# Patient Record
Sex: MALE | Race: WHITE | NOT HISPANIC OR LATINO | Employment: OTHER | URBAN - METROPOLITAN AREA
[De-identification: names, ages, dates, MRNs, and addresses within clinical notes are randomized per-mention and may not be internally consistent; named-entity substitution may affect disease eponyms.]

---

## 2017-01-09 ENCOUNTER — NON-PROVIDER VISIT (OUTPATIENT)
Dept: CARDIOLOGY | Facility: PHYSICIAN GROUP | Age: 52
End: 2017-01-09
Attending: INTERNAL MEDICINE
Payer: OTHER GOVERNMENT

## 2017-01-09 DIAGNOSIS — E11.8 TYPE 2 DIABETES MELLITUS WITH COMPLICATION, WITH LONG-TERM CURRENT USE OF INSULIN (HCC): ICD-10-CM

## 2017-01-09 DIAGNOSIS — Z95.1 HX OF CABG: ICD-10-CM

## 2017-01-09 DIAGNOSIS — I44.7 LBBB (LEFT BUNDLE BRANCH BLOCK): ICD-10-CM

## 2017-01-09 DIAGNOSIS — I10 ESSENTIAL HYPERTENSION: ICD-10-CM

## 2017-01-09 DIAGNOSIS — I50.9 HEART FAILURE, NYHA CLASS 3 (HCC): ICD-10-CM

## 2017-01-09 DIAGNOSIS — G47.33 OSA ON CPAP: ICD-10-CM

## 2017-01-09 DIAGNOSIS — I50.20 ACC/AHA STAGE C SYSTOLIC HEART FAILURE (HCC): ICD-10-CM

## 2017-01-09 DIAGNOSIS — Z79.4 TYPE 2 DIABETES MELLITUS WITH COMPLICATION, WITH LONG-TERM CURRENT USE OF INSULIN (HCC): ICD-10-CM

## 2017-01-09 DIAGNOSIS — I25.5 ISCHEMIC CARDIOMYOPATHY: ICD-10-CM

## 2017-01-09 LAB — LV EJECT FRACT  99904: NORMAL

## 2017-01-18 ENCOUNTER — OFFICE VISIT (OUTPATIENT)
Dept: CARDIOLOGY | Facility: MEDICAL CENTER | Age: 52
End: 2017-01-18
Payer: OTHER GOVERNMENT

## 2017-01-18 VITALS
HEIGHT: 74 IN | WEIGHT: 300 LBS | SYSTOLIC BLOOD PRESSURE: 92 MMHG | DIASTOLIC BLOOD PRESSURE: 60 MMHG | HEART RATE: 76 BPM | OXYGEN SATURATION: 93 % | BODY MASS INDEX: 38.5 KG/M2

## 2017-01-18 DIAGNOSIS — G47.33 OSA ON CPAP: ICD-10-CM

## 2017-01-18 DIAGNOSIS — I50.9 HEART FAILURE, NYHA CLASS 3 (HCC): ICD-10-CM

## 2017-01-18 DIAGNOSIS — I25.5 ISCHEMIC CARDIOMYOPATHY: ICD-10-CM

## 2017-01-18 DIAGNOSIS — I50.20 ACC/AHA STAGE C SYSTOLIC HEART FAILURE (HCC): ICD-10-CM

## 2017-01-18 DIAGNOSIS — I44.7 LBBB (LEFT BUNDLE BRANCH BLOCK): ICD-10-CM

## 2017-01-18 DIAGNOSIS — Z95.1 S/P CABG X 3: ICD-10-CM

## 2017-01-18 DIAGNOSIS — R06.02 SHORTNESS OF BREATH: ICD-10-CM

## 2017-01-18 DIAGNOSIS — Z95.1 HX OF CABG: ICD-10-CM

## 2017-01-18 PROCEDURE — 99214 OFFICE O/P EST MOD 30 MIN: CPT | Mod: 25 | Performed by: INTERNAL MEDICINE

## 2017-01-18 PROCEDURE — 94620 PR PULMONARY STRESS TESTING,SIMPLE: CPT | Performed by: INTERNAL MEDICINE

## 2017-01-18 ASSESSMENT — ENCOUNTER SYMPTOMS
BRUISES/BLEEDS EASILY: 0
NAUSEA: 0
MYALGIAS: 0
EYE PAIN: 0
HALLUCINATIONS: 0
PALPITATIONS: 0
HEADACHES: 0
SPEECH CHANGE: 0
DEPRESSION: 0
FALLS: 0
SHORTNESS OF BREATH: 1
CHILLS: 0
PND: 0
SENSORY CHANGE: 0
ORTHOPNEA: 0
BLURRED VISION: 0
BLOOD IN STOOL: 0
EYE DISCHARGE: 0
CLAUDICATION: 0
DIZZINESS: 0
FEVER: 0
DOUBLE VISION: 0
LOSS OF CONSCIOUSNESS: 0
VOMITING: 0
WEIGHT LOSS: 0
ABDOMINAL PAIN: 0
COUGH: 0

## 2017-01-18 NOTE — PROGRESS NOTES
Subjective:   Juaquin Solorzano is a 51 y.o. male who presents today for cardiac care and evaluation of his prior history of CABG. In 9/2013, patient underwent triple bypass surgery. In 2014 he underwent open sternotomy again because of complications. Patient also has a history of sleep apnea for which he has been using CPAP. Patient is obese.     Patient also has diabetes for which he is then controlled with insulin injections.    Patient was able to complete 314 m during his 6 minute walk test. his O2 saturation at baseline was 93% and at the end of the test, the O2 saturation was 95%. He reported 8 level of dyspnea on Anson scale.    Because of his report of exertional dyspnea, I obtain a transthoracic echocardiogram which shows left ventricular systolic function of 30%. Patient underwent ischemic evaluation with coronary angiogram. Essentially, patient is living off of of his TURNER to [facility]. His LV gram shows left ventricular systolic function of 50-55%. Patient still reports having shortness of breath. He is able to walk for half a mile however at slow. He is tolerating Entresto therapy well.     I repeated his transthoracic echocardiogram which she had one week ago which shows evidence of 50% in terms of his left ventricular systolic function. Patient continues to report of having shortness of breath even when tying his shoes. However, his 6 minute walk test is much improved because he was not even able to walk at the last visit.    Past Medical History   Diagnosis Date   • Myocardial infarct (CMS-HCC) 2007   • Myocardial infarct (CMS-HCC) 2012   • Bronchitis 2014   • Sleep apnea      cpap   • Arthritis      osteo   • Fibromyalgia    • Diabetes (CMS-HCC) 2006     insulin   • Cataract      early stages   • Psychiatric problem      depression and anxiety     Past Surgical History   Procedure Laterality Date   • Other cardiac surgery  2014     cabg x 3   • Other  2015     sternal revision   • Recovery  7/22/2016      Procedure: CATH LAB-[facility] W/POSSIBLE RADIAL APPROACH-CARIE;  Surgeon: Recoveryonly Surgery;  Location: SURGERY PRE-POST PROC UNIT [place];  Service:      Family History   Problem Relation Age of Onset   • Heart Disease Mother    • Hyperlipidemia Father    • Heart Disease Father    • Hypertension Father    • Stroke Father      History   Smoking status   • Former Smoker -- 0.50 packs/day for 12 years   • Types: Cigarettes   • Quit date: 07/15/2012   Smokeless tobacco   • Not on file     Allergies   Allergen Reactions   • Iodine      Outpatient Encounter Prescriptions as of 1/18/2017   Medication Sig Dispense Refill   • Sacubitril-Valsartan (ENTRESTO)  MG Tab Take  mg by mouth 2 Times a Day. 60 Tab 11   • spironolactone (ALDACTONE) 25 MG Tab Take 1 Tab by mouth every day. 30 Tab 3   • amitriptyline (ELAVIL) 75 MG Tab Take 75 mg by mouth every evening.     • allopurinol (ZYLOPRIM) 100 MG Tab Take 100 mg by mouth 2 Times a Day.     • vitamin D, Ergocalciferol, (DRISDOL) 97147 UNITS Cap capsule Take  by mouth every 7 days.     • Pyridoxine HCl (VITAMIN B-6 PO) Take  by mouth.     • insulin lispro (HUMALOG) 100 UNIT/ML Solution Inject  as instructed 3 times a day before meals.     • insulin glargine (LANTUS) 100 UNIT/ML Solution Inject  as instructed every evening.     • esomeprazole magnesium (NEXIUM) 40 MG Pack Take 40 mg by mouth every morning before breakfast.     • aspirin 81 MG tablet Take 81 mg by mouth every day.     • escitalopram (LEXAPRO) 10 MG Tab Take 10 mg by mouth every day.     • atorvastatin (LIPITOR) 40 MG Tab Take 40 mg by mouth every evening.     • metformin (GLUCOPHAGE) 1000 MG tablet Take 1,000 mg by mouth 2 times a day, with meals.     • metoprolol SR (TOPROL XL) 100 MG TABLET SR 24 HR Take 100 mg by mouth 2 Times a Day. Two tablets twice a day     • Omega-3 Fatty Acids (FISH OIL) 1000 MG Cap capsule Take 1,000 mg by mouth 3 times a day, with meals.     • alprazolam (XANAX) 0.5 MG Tab Take  "0.5 mg by mouth at bedtime as needed for Sleep. NOT TAKING       No facility-administered encounter medications on file as of 1/18/2017.     Review of Systems   Constitutional: Negative for fever, chills, weight loss and malaise/fatigue.   HENT: Negative for ear discharge, ear pain, hearing loss and nosebleeds.    Eyes: Negative for blurred vision, double vision, pain and discharge.   Respiratory: Positive for shortness of breath. Negative for cough.    Cardiovascular: Negative for chest pain, palpitations, orthopnea, claudication, leg swelling and PND.   Gastrointestinal: Negative for nausea, vomiting, abdominal pain, blood in stool and melena.   Genitourinary: Negative for dysuria and hematuria.   Musculoskeletal: Negative for myalgias, joint pain and falls.   Skin: Negative for itching and rash.   Neurological: Negative for dizziness, sensory change, speech change, loss of consciousness and headaches.   Endo/Heme/Allergies: Negative for environmental allergies. Does not bruise/bleed easily.   Psychiatric/Behavioral: Negative for depression, suicidal ideas and hallucinations.        Objective:   BP 92/60 mmHg  Pulse 76  Ht 1.88 m (6' 2.02\")  Wt 136.079 kg (300 lb)  BMI 38.50 kg/m2  SpO2 93%    Physical Exam   Constitutional: He is oriented to person, place, and time. No distress.   HENT:   Head: Normocephalic and atraumatic.   Eyes: EOM are normal.   Neck: Normal range of motion. No JVD present.   Cardiovascular: Normal rate, regular rhythm, normal heart sounds and intact distal pulses.  Exam reveals no gallop and no friction rub.    No murmur heard.  Bilateral femoral pulses are 2+, bilateral dorsalis pedis pulses are 2+, bilateral posterior tibialis pulses are 2+.   Pulmonary/Chest: No respiratory distress.   Abdominal: Soft. Bowel sounds are normal. There is no tenderness. There is no rebound and no guarding.   The is no presence of abdominal bruits   Musculoskeletal: Normal range of motion. He exhibits no " edema or tenderness.   Neurological: He is alert and oriented to person, place, and time.   Skin: Skin is warm and dry.   Psychiatric: He has a normal mood and affect.   Nursing note and vitals reviewed.      Assessment:     1. ACC/AHA stage C systolic heart failure (CMS-HCC)  DE PULMONARY STRESS TESTING,SIMPLE    PULMONARY FUNCTION TESTS Test requested: Complete Pulmonary Function Test   2. Heart failure, NYHA class 3 (CMS-[facility])  DE PULMONARY STRESS TESTING,SIMPLE    PULMONARY FUNCTION TESTS Test requested: Complete Pulmonary Function Test   3. Hx of CABG  DE PULMONARY STRESS TESTING,SIMPLE    PULMONARY FUNCTION TESTS Test requested: Complete Pulmonary Function Test   4. Ischemic cardiomyopathy  DE PULMONARY STRESS TESTING,SIMPLE    PULMONARY FUNCTION TESTS Test requested: Complete Pulmonary Function Test   5. S/P CABG x 3  DE PULMONARY STRESS TESTING,SIMPLE    PULMONARY FUNCTION TESTS Test requested: Complete Pulmonary Function Test   6. LBBB (left bundle branch block)  DE PULMONARY STRESS TESTING,SIMPLE    PULMONARY FUNCTION TESTS Test requested: Complete Pulmonary Function Test   7. CLARE on CPAP  DE PULMONARY STRESS TESTING,SIMPLE    PULMONARY FUNCTION TESTS Test requested: Complete Pulmonary Function Test   8. Shortness of breath  DE PULMONARY STRESS TESTING,SIMPLE    PULMONARY FUNCTION TESTS Test requested: Complete Pulmonary Function Test       Medical Decision Making:  Today's Assessment / Status / Plan:     Heart failure: EF improved to 50%. Pt tolerating medications, but continues to complain of SOB in some instances such as tying his shoes. Pt may have underlying lung disease, will obtain a PFT to evaluate further. No medication adjustments to be made at this time.     At this time, I am not certain that his shortness of breath is entirely cardiac. I would advise him to lose some weight also.    Continue current medical therapy without change today. His blood pressure is borderline but he is not having  lightheadedness or passing out episodes.    Today, based on physical examination findings, patient is euvolemic. No JVD, lungs are clear to auscultation, no pitting edema in bilateral lower extremities, no ascites.    No indication for ICD at this time.    Pt to follow up with Evita VELA in 4 months. Sooner if needed.

## 2017-01-18 NOTE — MR AVS SNAPSHOT
"        Juaquin RAINNelson Jeanine   2017 9:45 AM   Office Visit   MRN: [id number]    Department:  [facility]   Dept Phone:  [phone]    Description:  Male : 1965   Provider:  Adrian Simms M.D.           Reason for Visit     Follow-Up           Allergies as of 2017     Allergen Noted Reactions    Iodine 2016         You were diagnosed with     ACC/AHA stage C systolic heart failure (CMS-[facility])   [0012440]       Heart failure, NYHA class 3 (CMS-[facility])   [794009]       Hx of CABG   [084742]       Ischemic cardiomyopathy   [806337]       S/P CABG x 3   [984495]       LBBB (left bundle branch block)   [251849]       CLARE on CPAP   [182414]       Shortness of breath   [786.05.ICD-9-CM]         Vital Signs     Blood Pressure Pulse Height Weight Body Mass Index Oxygen Saturation    92/60 mmHg 76 1.88 m (6' 2.02\") 136.079 kg (300 lb) 38.50 kg/m2 93%    Smoking Status                   Former Smoker           Basic Information     Date Of Birth Sex Race Ethnicity Preferred Language    1965 Male White Non- English      Health Maintenance        Date Due Completion Dates    IMM HEP B VACCINE (1 of 3 - Primary Series) 1965 ---    A1C SCREENING 1/3/1967 ---    DIABETES MONOFILAMENT / LE EXAM 1/3/1967 ---    RETINAL SCREENING 7/3/1984 ---    FASTING LIPID PROFILE 7/3/1984 ---    URINE ACR / MICROALBUMIN 7/3/1984 ---    IMM DTaP/Tdap/Td Vaccine (1 - Tdap) 7/3/1985 ---    COLONOSCOPY 7/3/2016 ---    IMM INFLUENZA (1) 2016 ---    SERUM CREATININE 2017, 2016, 2016            Current Immunizations     No immunizations on file.      Below and/or attached are the medications your provider expects you to take. Review all of your home medications and newly ordered medications with your provider and/or pharmacist. Follow medication instructions as directed by your provider and/or pharmacist. Please keep your medication list with you and share with your provider. Update the " information when medications are discontinued, doses are changed, or new medications (including over-the-counter products) are added; and carry medication information at all times in the event of emergency situations     Allergies:  IODINE - (reactions not documented)               Medications  Valid as of: January 18, 2017 - 10:26 AM    Generic Name Brand Name Tablet Size Instructions for use    Allopurinol (Tab) ZYLOPRIM 100 MG Take 100 mg by mouth 2 Times a Day.        ALPRAZolam (Tab) XANAX 0.5 MG Take 0.5 mg by mouth at bedtime as needed for Sleep. NOT TAKING        Amitriptyline HCl (Tab) ELAVIL 75 MG Take 75 mg by mouth every evening.        Aspirin (Tab) aspirin 81 MG Take 81 mg by mouth every day.        Atorvastatin Calcium (Tab) LIPITOR 40 MG Take 40 mg by mouth every evening.        Ergocalciferol (Cap) DRISDOL 51636 UNITS Take  by mouth every 7 days.        Escitalopram Oxalate (Tab) LEXAPRO 10 MG Take 10 mg by mouth every day.        Esomeprazole Magnesium (Pack) NEXIUM 40 MG Take 40 mg by mouth every morning before breakfast.        Insulin Glargine (Solution) LANTUS 100 UNIT/ML Inject  as instructed every evening.        Insulin Lispro (Solution) HUMALOG 100 UNIT/ML Inject  as instructed 3 times a day before meals.        MetFORMIN HCl (Tab) GLUCOPHAGE 1000 MG Take 1,000 mg by mouth 2 times a day, with meals.        Metoprolol Succinate (TABLET SR 24 HR) TOPROL  MG Take 100 mg by mouth 2 Times a Day. Two tablets twice a day        Omega-3 Fatty Acids (Cap) fish oil 1000 MG Take 1,000 mg by mouth 3 times a day, with meals.        Pyridoxine HCl   Take  by mouth.        Sacubitril-Valsartan (Tab) ENTRESTO  MG Take  mg by mouth 2 Times a Day.        Spironolactone (Tab) ALDACTONE 25 MG Take 1 Tab by mouth every day.        .                 Medicines prescribed today were sent to:     [facility] - [facility], [place] - [address] [place] [address]  [place] [address]    Phone: [phone] Fax: [phone]    Open 24 Hours?: No    [facility] [place] [facility] [place], [place] - [address]    [address] [place] [address]    Phone: [phone] Fax: [phone]    Open 24 Hours?: No      Medication refill instructions:       If your prescription bottle indicates you have medication refills left, it is not necessary to call your provider’s office. Please contact your pharmacy and they will refill your medication.    If your prescription bottle indicates you do not have any refills left, you may request refills at any time through one of the following ways: The online CardiOx system (except Urgent Care), by calling your provider’s office, or by asking your pharmacy to contact your provider’s office with a refill request. Medication refills are processed only during regular business hours and may not be available until the next business day. Your provider may request additional information or to have a follow-up visit with you prior to refilling your medication.   *Please Note: Medication refills are assigned a new Rx number when refilled electronically. Your pharmacy may indicate that no refills were authorized even though a new prescription for the same medication is available at the pharmacy. Please request the medicine by name with the pharmacy before contacting your provider for a refill.           CardiOx Access Code: Activation code not generated  Current CardiOx Status: Active

## 2017-01-19 NOTE — PROGRESS NOTES
Reevaluation of 6MWT/MLWHF    Never had original 6MWT/MLWHF    1/18/2017:  6MWT: 313.9 meters  MLWHF: 83    Taylor, HF RN  [phone]

## 2017-01-30 ENCOUNTER — HOSPITAL ENCOUNTER (OUTPATIENT)
Dept: OTHER | Facility: MEDICAL CENTER | Age: 52
End: 2017-01-30
Attending: INTERNAL MEDICINE
Payer: OTHER GOVERNMENT

## 2017-01-30 DIAGNOSIS — I50.9 HEART FAILURE, NYHA CLASS 3 (HCC): ICD-10-CM

## 2017-01-30 DIAGNOSIS — R06.02 SHORTNESS OF BREATH: ICD-10-CM

## 2017-01-30 DIAGNOSIS — I25.5 ISCHEMIC CARDIOMYOPATHY: ICD-10-CM

## 2017-01-30 DIAGNOSIS — Z95.1 HX OF CABG: ICD-10-CM

## 2017-01-30 DIAGNOSIS — I50.20 ACC/AHA STAGE C SYSTOLIC HEART FAILURE (HCC): ICD-10-CM

## 2017-01-30 DIAGNOSIS — Z95.1 S/P CABG X 3: ICD-10-CM

## 2017-01-30 DIAGNOSIS — I44.7 LBBB (LEFT BUNDLE BRANCH BLOCK): ICD-10-CM

## 2017-01-30 DIAGNOSIS — G47.33 OSA ON CPAP: ICD-10-CM

## 2017-01-30 PROCEDURE — 94729 DIFFUSING CAPACITY: CPT | Mod: 26 | Performed by: INTERNAL MEDICINE

## 2017-01-30 PROCEDURE — 94726 PLETHYSMOGRAPHY LUNG VOLUMES: CPT | Mod: 26 | Performed by: INTERNAL MEDICINE

## 2017-01-30 PROCEDURE — 94060 EVALUATION OF WHEEZING: CPT

## 2017-01-30 PROCEDURE — 94060 EVALUATION OF WHEEZING: CPT | Mod: 26 | Performed by: INTERNAL MEDICINE

## 2017-01-30 PROCEDURE — 94726 PLETHYSMOGRAPHY LUNG VOLUMES: CPT

## 2017-01-30 PROCEDURE — 94729 DIFFUSING CAPACITY: CPT

## 2017-01-30 ASSESSMENT — PULMONARY FUNCTION TESTS
FVC: 3.72
FEV1/FVC: 85
FEV1/FVC_PERCENT_PREDICTED: 106
FEV1_PERCENT_PREDICTED: 73
FEV1_PERCENT_CHANGE: -3
FEV1/FVC_PERCENT_PREDICTED: 111
FEV1: 3.08
FEV1/FVC_PERCENT_CHANGE: -300
FVC_PERCENT_PREDICTED: 67
FEV1: 3.16
FEV1_PREDICTED: 4.35
FEV1_PERCENT_PREDICTED: 71
FVC: 3.76
FVC_PERCENT_PREDICTED: 66
FEV1/FVC: 81.91
FEV1/FVC_PERCENT_PREDICTED: 77
FEV1_PERCENT_CHANGE: 1
FVC_PREDICTED: 5.62

## 2017-01-31 NOTE — PROCEDURES
INTERPRETATION:  Spirometry reveals FVC mildly reduced at 3.72 liters, 66% of   predicted.  FEV1 is also mildly reduced at 3.16 liters, 73% of predicted.    FEV1/FVC ratio is super normal at 85%.  There is no improvement post   bronchodilator.  There is no dynamic airway collapse.  Total lung capacity is   normal at 7.02 liters, 92% of predicted.  There is some hyperinflation evident   that is mild with RV elevated at 2.91 liters, 132% of predicted.  RV/TLC   ratio is elevated at 138%.  Diffusion capacity is normal.  Airway resistance   is normal.  Flow volume loop is noted and has marginal restrictive pattern.    Room air sats normal at 97%.  Effort was good.    ASSESSMENT:  Spirometry suggests mild restrictive lung disease and no evidence   of obstruction.  Plethysmography reveals mild hyperinflation and no evidence   of restrictive lung disease.  Gas transfer is normal as is airway resistance.    Clinical correlation is recommended.       ____________________________________     MD EVANS CUI / DEVEN    DD:  01/31/2017 11:16:45  DT:  01/31/2017 15:19:45    D#:  [id number]  Job#:  [id number]

## 2017-02-20 RX ORDER — ESOMEPRAZOLE MAGNESIUM 40 MG/1
CAPSULE, DELAYED RELEASE ORAL
Qty: 90 CAP | Refills: 2 | Status: SHIPPED | OUTPATIENT
Start: 2017-02-20

## 2017-05-18 ENCOUNTER — OFFICE VISIT (OUTPATIENT)
Dept: CARDIOLOGY | Facility: MEDICAL CENTER | Age: 52
End: 2017-05-18
Payer: OTHER GOVERNMENT

## 2017-05-18 VITALS
OXYGEN SATURATION: 95 % | WEIGHT: 292 LBS | SYSTOLIC BLOOD PRESSURE: 106 MMHG | BODY MASS INDEX: 37.47 KG/M2 | HEIGHT: 74 IN | HEART RATE: 68 BPM | DIASTOLIC BLOOD PRESSURE: 74 MMHG

## 2017-05-18 DIAGNOSIS — E11.8 TYPE 2 DIABETES MELLITUS WITH COMPLICATION, WITH LONG-TERM CURRENT USE OF INSULIN (HCC): ICD-10-CM

## 2017-05-18 DIAGNOSIS — I50.20 ACC/AHA STAGE C SYSTOLIC HEART FAILURE (HCC): ICD-10-CM

## 2017-05-18 DIAGNOSIS — Z95.1 S/P CABG X 3: ICD-10-CM

## 2017-05-18 DIAGNOSIS — I44.7 LBBB (LEFT BUNDLE BRANCH BLOCK): ICD-10-CM

## 2017-05-18 DIAGNOSIS — I25.10 CORONARY ARTERY DISEASE INVOLVING NATIVE CORONARY ARTERY OF NATIVE HEART WITHOUT ANGINA PECTORIS: ICD-10-CM

## 2017-05-18 DIAGNOSIS — I10 ESSENTIAL HYPERTENSION: ICD-10-CM

## 2017-05-18 DIAGNOSIS — R06.02 SOB (SHORTNESS OF BREATH): ICD-10-CM

## 2017-05-18 DIAGNOSIS — I25.5 ISCHEMIC CARDIOMYOPATHY: ICD-10-CM

## 2017-05-18 DIAGNOSIS — Z79.4 TYPE 2 DIABETES MELLITUS WITH COMPLICATION, WITH LONG-TERM CURRENT USE OF INSULIN (HCC): ICD-10-CM

## 2017-05-18 DIAGNOSIS — I50.9 HEART FAILURE, NYHA CLASS 3 (HCC): ICD-10-CM

## 2017-05-18 PROCEDURE — 99214 OFFICE O/P EST MOD 30 MIN: CPT | Performed by: NURSE PRACTITIONER

## 2017-05-18 PROCEDURE — 94620 PR PULMONARY STRESS TESTING,SIMPLE: CPT | Performed by: NURSE PRACTITIONER

## 2017-05-18 RX ORDER — BUSPIRONE HYDROCHLORIDE 10 MG/1
10 TABLET ORAL 2 TIMES DAILY
COMMUNITY
End: 2018-03-05

## 2017-05-18 RX ORDER — GABAPENTIN 300 MG/1
300 CAPSULE ORAL 3 TIMES DAILY
COMMUNITY
End: 2018-03-05

## 2017-05-18 ASSESSMENT — MINNESOTA LIVING WITH HEART FAILURE QUESTIONNAIRE (MLHF)
TIRED, FATIGUED OR LOW ON ENERGY: 5
EATING LESS FOODS YOU LIKE: 5
MAKING YOU STAY IN A HOSPITAL: 1
LOSS OF SELF CONTROL IN YOUR LIFE: 5
MAKING YOU WORRY: 5
DIFFICULTY WITH SEXUAL ACTIVITIES: 5
SWELLING IN ANKLES OR LEGS: 5
MAKING YOU SHORT OF BREATH: 5
GIVING YOU SIDE EFFECTS FROM TREATMENTS: 4
DIFFICULTY WORKING TO EARN A LIVING: 5
DIFFICULTY SLEEPING WELL AT NIGHT: 3
WALKING ABOUT OR CLIMBING STAIRS DIFFICULT: 5
FEELING LIKE A BURDEN TO FAMILY AND FRIENDS: 5
DIFFICULTY WITH RECREATIONAL PASTIMES, SPORTS, HOBBIES: 5
WORKING AROUND THE HOUSE OR YARD DIFFICULT: 5
COSTING YOU MONEY FOR MEDICAL CARE: 3
TOTAL_SCORE: 94
HAVING TO SIT OR LIE DOWN DURING THE DAY: 5
DIFFICULTY TO CONCENTRATE OR REMEMBERING THINGS: 5
DIFFICULTY SOCIALIZING WITH FAMILY OR FRIENDS: 5
DIFFICULTY GOING AWAY FROM HOME: 3
MAKING YOU FEEL DEPRESSED: 5

## 2017-05-18 ASSESSMENT — ENCOUNTER SYMPTOMS
FEVER: 1
ABDOMINAL PAIN: 0
SHORTNESS OF BREATH: 1
CLAUDICATION: 0
COUGH: 0
ORTHOPNEA: 0
MYALGIAS: 0
DIZZINESS: 1
PALPITATIONS: 0
PND: 0

## 2017-05-18 ASSESSMENT — NEW YORK HEART ASSOCIATION (NYHA) CLASSIFICATION: NYHA FUNCTIONAL CLASS: CLASS III

## 2017-05-18 ASSESSMENT — 6 MINUTE WALK TEST (6MWT): TOTAL DISTANCE WALKED (METERS): 186

## 2017-05-18 NOTE — PROGRESS NOTES
Subjective:   Juaquin Solorzano is a 51 y.o. male who presents today for follow-up on his heart failure.    Patient of Dr. Simms. Patient was last seen 1/18/17. Patient has heart failure with reduced EF of now 50% from 30%. Patient has a history of CABG ×3 in 2013 with complications, ischemic cardiomyopathy, left bundle branch block, obstructive sleep apnea which he uses CPAP regularly, insulin-dependent diabetic. No medication changes were made for his heart failure. Patient was sent for a PFT to evaluate lung function.   Since his last visit, patient has felt that his shortness of breath is slightly worse. Patient denies chest pain with exertion, palpitations, orthopnea, PND or edema.    Patient has not taken his home weights regularly, his last home was about 2 weeks ago at 295 pounds.    At 6 minute walk test re-evaluation, patient was able to complete 186 m during his 6 minute walk test. He O2 saturation at baseline was 95% and at the end of the test, the O2 saturation was 93%. He reported level 7 of dyspnea on Anson scale. MLWHF 94    Past Medical History   Diagnosis Date   • Myocardial infarct (CMS-HCC) 2007   • Myocardial infarct (CMS-[facility]) 2012   • Bronchitis 2014   • Sleep apnea      cpap   • Arthritis      osteo   • Fibromyalgia    • Diabetes (CMS-HCC) 2006     insulin   • Cataract      early stages   • Psychiatric problem      depression and anxiety     Past Surgical History   Procedure Laterality Date   • Other cardiac surgery  2014     cabg x 3   • Other  2015     sternal revision   • Recovery  7/22/2016     Procedure: CATH LAB-[facility] W/POSSIBLE RADIAL APPROACH-CARIE;  Surgeon: Recoveryonly Surgery;  Location: SURGERY PRE-POST [facility] UN[facility] [place];  Service:      Family History   Problem Relation Age of Onset   • Heart Disease Mother    • Diabetes Mother    • Hyperlipidemia Father    • Heart Disease Father    • Hypertension Father    • Stroke Father    • Lung Disease Sister      History   Smoking status   •  Former Smoker -- 0.50 packs/day for 12 years   • Types: Cigarettes   • Quit date: 07/15/2012   Smokeless tobacco   • Not on file     Allergies   Allergen Reactions   • Iodine      Outpatient Encounter Prescriptions as of 5/18/2017   Medication Sig Dispense Refill   • busPIRone (BUSPAR) 10 MG Tab Take 10 mg by mouth 2 times a day.     • gabapentin (NEURONTIN) 300 MG Cap Take 300 mg by mouth 3 times a day.     • Sacubitril-Valsartan (ENTRESTO)  MG Tab Take  mg by mouth 2 Times a Day. 60 Tab 11   • spironolactone (ALDACTONE) 25 MG Tab Take 1 Tab by mouth every day. 30 Tab 3   • amitriptyline (ELAVIL) 75 MG Tab Take 75 mg by mouth every evening.     • allopurinol (ZYLOPRIM) 100 MG Tab Take 100 mg by mouth 2 Times a Day.     • Pyridoxine HCl (VITAMIN B-6 PO) Take  by mouth.     • insulin lispro (HUMALOG) 100 UNIT/ML Solution Inject  as instructed 3 times a day before meals.     • insulin glargine (LANTUS) 100 UNIT/ML Solution Inject  as instructed every evening.     • esomeprazole magnesium (NEXIUM) 40 MG Pack Take 40 mg by mouth every morning before breakfast.     • aspirin 81 MG tablet Take 81 mg by mouth every day.     • escitalopram (LEXAPRO) 10 MG Tab Take 10 mg by mouth every day.     • atorvastatin (LIPITOR) 40 MG Tab Take 40 mg by mouth every evening.     • metformin (GLUCOPHAGE) 1000 MG tablet Take 1,000 mg by mouth 2 times a day, with meals.     • metoprolol SR (TOPROL XL) 100 MG TABLET SR 24 HR Take 100 mg by mouth 2 Times a Day. Two tablets twice a day     • Omega-3 Fatty Acids (FISH OIL) 1000 MG Cap capsule Take 1,000 mg by mouth 3 times a day, with meals.     • alprazolam (XANAX) 0.5 MG Tab Take 0.5 mg by mouth at bedtime as needed for Sleep. NOT TAKING     • vitamin D, Ergocalciferol, (DRISDOL) 09300 UNITS Cap capsule Take  by mouth every 7 days.       No facility-administered encounter medications on file as of 5/18/2017.     Review of Systems   Constitutional: Positive for fever and  "malaise/fatigue.   Respiratory: Positive for shortness of breath. Negative for cough.    Cardiovascular: Positive for chest pain (aching from prev CABG, no exertional CP). Negative for palpitations, orthopnea, claudication, leg swelling and PND.   Gastrointestinal: Negative for abdominal pain.   Musculoskeletal: Negative for myalgias.   Neurological: Positive for dizziness (with standing too quickly).   All other systems reviewed and are negative.       Objective:   /74 mmHg  Pulse 68  Ht 1.88 m (6' 2\")  Wt 132.45 kg (292 lb)  BMI 37.47 kg/m2  SpO2 95%    Physical Exam   Constitutional: He is oriented to person, place, and time. He appears well-developed and well-nourished.   Obese BMI 37.49   HENT:   Head: Normocephalic and atraumatic.   Eyes: EOM are normal.   Neck: Normal range of motion. Neck supple. No JVD present.   Cardiovascular: Normal rate, regular rhythm, normal heart sounds and intact distal pulses.    No murmur heard.  Pulmonary/Chest: Effort normal and breath sounds normal. No respiratory distress. He has no wheezes. He has no rales.   Abdominal: Soft. Bowel sounds are normal.   Musculoskeletal: Normal range of motion. He exhibits no edema.   Neurological: He is alert and oriented to person, place, and time.   Skin: Skin is warm and dry.   Psychiatric: He has a normal mood and affect.   Nursing note and vitals reviewed.    No results found for: CHOLSTRLTOT, LDL, HDL, TRIGLYCERIDE    Lab Results   Component Value Date/Time    SODIUM 139 11/30/2016 09:15 AM    POTASSIUM 5.1 11/30/2016 09:15 AM    CHLORIDE 105 11/30/2016 09:15 AM    CO2 28 11/30/2016 09:15 AM    GLUCOSE 182* 11/30/2016 09:15 AM    BUN 15 11/30/2016 09:15 AM    CREATININE 1.08 11/30/2016 09:15 AM     No results found for: ALKPHOSPHAT, ASTSGOT, ALTSGPT, TBILIRUBIN     PFT 1/30/17  ASSESSMENT:  Spirometry suggests mild restrictive lung disease and no evidence   of obstruction.  Plethysmography reveals mild hyperinflation and no " evidence    of restrictive lung disease.  Gas transfer is normal as is airway resistance.     Clinical correlation is recommended.  Transthoracic  Echo Report      Echocardiography Laboratory 7/11/16    CONCLUSIONS  Technically difficult study incomplete information is obtained  Left ventricle not well visualized.  Akinesis of the mid to distal septum, apex, distal half of the lateral   wall, distal half of the anterior wall and inferoapical region.    There is relative sparing of the basal LV segments.  Left ventricular ejection fraction is visually estimated to be  30% and   dilated.  Right ventricle not well visualized.  Right ventricle not well visualized.  Appears to be mildly dilated (2D).  Normal pericardium without effusion.  The aortic valve is not well visualized.  Structurally normal mitral valve without significant stenosis or   regurgitation.    Heart cath 7/22/16   POSTOPERATIVE DIAGNOSES:  1.  Occluded vein grafts.  2.  Patent LIMA to LAD.  3.  Diffuse distal RCA disease.  4.  A 100% occluded LAD stent.  5.  Moderate nonobstructive left circumflex disease.  6.  LVEF 50%.    Echo from 1/9/17 in MEDIA tab  Assessment:     1. ACC/AHA stage C systolic heart failure (CMS-[facility])  LIPID PROFILE    ECHOCARDIOGRAM COMP W/O CONT   2. Heart failure, NYHA class 3 (CMS-[facility])  LIPID PROFILE    ECHOCARDIOGRAM COMP W/O CONT   3. Ischemic cardiomyopathy  LIPID PROFILE    ECHOCARDIOGRAM COMP W/O CONT   4. Coronary artery disease involving native coronary artery of native heart without angina pectoris  LIPID PROFILE    ECHOCARDIOGRAM COMP W/O CONT   5. S/P CABG x 3  LIPID PROFILE    ECHOCARDIOGRAM COMP W/O CONT   6. Type 2 diabetes mellitus with complication, with long-term current use of insulin (CMS-[facility])  LIPID PROFILE    ECHOCARDIOGRAM COMP W/O CONT   7. Essential hypertension  LIPID PROFILE    ECHOCARDIOGRAM COMP W/O CONT   8. LBBB (left bundle branch block)  ECHOCARDIOGRAM COMP W/O CONT   9. SOB (shortness of breath)          Medical Decision Making:  Today's Assessment / Status / Plan:   1. HFrEF, Stage C ,Class 3, EF 50%: Based on physical examination findings, patient is euvolemic. No JVD, lungs are clear to auscultation, no pitting edema in bilateral lower extremities, no ascites.  -Continue Toprol- mg twice a day  -Continue Entresto  mg twice a day   -Continue spironolactone 25 mg daily  Reinforced s/sx of worsening heart failure with patient and weight monitoring. Pt verbalizes understanding. Pt to call office or RTC if present.   -Repeat echo (d/t some increase in SOB)    2. CAD:  -Continue aspirin 81 mg daily  -Continue atorvastatin 40 mg daily  -No recent lipid panel, will Repeat lipid panel    3. Diabetes:  -Continue insulin and metformin    4. hypertension:   -Per above      FU in clinic in 3-4 weeks after echo with Dr. Simms. Sooner if needed.    Patient verbalizes understanding and agrees with the plan of care.     Collaborating MD: Lux Renteria MD

## 2017-05-18 NOTE — MR AVS SNAPSHOT
"        Juaquin RAINNelson Solorzano   2017 12:40 PM   Office Visit   MRN: [id number]    Department:  [facility]   Dept Phone:  [phone]    Description:  Male : 1965   Provider:  LAINA Starr           Reason for Visit     Follow-Up           Allergies as of 2017     Allergen Noted Reactions    Iodine 2016         You were diagnosed with     ACC/AHA stage C systolic heart failure (CMS-[facility])   [8836955]       Heart failure, NYHA class 3 (CMS-[facility])   [664964]       Ischemic cardiomyopathy   [609839]       Coronary artery disease involving native coronary artery of native heart without angina pectoris   [2700168]       S/P CABG x 3   [513263]       Type 2 diabetes mellitus with complication, with long-term current use of insulin (CMS-HCC)   [6955705]       Essential hypertension   [9568765]       LBBB (left bundle branch block)   [994946]         Vital Signs     Blood Pressure Pulse Height Weight Body Mass Index Oxygen Saturation    106/74 mmHg 68 1.88 m (6' 2\") 132.45 kg (292 lb) 37.47 kg/m2 95%    Smoking Status                   Former Smoker           Basic Information     Date Of Birth Sex Race Ethnicity Preferred Language    1965 Male White Non- English      Problem List              ICD-10-CM Priority Class Noted - Resolved    ACC/AHA stage C systolic heart failure (CMS-[facility]) I50.20 High  2017 - Present    S/P CABG x 3 Z95.1 High  2017 - Present    Heart failure, NYHA class 3 (CMS-[facility]) I50.9 High  2017 - Present    Type 2 diabetes mellitus with complication, with long-term current use of insulin (CMS-HCC) E11.8, Z79.4   2017 - Present    Essential hypertension I10   2017 - Present      Health Maintenance        Date Due Completion Dates    IMM HEP B VACCINE (1 of 3 - Primary Series) 1965 ---    A1C SCREENING 1/3/1967 ---    DIABETES MONOFILAMENT / LE EXAM 1/3/1967 ---    RETINAL SCREENING 7/3/1984 ---    FASTING LIPID PROFILE 7/3/1984 ---   " URINE ACR / MICROALBUMIN 7/3/1984 ---    IMM DTaP/Tdap/Td Vaccine (1 - Tdap) 7/3/1985 ---    COLONOSCOPY 7/3/2016 ---    SERUM CREATININE 11/30/2017 11/30/2016, 9/7/2016, 7/21/2016            Current Immunizations     No immunizations on file.      Below and/or attached are the medications your provider expects you to take. Review all of your home medications and newly ordered medications with your provider and/or pharmacist. Follow medication instructions as directed by your provider and/or pharmacist. Please keep your medication list with you and share with your provider. Update the information when medications are discontinued, doses are changed, or new medications (including over-the-counter products) are added; and carry medication information at all times in the event of emergency situations     Allergies:  IODINE - (reactions not documented)               Medications  Valid as of: May 18, 2017 -  1:36 PM    Generic Name Brand Name Tablet Size Instructions for use    Allopurinol (Tab) ZYLOPRIM 100 MG Take 100 mg by mouth 2 Times a Day.        ALPRAZolam (Tab) XANAX 0.5 MG Take 0.5 mg by mouth at bedtime as needed for Sleep. NOT TAKING        Amitriptyline HCl (Tab) ELAVIL 75 MG Take 75 mg by mouth every evening.        Aspirin (Tab) aspirin 81 MG Take 81 mg by mouth every day.        Atorvastatin Calcium (Tab) LIPITOR 40 MG Take 40 mg by mouth every evening.        BusPIRone HCl (Tab) BUSPAR 10 MG Take 10 mg by mouth 2 times a day.        Ergocalciferol (Cap) DRISDOL 64912 UNITS Take  by mouth every 7 days.        Escitalopram Oxalate (Tab) LEXAPRO 10 MG Take 10 mg by mouth every day.        Esomeprazole Magnesium (Pack) NEXIUM 40 MG Take 40 mg by mouth every morning before breakfast.        Gabapentin (Cap) NEURONTIN 300 MG Take 300 mg by mouth 3 times a day.        Insulin Glargine (Solution) LANTUS 100 UNIT/ML Inject  as instructed every evening.        Insulin Lispro (Solution) HUMALOG 100 UNIT/ML Inject   as instructed 3 times a day before meals.        MetFORMIN HCl (Tab) GLUCOPHAGE 1000 MG Take 1,000 mg by mouth 2 times a day, with meals.        Metoprolol Succinate (TABLET SR 24 HR) TOPROL  MG Take 100 mg by mouth 2 Times a Day. Two tablets twice a day        Omega-3 Fatty Acids (Cap) fish oil 1000 MG Take 1,000 mg by mouth 3 times a day, with meals.        Pyridoxine HCl   Take  by mouth.        Sacubitril-Valsartan (Tab) ENTRESTO  MG Take  mg by mouth 2 Times a Day.        Spironolactone (Tab) ALDACTONE 25 MG Take 1 Tab by mouth every day.        .                 Medicines prescribed today were sent to:     [facility] - [address] [address]    Phone: [phone] Fax: [phone]    Open 24 Hours?: No    [facility] - [place], [place] - [address]    [address]  [facility] [address]    Phone: [phone] Fax: [phone]    Open 24 Hours?: No      Medication refill instructions:       If your prescription bottle indicates you have medication refills left, it is not necessary to call your provider’s office. Please contact your pharmacy and they will refill your medication.    If your prescription bottle indicates you do not have any refills left, you may request refills at any time through one of the following ways: The online [employer] system (except Urgent Care), by calling your provider’s office, or by asking your pharmacy to contact your provider’s office with a refill request. Medication refills are processed only during regular business hours and may not be available until the next business day. Your provider may request additional information or to have a follow-up visit with you prior to refilling your medication.   *Please Note: Medication refills are assigned a new Rx number when refilled electronically. Your pharmacy may indicate that no refills were authorized even though a new prescription  for the same medication is available at the pharmacy. Please request the medicine by name with the pharmacy before contacting your provider for a refill.        Your To Do List     Future Labs/Procedures Complete By Expires    ECHOCARDIOGRAM COMP W/O CONT  As directed 5/18/2018    LIPID PROFILE  As directed 5/18/2018         Exotelhart Access Code: Activation code not generated  Current Etherpad Status: Active

## 2017-05-18 NOTE — Clinical Note
[facility]   [address]  [place] [address]  Phone: [phone]  Fax: [phone]              Juaquin Solorzano  1965    Encounter Date: 5/18/2017    LAINA Starr          PROGRESS NOTE:  Subjective:   Juaquin Solorzano is a 51 y.o. male who presents today for follow-up on his heart failure.    Patient of Dr. Simms. Patient was last seen 1/18/17. Patient has heart failure with reduced EF of now 50% from 30%. Patient has a history of CABG ×3 in 2013 with complications, ischemic cardiomyopathy, left bundle branch block, obstructive sleep apnea which he uses CPAP regularly, insulin-dependent diabetic. No medication changes were made for his heart failure. Patient was sent for a PFT to evaluate lung function.   Since his last visit, patient has felt that his shortness of breath is slightly worse. Patient denies chest pain with exertion, palpitations, orthopnea, PND or edema.    Patient has not taken his home weights regularly, his last home was about 2 weeks ago at 295 pounds.    Past Medical History   Diagnosis Date   • Myocardial infarct (CMS-HCC) 2007   • Myocardial infarct (CMS-HCC) 2012   • Bronchitis 2014   • Sleep apnea      cpap   • Arthritis      osteo   • Fibromyalgia    • Diabetes (CMS-[facility]) 2006     insulin   • Cataract      early stages   • Psychiatric problem      depression and anxiety     Past Surgical History   Procedure Laterality Date   • Other cardiac surgery  2014     cabg x 3   • Other  2015     sternal revision   • Recovery  7/22/2016     Procedure: CATH LAB-[facility] W/POSSIBLE RADIAL APPROACH-CARIE;  Surgeon: Recoveryonly Surgery;  Location: SURGERY PRE-POST PROC UNIT [facility];  Service:      Family History   Problem Relation Age of Onset   • Heart Disease Mother    • Diabetes Mother    • Hyperlipidemia Father    • Heart Disease Father    • Hypertension Father    • Stroke Father    • Lung Disease Sister      History   Smoking  status   • Former Smoker -- 0.50 packs/day for 12 years   • Types: Cigarettes   • Quit date: 07/15/2012   Smokeless tobacco   • Not on file     Allergies   Allergen Reactions   • Iodine      Outpatient Encounter Prescriptions as of 5/18/2017   Medication Sig Dispense Refill   • busPIRone (BUSPAR) 10 MG Tab Take 10 mg by mouth 2 times a day.     • gabapentin (NEURONTIN) 300 MG Cap Take 300 mg by mouth 3 times a day.     • Sacubitril-Valsartan (ENTRESTO)  MG Tab Take  mg by mouth 2 Times a Day. 60 Tab 11   • spironolactone (ALDACTONE) 25 MG Tab Take 1 Tab by mouth every day. 30 Tab 3   • amitriptyline (ELAVIL) 75 MG Tab Take 75 mg by mouth every evening.     • allopurinol (ZYLOPRIM) 100 MG Tab Take 100 mg by mouth 2 Times a Day.     • Pyridoxine HCl (VITAMIN B-6 PO) Take  by mouth.     • insulin lispro (HUMALOG) 100 UNIT/ML Solution Inject  as instructed 3 times a day before meals.     • insulin glargine (LANTUS) 100 UNIT/ML Solution Inject  as instructed every evening.     • esomeprazole magnesium (NEXIUM) 40 MG Pack Take 40 mg by mouth every morning before breakfast.     • aspirin 81 MG tablet Take 81 mg by mouth every day.     • escitalopram (LEXAPRO) 10 MG Tab Take 10 mg by mouth every day.     • atorvastatin (LIPITOR) 40 MG Tab Take 40 mg by mouth every evening.     • metformin (GLUCOPHAGE) 1000 MG tablet Take 1,000 mg by mouth 2 times a day, with meals.     • metoprolol SR (TOPROL XL) 100 MG TABLET SR 24 HR Take 100 mg by mouth 2 Times a Day. Two tablets twice a day     • Omega-3 Fatty Acids (FISH OIL) 1000 MG Cap capsule Take 1,000 mg by mouth 3 times a day, with meals.     • alprazolam (XANAX) 0.5 MG Tab Take 0.5 mg by mouth at bedtime as needed for Sleep. NOT TAKING     • vitamin D, Ergocalciferol, (DRISDOL) 58688 UNITS Cap capsule Take  by mouth every 7 days.       No facility-administered encounter medications on file as of 5/18/2017.     Review of Systems   Constitutional: Positive for fever  "and malaise/fatigue.   Respiratory: Positive for shortness of breath. Negative for cough.    Cardiovascular: Positive for chest pain (aching from prev CABG, no exertional CP). Negative for palpitations, orthopnea, claudication, leg swelling and PND.   Gastrointestinal: Negative for abdominal pain.   Musculoskeletal: Negative for myalgias.   Neurological: Positive for dizziness (with standing too quickly).   All other systems reviewed and are negative.       Objective:   /74 mmHg  Pulse 68  Ht 1.88 m (6' 2\")  Wt 132.45 kg (292 lb)  BMI 37.47 kg/m2  SpO2 95%    Physical Exam   Constitutional: He is oriented to person, place, and time. He appears well-developed and well-nourished.   Obese BMI 37.49   HENT:   Head: Normocephalic and atraumatic.   Eyes: EOM are normal.   Neck: Normal range of motion. Neck supple. No JVD present.   Cardiovascular: Normal rate, regular rhythm, normal heart sounds and intact distal pulses.    No murmur heard.  Pulmonary/Chest: Effort normal and breath sounds normal. No respiratory distress. He has no wheezes. He has no rales.   Abdominal: Soft. Bowel sounds are normal.   Musculoskeletal: Normal range of motion. He exhibits no edema.   Neurological: He is alert and oriented to person, place, and time.   Skin: Skin is warm and dry.   Psychiatric: He has a normal mood and affect.   Nursing note and vitals reviewed.    No results found for: CHOLSTRLTOT, LDL, HDL, TRIGLYCERIDE    Lab Results   Component Value Date/Time    SODIUM 139 11/30/2016 09:15 AM    POTASSIUM 5.1 11/30/2016 09:15 AM    CHLORIDE 105 11/30/2016 09:15 AM    CO2 28 11/30/2016 09:15 AM    GLUCOSE 182* 11/30/2016 09:15 AM    BUN 15 11/30/2016 09:15 AM    CREATININE 1.08 11/30/2016 09:15 AM     No results found for: ALKPHOSPHAT, ASTSGOT, ALTSGPT, TBILIRUBIN     PFT 1/30/17  ASSESSMENT:  Spirometry suggests mild restrictive lung disease and no evidence   of obstruction.  Plethysmography reveals mild hyperinflation and " no evidence    of restrictive lung disease.  Gas transfer is normal as is airway resistance.     Clinical correlation is recommended.  Transthoracic  Echo Report      Echocardiography Laboratory 7/11/16    CONCLUSIONS  Technically difficult study incomplete information is obtained  Left ventricle not well visualized.  Akinesis of the mid to distal septum, apex, distal half of the lateral   wall, distal half of the anterior wall and inferoapical region.    There is relative sparing of the basal LV segments.  Left ventricular ejection fraction is visually estimated to be  30% and   dilated.  Right ventricle not well visualized.  Right ventricle not well visualized.  Appears to be mildly dilated (2D).  Normal pericardium without effusion.  The aortic valve is not well visualized.  Structurally normal mitral valve without significant stenosis or   regurgitation.    Heart cath 7/22/16   POSTOPERATIVE DIAGNOSES:  1.  Occluded vein grafts.  2.  Patent LIMA to LAD.  3.  Diffuse distal RCA disease.  4.  A 100% occluded LAD stent.  5.  Moderate nonobstructive left circumflex disease.  6.  LVEF 50%.    Echo from 1/9/17 in MEDIA tab  Assessment:     1. ACC/AHA stage C systolic heart failure (CMS-[facility])  LIPID PROFILE    ECHOCARDIOGRAM COMP W/O CONT   2. Heart failure, NYHA class 3 (CMS-[facility])  LIPID PROFILE    ECHOCARDIOGRAM COMP W/O CONT   3. Ischemic cardiomyopathy  LIPID PROFILE    ECHOCARDIOGRAM COMP W/O CONT   4. Coronary artery disease involving native coronary artery of native heart without angina pectoris  LIPID PROFILE    ECHOCARDIOGRAM COMP W/O CONT   5. S/P CABG x 3  LIPID PROFILE    ECHOCARDIOGRAM COMP W/O CONT   6. Type 2 diabetes mellitus with complication, with long-term current use of insulin (CMS-[facility])  LIPID PROFILE    ECHOCARDIOGRAM COMP W/O CONT   7. Essential hypertension  LIPID PROFILE    ECHOCARDIOGRAM COMP W/O CONT   8. LBBB (left bundle branch block)  ECHOCARDIOGRAM COMP W/O CONT       Medical Decision  Making:  Today's Assessment / Status / Plan:   1. HFrEF, Stage C ,Class 3, EF 50%: Based on physical examination findings, patient is euvolemic. No JVD, lungs are clear to auscultation, no pitting edema in bilateral lower extremities, no ascites.  -Continue Toprol- mg twice a day  -Continue Entresto  mg twice a day   -Continue spironolactone 25 mg daily  Reinforced s/sx of worsening heart failure with patient and weight monitoring. Pt verbalizes understanding. Pt to call office or RTC if present.   -Repeat echo (d/t some increase in SOB)    2. CAD:  -Continue aspirin 81 mg daily  -Continue atorvastatin 40 mg daily  -No recent lipid panel, will Repeat lipid panel    3. Diabetes:  -Continue insulin and metformin    4. hypertension:   -Per above      FU in clinic in 3-4 weeks after echo with Dr. Simms. Sooner if needed.    Patient verbalizes understanding and agrees with the plan of care.     Collaborating MD: MD Victoria Hussein M.D.  [address] [address]  VIA Facsimile: [phone]

## 2017-05-19 ENCOUNTER — NON-PROVIDER VISIT (OUTPATIENT)
Dept: MEDICAL GROUP | Facility: CLINIC | Age: 52
End: 2017-05-19
Payer: OTHER GOVERNMENT

## 2017-05-19 ENCOUNTER — HOSPITAL ENCOUNTER (OUTPATIENT)
Facility: MEDICAL CENTER | Age: 52
End: 2017-05-19
Attending: NURSE PRACTITIONER
Payer: OTHER GOVERNMENT

## 2017-05-19 DIAGNOSIS — Z79.4 TYPE 2 DIABETES MELLITUS WITH COMPLICATION, WITH LONG-TERM CURRENT USE OF INSULIN (HCC): ICD-10-CM

## 2017-05-19 DIAGNOSIS — I25.5 ISCHEMIC CARDIOMYOPATHY: ICD-10-CM

## 2017-05-19 DIAGNOSIS — E11.8 TYPE 2 DIABETES MELLITUS WITH COMPLICATION, WITH LONG-TERM CURRENT USE OF INSULIN (HCC): ICD-10-CM

## 2017-05-19 DIAGNOSIS — I50.9 HEART FAILURE, NYHA CLASS 3 (HCC): ICD-10-CM

## 2017-05-19 DIAGNOSIS — I25.10 CORONARY ARTERY DISEASE INVOLVING NATIVE CORONARY ARTERY OF NATIVE HEART WITHOUT ANGINA PECTORIS: ICD-10-CM

## 2017-05-19 DIAGNOSIS — I10 ESSENTIAL HYPERTENSION: ICD-10-CM

## 2017-05-19 DIAGNOSIS — I50.20 ACC/AHA STAGE C SYSTOLIC HEART FAILURE (HCC): ICD-10-CM

## 2017-05-19 DIAGNOSIS — Z95.1 S/P CABG X 3: ICD-10-CM

## 2017-05-19 LAB
AMBIGUOUS DTTM AMBI4: NORMAL
CHOLEST SERPL-MCNC: 76 MG/DL (ref 100–199)
HDLC SERPL-MCNC: 26 MG/DL
LDLC SERPL CALC-MCNC: 32 MG/DL
TRIGL SERPL-MCNC: 91 MG/DL (ref 0–149)

## 2017-05-19 PROCEDURE — 80061 LIPID PANEL: CPT

## 2017-05-19 PROCEDURE — 36415 COLL VENOUS BLD VENIPUNCTURE: CPT | Performed by: NURSE PRACTITIONER

## 2017-05-19 PROCEDURE — 99000 SPECIMEN HANDLING OFFICE-LAB: CPT | Performed by: NURSE PRACTITIONER

## 2017-05-22 NOTE — PROGRESS NOTES
"Reevaluation of 6MWT/MLWHF Questionnaire    Date: 2017  6MWT: 313.9 meters  MLWHF: 83    Date: 2017  6MWT: 186 meters   MLWHF: 94    OP Heart Failure  Vitals  Appointment Type: Heart Failure Established  Weight: (!) 132.45 kg (292 lb)  How Weight Obtained: Stand Up Scale  Height: 188 cm (6' 2\")  BMI (Calculated): 37.49  Blood Pressure: 106/74 mmHg  Pulse: 68    System Assessment  NYHA Functional Class Assessment: Class III  ACC/AHA HF Stage: C    Smoking Hx  Have you Ever Smoked: Yes  Have you Smoked in the Last 12 Mos: No  Confirm Quit Date: 07/15/12     Alcohol Hx  Do you Drink?: No     Illicit Drug Hx  Illicit Drug History: No    [place] Living with Heart Failure  Swelling in Ankles or Legs: 5  Having to Sit or Lie Down During the Day: 5  Walking About or Climbing Stairs Difficult: 5  Working Around the House or Yard Difficult: 5  Difficulty Going Away from Home: 3  Difficulty Sleeping Well at Night: 3  Difficulty Socializing with Family or Friends: 5  Difficulty Working to Earn a Livin  Difficulty with Recreational Pastimes, Sports, Hobbies: 5  Difficulty with Sexual Activities: 5  Eating Less Foods You Like: 5  Making you Short of Breath: 5  Tired, Fatigued or Low on Energy: 5  Making you Stay in a Hospital: 1  Costing you Money for Medical Care?: 3  Giving you Side Effects from Treatments: 4  Feeling like a [place] to Family and Friends: 5  Loss of Self Control in your Life: 5  Making You Worry: 5  Difficulty to Concentrate or Remembering Things: 5  Making you Feel Depressed: 5  MLHF Total Score : 94    6 Minute Walk Test  Baseline to end of test: 4:53  Total meters walked: 186  Comments:  (not able to complete d/t fatigue)    CHI Vazquez RN  [phone]  "

## 2017-06-19 ENCOUNTER — HOSPITAL ENCOUNTER (OUTPATIENT)
Dept: CARDIOLOGY | Facility: MEDICAL CENTER | Age: 52
End: 2017-06-19
Attending: NURSE PRACTITIONER
Payer: OTHER GOVERNMENT

## 2017-06-19 DIAGNOSIS — I10 ESSENTIAL HYPERTENSION: ICD-10-CM

## 2017-06-19 DIAGNOSIS — E11.8 TYPE 2 DIABETES MELLITUS WITH COMPLICATION, WITH LONG-TERM CURRENT USE OF INSULIN (HCC): ICD-10-CM

## 2017-06-19 DIAGNOSIS — I50.9 HEART FAILURE, NYHA CLASS 3 (HCC): ICD-10-CM

## 2017-06-19 DIAGNOSIS — Z79.4 TYPE 2 DIABETES MELLITUS WITH COMPLICATION, WITH LONG-TERM CURRENT USE OF INSULIN (HCC): ICD-10-CM

## 2017-06-19 DIAGNOSIS — I25.10 CORONARY ARTERY DISEASE INVOLVING NATIVE CORONARY ARTERY OF NATIVE HEART WITHOUT ANGINA PECTORIS: ICD-10-CM

## 2017-06-19 DIAGNOSIS — I44.7 LBBB (LEFT BUNDLE BRANCH BLOCK): ICD-10-CM

## 2017-06-19 DIAGNOSIS — Z95.1 S/P CABG X 3: ICD-10-CM

## 2017-06-19 DIAGNOSIS — I50.20 ACC/AHA STAGE C SYSTOLIC HEART FAILURE (HCC): ICD-10-CM

## 2017-06-19 DIAGNOSIS — I25.5 ISCHEMIC CARDIOMYOPATHY: ICD-10-CM

## 2017-06-19 LAB
LV EJECT FRACT  99904: 35
LV EJECT FRACT MOD 2C 99903: 42.2
LV EJECT FRACT MOD 4C 99902: 29.01
LV EJECT FRACT MOD BP 99901: 37.36

## 2017-06-19 PROCEDURE — 93306 TTE W/DOPPLER COMPLETE: CPT

## 2017-06-19 PROCEDURE — 93306 TTE W/DOPPLER COMPLETE: CPT | Mod: 26 | Performed by: INTERNAL MEDICINE

## 2017-06-20 ENCOUNTER — TELEPHONE (OUTPATIENT)
Dept: CARDIOLOGY | Facility: MEDICAL CENTER | Age: 52
End: 2017-06-20

## 2017-06-20 NOTE — TELEPHONE ENCOUNTER
Message  Received: Today       LAINA Starr R.N.                   Patient has appt with Dr. Simms on 6/27/17. Will discuss results with him then.       Pt called and made aware that Dr. Simms will discuss results with him on 6/27 @ 2134 and confirmed that appt date/time with him.

## 2017-06-27 ENCOUNTER — OFFICE VISIT (OUTPATIENT)
Dept: CARDIOLOGY | Facility: MEDICAL CENTER | Age: 52
End: 2017-06-27
Payer: OTHER GOVERNMENT

## 2017-06-27 VITALS
OXYGEN SATURATION: 96 % | BODY MASS INDEX: 37.86 KG/M2 | WEIGHT: 295 LBS | HEIGHT: 74 IN | HEART RATE: 69 BPM | DIASTOLIC BLOOD PRESSURE: 70 MMHG | SYSTOLIC BLOOD PRESSURE: 104 MMHG

## 2017-06-27 DIAGNOSIS — I10 ESSENTIAL HYPERTENSION: ICD-10-CM

## 2017-06-27 DIAGNOSIS — Z79.4 TYPE 2 DIABETES MELLITUS WITH COMPLICATION, WITH LONG-TERM CURRENT USE OF INSULIN (HCC): ICD-10-CM

## 2017-06-27 DIAGNOSIS — E11.8 TYPE 2 DIABETES MELLITUS WITH COMPLICATION, WITH LONG-TERM CURRENT USE OF INSULIN (HCC): ICD-10-CM

## 2017-06-27 DIAGNOSIS — G47.33 OSA ON CPAP: ICD-10-CM

## 2017-06-27 DIAGNOSIS — I25.5 ISCHEMIC CARDIOMYOPATHY: ICD-10-CM

## 2017-06-27 DIAGNOSIS — I44.7 LBBB (LEFT BUNDLE BRANCH BLOCK): ICD-10-CM

## 2017-06-27 DIAGNOSIS — I50.9 HEART FAILURE, NYHA CLASS 3 (HCC): ICD-10-CM

## 2017-06-27 DIAGNOSIS — Z95.1 HX OF CABG: ICD-10-CM

## 2017-06-27 DIAGNOSIS — I50.20 ACC/AHA STAGE C SYSTOLIC HEART FAILURE (HCC): ICD-10-CM

## 2017-06-27 PROCEDURE — 99214 OFFICE O/P EST MOD 30 MIN: CPT | Performed by: INTERNAL MEDICINE

## 2017-06-27 RX ORDER — ATORVASTATIN CALCIUM 40 MG/1
40 TABLET, FILM COATED ORAL DAILY
Qty: 90 TAB | Refills: 3 | Status: SHIPPED | OUTPATIENT
Start: 2017-06-27 | End: 2018-04-09 | Stop reason: SDUPTHER

## 2017-06-27 RX ORDER — METOPROLOL TARTRATE 100 MG/1
100 TABLET ORAL 2 TIMES DAILY
COMMUNITY
End: 2017-06-27 | Stop reason: SDUPTHER

## 2017-06-27 RX ORDER — METOPROLOL TARTRATE 100 MG/1
100 TABLET ORAL 2 TIMES DAILY
Qty: 180 TAB | Refills: 3 | Status: SHIPPED | OUTPATIENT
Start: 2017-06-27 | End: 2018-01-08

## 2017-06-27 RX ORDER — SPIRONOLACTONE 25 MG/1
25 TABLET ORAL DAILY
Qty: 90 TAB | Refills: 3 | Status: SHIPPED | OUTPATIENT
Start: 2017-06-27 | End: 2018-01-08 | Stop reason: SDUPTHER

## 2017-06-27 RX ORDER — DOXYCYCLINE HYCLATE 100 MG/1
CAPSULE ORAL
Refills: 0 | COMMUNITY
Start: 2017-05-01 | End: 2018-03-05

## 2017-06-27 RX ORDER — HYDROCODONE BITARTRATE AND ACETAMINOPHEN 5; 325 MG/1; MG/1
TABLET ORAL
Refills: 0 | COMMUNITY
Start: 2017-05-01 | End: 2018-03-05

## 2017-06-27 ASSESSMENT — ENCOUNTER SYMPTOMS
DIZZINESS: 0
CLAUDICATION: 0
HALLUCINATIONS: 0
FEVER: 0
ABDOMINAL PAIN: 0
BRUISES/BLEEDS EASILY: 0
FALLS: 0
PALPITATIONS: 0
SPEECH CHANGE: 0
BLOOD IN STOOL: 0
BLURRED VISION: 0
CHILLS: 0
LOSS OF CONSCIOUSNESS: 0
WEIGHT LOSS: 0
SENSORY CHANGE: 0
HEADACHES: 0
DEPRESSION: 0
VOMITING: 0
EYE PAIN: 0
NAUSEA: 0
DOUBLE VISION: 0
COUGH: 0
EYE DISCHARGE: 0
MYALGIAS: 0
PND: 0
ORTHOPNEA: 0
SHORTNESS OF BREATH: 1

## 2017-06-27 NOTE — Clinical Note
[facility]   [address] [facility] [address]  [place] [address]  Phone: [phone]  Fax: [phone]              Juaquin Solorzano  1965    Encounter Date: 6/27/2017    Adrian Simms M.D.          PROGRESS NOTE:  Subjective:   Juaquin Solorzano is a 51 y.o. male who presents today for cardiac care and evaluation of his prior history of CABG. In 9/2013, patient underwent triple bypass surgery. In 2014 he underwent open sternotomy again because of complications. Patient also has a history of sleep apnea for which he has been using CPAP. Patient is obese.     Patient also has diabetes for which he is then controlled with insulin injections.    Patient was able to complete 314 m during his 6 minute walk test. his O2 saturation at baseline was 93% and at the end of the test, the O2 saturation was 95%. He reported 8 level of dyspnea on Anson scale.    Because of his report of exertional dyspnea, I obtain a transthoracic echocardiogram which shows left ventricular systolic function of 30%. Patient underwent ischemic evaluation with coronary angiogram. Essentially, patient is living off of of his [place] to [facility]. His LV gram shows left ventricular systolic function of 50-55%. Patient still reports having shortness of breath. He is able to walk for half a mile however at slow. He is tolerating Entresto therapy well.     I repeated his transthoracic echocardiogram which she had one week ago which shows evidence of 50% in terms of his left ventricular systolic function. Patient continues to report of having shortness of breath even when tying his shoes. However, his 6 minute walk test is much improved because he was not even able to walk at the last visit.    Patient continues to be short of breath. He gets symptomatic with tying his shoe still. He did have a PFT which shows mild evidence of restrictive lung disease but no evidence of obstruction. He is tolerating his medications  well.    He is on HBG102 200 mg po bid, Lopressor 100 mg po bid, Spironolactone 25 mg po daily.    Blood pressure is well controlled.    Past Medical History   Diagnosis Date   • Myocardial infarct (CMS-HCC) 2007   • Myocardial infarct (CMS-HCC) 2012   • Bronchitis 2014   • Sleep apnea      cpap   • Arthritis      osteo   • Fibromyalgia    • Diabetes (CMS-HCC) 2006     insulin   • Cataract      early stages   • Psychiatric problem      depression and anxiety     Past Surgical History   Procedure Laterality Date   • Other cardiac surgery  2014     cabg x 3   • Other  2015     sternal revision   • Recovery  7/22/2016     Procedure: CATH LAB[facility] W/POSSIBLE RADIAL APPROACH-CARIE;  Surgeon: Recoveryonly Surgery;  Location: SURGERY PRE-POST PROC UNIT [facility];  Service:      Family History   Problem Relation Age of Onset   • Heart Disease Mother    • Diabetes Mother    • Hyperlipidemia Father    • Heart Disease Father    • Hypertension Father    • Stroke Father    • Lung Disease Sister      History   Smoking status   • Former Smoker -- 0.50 packs/day for 12 years   • Types: Cigarettes   • Quit date: 07/15/2012   Smokeless tobacco   • Not on file     Allergies   Allergen Reactions   • Iodine      Outpatient Encounter Prescriptions as of 6/27/2017   Medication Sig Dispense Refill   • spironolactone (ALDACTONE) 25 MG Tab Take 1 Tab by mouth every day. 90 Tab 3   • sacubitril-valsartan (ENTRESTO)  MG Tab tablet Take 1 Tab by mouth 2 Times a Day. 180 Tab 3   • metoprolol (LOPRESSOR) 100 MG Tab Take 1 Tab by mouth 2 times a day. 180 Tab 3   • atorvastatin (LIPITOR) 40 MG Tab Take 1 Tab by mouth every day. 90 Tab 3   • busPIRone (BUSPAR) 10 MG Tab Take 10 mg by mouth 2 times a day.     • gabapentin (NEURONTIN) 300 MG Cap Take 300 mg by mouth 3 times a day.     • amitriptyline (ELAVIL) 75 MG Tab Take 75 mg by mouth every evening.     • allopurinol (ZYLOPRIM) 100 MG Tab Take 100 mg by mouth 2 Times a Day.     • Pyridoxine  HCl (VITAMIN B-6 PO) Take  by mouth.     • insulin lispro (HUMALOG) 100 UNIT/ML Solution Inject  as instructed 3 times a day before meals.     • insulin glargine (LANTUS) 100 UNIT/ML Solution Inject  as instructed every evening.     • esomeprazole magnesium (NEXIUM) 40 MG Pack Take 40 mg by mouth every morning before breakfast.     • aspirin 81 MG tablet Take 81 mg by mouth every day.     • escitalopram (LEXAPRO) 10 MG Tab Take 10 mg by mouth every day.     • metformin (GLUCOPHAGE) 1000 MG tablet Take 1,000 mg by mouth 2 times a day, with meals.     • metoprolol SR (TOPROL XL) 100 MG TABLET SR 24 HR Take 100 mg by mouth 2 Times a Day. Two tablets twice a day     • Omega-3 Fatty Acids (FISH OIL) 1000 MG Cap capsule Take 1,000 mg by mouth 3 times a day, with meals.     • doxycycline (VIBRAMYCIN) 100 MG Cap   0   • hydrocodone-acetaminophen (NORCO) 5-325 MG Tab per tablet   0   • [DISCONTINUED] metoprolol (LOPRESSOR) 100 MG Tab Take 100 mg by mouth 2 times a day.     • [DISCONTINUED] Sacubitril-Valsartan (ENTRESTO)  MG Tab Take  mg by mouth 2 Times a Day. 60 Tab 11   • [DISCONTINUED] spironolactone (ALDACTONE) 25 MG Tab Take 1 Tab by mouth every day. 30 Tab 3   • alprazolam (XANAX) 0.5 MG Tab Take 0.5 mg by mouth at bedtime as needed for Sleep. NOT TAKING     • vitamin D, Ergocalciferol, (DRISDOL) 18203 UNITS Cap capsule Take  by mouth every 7 days.     • [DISCONTINUED] atorvastatin (LIPITOR) 40 MG Tab Take 40 mg by mouth every evening.       No facility-administered encounter medications on file as of 6/27/2017.     Review of Systems   Constitutional: Negative for fever, chills, weight loss and malaise/fatigue.   HENT: Negative for ear discharge, ear pain, hearing loss and nosebleeds.    Eyes: Negative for blurred vision, double vision, pain and discharge.   Respiratory: Positive for shortness of breath. Negative for cough.    Cardiovascular: Negative for chest pain, palpitations, orthopnea, claudication,  "leg swelling and PND.   Gastrointestinal: Negative for nausea, vomiting, abdominal pain, blood in stool and melena.   Genitourinary: Negative for dysuria and hematuria.   Musculoskeletal: Negative for myalgias, joint pain and falls.   Skin: Negative for itching and rash.   Neurological: Negative for dizziness, sensory change, speech change, loss of consciousness and headaches.   Endo/Heme/Allergies: Negative for environmental allergies. Does not bruise/bleed easily.   Psychiatric/Behavioral: Negative for depression, suicidal ideas and hallucinations.        Objective:   /70 mmHg  Pulse 69  Ht 1.88 m (6' 2.02\")  Wt 133.811 kg (295 lb)  BMI 37.86 kg/m2  SpO2 96%    Physical Exam   Constitutional: He is oriented to person, place, and time. He appears well-developed and well-nourished.   HENT:   Head: Normocephalic and atraumatic.   Eyes: EOM are normal.   Neck: Normal range of motion. No JVD present.   Cardiovascular: Normal rate, regular rhythm, normal heart sounds and intact distal pulses.  Exam reveals no gallop and no friction rub.    No murmur heard.  Bilateral femoral pulses are 2+, bilateral dorsalis pedis pulses are 2+, bilateral posterior tibialis pulses are 2+.   Pulmonary/Chest: No respiratory distress. He has no wheezes. He has no rales. He exhibits no tenderness.   Abdominal: Soft. Bowel sounds are normal. There is no tenderness. There is no rebound and no guarding.   The is no presence of abdominal bruits   Musculoskeletal: Normal range of motion.   Neurological: He is alert and oriented to person, place, and time.   Skin: Skin is warm and dry.   Psychiatric: He has a normal mood and affect.   Nursing note and vitals reviewed.      Assessment:     1. ACC/AHA stage C systolic heart failure (CMS-HCC)  spironolactone (ALDACTONE) 25 MG Tab    sacubitril-valsartan (ENTRESTO)  MG Tab tablet    metoprolol (LOPRESSOR) 100 MG Tab    atorvastatin (LIPITOR) 40 MG Tab    COMP METABOLIC PANEL    LIPID " PANEL   2. Heart failure, NYHA class 3 (CMS-HCC)  spironolactone (ALDACTONE) 25 MG Tab    sacubitril-valsartan (ENTRESTO)  MG Tab tablet    metoprolol (LOPRESSOR) 100 MG Tab    atorvastatin (LIPITOR) 40 MG Tab    COMP METABOLIC PANEL    LIPID PANEL   3. Essential hypertension  spironolactone (ALDACTONE) 25 MG Tab    sacubitril-valsartan (ENTRESTO)  MG Tab tablet    metoprolol (LOPRESSOR) 100 MG Tab    atorvastatin (LIPITOR) 40 MG Tab    COMP METABOLIC PANEL    LIPID PANEL   4. Ischemic cardiomyopathy  spironolactone (ALDACTONE) 25 MG Tab    sacubitril-valsartan (ENTRESTO)  MG Tab tablet    metoprolol (LOPRESSOR) 100 MG Tab    atorvastatin (LIPITOR) 40 MG Tab    COMP METABOLIC PANEL    LIPID PANEL   5. CLARE on CPAP  spironolactone (ALDACTONE) 25 MG Tab    sacubitril-valsartan (ENTRESTO)  MG Tab tablet    metoprolol (LOPRESSOR) 100 MG Tab    atorvastatin (LIPITOR) 40 MG Tab    COMP METABOLIC PANEL    LIPID PANEL   6. Hx of CABG  spironolactone (ALDACTONE) 25 MG Tab    sacubitril-valsartan (ENTRESTO)  MG Tab tablet    metoprolol (LOPRESSOR) 100 MG Tab    atorvastatin (LIPITOR) 40 MG Tab    COMP METABOLIC PANEL    LIPID PANEL   7. LBBB (left bundle branch block)  spironolactone (ALDACTONE) 25 MG Tab    sacubitril-valsartan (ENTRESTO)  MG Tab tablet    metoprolol (LOPRESSOR) 100 MG Tab    atorvastatin (LIPITOR) 40 MG Tab    COMP METABOLIC PANEL    LIPID PANEL   8. Type 2 diabetes mellitus with complication, with long-term current use of insulin (CMS-HCC)  spironolactone (ALDACTONE) 25 MG Tab    sacubitril-valsartan (ENTRESTO)  MG Tab tablet    metoprolol (LOPRESSOR) 100 MG Tab    atorvastatin (LIPITOR) 40 MG Tab    COMP METABOLIC PANEL    LIPID PANEL       Medical Decision Making:  Today's Assessment / Status / Plan:     Today, based on physical examination findings, patient is euvolemic. No JVD, lungs are clear to auscultation, no pitting edema in bilateral lower extremities, no  ascites.    I am not certain that his symptomatology of dyspnea is entirely cardiac. I do feel that weight loss would greatly benefit his symptomatology of shortness of breath.    We will continue his current medical therapy without change.    He will return to see our nurse practitioner in 6 months.      Victoria Callahan M.D.  [address]  [facility] [address]  VIA Facsimile: [phone]

## 2017-06-27 NOTE — MR AVS SNAPSHOT
"        Juaquin RAINNelson Solorzano   2017 9:15 AM   Office Visit   MRN: [id number]    Department:  [facility]   Dept Phone:  [phone]    Description:  Male : 1965   Provider:  Adrian Simms M.D.           Reason for Visit     Follow-Up HF est       Allergies as of 2017     Allergen Noted Reactions    Iodine 2016         You were diagnosed with     ACC/AHA stage C systolic heart failure (CMS-HCC)   [6959587]       Heart failure, NYHA class 3 (CMS-HCC)   [144226]       Essential hypertension   [9584501]       Ischemic cardiomyopathy   [752587]       CLARE on CPAP   [584251]       Hx of CABG   [054587]       LBBB (left bundle branch block)   [531408]       Type 2 diabetes mellitus with complication, with long-term current use of insulin (CMS-HCC)   [3859804]         Vital Signs     Blood Pressure Pulse Height Weight Body Mass Index Oxygen Saturation    104/70 mmHg 69 1.88 m (6' 2.02\") 133.811 kg (295 lb) 37.86 kg/m2 96%    Smoking Status                   Former Smoker           Basic Information     Date Of Birth Sex Race Ethnicity Preferred Language    1965 Male White Non- English      Problem List              ICD-10-CM Priority Class Noted - Resolved    ACC/AHA stage C systolic heart failure (CMS-[facility]) I50.20 High  2017 - Present    S/P CABG x 3 Z95.1 High  2017 - Present    Heart failure, NYHA class 3 (CMS-HCC) I50.9 High  2017 - Present    Type 2 diabetes mellitus with complication, with long-term current use of insulin (CMS-HCC) E11.8, Z79.4   2017 - Present    Essential hypertension I10   2017 - Present      Health Maintenance        Date Due Completion Dates    IMM HEP B VACCINE (1 of 3 - Primary Series) 1965 ---    A1C SCREENING 1/3/1967 ---    DIABETES MONOFILAMENT / LE EXAM 1/3/1967 ---    RETINAL SCREENING 7/3/1984 ---    URINE ACR / MICROALBUMIN 7/3/1984 ---    IMM DTaP/Tdap/Td Vaccine (1 - Tdap) 7/3/1985 ---    IMM PNEUMOCOCCAL 19-64 (ADULT) " MEDIUM RISK SERIES (1 of 1 - PPSV23) 7/3/1985 ---    COLONOSCOPY 7/3/2016 ---    SERUM CREATININE 11/30/2017 11/30/2016, 9/7/2016, 7/21/2016    FASTING LIPID PROFILE 5/19/2018 5/19/2017            Current Immunizations     No immunizations on file.      Below and/or attached are the medications your provider expects you to take. Review all of your home medications and newly ordered medications with your provider and/or pharmacist. Follow medication instructions as directed by your provider and/or pharmacist. Please keep your medication list with you and share with your provider. Update the information when medications are discontinued, doses are changed, or new medications (including over-the-counter products) are added; and carry medication information at all times in the event of emergency situations     Allergies:  IODINE - (reactions not documented)               Medications  Valid as of: June 27, 2017 -  9:32 AM    Generic Name Brand Name Tablet Size Instructions for use    Allopurinol (Tab) ZYLOPRIM 100 MG Take 100 mg by mouth 2 Times a Day.        ALPRAZolam (Tab) XANAX 0.5 MG Take 0.5 mg by mouth at bedtime as needed for Sleep. NOT TAKING        Amitriptyline HCl (Tab) ELAVIL 75 MG Take 75 mg by mouth every evening.        Aspirin (Tab) aspirin 81 MG Take 81 mg by mouth every day.        Atorvastatin Calcium (Tab) LIPITOR 40 MG Take 1 Tab by mouth every day.        BusPIRone HCl (Tab) BUSPAR 10 MG Take 10 mg by mouth 2 times a day.        Doxycycline Hyclate (Cap) VIBRAMYCIN 100 MG         Ergocalciferol (Cap) DRISDOL 69600 UNITS Take  by mouth every 7 days.        Escitalopram Oxalate (Tab) LEXAPRO 10 MG Take 10 mg by mouth every day.        Esomeprazole Magnesium (Pack) NEXIUM 40 MG Take 40 mg by mouth every morning before breakfast.        Gabapentin (Cap) NEURONTIN 300 MG Take 300 mg by mouth 3 times a day.        Hydrocodone-Acetaminophen (Tab) NORCO 5-325 MG         Insulin Glargine (Solution) LANTUS  100 UNIT/ML Inject  as instructed every evening.        Insulin Lispro (Solution) HUMALOG 100 UNIT/ML Inject  as instructed 3 times a day before meals.        MetFORMIN HCl (Tab) GLUCOPHAGE 1000 MG Take 1,000 mg by mouth 2 times a day, with meals.        Metoprolol Succinate (TABLET SR 24 HR) TOPROL  MG Take 100 mg by mouth 2 Times a Day. Two tablets twice a day        Metoprolol Tartrate (Tab) LOPRESSOR 100 MG Take 1 Tab by mouth 2 times a day.        Omega-3 Fatty Acids (Cap) fish oil 1000 MG Take 1,000 mg by mouth 3 times a day, with meals.        Pyridoxine HCl   Take  by mouth.        Sacubitril-Valsartan (Tab) ENTRESTO  MG Take 1 Tab by mouth 2 Times a Day.        Spironolactone (Tab) ALDACTONE 25 MG Take 1 Tab by mouth every day.        .                 Medicines prescribed today were sent to:     [facility] - [address] [address]    Phone: [phone] Fax: [phone]    Open 24 Hours?: No    [facility] [place] [place] - [address]    [address]  [facility] [address]    Phone: [phone] Fax: [phone]    Open 24 Hours?: No      Medication refill instructions:       If your prescription bottle indicates you have medication refills left, it is not necessary to call your provider’s office. Please contact your pharmacy and they will refill your medication.    If your prescription bottle indicates you do not have any refills left, you may request refills at any time through one of the following ways: The online [employer] system (except Urgent Care), by calling your provider’s office, or by asking your pharmacy to contact your provider’s office with a refill request. Medication refills are processed only during regular business hours and may not be available until the next business day. Your provider may request additional information or to have a follow-up visit with you prior to refilling your  medication.   *Please Note: Medication refills are assigned a new Rx number when refilled electronically. Your pharmacy may indicate that no refills were authorized even though a new prescription for the same medication is available at the pharmacy. Please request the medicine by name with the pharmacy before contacting your provider for a refill.        Your To Do List     Future Labs/Procedures Complete By Expires    COMP METABOLIC PANEL  As directed 6/28/2018         [employer] Access Code: Activation code not generated  Current [employer] Status: Active

## 2017-06-27 NOTE — PROGRESS NOTES
Subjective:   Juaquin Solorzano is a 51 y.o. male who presents today for cardiac care and evaluation of his prior history of CABG. In 9/2013, patient underwent triple bypass surgery. In 2014 he underwent open sternotomy again because of complications. Patient also has a history of sleep apnea for which he has been using CPAP. Patient is obese.     Patient also has diabetes for which he is then controlled with insulin injections.    Patient was able to complete 314 m during his 6 minute walk test. his O2 saturation at baseline was 93% and at the end of the test, the O2 saturation was 95%. He reported 8 level of dyspnea on Anson scale.    Because of his report of exertional dyspnea, I obtain a transthoracic echocardiogram which shows left ventricular systolic function of 30%. Patient underwent ischemic evaluation with coronary angiogram. Essentially, patient is living off of of his TURNER to [facility]. His LV gram shows left ventricular systolic function of 50-55%. Patient still reports having shortness of breath. He is able to walk for half a mile however at slow. He is tolerating Entresto therapy well.     I repeated his transthoracic echocardiogram which she had one week ago which shows evidence of 50% in terms of his left ventricular systolic function. Patient continues to report of having shortness of breath even when tying his shoes. However, his 6 minute walk test is much improved because he was not even able to walk at the last visit.    Patient continues to be short of breath. He gets symptomatic with tying his shoe still. He did have a PFT which shows mild evidence of restrictive lung disease but no evidence of obstruction. He is tolerating his medications well.    He is on [id number] 200 mg po bid, Lopressor 100 mg po bid, Spironolactone 25 mg po daily.    Blood pressure is well controlled.    Past Medical History   Diagnosis Date   • Myocardial infarct (CMS-[facility]) 2007   • Myocardial infarct (CMS-[facility]) 2012   • Bronchitis  2014   • Sleep apnea      cpap   • Arthritis      osteo   • Fibromyalgia    • Diabetes (CMS-[facility]) 2006     insulin   • Cataract      early stages   • Psychiatric problem      depression and anxiety     Past Surgical History   Procedure Laterality Date   • Other cardiac surgery  2014     cabg x 3   • Other  2015     sternal revision   • Recovery  7/22/2016     Procedure: CATH LAB-[facility] W/POSSIBLE RADIAL APPROACH-CARIE;  Surgeon: Recoveryonly Surgery;  [place] PRE[place] [facility] [place]  Service:      Family History   Problem Relation Age of Onset   • Heart Disease Mother    • Diabetes Mother    • Hyperlipidemia Father    • Heart Disease Father    • Hypertension Father    • Stroke Father    • Lung Disease Sister      History   Smoking status   • Former Smoker -- 0.50 packs/day for 12 years   • Types: Cigarettes   • Quit date: 07/15/2012   Smokeless tobacco   • Not on file     Allergies   Allergen Reactions   • Iodine      Outpatient Encounter Prescriptions as of 6/27/2017   Medication Sig Dispense Refill   • spironolactone (ALDACTONE) 25 MG Tab Take 1 Tab by mouth every day. 90 Tab 3   • sacubitril-valsartan (ENTRESTO)  MG Tab tablet Take 1 Tab by mouth 2 Times a Day. 180 Tab 3   • metoprolol (LOPRESSOR) 100 MG Tab Take 1 Tab by mouth 2 times a day. 180 Tab 3   • atorvastatin (LIPITOR) 40 MG Tab Take 1 Tab by mouth every day. 90 Tab 3   • busPIRone (BUSPAR) 10 MG Tab Take 10 mg by mouth 2 times a day.     • gabapentin (NEURONTIN) 300 MG Cap Take 300 mg by mouth 3 times a day.     • amitriptyline (ELAVIL) 75 MG Tab Take 75 mg by mouth every evening.     • allopurinol (ZYLOPRIM) 100 MG Tab Take 100 mg by mouth 2 Times a Day.     • Pyridoxine HCl (VITAMIN B-6 PO) Take  by mouth.     • insulin lispro (HUMALOG) 100 UNIT/ML Solution Inject  as instructed 3 times a day before meals.     • insulin glargine (LANTUS) 100 UNIT/ML Solution Inject  as instructed every evening.     • esomeprazole magnesium (NEXIUM)  40 MG Pack Take 40 mg by mouth every morning before breakfast.     • aspirin 81 MG tablet Take 81 mg by mouth every day.     • escitalopram (LEXAPRO) 10 MG Tab Take 10 mg by mouth every day.     • metformin (GLUCOPHAGE) 1000 MG tablet Take 1,000 mg by mouth 2 times a day, with meals.     • metoprolol SR (TOPROL XL) 100 MG TABLET SR 24 HR Take 100 mg by mouth 2 Times a Day. Two tablets twice a day     • Omega-3 Fatty Acids (FISH OIL) 1000 MG Cap capsule Take 1,000 mg by mouth 3 times a day, with meals.     • doxycycline (VIBRAMYCIN) 100 MG Cap   0   • hydrocodone-acetaminophen (NORCO) 5-325 MG Tab per tablet   0   • [DISCONTINUED] metoprolol (LOPRESSOR) 100 MG Tab Take 100 mg by mouth 2 times a day.     • [DISCONTINUED] Sacubitril-Valsartan (ENTRESTO)  MG Tab Take  mg by mouth 2 Times a Day. 60 Tab 11   • [DISCONTINUED] spironolactone (ALDACTONE) 25 MG Tab Take 1 Tab by mouth every day. 30 Tab 3   • alprazolam (XANAX) 0.5 MG Tab Take 0.5 mg by mouth at bedtime as needed for Sleep. NOT TAKING     • vitamin D, Ergocalciferol, (DRISDOL) 25823 UNITS Cap capsule Take  by mouth every 7 days.     • [DISCONTINUED] atorvastatin (LIPITOR) 40 MG Tab Take 40 mg by mouth every evening.       No facility-administered encounter medications on file as of 6/27/2017.     Review of Systems   Constitutional: Negative for fever, chills, weight loss and malaise/fatigue.   HENT: Negative for ear discharge, ear pain, hearing loss and nosebleeds.    Eyes: Negative for blurred vision, double vision, pain and discharge.   Respiratory: Positive for shortness of breath. Negative for cough.    Cardiovascular: Negative for chest pain, palpitations, orthopnea, claudication, leg swelling and PND.   Gastrointestinal: Negative for nausea, vomiting, abdominal pain, blood in stool and melena.   Genitourinary: Negative for dysuria and hematuria.   Musculoskeletal: Negative for myalgias, joint pain and falls.   Skin: Negative for itching and  "rash.   Neurological: Negative for dizziness, sensory change, speech change, loss of consciousness and headaches.   Endo/Heme/Allergies: Negative for environmental allergies. Does not bruise/bleed easily.   Psychiatric/Behavioral: Negative for depression, suicidal ideas and hallucinations.        Objective:   /70 mmHg  Pulse 69  Ht 1.88 m (6' 2.02\")  Wt 133.811 kg (295 lb)  BMI 37.86 kg/m2  SpO2 96%    Physical Exam   Constitutional: He is oriented to person, place, and time. He appears well-developed and well-nourished.   HENT:   Head: Normocephalic and atraumatic.   Eyes: EOM are normal.   Neck: Normal range of motion. No JVD present.   Cardiovascular: Normal rate, regular rhythm, normal heart sounds and intact distal pulses.  Exam reveals no gallop and no friction rub.    No murmur heard.  Bilateral femoral pulses are 2+, bilateral dorsalis pedis pulses are 2+, bilateral posterior tibialis pulses are 2+.   Pulmonary/Chest: No respiratory distress. He has no wheezes. He has no rales. He exhibits no tenderness.   Abdominal: Soft. Bowel sounds are normal. There is no tenderness. There is no rebound and no guarding.   The is no presence of abdominal bruits   Musculoskeletal: Normal range of motion.   Neurological: He is alert and oriented to person, place, and time.   Skin: Skin is warm and dry.   Psychiatric: He has a normal mood and affect.   Nursing note and vitals reviewed.      Assessment:     1. ACC/AHA stage C systolic heart failure (CMS-[facility])  spironolactone (ALDACTONE) 25 MG Tab    sacubitril-valsartan (ENTRESTO)  MG Tab tablet    metoprolol (LOPRESSOR) 100 MG Tab    atorvastatin (LIPITOR) 40 MG Tab    COMP METABOLIC PANEL    LIPID PANEL   2. Heart failure, NYHA class 3 (CMS-HCC)  spironolactone (ALDACTONE) 25 MG Tab    sacubitril-valsartan (ENTRESTO)  MG Tab tablet    metoprolol (LOPRESSOR) 100 MG Tab    atorvastatin (LIPITOR) 40 MG Tab    COMP METABOLIC PANEL    LIPID PANEL   3. " Essential hypertension  spironolactone (ALDACTONE) 25 MG Tab    sacubitril-valsartan (ENTRESTO)  MG Tab tablet    metoprolol (LOPRESSOR) 100 MG Tab    atorvastatin (LIPITOR) 40 MG Tab    COMP METABOLIC PANEL    LIPID PANEL   4. Ischemic cardiomyopathy  spironolactone (ALDACTONE) 25 MG Tab    sacubitril-valsartan (ENTRESTO)  MG Tab tablet    metoprolol (LOPRESSOR) 100 MG Tab    atorvastatin (LIPITOR) 40 MG Tab    COMP METABOLIC PANEL    LIPID PANEL   5. CLARE on CPAP  spironolactone (ALDACTONE) 25 MG Tab    sacubitril-valsartan (ENTRESTO)  MG Tab tablet    metoprolol (LOPRESSOR) 100 MG Tab    atorvastatin (LIPITOR) 40 MG Tab    COMP METABOLIC PANEL    LIPID PANEL   6. Hx of CABG  spironolactone (ALDACTONE) 25 MG Tab    sacubitril-valsartan (ENTRESTO)  MG Tab tablet    metoprolol (LOPRESSOR) 100 MG Tab    atorvastatin (LIPITOR) 40 MG Tab    COMP METABOLIC PANEL    LIPID PANEL   7. LBBB (left bundle branch block)  spironolactone (ALDACTONE) 25 MG Tab    sacubitril-valsartan (ENTRESTO)  MG Tab tablet    metoprolol (LOPRESSOR) 100 MG Tab    atorvastatin (LIPITOR) 40 MG Tab    COMP METABOLIC PANEL    LIPID PANEL   8. Type 2 diabetes mellitus with complication, with long-term current use of insulin (CMS-HCC)  spironolactone (ALDACTONE) 25 MG Tab    sacubitril-valsartan (ENTRESTO)  MG Tab tablet    metoprolol (LOPRESSOR) 100 MG Tab    atorvastatin (LIPITOR) 40 MG Tab    COMP METABOLIC PANEL    LIPID PANEL       Medical Decision Making:  Today's Assessment / Status / Plan:     Today, based on physical examination findings, patient is euvolemic. No JVD, lungs are clear to auscultation, no pitting edema in bilateral lower extremities, no ascites.    I am not certain that his symptomatology of dyspnea is entirely cardiac. I do feel that weight loss would greatly benefit his symptomatology of shortness of breath.    We will continue his current medical therapy without change.    He will return  to see our nurse practitioner in 6 months.

## 2018-01-04 ENCOUNTER — TELEPHONE (OUTPATIENT)
Dept: CARDIOLOGY | Facility: MEDICAL CENTER | Age: 53
End: 2018-01-04

## 2018-01-08 ENCOUNTER — OFFICE VISIT (OUTPATIENT)
Dept: CARDIOLOGY | Facility: MEDICAL CENTER | Age: 53
End: 2018-01-08
Payer: OTHER GOVERNMENT

## 2018-01-08 VITALS
DIASTOLIC BLOOD PRESSURE: 60 MMHG | BODY MASS INDEX: 39.36 KG/M2 | HEIGHT: 73 IN | OXYGEN SATURATION: 97 % | HEART RATE: 78 BPM | SYSTOLIC BLOOD PRESSURE: 100 MMHG | WEIGHT: 297 LBS

## 2018-01-08 DIAGNOSIS — Z79.4 TYPE 2 DIABETES MELLITUS WITH COMPLICATION, WITH LONG-TERM CURRENT USE OF INSULIN (HCC): ICD-10-CM

## 2018-01-08 DIAGNOSIS — I25.5 ISCHEMIC CARDIOMYOPATHY: ICD-10-CM

## 2018-01-08 DIAGNOSIS — G47.33 OSA ON CPAP: ICD-10-CM

## 2018-01-08 DIAGNOSIS — I10 ESSENTIAL HYPERTENSION: ICD-10-CM

## 2018-01-08 DIAGNOSIS — E11.8 TYPE 2 DIABETES MELLITUS WITH COMPLICATION, WITH LONG-TERM CURRENT USE OF INSULIN (HCC): ICD-10-CM

## 2018-01-08 DIAGNOSIS — I50.9 HEART FAILURE, NYHA CLASS 3 (HCC): ICD-10-CM

## 2018-01-08 DIAGNOSIS — I44.7 LBBB (LEFT BUNDLE BRANCH BLOCK): ICD-10-CM

## 2018-01-08 DIAGNOSIS — Z95.1 HX OF CABG: ICD-10-CM

## 2018-01-08 DIAGNOSIS — I50.20 ACC/AHA STAGE C SYSTOLIC HEART FAILURE (HCC): ICD-10-CM

## 2018-01-08 PROCEDURE — 99215 OFFICE O/P EST HI 40 MIN: CPT | Performed by: INTERNAL MEDICINE

## 2018-01-08 RX ORDER — SPIRONOLACTONE 25 MG/1
25 TABLET ORAL DAILY
Qty: 90 TAB | Refills: 3 | Status: SHIPPED | OUTPATIENT
Start: 2018-01-08 | End: 2018-04-09 | Stop reason: SDUPTHER

## 2018-01-08 RX ORDER — METOPROLOL SUCCINATE 100 MG/1
100 TABLET, EXTENDED RELEASE ORAL 2 TIMES DAILY
Qty: 180 TAB | Refills: 3 | Status: SHIPPED | OUTPATIENT
Start: 2018-01-08 | End: 2018-01-12 | Stop reason: SDUPTHER

## 2018-01-08 ASSESSMENT — ENCOUNTER SYMPTOMS
BLOOD IN STOOL: 0
SENSORY CHANGE: 0
ORTHOPNEA: 0
WEIGHT LOSS: 0
CLAUDICATION: 0
FEVER: 0
DIZZINESS: 0
CHILLS: 0
EYE DISCHARGE: 0
PALPITATIONS: 0
FALLS: 0
BLURRED VISION: 0
DOUBLE VISION: 0
SHORTNESS OF BREATH: 1
LOSS OF CONSCIOUSNESS: 0
MYALGIAS: 0
VOMITING: 0
EYE PAIN: 0
COUGH: 0
SPEECH CHANGE: 0
PND: 0
DEPRESSION: 0
NAUSEA: 0
HEADACHES: 0
ABDOMINAL PAIN: 0
BRUISES/BLEEDS EASILY: 0
HALLUCINATIONS: 0

## 2018-01-08 NOTE — PROGRESS NOTES
Subjective:   Juaquin Solorzano is a 52 y.o. male who presents today for cardiac care and evaluation of his prior history of CABG, ischemic cardiomyopathy, heart failure. In 9/2013, patient underwent triple bypass surgery. In 2014 he underwent open sternotomy again because of complications. Patient also has a history of sleep apnea for which he has been using CPAP. Patient is obese.      Patient also has diabetes for which he is then controlled with insulin injections.     Patient was able to complete 314 m during his 6 minute walk test. his O2 saturation at baseline was 93% and at the end of the test, the O2 saturation was 95%. He reported 8 level of dyspnea on Anson scale.     Because of his report of exertional dyspnea, I obtain a transthoracic echocardiogram which shows left ventricular systolic function of 30%. Patient underwent ischemic evaluation with coronary angiogram. Essentially, patient is living off of of his TURNER to [facility]. His LV gram shows left ventricular systolic function of 50-55%. Patient still reports having shortness of breath. He is able to walk for half a mile however at slow. He is tolerating Entresto therapy well.      Patient continues to be short of breath. He gets symptomatic with tying his shoe still. He did have a PFT which shows mild evidence of restrictive lung disease but no evidence of obstruction. He is tolerating his medications well.     He is on [id number] 200 mg po bid, Lopressor 100 mg po bid, Spironolactone 25 mg po daily.     Blood pressure is well controlled.     Chief Complaint: exertional dyspnea, heart failure management.    Past Medical History:   Diagnosis Date   • Arthritis     osteo   • Bronchitis 2014   • Cataract     early stages   • Diabetes (CMS-HCC) 2006    insulin   • Fibromyalgia    • Myocardial infarct 2007   • Myocardial infarct 2012   • Psychiatric problem     depression and anxiety   • Sleep apnea     cpap     Past Surgical History:   Procedure Laterality Date    • RECOVERY  7/22/2016    Procedure: CATH LAB-[facility] W/POSSIBLE RADIAL APPROACH-CARIE;  Surgeon: Recoveryonly Surgery;  [place] [facility] [place]  Service:    • OTHER  2015    sternal revision   • OTHER CARDIAC SURGERY  2014    cabg x 3     Family History   Problem Relation Age of Onset   • Heart Disease Mother    • Diabetes Mother    • Hyperlipidemia Father    • Heart Disease Father    • Hypertension Father    • Stroke Father    • Lung Disease Sister      History   Smoking Status   • Former Smoker   • Packs/day: 0.50   • Years: 12.00   • Types: Cigarettes   • Quit date: 7/15/2012   Smokeless Tobacco   • Never Used     Allergies   Allergen Reactions   • Iodine      Outpatient Encounter Prescriptions as of 1/8/2018   Medication Sig Dispense Refill   • metoprolol SR (TOPROL XL) 100 MG TABLET SR 24 HR Take 1 Tab by mouth 2 Times a Day. Two tablets twice a day 180 Tab 3   • sacubitril-valsartan (ENTRESTO)  MG Tab tablet Take 1 Tab by mouth 2 Times a Day. 180 Tab 3   • spironolactone (ALDACTONE) 25 MG Tab Take 1 Tab by mouth every day. 90 Tab 3   • atorvastatin (LIPITOR) 40 MG Tab Take 1 Tab by mouth every day. 90 Tab 3   • busPIRone (BUSPAR) 10 MG Tab Take 10 mg by mouth 2 times a day.     • gabapentin (NEURONTIN) 300 MG Cap Take 300 mg by mouth 3 times a day.     • amitriptyline (ELAVIL) 75 MG Tab Take 75 mg by mouth every evening.     • allopurinol (ZYLOPRIM) 100 MG Tab Take 100 mg by mouth 2 Times a Day.     • Pyridoxine HCl (VITAMIN B-6 PO) Take  by mouth.     • insulin lispro (HUMALOG) 100 UNIT/ML Solution Inject  as instructed 3 times a day before meals.     • insulin glargine (LANTUS) 100 UNIT/ML Solution Inject  as instructed every evening.     • esomeprazole magnesium (NEXIUM) 40 MG Pack Take 40 mg by mouth every morning before breakfast.     • aspirin 81 MG tablet Take 81 mg by mouth every day.     • escitalopram (LEXAPRO) 10 MG Tab Take 10 mg by mouth every day.     • metformin  (GLUCOPHAGE) 1000 MG tablet Take 1,000 mg by mouth 2 times a day, with meals.     • Omega-3 Fatty Acids (FISH OIL) 1000 MG Cap capsule Take 1,000 mg by mouth 3 times a day, with meals.     • doxycycline (VIBRAMYCIN) 100 MG Cap   0   • hydrocodone-acetaminophen (NORCO) 5-325 MG Tab per tablet   0   • [DISCONTINUED] spironolactone (ALDACTONE) 25 MG Tab Take 1 Tab by mouth every day. 90 Tab 3   • [DISCONTINUED] sacubitril-valsartan (ENTRESTO)  MG Tab tablet Take 1 Tab by mouth 2 Times a Day. 180 Tab 3   • [DISCONTINUED] metoprolol (LOPRESSOR) 100 MG Tab Take 1 Tab by mouth 2 times a day. 180 Tab 3   • alprazolam (XANAX) 0.5 MG Tab Take 0.5 mg by mouth at bedtime as needed for Sleep. NOT TAKING     • vitamin D, Ergocalciferol, (DRISDOL) 05840 UNITS Cap capsule Take  by mouth every 7 days.     • [DISCONTINUED] metoprolol SR (TOPROL XL) 100 MG TABLET SR 24 HR Take 100 mg by mouth 2 Times a Day. Two tablets twice a day       No facility-administered encounter medications on file as of 1/8/2018.      Review of Systems   Constitutional: Negative for chills, fever, malaise/fatigue and weight loss.   HENT: Negative for ear discharge, ear pain, hearing loss and nosebleeds.    Eyes: Negative for blurred vision, double vision, pain and discharge.   Respiratory: Positive for shortness of breath. Negative for cough.    Cardiovascular: Negative for chest pain, palpitations, orthopnea, claudication, leg swelling and PND.   Gastrointestinal: Negative for abdominal pain, blood in stool, melena, nausea and vomiting.   Genitourinary: Negative for dysuria and hematuria.   Musculoskeletal: Negative for falls, joint pain and myalgias.   Skin: Negative for itching and rash.   Neurological: Negative for dizziness, sensory change, speech change, loss of consciousness and headaches.   Endo/Heme/Allergies: Negative for environmental allergies. Does not bruise/bleed easily.   Psychiatric/Behavioral: Negative for depression, hallucinations  "and suicidal ideas.        Objective:   /60   Pulse 78   Ht 1.854 m (6' 1\")   Wt (!) 134.7 kg (297 lb)   SpO2 97%   BMI 39.18 kg/m²     Physical Exam   Constitutional: He is oriented to person, place, and time. He appears well-developed and well-nourished.   HENT:   Head: Normocephalic and atraumatic.   Eyes: EOM are normal.   Neck: Normal range of motion. No JVD present.   Cardiovascular: Normal rate, regular rhythm, normal heart sounds and intact distal pulses.  Exam reveals no gallop and no friction rub.    No murmur heard.  Pulmonary/Chest: No respiratory distress. He has no wheezes. He has no rales. He exhibits no tenderness.   Abdominal: Soft. Bowel sounds are normal. There is no tenderness. There is no rebound and no guarding.   The is no presence of abdominal bruits   Musculoskeletal: Normal range of motion.   Neurological: He is alert and oriented to person, place, and time.   Skin: Skin is warm and dry.   Psychiatric: He has a normal mood and affect.   Nursing note and vitals reviewed.      Assessment:     1. ACC/AHA stage C systolic heart failure (CMS-[facility])  metoprolol SR (TOPROL XL) 100 MG TABLET SR 24 HR    ECHOCARDIOGRAM COMP W/O CONT    sacubitril-valsartan (ENTRESTO)  MG Tab tablet    spironolactone (ALDACTONE) 25 MG Tab   2. Heart failure, NYHA class 3 (CMS-[facility])  metoprolol SR (TOPROL XL) 100 MG TABLET SR 24 HR    ECHOCARDIOGRAM COMP W/O CONT    sacubitril-valsartan (ENTRESTO)  MG Tab tablet    spironolactone (ALDACTONE) 25 MG Tab   3. Hx of CABG  sacubitril-valsartan (ENTRESTO)  MG Tab tablet    spironolactone (ALDACTONE) 25 MG Tab   4. Ischemic cardiomyopathy  sacubitril-valsartan (ENTRESTO)  MG Tab tablet    spironolactone (ALDACTONE) 25 MG Tab   5. LBBB (left bundle branch block)  sacubitril-valsartan (ENTRESTO)  MG Tab tablet    spironolactone (ALDACTONE) 25 MG Tab   6. CLARE on CPAP  sacubitril-valsartan (ENTRESTO)  MG Tab tablet    spironolactone " (ALDACTONE) 25 MG Tab   7. Type 2 diabetes mellitus with complication, with long-term current use of insulin (CMS-[facility])  sacubitril-valsartan (ENTRESTO)  MG Tab tablet    spironolactone (ALDACTONE) 25 MG Tab   8. Essential hypertension  sacubitril-valsartan (ENTRESTO)  MG Tab tablet    spironolactone (ALDACTONE) 25 MG Tab       Medical Decision Making:  Today's Assessment / Status / Plan:   Today, based on physical examination findings, patient is euvolemic. No JVD, lungs are clear to auscultation, no pitting edema in bilateral lower extremities, no ascites.    Dry weight is 297 lbs.    Will switch back to long acting metoprolol.    Will repeat TTE.    Continue ARNI with Entresto 200 mg po bid, Spironolactone 25 mg po daily, ASA.

## 2018-01-08 NOTE — LETTER
[facility]   [address], [facility] [address]  [place] [address]  Phone: [phone]  Fax: [phone]              Juaquin Solorzano  1965    Encounter Date: 1/8/2018    Adrian Simms M.D.          PROGRESS NOTE:  Subjective:   Juaquin Solorzano is a 52 y.o. male who presents today for cardiac care and evaluation of his prior history of CABG, ischemic cardiomyopathy, heart failure. In 9/2013, patient underwent triple bypass surgery. In 2014 he underwent open sternotomy again because of complications. Patient also has a history of sleep apnea for which he has been using CPAP. Patient is obese.      Patient also has diabetes for which he is then controlled with insulin injections.     Patient was able to complete 314 m during his 6 minute walk test. his O2 saturation at baseline was 93% and at the end of the test, the O2 saturation was 95%. He reported 8 level of dyspnea on Anson scale.     Because of his report of exertional dyspnea, I obtain a transthoracic echocardiogram which shows left ventricular systolic function of 30%. Patient underwent ischemic evaluation with coronary angiogram. Essentially, patient is living off of of his [facility] to [facility]. His LV gram shows left ventricular systolic function of 50-55%. Patient still reports having shortness of breath. He is able to walk for half a mile however at slow. He is tolerating Entresto therapy well.      Patient continues to be short of breath. He gets symptomatic with tying his shoe still. He did have a PFT which shows mild evidence of restrictive lung disease but no evidence of obstruction. He is tolerating his medications well.     He is on ODZ511 200 mg po bid, Lopressor 100 mg po bid, Spironolactone 25 mg po daily.     Blood pressure is well controlled.     Chief Complaint: exertional dyspnea, heart failure management.    Past Medical History:   Diagnosis Date   • Arthritis     osteo   • Bronchitis 2014   •  Cataract     early stages   • Diabetes (CMS-[facility]) 2006    insulin   • Fibromyalgia    • Myocardial infarct 2007   • Myocardial infarct 2012   • Psychiatric problem     depression and anxiety   • Sleep apnea     cpap     Past Surgical History:   Procedure Laterality Date   • RECOVERY  7/22/2016    Procedure: CATH LAB-[facility] W/POSSIBLE RADIAL APPROACH-CARIE;  Surgeon: Recoveryonly Surgery;  [place] [facility] [place]  Service:    • OTHER  2015    sternal revision   • OTHER CARDIAC SURGERY  2014    cabg x 3     Family History   Problem Relation Age of Onset   • Heart Disease Mother    • Diabetes Mother    • Hyperlipidemia Father    • Heart Disease Father    • Hypertension Father    • Stroke Father    • Lung Disease Sister      History   Smoking Status   • Former Smoker   • Packs/day: 0.50   • Years: 12.00   • Types: Cigarettes   • Quit date: 7/15/2012   Smokeless Tobacco   • Never Used     Allergies   Allergen Reactions   • Iodine      Outpatient Encounter Prescriptions as of 1/8/2018   Medication Sig Dispense Refill   • metoprolol SR (TOPROL XL) 100 MG TABLET SR 24 HR Take 1 Tab by mouth 2 Times a Day. Two tablets twice a day 180 Tab 3   • sacubitril-valsartan (ENTRESTO)  MG Tab tablet Take 1 Tab by mouth 2 Times a Day. 180 Tab 3   • spironolactone (ALDACTONE) 25 MG Tab Take 1 Tab by mouth every day. 90 Tab 3   • atorvastatin (LIPITOR) 40 MG Tab Take 1 Tab by mouth every day. 90 Tab 3   • busPIRone (BUSPAR) 10 MG Tab Take 10 mg by mouth 2 times a day.     • gabapentin (NEURONTIN) 300 MG Cap Take 300 mg by mouth 3 times a day.     • amitriptyline (ELAVIL) 75 MG Tab Take 75 mg by mouth every evening.     • allopurinol (ZYLOPRIM) 100 MG Tab Take 100 mg by mouth 2 Times a Day.     • Pyridoxine HCl (VITAMIN B-6 PO) Take  by mouth.     • insulin lispro (HUMALOG) 100 UNIT/ML Solution Inject  as instructed 3 times a day before meals.     • insulin glargine (LANTUS) 100 UNIT/ML Solution Inject  as  instructed every evening.     • esomeprazole magnesium (NEXIUM) 40 MG Pack Take 40 mg by mouth every morning before breakfast.     • aspirin 81 MG tablet Take 81 mg by mouth every day.     • escitalopram (LEXAPRO) 10 MG Tab Take 10 mg by mouth every day.     • metformin (GLUCOPHAGE) 1000 MG tablet Take 1,000 mg by mouth 2 times a day, with meals.     • Omega-3 Fatty Acids (FISH OIL) 1000 MG Cap capsule Take 1,000 mg by mouth 3 times a day, with meals.     • doxycycline (VIBRAMYCIN) 100 MG Cap   0   • hydrocodone-acetaminophen (NORCO) 5-325 MG Tab per tablet   0   • [DISCONTINUED] spironolactone (ALDACTONE) 25 MG Tab Take 1 Tab by mouth every day. 90 Tab 3   • [DISCONTINUED] sacubitril-valsartan (ENTRESTO)  MG Tab tablet Take 1 Tab by mouth 2 Times a Day. 180 Tab 3   • [DISCONTINUED] metoprolol (LOPRESSOR) 100 MG Tab Take 1 Tab by mouth 2 times a day. 180 Tab 3   • alprazolam (XANAX) 0.5 MG Tab Take 0.5 mg by mouth at bedtime as needed for Sleep. NOT TAKING     • vitamin D, Ergocalciferol, (DRISDOL) 97937 UNITS Cap capsule Take  by mouth every 7 days.     • [DISCONTINUED] metoprolol SR (TOPROL XL) 100 MG TABLET SR 24 HR Take 100 mg by mouth 2 Times a Day. Two tablets twice a day       No facility-administered encounter medications on file as of 1/8/2018.      Review of Systems   Constitutional: Negative for chills, fever, malaise/fatigue and weight loss.   HENT: Negative for ear discharge, ear pain, hearing loss and nosebleeds.    Eyes: Negative for blurred vision, double vision, pain and discharge.   Respiratory: Positive for shortness of breath. Negative for cough.    Cardiovascular: Negative for chest pain, palpitations, orthopnea, claudication, leg swelling and PND.   Gastrointestinal: Negative for abdominal pain, blood in stool, melena, nausea and vomiting.   Genitourinary: Negative for dysuria and hematuria.   Musculoskeletal: Negative for falls, joint pain and myalgias.   Skin: Negative for itching and  "rash.   Neurological: Negative for dizziness, sensory change, speech change, loss of consciousness and headaches.   Endo/Heme/Allergies: Negative for environmental allergies. Does not bruise/bleed easily.   Psychiatric/Behavioral: Negative for depression, hallucinations and suicidal ideas.        Objective:   /60   Pulse 78   Ht 1.854 m (6' 1\")   Wt (!) 134.7 kg (297 lb)   SpO2 97%   BMI 39.18 kg/m²      Physical Exam   Constitutional: He is oriented to person, place, and time. He appears well-developed and well-nourished.   HENT:   Head: Normocephalic and atraumatic.   Eyes: EOM are normal.   Neck: Normal range of motion. No JVD present.   Cardiovascular: Normal rate, regular rhythm, normal heart sounds and intact distal pulses.  Exam reveals no gallop and no friction rub.    No murmur heard.  Pulmonary/Chest: No respiratory distress. He has no wheezes. He has no rales. He exhibits no tenderness.   Abdominal: Soft. Bowel sounds are normal. There is no tenderness. There is no rebound and no guarding.   The is no presence of abdominal bruits   Musculoskeletal: Normal range of motion.   Neurological: He is alert and oriented to person, place, and time.   Skin: Skin is warm and dry.   Psychiatric: He has a normal mood and affect.   Nursing note and vitals reviewed.      Assessment:     1. ACC/AHA stage C systolic heart failure (CMS-[facility])  metoprolol SR (TOPROL XL) 100 MG TABLET SR 24 HR    ECHOCARDIOGRAM COMP W/O CONT    sacubitril-valsartan (ENTRESTO)  MG Tab tablet    spironolactone (ALDACTONE) 25 MG Tab   2. Heart failure, NYHA class 3 (CMS-[facility])  metoprolol SR (TOPROL XL) 100 MG TABLET SR 24 HR    ECHOCARDIOGRAM COMP W/O CONT    sacubitril-valsartan (ENTRESTO)  MG Tab tablet    spironolactone (ALDACTONE) 25 MG Tab   3. Hx of CABG  sacubitril-valsartan (ENTRESTO)  MG Tab tablet    spironolactone (ALDACTONE) 25 MG Tab   4. Ischemic cardiomyopathy  sacubitril-valsartan (ENTRESTO)  MG " Tab tablet    spironolactone (ALDACTONE) 25 MG Tab   5. LBBB (left bundle branch block)  sacubitril-valsartan (ENTRESTO)  MG Tab tablet    spironolactone (ALDACTONE) 25 MG Tab   6. CLARE on CPAP  sacubitril-valsartan (ENTRESTO)  MG Tab tablet    spironolactone (ALDACTONE) 25 MG Tab   7. Type 2 diabetes mellitus with complication, with long-term current use of insulin (CMS-HCC)  sacubitril-valsartan (ENTRESTO)  MG Tab tablet    spironolactone (ALDACTONE) 25 MG Tab   8. Essential hypertension  sacubitril-valsartan (ENTRESTO)  MG Tab tablet    spironolactone (ALDACTONE) 25 MG Tab       Medical Decision Making:  Today's Assessment / Status / Plan:   Today, based on physical examination findings, patient is euvolemic. No JVD, lungs are clear to auscultation, no pitting edema in bilateral lower extremities, no ascites.    Dry weight is 297 lbs.    Will switch back to long acting metoprolol.    Will repeat TTE.    Continue ARNI with Entresto 200 mg po bid, Spironolactone 25 mg po daily, ASA.          Victoria Callahan M.D.  [address] [address]  VIA Facsimile: [phone]

## 2018-01-12 DIAGNOSIS — I50.20 ACC/AHA STAGE C SYSTOLIC HEART FAILURE (HCC): ICD-10-CM

## 2018-01-12 DIAGNOSIS — I50.9 HEART FAILURE, NYHA CLASS 3 (HCC): ICD-10-CM

## 2018-01-12 RX ORDER — METOPROLOL SUCCINATE 100 MG/1
100 TABLET, EXTENDED RELEASE ORAL 2 TIMES DAILY
Qty: 180 TAB | Refills: 3 | Status: SHIPPED | OUTPATIENT
Start: 2018-01-12 | End: 2018-04-09 | Stop reason: SDUPTHER

## 2018-02-07 ENCOUNTER — TELEPHONE (OUTPATIENT)
Dept: CARDIOLOGY | Facility: MEDICAL CENTER | Age: 53
End: 2018-02-07

## 2018-02-07 NOTE — TELEPHONE ENCOUNTER
Scheduling called to confirm that the echo Dr. Simms ordered should be without contrast as the patient is insisting that it is with contrast. The current order is w/o contrast and there is no indication from Dr. Simms's office visit dictation that w/ contrast is needed. Scheduling will explain this to the patient.     To Zulema RN: marianne GARCIA

## 2018-02-12 ENCOUNTER — HOSPITAL ENCOUNTER (OUTPATIENT)
Dept: CARDIOLOGY | Facility: MEDICAL CENTER | Age: 53
End: 2018-02-12
Attending: INTERNAL MEDICINE
Payer: OTHER GOVERNMENT

## 2018-02-12 DIAGNOSIS — I50.20 ACC/AHA STAGE C SYSTOLIC HEART FAILURE (HCC): ICD-10-CM

## 2018-02-12 DIAGNOSIS — I50.9 HEART FAILURE, NYHA CLASS 3 (HCC): ICD-10-CM

## 2018-02-12 LAB
LV EJECT FRACT  99904: 35
LV EJECT FRACT MOD 2C 99903: 25.22
LV EJECT FRACT MOD 4C 99902: 36.93
LV EJECT FRACT MOD BP 99901: 31.49

## 2018-02-12 PROCEDURE — 93306 TTE W/DOPPLER COMPLETE: CPT | Mod: 26 | Performed by: INTERNAL MEDICINE

## 2018-02-12 PROCEDURE — 93306 TTE W/DOPPLER COMPLETE: CPT

## 2018-03-05 ENCOUNTER — OFFICE VISIT (OUTPATIENT)
Dept: URGENT CARE | Facility: PHYSICIAN GROUP | Age: 53
End: 2018-03-05
Payer: OTHER GOVERNMENT

## 2018-03-05 VITALS
DIASTOLIC BLOOD PRESSURE: 70 MMHG | TEMPERATURE: 98.6 F | OXYGEN SATURATION: 98 % | SYSTOLIC BLOOD PRESSURE: 110 MMHG | BODY MASS INDEX: 37.27 KG/M2 | HEIGHT: 74 IN | HEART RATE: 82 BPM | RESPIRATION RATE: 16 BRPM | WEIGHT: 290.4 LBS

## 2018-03-05 DIAGNOSIS — L08.9 SKIN INFECTION: ICD-10-CM

## 2018-03-05 DIAGNOSIS — R21 RASH: ICD-10-CM

## 2018-03-05 PROCEDURE — 99214 OFFICE O/P EST MOD 30 MIN: CPT | Performed by: FAMILY MEDICINE

## 2018-03-05 RX ORDER — CLOBETASOL PROPIONATE 0.5 MG/G
OINTMENT TOPICAL
Qty: 30 G | Refills: 0 | Status: SHIPPED | OUTPATIENT
Start: 2018-03-05 | End: 2018-08-01

## 2018-03-05 RX ORDER — SULFAMETHOXAZOLE AND TRIMETHOPRIM 800; 160 MG/1; MG/1
1 TABLET ORAL EVERY 12 HOURS
Qty: 14 TAB | Refills: 0 | Status: SHIPPED | OUTPATIENT
Start: 2018-03-05 | End: 2018-03-12

## 2018-03-05 ASSESSMENT — ENCOUNTER SYMPTOMS
FEVER: 0
HEMOPTYSIS: 0
FOCAL WEAKNESS: 0
ORTHOPNEA: 0
CHILLS: 0
DIZZINESS: 0
SHORTNESS OF BREATH: 0

## 2018-03-05 ASSESSMENT — PAIN SCALES - GENERAL: PAINLEVEL: 1=MINIMAL PAIN

## 2018-03-05 NOTE — PROGRESS NOTES
Subjective:      Juaquin Solorzano is a 52 y.o. male who presents with Allergic Reaction (had a tattoo done 10 days ago)    Chief Complaint   Patient presents with   • Allergic Reaction     had a tattoo done 10 days ago        - This is a very pleasant 52 y.o. male with complaints of Lt shoulder tatt w/ some pain itch x 5 days.       - Hx DM/HTN, sugars run low 100's         ALLERGIES:  Iodine     PMH:  Past Medical History:   Diagnosis Date   • Arthritis     osteo   • Bronchitis 2014   • Cataract     early stages   • Diabetes (CMS-HCC) 2006    insulin   • Fibromyalgia    • Myocardial infarct 2007   • Myocardial infarct 2012   • Psychiatric problem     depression and anxiety   • Sleep apnea     cpap        MEDS:    Current Outpatient Prescriptions:   •  sulfamethoxazole-trimethoprim (BACTRIM DS) 800-160 MG tablet, Take 1 Tab by mouth every 12 hours for 7 days., Disp: 14 Tab, Rfl: 0  •  clobetasol (TEMOVATE) 0.05 % Ointment, BID x 7 days, Disp: 30 g, Rfl: 0  •  metoprolol SR (TOPROL XL) 100 MG TABLET SR 24 HR, Take 1 Tab by mouth 2 Times a Day., Disp: 180 Tab, Rfl: 3  •  sacubitril-valsartan (ENTRESTO)  MG Tab tablet, Take 1 Tab by mouth 2 Times a Day., Disp: 180 Tab, Rfl: 3  •  spironolactone (ALDACTONE) 25 MG Tab, Take 1 Tab by mouth every day., Disp: 90 Tab, Rfl: 3  •  atorvastatin (LIPITOR) 40 MG Tab, Take 1 Tab by mouth every day., Disp: 90 Tab, Rfl: 3  •  amitriptyline (ELAVIL) 75 MG Tab, Take 75 mg by mouth every evening., Disp: , Rfl:   •  allopurinol (ZYLOPRIM) 100 MG Tab, Take 100 mg by mouth 2 Times a Day., Disp: , Rfl:   •  Pyridoxine HCl (VITAMIN B-6 PO), Take  by mouth., Disp: , Rfl:   •  insulin lispro (HUMALOG) 100 UNIT/ML Solution, Inject  as instructed 3 times a day before meals., Disp: , Rfl:   •  insulin glargine (LANTUS) 100 UNIT/ML Solution, Inject  as instructed every evening., Disp: , Rfl:   •  esomeprazole magnesium (NEXIUM) 40 MG Pack, Take 40 mg by mouth every morning before  "breakfast., Disp: , Rfl:   •  aspirin 81 MG tablet, Take 81 mg by mouth every day., Disp: , Rfl:   •  escitalopram (LEXAPRO) 10 MG Tab, Take 10 mg by mouth every day., Disp: , Rfl:   •  metformin (GLUCOPHAGE) 1000 MG tablet, Take 1,000 mg by mouth 2 times a day, with meals., Disp: , Rfl:   •  Omega-3 Fatty Acids (FISH OIL) 1000 MG Cap capsule, Take 1,000 mg by mouth 3 times a day, with meals., Disp: , Rfl:     ** I have documented what I find to be significant in regards to past medical, social, family and surgical history  in my HPI or under PMH/PSH/FH review section, otherwise it is contributory **           HPI    Review of Systems   Constitutional: Negative for chills and fever.   Respiratory: Negative for hemoptysis and shortness of breath.    Cardiovascular: Negative for chest pain and orthopnea.   Neurological: Negative for dizziness and focal weakness.          Objective:     /70   Pulse 82   Temp 37 °C (98.6 °F)   Resp 16   Ht 1.88 m (6' 2\")   Wt (!) 131.7 kg (290 lb 6.4 oz)   SpO2 98%   BMI 37.29 kg/m²      Physical Exam   Constitutional: He appears well-developed. No distress.   HENT:   Head: Normocephalic and atraumatic.   Mouth/Throat: Oropharynx is clear and moist.   Eyes: Conjunctivae are normal.   Neck: Neck supple.   Cardiovascular: Regular rhythm.    No murmur heard.  Pulmonary/Chest: Effort normal. No respiratory distress.   Neurological: He is alert. He exhibits normal muscle tone.   Skin: Skin is warm and dry.   Psychiatric: He has a normal mood and affect. Judgment normal.   Nursing note and vitals reviewed.  folliculitis like rash over tatt Lt shoulder             Assessment/Plan:         1. Rash  clobetasol (TEMOVATE) 0.05 % Ointment   2. Skin infection  sulfamethoxazole-trimethoprim (BACTRIM DS) 800-160 MG tablet             Dx & d/c instructions discussed w/ patient and/or family members. Follow up w/ Prvt Dr or here in 3-4 days if not getting better, sooner if needed,  ER if " worse and UC/PCP unavailable.        Possible side effects (i.e. Rash, GI upset/constipation, sedation, elevation of BP or sugars) of any medications given discussed.

## 2018-03-20 ENCOUNTER — OFFICE VISIT (OUTPATIENT)
Dept: NEUROLOGY | Facility: MEDICAL CENTER | Age: 53
End: 2018-03-20
Payer: OTHER GOVERNMENT

## 2018-03-20 VITALS
HEIGHT: 74 IN | OXYGEN SATURATION: 95 % | SYSTOLIC BLOOD PRESSURE: 114 MMHG | HEART RATE: 74 BPM | RESPIRATION RATE: 16 BRPM | BODY MASS INDEX: 37.29 KG/M2 | WEIGHT: 290.57 LBS | DIASTOLIC BLOOD PRESSURE: 68 MMHG | TEMPERATURE: 97 F

## 2018-03-20 DIAGNOSIS — R68.89 SPELLS OF DECREASED ATTENTIVENESS: ICD-10-CM

## 2018-03-20 DIAGNOSIS — E11.8 TYPE 2 DIABETES MELLITUS WITH COMPLICATION, WITH LONG-TERM CURRENT USE OF INSULIN (HCC): ICD-10-CM

## 2018-03-20 DIAGNOSIS — E56.9 VITAMIN DEFICIENCY: ICD-10-CM

## 2018-03-20 DIAGNOSIS — R41.89 COGNITIVE CHANGE: ICD-10-CM

## 2018-03-20 DIAGNOSIS — Z79.4 TYPE 2 DIABETES MELLITUS WITH COMPLICATION, WITH LONG-TERM CURRENT USE OF INSULIN (HCC): ICD-10-CM

## 2018-03-20 DIAGNOSIS — E08.49 OTHER DIABETIC NEUROLOGICAL COMPLICATION ASSOCIATED WITH DIABETES MELLITUS DUE TO UNDERLYING CONDITION (HCC): ICD-10-CM

## 2018-03-20 PROBLEM — E08.40 DIABETIC NEUROPATHY ASSOCIATED WITH DIABETES MELLITUS DUE TO UNDERLYING CONDITION (HCC): Status: ACTIVE | Noted: 2018-03-20

## 2018-03-20 PROCEDURE — 99204 OFFICE O/P NEW MOD 45 MIN: CPT | Performed by: PSYCHIATRY & NEUROLOGY

## 2018-03-20 ASSESSMENT — PATIENT HEALTH QUESTIONNAIRE - PHQ9
SUM OF ALL RESPONSES TO PHQ QUESTIONS 1-9: 13
5. POOR APPETITE OR OVEREATING: 0 - NOT AT ALL
CLINICAL INTERPRETATION OF PHQ2 SCORE: 1

## 2018-03-20 NOTE — PROGRESS NOTES
NEUROLOGY NOTE    Referring Physician  Victoria Callahan M.D.      CHIEF COMPLAINT:    Memory deteriorating more in the past couple of years  Forgot what he just did   Did not have super memory to begin with  Starring spells -- 10 times per day or more    Chief Complaint   Patient presents with   • Establish Care     Memory issues       PRESENT ILLNESS:   Memory deteriorating more in the past couple of years  Forgot what he just did   Did not have super memory to begin with  Starring spells -- 10 times per day or more    Starring spells probably started 5 years    Head injuries at least 3 times when he was kid--- had to get scalp stiches    1. Starring Spells ( for 5 years since 2013), Poor Memory for years, Hx of brain contusion  2. DM, Heart Attack( 3 times status post CABG 2015), DM neuropathy for 4 years    Disability and not able to work for 4 years    PAST MEDICAL HISTORY:  Past Medical History:   Diagnosis Date   • Arthritis     osteo   • Bronchitis 2014   • Cataract     early stages   • Diabetes (CMS-[facility]) 2006    insulin   • Fibromyalgia    • Myocardial infarct 2007   • Myocardial infarct 2012   • Psychiatric problem     depression and anxiety   • Sleep apnea     cpap       PAST SURGICAL HISTORY:  Past Surgical History:   Procedure Laterality Date   • RECOVERY  7/22/2016    Procedure: CATH LAB-[facility] W/POSSIBLE RADIAL APPROACH-CARIE;  Surgeon: Recoveryonly Surgery;  [place] [facility] [place]  Service:    • OTHER  2015    sternal revision   • OTHER CARDIAC SURGERY  2014    cabg x 3       FAMILY HISTORY:  Family History   Problem Relation Age of Onset   • Heart Disease Mother    • Diabetes Mother    • Hyperlipidemia Father    • Heart Disease Father    • Hypertension Father    • Stroke Father    • Lung Disease Sister        SOCIAL HISTORY:  Social History     Social History   • Marital status:      Spouse name: N/A   • Number of children: N/A   • Years of education: N/A     Occupational  History   • Not on file.     Social History Main Topics   • Smoking status: Former Smoker     Packs/day: 0.50     Years: 12.00     Types: Cigarettes     Quit date: 7/15/2012   • Smokeless tobacco: Never Used   • Alcohol use No      Comment: rare   • Drug use: Yes     Types: Marijuana      Comment: edible marijuana   • Sexual activity: Not on file     Other Topics Concern   • Not on file     Social History Narrative   • No narrative on file     ALLERGIES:  Allergies   Allergen Reactions   • Iodine      TOBHX  History   Smoking Status   • Former Smoker   • Packs/day: 0.50   • Years: 12.00   • Types: Cigarettes   • Quit date: 7/15/2012   Smokeless Tobacco   • Never Used     ALCHX  History   Alcohol Use No     Comment: rare     DRUGHX  History   Drug Use   • Types: Marijuana     Comment: edible marijuana           MEDICATIONS:  Current Outpatient Prescriptions   Medication   • clobetasol (TEMOVATE) 0.05 % Ointment   • metoprolol SR (TOPROL XL) 100 MG TABLET SR 24 HR   • sacubitril-valsartan (ENTRESTO)  MG Tab tablet   • spironolactone (ALDACTONE) 25 MG Tab   • atorvastatin (LIPITOR) 40 MG Tab   • amitriptyline (ELAVIL) 75 MG Tab   • allopurinol (ZYLOPRIM) 100 MG Tab   • Pyridoxine HCl (VITAMIN B-6 PO)   • insulin lispro (HUMALOG) 100 UNIT/ML Solution   • insulin glargine (LANTUS) 100 UNIT/ML Solution   • esomeprazole magnesium (NEXIUM) 40 MG Pack   • aspirin 81 MG tablet   • escitalopram (LEXAPRO) 10 MG Tab   • metformin (GLUCOPHAGE) 1000 MG tablet   • Omega-3 Fatty Acids (FISH OIL) 1000 MG Cap capsule     No current facility-administered medications for this visit.        REVIEW OF SYSTEM:    Constitutional: Denies fevers, Denies weight changes   Eyes: Denies changes in vision, no eye pain   Ears/Nose/Throat/Mouth: Denies nasal congestion or sore throat   Cardiovascular: heart attack,  Respiratory: Denies SOB.   Gastrointestinal/Hepatic: Denies abdominal pain, nausea, vomiting, diarrhea, constipation or GI  "bleeding   Genitourinary: Denies bladder dysfunction, dysuria or frequency   Musculoskeletal/Rheum: Denies joint pain and swelling   Skin/Breast: Denies rash, denies breast lumps or discharge   Neurological: memory problems, starring spells, head injury, neuropathy-- four years  Psychiatric: depression   Endocrine: DM   Heme/Oncology/Lymph Nodes: Denies enlarged lymph nodes, denies brusing or known bleeding disorder   Allergic/Immunologic: Denies hx of allergies         PHYSICAL AND NEUROLOGICAL EXMAINATIONS:  VITAL SIGNS: /68   Pulse 74   Temp 36.1 °C (97 °F)   Resp 16   Ht 1.88 m (6' 2\")   Wt (!) 131.8 kg (290 lb 9.1 oz)   SpO2 95%   BMI 37.31 kg/m²   CURRENT WEIGHT:   BMI: Body mass index is 37.31 kg/m².  PREVIOUS WEIGHTS:  Wt Readings from Last 25 Encounters:   03/20/18 (!) 131.8 kg (290 lb 9.1 oz)   03/05/18 (!) 131.7 kg (290 lb 6.4 oz)   01/08/18 (!) 134.7 kg (297 lb)   06/27/17 (!) 133.8 kg (295 lb)   05/18/17 (!) 132.5 kg (292 lb)   01/18/17 (!) 136.1 kg (300 lb)   10/12/16 (!) 133.8 kg (295 lb)   08/31/16 (!) 133.8 kg (295 lb)   07/21/16 (!) 134.9 kg (297 lb 6.4 oz)   07/15/16 (!) 132.5 kg (292 lb 2 oz)   06/17/16 (!) 133.8 kg (295 lb)       General appearance of patient: WDWN(+) NAD(+)    EYES  o Fundus : Papilledem(-) Exudates(-) Hemorrhage(-)  Nervous System  Orientation to time, place and person(+)  Memory normal(-)  Language: aphasia(-)  Knowledge: past(+) Current(+)  Attention(+)  Cranial Nerves  • Nerve 2: intact  • Nerve 3,4,6: intact  • Nerve 5 : intact  • Nerve 7: intact  • Nerve 8: intact  • Nerve 9 & 10: intact  • Nerve 11: intact  • Nerve 12: intact  Muscle Power and muscle tone: symmetric, normal in upper and lower  Sensory System: Pin sensation decreased over both feet  Reflexes: symmetric throughout  Cerebellar Function FNP normal   Gait : Steady(-) able to walk without help   Heart and Vascular  Peripheral Vasucular system : Edema (-) Swelling(-)  RHB, Breathing sound " clear  abdomen bowel sound normoactive  Extremities freely moveable  Joints no contracture       NEUROIMAGING:       LAB:        Not able to function after cardiac attacks 4 years ago    IMPRESSION:    1. Starring Spells ( for 5 years since 2013), Poor Memory for years, Hx of brain contusion  2. DM, Heart Attack( 3 times status post CABG 2015), DM neuropathy for 4 years, Status Post Cardiac Arrest-- Heart Failure  3. Neuropathy ( DM neuropathy), Overweight,     PLAN/RECOMMENDATIONS:      The patient had MRI of brain done in the local hospital just recently  Explained to the patient-- we would need his help to get the MRI of brain CD for us to review  We will also offer EEG and blood tests to look for the causes of starring spells and cognitive decline    Possibly, the patient might have brain injuries ( multiple head trauma and cardiac events) and subsequent subtle seizures or small strokes or both...    We could start therapeutic trial of medication any time       Advise  ________________________________________________________________________      Exercise muscle and brain    Weight loss    Quit alcohol altogether    Reduce anxiety by praying, yoga, meditation and etc  ________________________________________________________________________      It is fine to try the following after blood tests  ________________________________________________________________________    Fish Oil -- Omega 3 1000mg 3# daily  Try Daily Probiotic too  Vit D-3 4000 unit daily  ________________________________________________________________________              SIGNATURE:  Favio Rob    CC:  Victoria Callahan M.D.

## 2018-03-20 NOTE — PATIENT INSTRUCTIONS
Not able to function after cardiac attacks 4 years ago    IMPRESSION:    1. Starring Spells ( for 5 years since 2013), Poor Memory for years, Hx of brain contusion  2. DM, Heart Attack( 3 times status post CABG 2015), DM neuropathy for 4 years, Status Post Cardiac Arrest-- Heart Failure  3. Neuropathy ( DM neuropathy), Overweight,     PLAN/RECOMMENDATIONS:      The patient had MRI of brain done in the local hospital just recently  Explained to the patient-- we would need his help to get the MRI of brain CD for us to review  We will also offer EEG and blood tests to look for the causes of starring spells and cognitive decline    Possibly, the patient might have brain injuries ( multiple head trauma and cardiac events) and subsequent subtle seizures or small strokes or both...    We could start therapeutic trial of medication any time       Advise  ________________________________________________________________________      Exercise muscle and brain    Weight loss    Quit alcohol altogether    Reduce anxiety by praying, yoga, meditation and etc  ________________________________________________________________________      It is fine to try the following after blood tests  ________________________________________________________________________    Fish Oil -- Omega 3 1000mg 3# daily  Try Daily Probiotic too  Vit D-3 4000 unit daily  ________________________________________________________________________              SIGNATURE:  Favio Rob    CC:  Victoria Callahan M.D.

## 2018-04-09 ENCOUNTER — OFFICE VISIT (OUTPATIENT)
Dept: CARDIOLOGY | Facility: MEDICAL CENTER | Age: 53
End: 2018-04-09
Payer: OTHER GOVERNMENT

## 2018-04-09 VITALS
SYSTOLIC BLOOD PRESSURE: 100 MMHG | HEART RATE: 88 BPM | OXYGEN SATURATION: 93 % | BODY MASS INDEX: 38.12 KG/M2 | DIASTOLIC BLOOD PRESSURE: 70 MMHG | WEIGHT: 297 LBS | HEIGHT: 74 IN

## 2018-04-09 DIAGNOSIS — I50.20 ACC/AHA STAGE C SYSTOLIC HEART FAILURE (HCC): ICD-10-CM

## 2018-04-09 DIAGNOSIS — I50.9 HEART FAILURE, NYHA CLASS 3 (HCC): ICD-10-CM

## 2018-04-09 DIAGNOSIS — Z79.4 TYPE 2 DIABETES MELLITUS WITH COMPLICATION, WITH LONG-TERM CURRENT USE OF INSULIN (HCC): ICD-10-CM

## 2018-04-09 DIAGNOSIS — E11.8 TYPE 2 DIABETES MELLITUS WITH COMPLICATION, WITH LONG-TERM CURRENT USE OF INSULIN (HCC): ICD-10-CM

## 2018-04-09 DIAGNOSIS — I44.7 LBBB (LEFT BUNDLE BRANCH BLOCK): ICD-10-CM

## 2018-04-09 DIAGNOSIS — G47.33 OSA ON CPAP: ICD-10-CM

## 2018-04-09 DIAGNOSIS — Z95.1 HX OF CABG: ICD-10-CM

## 2018-04-09 DIAGNOSIS — I25.10 CORONARY ARTERY DISEASE INVOLVING NATIVE CORONARY ARTERY OF NATIVE HEART WITHOUT ANGINA PECTORIS: ICD-10-CM

## 2018-04-09 DIAGNOSIS — I25.5 ISCHEMIC CARDIOMYOPATHY: ICD-10-CM

## 2018-04-09 LAB — EKG IMPRESSION: NORMAL

## 2018-04-09 PROCEDURE — 99214 OFFICE O/P EST MOD 30 MIN: CPT | Mod: 25 | Performed by: INTERNAL MEDICINE

## 2018-04-09 PROCEDURE — 93000 ELECTROCARDIOGRAM COMPLETE: CPT | Performed by: INTERNAL MEDICINE

## 2018-04-09 RX ORDER — SPIRONOLACTONE 25 MG/1
25 TABLET ORAL DAILY
Qty: 90 TAB | Refills: 3 | Status: SHIPPED | OUTPATIENT
Start: 2018-04-09 | End: 2018-08-01 | Stop reason: SDUPTHER

## 2018-04-09 RX ORDER — METOPROLOL SUCCINATE 100 MG/1
100 TABLET, EXTENDED RELEASE ORAL 2 TIMES DAILY
Qty: 180 TAB | Refills: 3 | Status: SHIPPED | OUTPATIENT
Start: 2018-04-09 | End: 2018-08-01 | Stop reason: SDUPTHER

## 2018-04-09 RX ORDER — ATORVASTATIN CALCIUM 40 MG/1
40 TABLET, FILM COATED ORAL DAILY
Qty: 90 TAB | Refills: 3 | Status: SHIPPED | OUTPATIENT
Start: 2018-04-09 | End: 2018-08-01 | Stop reason: SDUPTHER

## 2018-04-09 ASSESSMENT — ENCOUNTER SYMPTOMS
SENSORY CHANGE: 0
DEPRESSION: 0
PALPITATIONS: 0
DIAPHORESIS: 0
DIZZINESS: 0
DOUBLE VISION: 0
SHORTNESS OF BREATH: 1
FALLS: 0
BRUISES/BLEEDS EASILY: 0
HEADACHES: 0
MEMORY LOSS: 0
FEVER: 0
BLURRED VISION: 0
ABDOMINAL PAIN: 0
MYALGIAS: 0
COUGH: 0

## 2018-04-09 NOTE — PROGRESS NOTES
Chief Complaint   Patient presents with   • Congestive Heart Failure     HF est.       Subjective:   Juaquin Solorzano is a 52 y.o. male who presents today for cardiac care and evaluation of his prior history of CABG, ischemic cardiomyopathy, heart failure. In 9/2013, patient underwent triple bypass surgery. In 2014 he underwent open sternotomy again because of complications. Patient also has a history of sleep apnea for which he has been using CPAP. Patient is obese.      Patient also has diabetes for which he is then controlled with insulin injections.     Patient was able to complete 314 m during his 6 minute walk test. his O2 saturation at baseline was 93% and at the end of the test, the O2 saturation was 95%. He reported 8 level of dyspnea on Anson scale.     Because of his report of exertional dyspnea, I obtain a transthoracic echocardiogram which shows left ventricular systolic function of 30%. Patient underwent ischemic evaluation with coronary angiogram. Essentially, patient is living off of of his [place] to [facility]. Patient still reports having shortness of breath. He is able to walk for half a mile however at slow. He is tolerating Entresto therapy well.      Patient continues to be short of breath. He gets symptomatic with tying his shoe still. He did have a PFT which shows mild evidence of restrictive lung disease but no evidence of obstruction. He is tolerating his medications well.     He is on [id number] 200 mg po bid, Toprol  mg po bid, Spironolactone 25 mg po daily.    After guidelines directed medical therapy, his left ventricular systolic function is still at 35%.     Blood pressure is well controlled.     Past Medical History:   Diagnosis Date   • Arthritis     osteo   • Bronchitis 2014   • Cataract     early stages   • Diabetes (CMS-[facility]) 2006    insulin   • Fibromyalgia    • Myocardial infarct 2007   • Myocardial infarct 2012   • Psychiatric problem     depression and anxiety   • Sleep apnea      cpap     Past Surgical History:   Procedure Laterality Date   • RECOVERY  7/22/2016    Procedure: CATH LAB-[facility] W/POSSIBLE RADIAL APPROACH-CARIE;  Surgeon: Recoveryonly Surgery;  Location: SURGERY PRE-POST PROC UNIT [facility] [place];  Service:    • OTHER  2015    sternal revision   • OTHER CARDIAC SURGERY  2014    cabg x 3     Family History   Problem Relation Age of Onset   • Heart Disease Mother    • Diabetes Mother    • Hyperlipidemia Father    • Heart Disease Father    • Hypertension Father    • Stroke Father    • Lung Disease Sister      Social History     Social History   • Marital status:      Spouse name: N/A   • Number of children: N/A   • Years of education: N/A     Occupational History   • Not on file.     Social History Main Topics   • Smoking status: Former Smoker     Packs/day: 0.50     Years: 12.00     Types: Cigarettes     Quit date: 7/15/2012   • Smokeless tobacco: Never Used   • Alcohol use No      Comment: rare   • Drug use: Yes     Types: Marijuana      Comment: edible marijuana   • Sexual activity: Not on file     Other Topics Concern   • Not on file     Social History Narrative   • No narrative on file     Allergies   Allergen Reactions   • Iodine      Outpatient Encounter Prescriptions as of 4/9/2018   Medication Sig Dispense Refill   • sacubitril-valsartan (ENTRESTO)  MG Tab tablet Take 1 Tab by mouth 2 Times a Day. 180 Tab 3   • spironolactone (ALDACTONE) 25 MG Tab Take 1 Tab by mouth every day. 90 Tab 3   • metoprolol SR (TOPROL XL) 100 MG TABLET SR 24 HR Take 1 Tab by mouth 2 Times a Day. 180 Tab 3   • atorvastatin (LIPITOR) 40 MG Tab Take 1 Tab by mouth every day. 90 Tab 3   • clobetasol (TEMOVATE) 0.05 % Ointment BID x 7 days 30 g 0   • amitriptyline (ELAVIL) 75 MG Tab Take 75 mg by mouth every evening.     • allopurinol (ZYLOPRIM) 100 MG Tab Take 100 mg by mouth 2 Times a Day.     • Pyridoxine HCl (VITAMIN B-6 PO) Take  by mouth.     • insulin lispro (HUMALOG) 100 UNIT/ML Solution  "Inject  as instructed 3 times a day before meals.     • insulin glargine (LANTUS) 100 UNIT/ML Solution Inject  as instructed every evening.     • esomeprazole magnesium (NEXIUM) 40 MG Pack Take 40 mg by mouth every morning before breakfast.     • aspirin 81 MG tablet Take 81 mg by mouth every day.     • escitalopram (LEXAPRO) 10 MG Tab Take 10 mg by mouth every day.     • metformin (GLUCOPHAGE) 1000 MG tablet Take 1,000 mg by mouth 2 times a day, with meals.     • Omega-3 Fatty Acids (FISH OIL) 1000 MG Cap capsule Take 1,000 mg by mouth 3 times a day, with meals.     • [DISCONTINUED] metoprolol SR (TOPROL XL) 100 MG TABLET SR 24 HR Take 1 Tab by mouth 2 Times a Day. 180 Tab 3   • [DISCONTINUED] sacubitril-valsartan (ENTRESTO)  MG Tab tablet Take 1 Tab by mouth 2 Times a Day. 180 Tab 3   • [DISCONTINUED] spironolactone (ALDACTONE) 25 MG Tab Take 1 Tab by mouth every day. 90 Tab 3   • [DISCONTINUED] atorvastatin (LIPITOR) 40 MG Tab Take 1 Tab by mouth every day. 90 Tab 3     No facility-administered encounter medications on file as of 4/9/2018.      Review of Systems   Constitutional: Negative for diaphoresis and fever.   HENT: Negative for nosebleeds.    Eyes: Negative for blurred vision and double vision.   Respiratory: Positive for shortness of breath. Negative for cough.    Cardiovascular: Negative for chest pain and palpitations.   Gastrointestinal: Negative for abdominal pain.   Genitourinary: Negative for dysuria and frequency.   Musculoskeletal: Negative for falls and myalgias.   Skin: Negative for rash.   Neurological: Negative for dizziness, sensory change and headaches.   Endo/Heme/Allergies: Does not bruise/bleed easily.   Psychiatric/Behavioral: Negative for depression and memory loss.        Objective:   /70   Pulse 88   Ht 1.88 m (6' 2\")   Wt (!) 134.7 kg (297 lb)   SpO2 93%   BMI 38.13 kg/m²     Physical Exam   Constitutional: He is oriented to person, place, and time. No distress. "   HENT:   Head: Normocephalic and atraumatic.   Right Ear: External ear normal.   Left Ear: External ear normal.   Eyes: Right eye exhibits no discharge. Left eye exhibits no discharge.   Neck: No JVD present. No thyromegaly present.   Cardiovascular: Normal rate, regular rhythm, normal heart sounds and intact distal pulses.  Exam reveals no gallop and no friction rub.    No murmur heard.  Pulmonary/Chest: Breath sounds normal. No respiratory distress.   Abdominal: Bowel sounds are normal. He exhibits no distension. There is no tenderness.   Musculoskeletal: He exhibits no edema or tenderness.   Neurological: He is alert and oriented to person, place, and time. No cranial nerve deficit.   Skin: Skin is warm and dry. He is not diaphoretic.   Psychiatric: He has a normal mood and affect. His behavior is normal.   Nursing note and vitals reviewed.      Assessment:     1. ACC/AHA stage C systolic heart failure (CMS-HCC)  [facility] EPIPHANY EKG (Clinic Performed)    sacubitril-valsartan (ENTRESTO)  MG Tab tablet    spironolactone (ALDACTONE) 25 MG Tab    metoprolol SR (TOPROL XL) 100 MG TABLET SR 24 HR    atorvastatin (LIPITOR) 40 MG Tab   2. Heart failure, NYHA class 3 (CMS-HCC)  sacubitril-valsartan (ENTRESTO)  MG Tab tablet    spironolactone (ALDACTONE) 25 MG Tab    metoprolol SR (TOPROL XL) 100 MG TABLET SR 24 HR    atorvastatin (LIPITOR) 40 MG Tab   3. Ischemic cardiomyopathy  sacubitril-valsartan (ENTRESTO)  MG Tab tablet    spironolactone (ALDACTONE) 25 MG Tab    atorvastatin (LIPITOR) 40 MG Tab   4. Hx of CABG  sacubitril-valsartan (ENTRESTO)  MG Tab tablet    spironolactone (ALDACTONE) 25 MG Tab    atorvastatin (LIPITOR) 40 MG Tab   5. LBBB (left bundle branch block)  sacubitril-valsartan (ENTRESTO)  MG Tab tablet    spironolactone (ALDACTONE) 25 MG Tab    atorvastatin (LIPITOR) 40 MG Tab   6. Coronary artery disease involving native coronary artery of native heart without angina pectoris      7. CLARE on CPAP  sacubitril-valsartan (ENTRESTO)  MG Tab tablet    spironolactone (ALDACTONE) 25 MG Tab    atorvastatin (LIPITOR) 40 MG Tab   8. Type 2 diabetes mellitus with complication, with long-term current use of insulin (CMS-[facility])  sacubitril-valsartan (ENTRESTO)  MG Tab tablet    spironolactone (ALDACTONE) 25 MG Tab    atorvastatin (LIPITOR) 40 MG Tab       Medical Decision Making:  Today's Assessment / Status / Plan:   Today, based on physical examination findings, patient is euvolemic. No JVD, lungs are clear to auscultation, no pitting edema in bilateral lower extremities, no ascites.    Dry weight is 297 lbs.    Some weight loss will help with his shortness of breath as well. Patient understands this but has been hard for him to do.    Continue current medical regimen. [id number] 200 mg po bid, Toprol  mg po bid, Spironolactone 25 mg po daily.    Patient is indicated to undergo ICD placement for primary prevention. He does have underlying left bundle branch block. Therefore, BiV ICD is most beneficial.    I will see patient back in clinic with lab tests and studies results in 6 months.    I thank you Dr. Callahan for referring patient to our Cardiology Clinic today.

## 2018-04-09 NOTE — LETTER
[facility]   [address]  [place] [address]  Phone: [phone]  Fax: [phone]              Juaquin Solorzano  1965    Encounter Date: 4/9/2018    Adrian Simms M.D.          PROGRESS NOTE:  Chief Complaint   Patient presents with   • Congestive Heart Failure     HF est.       Subjective:   Juaquin Solorzano is a 52 y.o. male who presents today for cardiac care and evaluation of his prior history of CABG, ischemic cardiomyopathy, heart failure. In 9/2013, patient underwent triple bypass surgery. In 2014 he underwent open sternotomy again because of complications. Patient also has a history of sleep apnea for which he has been using CPAP. Patient is obese.      Patient also has diabetes for which he is then controlled with insulin injections.     Patient was able to complete 314 m during his 6 minute walk test. his O2 saturation at baseline was 93% and at the end of the test, the O2 saturation was 95%. He reported 8 level of dyspnea on Anson scale.     Because of his report of exertional dyspnea, I obtain a transthoracic echocardiogram which shows left ventricular systolic function of 30%. Patient underwent ischemic evaluation with coronary angiogram. Essentially, patient is living off of of his [place] to [facility]. Patient still reports having shortness of breath. He is able to walk for half a mile however at slow. He is tolerating Entresto therapy well.      Patient continues to be short of breath. He gets symptomatic with tying his shoe still. He did have a PFT which shows mild evidence of restrictive lung disease but no evidence of obstruction. He is tolerating his medications well.     He is on [id number] 200 mg po bid, Toprol  mg po bid, Spironolactone 25 mg po daily.    After guidelines directed medical therapy, his left ventricular systolic function is still at 35%.     Blood pressure is well controlled.     Past Medical History:   Diagnosis  Date   • Arthritis     osteo   • Bronchitis 2014   • Cataract     early stages   • Diabetes (CMS-[facility]) 2006    insulin   • Fibromyalgia    • Myocardial infarct 2007   • Myocardial infarct 2012   • Psychiatric problem     depression and anxiety   • Sleep apnea     cpap     Past Surgical History:   Procedure Laterality Date   • RECOVERY  7/22/2016    Procedure: CATH LAB-[facility] W/POSSIBLE RADIAL APPROACH-CARIE;  Surgeon: Recoveryonly Surgery;  Location: SURG[place] PRE-POST PROC UNIT [facility] [place];  Service:    • OTHER  2015    sternal revision   • OTHER CARDIAC SURGERY  2014    cabg x 3     Family History   Problem Relation Age of Onset   • Heart Disease Mother    • Diabetes Mother    • Hyperlipidemia Father    • Heart Disease Father    • Hypertension Father    • Stroke Father    • Lung Disease Sister      Social History     Social History   • Marital status:      Spouse name: N/A   • Number of children: N/A   • Years of education: N/A     Occupational History   • Not on file.     Social History Main Topics   • Smoking status: Former Smoker     Packs/day: 0.50     Years: 12.00     Types: Cigarettes     Quit date: 7/15/2012   • Smokeless tobacco: Never Used   • Alcohol use No      Comment: rare   • Drug use: Yes     Types: Marijuana      Comment: edible marijuana   • Sexual activity: Not on file     Other Topics Concern   • Not on file     Social History Narrative   • No narrative on file     Allergies   Allergen Reactions   • Iodine      Outpatient Encounter Prescriptions as of 4/9/2018   Medication Sig Dispense Refill   • sacubitril-valsartan (ENTRESTO)  MG Tab tablet Take 1 Tab by mouth 2 Times a Day. 180 Tab 3   • spironolactone (ALDACTONE) 25 MG Tab Take 1 Tab by mouth every day. 90 Tab 3   • metoprolol SR (TOPROL XL) 100 MG TABLET SR 24 HR Take 1 Tab by mouth 2 Times a Day. 180 Tab 3   • atorvastatin (LIPITOR) 40 MG Tab Take 1 Tab by mouth every day. 90 Tab 3   • clobetasol (TEMOVATE) 0.05 % Ointment BID x 7 days  30 g 0   • amitriptyline (ELAVIL) 75 MG Tab Take 75 mg by mouth every evening.     • allopurinol (ZYLOPRIM) 100 MG Tab Take 100 mg by mouth 2 Times a Day.     • Pyridoxine HCl (VITAMIN B-6 PO) Take  by mouth.     • insulin lispro (HUMALOG) 100 UNIT/ML Solution Inject  as instructed 3 times a day before meals.     • insulin glargine (LANTUS) 100 UNIT/ML Solution Inject  as instructed every evening.     • esomeprazole magnesium (NEXIUM) 40 MG Pack Take 40 mg by mouth every morning before breakfast.     • aspirin 81 MG tablet Take 81 mg by mouth every day.     • escitalopram (LEXAPRO) 10 MG Tab Take 10 mg by mouth every day.     • metformin (GLUCOPHAGE) 1000 MG tablet Take 1,000 mg by mouth 2 times a day, with meals.     • Omega-3 Fatty Acids (FISH OIL) 1000 MG Cap capsule Take 1,000 mg by mouth 3 times a day, with meals.     • [DISCONTINUED] metoprolol SR (TOPROL XL) 100 MG TABLET SR 24 HR Take 1 Tab by mouth 2 Times a Day. 180 Tab 3   • [DISCONTINUED] sacubitril-valsartan (ENTRESTO)  MG Tab tablet Take 1 Tab by mouth 2 Times a Day. 180 Tab 3   • [DISCONTINUED] spironolactone (ALDACTONE) 25 MG Tab Take 1 Tab by mouth every day. 90 Tab 3   • [DISCONTINUED] atorvastatin (LIPITOR) 40 MG Tab Take 1 Tab by mouth every day. 90 Tab 3     No facility-administered encounter medications on file as of 4/9/2018.      Review of Systems   Constitutional: Negative for diaphoresis and fever.   HENT: Negative for nosebleeds.    Eyes: Negative for blurred vision and double vision.   Respiratory: Positive for shortness of breath. Negative for cough.    Cardiovascular: Negative for chest pain and palpitations.   Gastrointestinal: Negative for abdominal pain.   Genitourinary: Negative for dysuria and frequency.   Musculoskeletal: Negative for falls and myalgias.   Skin: Negative for rash.   Neurological: Negative for dizziness, sensory change and headaches.   Endo/Heme/Allergies: Does not bruise/bleed easily.    "  Psychiatric/Behavioral: Negative for depression and memory loss.        Objective:   /70   Pulse 88   Ht 1.88 m (6' 2\")   Wt (!) 134.7 kg (297 lb)   SpO2 93%   BMI 38.13 kg/m²      Physical Exam   Constitutional: He is oriented to person, place, and time. No distress.   HENT:   Head: Normocephalic and atraumatic.   Right Ear: External ear normal.   Left Ear: External ear normal.   Eyes: Right eye exhibits no discharge. Left eye exhibits no discharge.   Neck: No JVD present. No thyromegaly present.   Cardiovascular: Normal rate, regular rhythm, normal heart sounds and intact distal pulses.  Exam reveals no gallop and no friction rub.    No murmur heard.  Pulmonary/Chest: Breath sounds normal. No respiratory distress.   Abdominal: Bowel sounds are normal. He exhibits no distension. There is no tenderness.   Musculoskeletal: He exhibits no edema or tenderness.   Neurological: He is alert and oriented to person, place, and time. No cranial nerve deficit.   Skin: Skin is warm and dry. He is not diaphoretic.   Psychiatric: He has a normal mood and affect. His behavior is normal.   Nursing note and vitals reviewed.      Assessment:     1. ACC/AHA stage C systolic heart failure (CMS-HCC)  [facility] EPIPHANY EKG (Clinic Performed)    sacubitril-valsartan (ENTRESTO)  MG Tab tablet    spironolactone (ALDACTONE) 25 MG Tab    metoprolol SR (TOPROL XL) 100 MG TABLET SR 24 HR    atorvastatin (LIPITOR) 40 MG Tab   2. Heart failure, NYHA class 3 (CMS-HCC)  sacubitril-valsartan (ENTRESTO)  MG Tab tablet    spironolactone (ALDACTONE) 25 MG Tab    metoprolol SR (TOPROL XL) 100 MG TABLET SR 24 HR    atorvastatin (LIPITOR) 40 MG Tab   3. Ischemic cardiomyopathy  sacubitril-valsartan (ENTRESTO)  MG Tab tablet    spironolactone (ALDACTONE) 25 MG Tab    atorvastatin (LIPITOR) 40 MG Tab   4. Hx of CABG  sacubitril-valsartan (ENTRESTO)  MG Tab tablet    spironolactone (ALDACTONE) 25 MG Tab    atorvastatin " (LIPITOR) 40 MG Tab   5. LBBB (left bundle branch block)  sacubitril-valsartan (ENTRESTO)  MG Tab tablet    spironolactone (ALDACTONE) 25 MG Tab    atorvastatin (LIPITOR) 40 MG Tab   6. Coronary artery disease involving native coronary artery of native heart without angina pectoris     7. CLARE on CPAP  sacubitril-valsartan (ENTRESTO)  MG Tab tablet    spironolactone (ALDACTONE) 25 MG Tab    atorvastatin (LIPITOR) 40 MG Tab   8. Type 2 diabetes mellitus with complication, with long-term current use of insulin (CMS-HCC)  sacubitril-valsartan (ENTRESTO)  MG Tab tablet    spironolactone (ALDACTONE) 25 MG Tab    atorvastatin (LIPITOR) 40 MG Tab       Medical Decision Making:  Today's Assessment / Status / Plan:   Today, based on physical examination findings, patient is euvolemic. No JVD, lungs are clear to auscultation, no pitting edema in bilateral lower extremities, no ascites.    Dry weight is 297 lbs.    Some weight loss will help with his shortness of breath as well. Patient understands this but has been hard for him to do.    Continue current medical regimen. [id number] 200 mg po bid, Toprol  mg po bid, Spironolactone 25 mg po daily.    Patient is indicated to undergo ICD placement for primary prevention. He does have underlying left bundle branch block. Therefore, BiV ICD is most beneficial.    I will see patient back in clinic with lab tests and studies results in 6 months.    I thank you Dr. Callahan for referring patient to our Cardiology Clinic today.              Victoria Callahan M.D.  [address] [address]  VIA Facsimile: [phone]

## 2018-04-12 ENCOUNTER — TELEPHONE (OUTPATIENT)
Dept: CARDIOLOGY | Facility: MEDICAL CENTER | Age: 53
End: 2018-04-12

## 2018-04-12 NOTE — TELEPHONE ENCOUNTER
Per Dr. Cullen and Dr. Simms, patient scheduled for BiV ICD Insert on 4-27-18 at [facility] with Dr. Cullen.

## 2018-04-26 ENCOUNTER — APPOINTMENT (OUTPATIENT)
Dept: ADMISSIONS | Facility: MEDICAL CENTER | Age: 53
End: 2018-04-26
Attending: INTERNAL MEDICINE
Payer: OTHER GOVERNMENT

## 2018-04-27 ENCOUNTER — HOSPITAL ENCOUNTER (OUTPATIENT)
Facility: MEDICAL CENTER | Age: 53
End: 2018-04-28
Attending: INTERNAL MEDICINE | Admitting: INTERNAL MEDICINE
Payer: OTHER GOVERNMENT

## 2018-04-27 ENCOUNTER — APPOINTMENT (OUTPATIENT)
Dept: RADIOLOGY | Facility: MEDICAL CENTER | Age: 53
End: 2018-04-27
Attending: INTERNAL MEDICINE
Payer: OTHER GOVERNMENT

## 2018-04-27 LAB
ALBUMIN SERPL BCP-MCNC: 4 G/DL (ref 3.2–4.9)
ALBUMIN/GLOB SERPL: 1.5 G/DL
ALP SERPL-CCNC: 54 U/L (ref 30–99)
ALT SERPL-CCNC: 35 U/L (ref 2–50)
ANION GAP SERPL CALC-SCNC: 10 MMOL/L (ref 0–11.9)
AST SERPL-CCNC: 31 U/L (ref 12–45)
BILIRUB SERPL-MCNC: 0.9 MG/DL (ref 0.1–1.5)
BUN SERPL-MCNC: 13 MG/DL (ref 8–22)
CALCIUM SERPL-MCNC: 9.2 MG/DL (ref 8.5–10.5)
CHLORIDE SERPL-SCNC: 103 MMOL/L (ref 96–112)
CO2 SERPL-SCNC: 24 MMOL/L (ref 20–33)
CREAT SERPL-MCNC: 0.95 MG/DL (ref 0.5–1.4)
EKG IMPRESSION: NORMAL
EKG IMPRESSION: NORMAL
ERYTHROCYTE [DISTWIDTH] IN BLOOD BY AUTOMATED COUNT: 38.9 FL (ref 35.9–50)
GLOBULIN SER CALC-MCNC: 2.7 G/DL (ref 1.9–3.5)
GLUCOSE BLD-MCNC: 215 MG/DL (ref 65–99)
GLUCOSE BLD-MCNC: 326 MG/DL (ref 65–99)
GLUCOSE SERPL-MCNC: 146 MG/DL (ref 65–99)
HCT VFR BLD AUTO: 40 % (ref 42–52)
HGB BLD-MCNC: 14.4 G/DL (ref 14–18)
INR PPP: 0.98 (ref 0.87–1.13)
MCH RBC QN AUTO: 31.9 PG (ref 27–33)
MCHC RBC AUTO-ENTMCNC: 36 G/DL (ref 33.7–35.3)
MCV RBC AUTO: 88.5 FL (ref 81.4–97.8)
PLATELET # BLD AUTO: 205 K/UL (ref 164–446)
PMV BLD AUTO: 10.5 FL (ref 9–12.9)
POTASSIUM SERPL-SCNC: 4.4 MMOL/L (ref 3.6–5.5)
PROT SERPL-MCNC: 6.7 G/DL (ref 6–8.2)
PROTHROMBIN TIME: 12.7 SEC (ref 12–14.6)
RBC # BLD AUTO: 4.52 M/UL (ref 4.7–6.1)
SODIUM SERPL-SCNC: 137 MMOL/L (ref 135–145)
TSH SERPL DL<=0.005 MIU/L-ACNC: 3.17 UIU/ML (ref 0.38–5.33)
WBC # BLD AUTO: 8.5 K/UL (ref 4.8–10.8)

## 2018-04-27 PROCEDURE — 304853 HCHG PPM TEST CABLE

## 2018-04-27 PROCEDURE — A9270 NON-COVERED ITEM OR SERVICE: HCPCS | Performed by: INTERNAL MEDICINE

## 2018-04-27 PROCEDURE — 96372 THER/PROPH/DIAG INJ SC/IM: CPT | Mod: XU

## 2018-04-27 PROCEDURE — 360979 HCHG DIAGNOSTIC CATH

## 2018-04-27 PROCEDURE — 99152 MOD SED SAME PHYS/QHP 5/>YRS: CPT

## 2018-04-27 PROCEDURE — 700111 HCHG RX REV CODE 636 W/ 250 OVERRIDE (IP)

## 2018-04-27 PROCEDURE — C1899 LEAD, PMKR/AICD COMBINATION: HCPCS

## 2018-04-27 PROCEDURE — 700102 HCHG RX REV CODE 250 W/ 637 OVERRIDE(OP): Performed by: INTERNAL MEDICINE

## 2018-04-27 PROCEDURE — 700111 HCHG RX REV CODE 636 W/ 250 OVERRIDE (IP): Performed by: NURSE PRACTITIONER

## 2018-04-27 PROCEDURE — 90471 IMMUNIZATION ADMIN: CPT

## 2018-04-27 PROCEDURE — C1887 CATHETER, GUIDING: HCPCS

## 2018-04-27 PROCEDURE — 160002 HCHG RECOVERY MINUTES (STAT)

## 2018-04-27 PROCEDURE — C1892 INTRO/SHEATH,FIXED,PEEL-AWAY: HCPCS

## 2018-04-27 PROCEDURE — G0378 HOSPITAL OBSERVATION PER HR: HCPCS

## 2018-04-27 PROCEDURE — 85610 PROTHROMBIN TIME: CPT

## 2018-04-27 PROCEDURE — 80053 COMPREHEN METABOLIC PANEL: CPT

## 2018-04-27 PROCEDURE — C2628 CATHETER, OCCLUSION: HCPCS

## 2018-04-27 PROCEDURE — 700101 HCHG RX REV CODE 250

## 2018-04-27 PROCEDURE — 85027 COMPLETE CBC AUTOMATED: CPT

## 2018-04-27 PROCEDURE — 90732 PPSV23 VACC 2 YRS+ SUBQ/IM: CPT | Performed by: INTERNAL MEDICINE

## 2018-04-27 PROCEDURE — 96375 TX/PRO/DX INJ NEW DRUG ADDON: CPT | Mod: XU

## 2018-04-27 PROCEDURE — 700111 HCHG RX REV CODE 636 W/ 250 OVERRIDE (IP): Performed by: INTERNAL MEDICINE

## 2018-04-27 PROCEDURE — 99153 MOD SED SAME PHYS/QHP EA: CPT

## 2018-04-27 PROCEDURE — C1900 LEAD, CORONARY VENOUS: HCPCS

## 2018-04-27 PROCEDURE — 304952 HCHG R 2 PADS

## 2018-04-27 PROCEDURE — 71045 X-RAY EXAM CHEST 1 VIEW: CPT

## 2018-04-27 PROCEDURE — 96374 THER/PROPH/DIAG INJ IV PUSH: CPT | Mod: XU

## 2018-04-27 PROCEDURE — C1882 AICD, OTHER THAN SING/DUAL: HCPCS

## 2018-04-27 PROCEDURE — C1730 CATH, EP, 19 OR FEW ELECT: HCPCS

## 2018-04-27 PROCEDURE — 93010 ELECTROCARDIOGRAM REPORT: CPT | Mod: 76 | Performed by: INTERNAL MEDICINE

## 2018-04-27 PROCEDURE — C1769 GUIDE WIRE: HCPCS

## 2018-04-27 PROCEDURE — 93640 EP EVAL 1/2CHMBR PACG CVDFB: CPT

## 2018-04-27 PROCEDURE — 93005 ELECTROCARDIOGRAM TRACING: CPT | Performed by: INTERNAL MEDICINE

## 2018-04-27 PROCEDURE — 33225 L VENTRIC PACING LEAD ADD-ON: CPT

## 2018-04-27 PROCEDURE — 82962 GLUCOSE BLOOD TEST: CPT

## 2018-04-27 PROCEDURE — 33249 INSJ/RPLCMT DEFIB W/LEAD(S): CPT

## 2018-04-27 PROCEDURE — C1898 LEAD, PMKR, OTHER THAN TRANS: HCPCS

## 2018-04-27 PROCEDURE — 305387 HCHG SUTURES

## 2018-04-27 PROCEDURE — 84443 ASSAY THYROID STIM HORMONE: CPT

## 2018-04-27 RX ORDER — CEFAZOLIN SODIUM 1 G/3ML
INJECTION, POWDER, FOR SOLUTION INTRAMUSCULAR; INTRAVENOUS
Status: COMPLETED
Start: 2018-04-27 | End: 2018-04-27

## 2018-04-27 RX ORDER — MORPHINE SULFATE 4 MG/ML
2 INJECTION, SOLUTION INTRAMUSCULAR; INTRAVENOUS EVERY 4 HOURS PRN
Status: DISCONTINUED | OUTPATIENT
Start: 2018-04-27 | End: 2018-04-28 | Stop reason: HOSPADM

## 2018-04-27 RX ORDER — OXYCODONE HYDROCHLORIDE AND ACETAMINOPHEN 5; 325 MG/1; MG/1
1-2 TABLET ORAL EVERY 4 HOURS PRN
Status: DISCONTINUED | OUTPATIENT
Start: 2018-04-27 | End: 2018-04-28 | Stop reason: HOSPADM

## 2018-04-27 RX ORDER — LIDOCAINE HYDROCHLORIDE 20 MG/ML
INJECTION, SOLUTION INFILTRATION; PERINEURAL
Status: COMPLETED
Start: 2018-04-27 | End: 2018-04-27

## 2018-04-27 RX ORDER — ALLOPURINOL 100 MG/1
100 TABLET ORAL 2 TIMES DAILY
Status: DISCONTINUED | OUTPATIENT
Start: 2018-04-27 | End: 2018-04-28 | Stop reason: HOSPADM

## 2018-04-27 RX ORDER — ATORVASTATIN CALCIUM 40 MG/1
40 TABLET, FILM COATED ORAL DAILY
Status: DISCONTINUED | OUTPATIENT
Start: 2018-04-27 | End: 2018-04-28 | Stop reason: HOSPADM

## 2018-04-27 RX ORDER — MIDAZOLAM HYDROCHLORIDE 1 MG/ML
INJECTION INTRAMUSCULAR; INTRAVENOUS
Status: COMPLETED
Start: 2018-04-27 | End: 2018-04-27

## 2018-04-27 RX ORDER — METHYLPREDNISOLONE SODIUM SUCCINATE 125 MG/2ML
INJECTION, POWDER, LYOPHILIZED, FOR SOLUTION INTRAMUSCULAR; INTRAVENOUS
Status: COMPLETED
Start: 2018-04-27 | End: 2018-04-27

## 2018-04-27 RX ORDER — SPIRONOLACTONE 25 MG/1
25 TABLET ORAL DAILY
Status: DISCONTINUED | OUTPATIENT
Start: 2018-04-28 | End: 2018-04-28 | Stop reason: HOSPADM

## 2018-04-27 RX ORDER — METOPROLOL SUCCINATE 50 MG/1
100 TABLET, EXTENDED RELEASE ORAL 2 TIMES DAILY
Status: DISCONTINUED | OUTPATIENT
Start: 2018-04-27 | End: 2018-04-28 | Stop reason: HOSPADM

## 2018-04-27 RX ORDER — ASPIRIN 81 MG/1
81 TABLET, CHEWABLE ORAL DAILY
Status: DISCONTINUED | OUTPATIENT
Start: 2018-04-27 | End: 2018-04-28 | Stop reason: HOSPADM

## 2018-04-27 RX ORDER — ESCITALOPRAM OXALATE 10 MG/1
10 TABLET ORAL DAILY
Status: DISCONTINUED | OUTPATIENT
Start: 2018-04-27 | End: 2018-04-28 | Stop reason: HOSPADM

## 2018-04-27 RX ORDER — ACETAMINOPHEN 325 MG/1
650 TABLET ORAL EVERY 4 HOURS PRN
Status: DISCONTINUED | OUTPATIENT
Start: 2018-04-27 | End: 2018-04-28 | Stop reason: HOSPADM

## 2018-04-27 RX ORDER — CEFAZOLIN SODIUM 2 G/100ML
2 INJECTION, SOLUTION INTRAVENOUS ONCE
Status: COMPLETED | OUTPATIENT
Start: 2018-04-27 | End: 2018-04-27

## 2018-04-27 RX ORDER — ESOMEPRAZOLE MAGNESIUM 40 MG/1
40 GRANULE, DELAYED RELEASE ORAL
Status: DISCONTINUED | OUTPATIENT
Start: 2018-04-27 | End: 2018-04-27

## 2018-04-27 RX ORDER — OMEPRAZOLE 20 MG/1
20 CAPSULE, DELAYED RELEASE ORAL
Status: DISCONTINUED | OUTPATIENT
Start: 2018-04-27 | End: 2018-04-28 | Stop reason: HOSPADM

## 2018-04-27 RX ORDER — DIPHENHYDRAMINE HYDROCHLORIDE 50 MG/ML
INJECTION INTRAMUSCULAR; INTRAVENOUS
Status: COMPLETED
Start: 2018-04-27 | End: 2018-04-27

## 2018-04-27 RX ORDER — INSULIN GLARGINE 100 [IU]/ML
50 INJECTION, SOLUTION SUBCUTANEOUS NIGHTLY
Status: DISCONTINUED | OUTPATIENT
Start: 2018-04-27 | End: 2018-04-28 | Stop reason: HOSPADM

## 2018-04-27 RX ORDER — AMITRIPTYLINE HYDROCHLORIDE 75 MG/1
75 TABLET ORAL NIGHTLY
Status: DISCONTINUED | OUTPATIENT
Start: 2018-04-27 | End: 2018-04-28 | Stop reason: HOSPADM

## 2018-04-27 RX ADMIN — METOPROLOL SUCCINATE 100 MG: 100 TABLET, FILM COATED, EXTENDED RELEASE ORAL at 18:36

## 2018-04-27 RX ADMIN — CEFAZOLIN SODIUM 2 G: 2 INJECTION, SOLUTION INTRAVENOUS at 17:55

## 2018-04-27 RX ADMIN — ATORVASTATIN CALCIUM 40 MG: 40 TABLET, FILM COATED ORAL at 18:54

## 2018-04-27 RX ADMIN — MIDAZOLAM 2 MG: 1 INJECTION INTRAMUSCULAR; INTRAVENOUS at 08:41

## 2018-04-27 RX ADMIN — MORPHINE SULFATE 2 MG: 4 INJECTION INTRAVENOUS at 15:58

## 2018-04-27 RX ADMIN — INSULIN GLARGINE 50 UNITS: 100 INJECTION, SOLUTION SUBCUTANEOUS at 20:29

## 2018-04-27 RX ADMIN — SACUBITRIL AND VALSARTAN 1 TABLET: 97; 103 TABLET, FILM COATED ORAL at 20:31

## 2018-04-27 RX ADMIN — LIDOCAINE HYDROCHLORIDE: 20 INJECTION, SOLUTION INFILTRATION; PERINEURAL at 08:04

## 2018-04-27 RX ADMIN — ALLOPURINOL 100 MG: 100 TABLET ORAL at 18:31

## 2018-04-27 RX ADMIN — OXYCODONE HYDROCHLORIDE AND ACETAMINOPHEN 2 TABLET: 5; 325 TABLET ORAL at 11:20

## 2018-04-27 RX ADMIN — MIDAZOLAM 2 MG: 1 INJECTION INTRAMUSCULAR; INTRAVENOUS at 08:40

## 2018-04-27 RX ADMIN — DIPHENHYDRAMINE HYDROCHLORIDE 50 MG: 50 INJECTION INTRAMUSCULAR; INTRAVENOUS at 08:04

## 2018-04-27 RX ADMIN — METHYLPREDNISOLONE SODIUM SUCCINATE 125 MG: 125 INJECTION, POWDER, FOR SOLUTION INTRAMUSCULAR; INTRAVENOUS at 08:04

## 2018-04-27 RX ADMIN — PNEUMOCOCCAL VACCINE POLYVALENT 25 MCG
25; 25; 25; 25; 25; 25; 25; 25; 25; 25; 25; 25; 25; 25; 25; 25; 25; 25; 25; 25; 25; 25; 25 INJECTION, SOLUTION INTRAMUSCULAR; SUBCUTANEOUS at 17:56

## 2018-04-27 RX ADMIN — CEFAZOLIN 3000 MG: 330 INJECTION, POWDER, FOR SOLUTION INTRAMUSCULAR; INTRAVENOUS at 08:04

## 2018-04-27 RX ADMIN — MIDAZOLAM 0.5 MG: 1 INJECTION INTRAMUSCULAR; INTRAVENOUS at 09:16

## 2018-04-27 RX ADMIN — OMEPRAZOLE 20 MG: 20 CAPSULE, DELAYED RELEASE ORAL at 11:28

## 2018-04-27 RX ADMIN — INSULIN LISPRO 15 UNITS: 100 INJECTION, SOLUTION INTRAVENOUS; SUBCUTANEOUS at 17:09

## 2018-04-27 RX ADMIN — MIDAZOLAM 2 MG: 1 INJECTION INTRAMUSCULAR; INTRAVENOUS at 09:16

## 2018-04-27 RX ADMIN — ASPIRIN 81 MG: 81 TABLET, CHEWABLE ORAL at 11:20

## 2018-04-27 RX ADMIN — CEFAZOLIN 1000 MG: 330 INJECTION, POWDER, FOR SOLUTION INTRAMUSCULAR; INTRAVENOUS at 08:33

## 2018-04-27 RX ADMIN — FENTANYL CITRATE 200 MCG: 50 INJECTION, SOLUTION INTRAMUSCULAR; INTRAVENOUS at 09:17

## 2018-04-27 RX ADMIN — OXYCODONE HYDROCHLORIDE AND ACETAMINOPHEN 2 TABLET: 5; 325 TABLET ORAL at 20:31

## 2018-04-27 RX ADMIN — AMITRIPTYLINE HYDROCHLORIDE 75 MG: 75 TABLET, FILM COATED ORAL at 18:31

## 2018-04-27 ASSESSMENT — COGNITIVE AND FUNCTIONAL STATUS - GENERAL
WALKING IN HOSPITAL ROOM: A LITTLE
SUGGESTED CMS G CODE MODIFIER MOBILITY: CJ
DAILY ACTIVITIY SCORE: 22
CLIMB 3 TO 5 STEPS WITH RAILING: A LITTLE
MOBILITY SCORE: 21
DRESSING REGULAR UPPER BODY CLOTHING: A LITTLE
SUGGESTED CMS G CODE MODIFIER DAILY ACTIVITY: CJ
STANDING UP FROM CHAIR USING ARMS: A LITTLE
DRESSING REGULAR LOWER BODY CLOTHING: A LITTLE

## 2018-04-27 ASSESSMENT — PAIN SCALES - GENERAL
PAINLEVEL_OUTOF10: 0
PAINLEVEL_OUTOF10: 0
PAINLEVEL_OUTOF10: 8

## 2018-04-27 ASSESSMENT — PATIENT HEALTH QUESTIONNAIRE - PHQ9
6. FEELING BAD ABOUT YOURSELF - OR THAT YOU ARE A FAILURE OR HAVE LET YOURSELF OR YOUR FAMILY DOWN: MORE THAN HALF THE DAYS
4. FEELING TIRED OR HAVING LITTLE ENERGY: NEARLY EVERY DAY
5. POOR APPETITE OR OVEREATING: NOT AT ALL
9. THOUGHTS THAT YOU WOULD BE BETTER OFF DEAD, OR OF HURTING YOURSELF: NOT AT ALL
2. FEELING DOWN, DEPRESSED, IRRITABLE, OR HOPELESS: MORE THAN HALF THE DAYS
3. TROUBLE FALLING OR STAYING ASLEEP OR SLEEPING TOO MUCH: SEVERAL DAYS
1. LITTLE INTEREST OR PLEASURE IN DOING THINGS: NEARLY EVERY DAY
8. MOVING OR SPEAKING SO SLOWLY THAT OTHER PEOPLE COULD HAVE NOTICED. OR THE OPPOSITE, BEING SO FIGETY OR RESTLESS THAT YOU HAVE BEEN MOVING AROUND A LOT MORE THAN USUAL: NOT AT ALL
7. TROUBLE CONCENTRATING ON THINGS, SUCH AS READING THE NEWSPAPER OR WATCHING TELEVISION: SEVERAL DAYS
SUM OF ALL RESPONSES TO PHQ9 QUESTIONS 1 AND 2: 5
SUM OF ALL RESPONSES TO PHQ QUESTIONS 1-9: 12

## 2018-04-27 ASSESSMENT — LIFESTYLE VARIABLES
CONSUMPTION TOTAL: NEGATIVE
AVERAGE NUMBER OF DAYS PER WEEK YOU HAVE A DRINK CONTAINING ALCOHOL: 0
HAVE YOU EVER FELT YOU SHOULD CUT DOWN ON YOUR DRINKING: NO
ALCOHOL_USE: YES
HAVE PEOPLE ANNOYED YOU BY CRITICIZING YOUR DRINKING: NO
ON A TYPICAL DAY WHEN YOU DRINK ALCOHOL HOW MANY DRINKS DO YOU HAVE: 1
EVER FELT BAD OR GUILTY ABOUT YOUR DRINKING: NO
TOTAL SCORE: 0
EVER_SMOKED: YES
HOW MANY TIMES IN THE PAST YEAR HAVE YOU HAD 5 OR MORE DRINKS IN A DAY: 0
EVER HAD A DRINK FIRST THING IN THE MORNING TO STEADY YOUR NERVES TO GET RID OF A HANGOVER: NO
TOTAL SCORE: 0
TOTAL SCORE: 0

## 2018-04-27 NOTE — PROGRESS NOTES
2 RN skin check completed with Halina:    Pt ears intact and blanching. Pt has incision on left chest from pacemaker placement. Dressing is clean, dry, and intact with no sign of hematoma.  Sacrum intact and blanching.  Bilateral feet are dry and flaky.  Overall skin is intact.

## 2018-04-27 NOTE — OP REPORT
Electrophysiology Procedure Note  [facility]      Date of procedure: 4/27/2018     Procedure Performed: Placement of AICD, placement of LV lead, moderate sedation administered by CRNA and supervised by physician    Indication: ICM, LBBB    Physician(s): Esequiel Cullen M.D.    Anesthesia: Moderate sedation,  start time 8:18 am, stop time 9:28 am  the moderate sedation document has been reviewed, signed and scanned into media     Specimen(s) Removed: None     Estimated Blood Loss:  30cc    Complications: none    Pre Procedure ECG: SR with LBBB    Post Procedure ECG: SR with BIV pacing    DESCRIPTION OF PROCEDURE: After informed written consent, the patient was brought to the electrophysiology lab in the fasting, unsedated state. The patient was prepped and draped in the usual sterile fashion. The procedure was performed under moderate sedation with local anesthetic. A left infraclavicular incision was made with a scalpel and the pre-pectoral device pocket was created using a combination of blunt dissection and electrocautery. The modified Seldinger technique was used to gain access to the left axillary vein times three. A peel-away hemostasis sheath was placed in the vein. Under fluoroscopic guidance, the ICD lead was introduced into the heart. The ventricular lead was advanced into the RVOT position the pulled back and advanced to the RV apex. The lead was tested and had satisfactory sensing and pacing parameters. Another peel-away hemostasis sheath was placed in the vein.  The CS was cannulated with an inner sheath and diagnostic EP catheter. CS angio showed amenable vein in the high lateral CS which was somewhat small. There was also a posterolateral branch at CS os. The quadripolar lead was placed in the high lateral branch with excellent numbers with vectors without phrenic nerve stimulation ( Vector one was the only one with good capture but stable).  Another peel-away hemostasis sheath was  placed in the vein. The Right atrial lead was placed and tested with good numbers and fixated with the normal mechanism. High output pacing did not produce extracardiac stimulation in either the RA or RV lead.  The leads were sutured to the underlying pectoral muscle with interrupted silk over a silastic suture sleeve. The device pocket was irrigated with antibiotic solution, inspected, and no bleeding was seen. The leads were connected to the ICD pulse generator and the device was inserted into the pocket. The wound was closed with three layers of absorbable sutures and covered with Steri-Strips.  I personally supervised the administration of moderate sedation by the CRNA and observed the level of consciousness and physiologic status throughout the procedure.  Following recovery from sedation, the patient was transferred to a monitored bed in good condition.    IMPLANTED DEVICE INFORMATION:    Pulse generator is a [employer] model G148   Serial number [id number]      LEAD INFORMATION:  1. Right atrial lead is a Mayfield model 4470, serial number [id number], P wave 2.8 millivolts, threshold 0.6 Volts, pacing impedance 899 Ohms.  2. Right ventricular lead is a Mayfield model 0296, serial number [id number], R wave 17 millivolts, threshold 0.6 Volts , pacing impedance 899 Ohms.   3. Left ventricular lead is a Mayfield model Quad 4672, serial number [id number], R wave 13 millivolts, threshold 1.3 Volts at 0,4 ms 1 to RV, pacing impedance 764 Ohms.     DEVICE PROGRAMMING:    Artemio therapy: DDDR  Tachy therapy:  Two zone    DEFIBRILLATION THRESHOLD TESTING:    Npt performed    FLUOROSCOPY TIME: 10.2 min    SUMMARY/CONCLUSIONS:  1. Successful BIV AICD    RECOMMENDATIONS:  1. Admit to monitored bed.  2. PA and lateral chest x-ray.  3. Implantable cardioverter defibrillator interrogation prior to hospital discharge.  4. Follow-up in device clinic for wound check and device interrogation.

## 2018-04-27 NOTE — PROGRESS NOTES
Bedside report received. Patient A&O x4.  RA. Monitor room notified of pt arrival. Paced on the monitor.  Vital signs stable.  Complains of pain 8/10 medicated per MAR. POC discussed with patient. Patient verbalized understanding. Call light and belongings within reach. Bed locked and in lowest position, alarm and fall precautions in place.

## 2018-04-27 NOTE — H&P
Physician H&P    Patient ID:  Juaquin Solorzano  [id number]  52 y.o. male  1965    History:  Primary Diagnosis: ICM with LBBB for primary AICD.     HPI:  For primary prevention AICD. DING. Old CABG.     Past Medical History:  has a past medical history of Arthritis; Block, bundle branch, left; Breath shortness; Bronchitis (2014); Cataract (04/26/2018); Diabetes (CMS-HCC) (2006); Fibromyalgia (04/26/2018); Gout; Heart burn; High cholesterol; HTN (hypertension); Indigestion; Ischemic cardiomyopathy; Myocardial infarct (2007); Myocardial infarct (2012); Neuropathy (CMS-HCC); Psychiatric problem; and Sleep apnea.  Past Surgical History:  has a past surgical history that includes other cardiac surgery (2014); recovery (7/22/2016); claudy by laparoscopy (2010); and knee arthroscopy.  Past Social History:  reports that he quit smoking about 5 years ago. His smoking use included Cigarettes. He has a 6.00 pack-year smoking history. He has never used smokeless tobacco. He reports that he drinks alcohol. He reports that he uses drugs, including Marijuana.  Past Family History:   Family History   Problem Relation Age of Onset   • Heart Disease Mother    • Diabetes Mother    • Hyperlipidemia Father    • Heart Disease Father    • Hypertension Father    • Stroke Father    • Lung Disease Sister      Allergies: Contrast media with iodine [iodine]    Current Medications:  Prior to Admission medications    Medication Sig Start Date End Date Taking? Authorizing Provider   DULoxetine HCl (CYMBALTA PO) Take  by mouth every morning.   Yes Physician Outpatient   sacubitril-valsartan (ENTRESTO)  MG Tab tablet Take 1 Tab by mouth 2 Times a Day. 4/9/18   Adrian Simms M.D.   spironolactone (ALDACTONE) 25 MG Tab Take 1 Tab by mouth every day. 4/9/18   Adrian Simms M.D.   metoprolol SR (TOPROL XL) 100 MG TABLET SR 24 HR Take 1 Tab by mouth 2 Times a Day. 4/9/18   Adrian Simms M.D.   atorvastatin (LIPITOR) 40 MG Tab  "Take 1 Tab by mouth every day. 4/9/18   Adrian Simms M.D.   clobetasol (TEMOVATE) 0.05 % Ointment BID x 7 days 3/5/18   Dieudonne Justin M.D.   amitriptyline (ELAVIL) 75 MG Tab Take 75 mg by mouth every evening.    Physician Outpatient   allopurinol (ZYLOPRIM) 100 MG Tab Take 100 mg by mouth 2 Times a Day.    Physician Outpatient   Pyridoxine HCl (VITAMIN B-6 PO) Take 1 Tab by mouth every day.    Physician Outpatient   insulin lispro (HUMALOG) 100 UNIT/ML Solution Inject 8-10 Units as instructed 3 times a day before meals.    Physician Outpatient   insulin glargine (LANTUS) 100 UNIT/ML Solution Inject 50 Units as instructed every evening.    Physician Outpatient   esomeprazole magnesium (NEXIUM) 40 MG Pack Take 40 mg by mouth every morning before breakfast.    Physician Outpatient   aspirin 81 MG tablet Take 81 mg by mouth every day.    Physician Outpatient   escitalopram (LEXAPRO) 10 MG Tab Take 10 mg by mouth every day.    Physician Outpatient   metformin (GLUCOPHAGE) 1000 MG tablet Take 1,000 mg by mouth 2 times a day, with meals.    Physician Outpatient   Omega-3 Fatty Acids (FISH OIL) 1000 MG Cap capsule Take 1,000 mg by mouth 3 times a day, with meals.    Physician Outpatient       Review of Systems:  ROS  Blood pressure 119/77, pulse 70, temperature 36.2 °C (97.2 °F), resp. rate 18, height 1.93 m (6' 4\"), weight (!) 133.8 kg (295 lb), SpO2 94 %.    Physical Examination:  Physical Exam   Constitutional: He is oriented to person, place, and time. He appears well-developed. No distress.   HENT:   Mouth/Throat: Mucous membranes are normal.   Eyes: Conjunctivae and EOM are normal.   Neck: No JVD present. No tracheal deviation present. No thyroid mass and no thyromegaly present.   Cardiovascular: Normal rate, regular rhythm and intact distal pulses.    No murmur heard.  Pulmonary/Chest: Effort normal and breath sounds normal. No respiratory distress. He exhibits no tenderness.   Abdominal: Soft. There is no " tenderness.   Musculoskeletal: Normal range of motion. He exhibits no edema.   Neurological: He is alert and oriented to person, place, and time. He has normal strength. He displays no tremor.   Skin: Skin is warm and dry. He is not diaphoretic.   Psychiatric: He has a normal mood and affect. His behavior is normal.   Vitals reviewed.      Impression:  1. LBBB and ICM.   The risks, benefits, and alternatives to cardiac resynchronization therapy defibrillator (CRT-D) placement were discussed in great detail, specific risks mentioned include bleeding, infection, cardiac perforation with possible tamponade possibly requiring pericardiocentesis or open heart surgery. In addition the possibility of lead dislodgment (2-3%), inappropriate shocks, pneumothorax (3%), hemothorax were discussed. The following specific CRT procedural complications were also mentioned including the possibility of diaphragmatic stimulation necessitating lead repositioning, and a  possibility of being unable to place a left ventricular lead in 10% of patient's. Also mentioned were the possibilities of death, stroke, and myocardial infarction. The patient verbalized understanding of these potential complications and wishes to proceed with the procedure.        Pre Procedure Assessment:  Pre-Procedure Assessment - Applicable for patients receiving sedation by non-anesthesia practitioner.  Previous drug history, other anesthetic experiences, potential anesthetic problems and choice of anesthesia assessed, discussed with patient.     No prior complications.  Results of relevant diagnostic studies reviewed.    Plan of Sedation:  IV conscious sedation    ASA 3 - Patient with severe systemic disease

## 2018-04-27 NOTE — CARE PLAN
Problem: Communication  Goal: The ability to communicate needs accurately and effectively will improve  Outcome: PROGRESSING AS EXPECTED  Discussed POC and medications with patient. Pt verbalized understanding.      Problem: Safety  Goal: Will remain free from injury  Outcome: PROGRESSING AS EXPECTED  Bed locked and in lowest position. Pt up self. Steady on feet. Treaded socks provided. Call light and belongings within reach.  Patient educated to call for assistance. Pt verbalized understanding. Hourly rounding in place.

## 2018-04-27 NOTE — PROGRESS NOTES
Cardiology Progress Note               Author: Woody Holbrook Date & Time created: 2018  3:42 PM     Admitted for elective implantation  primary  AICD , EF 35% refractory to medical therapy, left bundle branch block    History of CAD s/p CABG  (3v), repeat sternotomy  secondary to complications,  ischemic cardiomyopathy, heart failure NYHA class III, AHA stage C, diabetes, sleep apnea, uses CPAP, left bundle branch block, gout, depression, anxiety, hyperlipidemia         s/p biventricular AICD on 18, [employer] (DDDR, 2 zone tachycardia therapy)         Blood pressure 101/62  Heart rate paced       Labs reviewed  WBC elevated this am 15.7  CMP stable        Echocardiogram 18, LVEF 35%    Review of Systems   Respiratory: Negative for cough and shortness of breath.    Cardiovascular: Negative for chest pain, palpitations, orthopnea, claudication, leg swelling and PND.       Physical Exam   Constitutional: He is oriented to person, place, and time.   HENT:   Head: Normocephalic.   Eyes: Conjunctivae are normal.   Neck: No JVD present. No thyromegaly present.   Cardiovascular:   Pulses:       Carotid pulses are 2+ on the right side, and 2+ on the left side.       Radial pulses are 2+ on the right side, and 2+ on the left side.   pacing   Pulmonary/Chest: He has no wheezes.   Abdominal: Soft.   Musculoskeletal: He exhibits no edema.   Neurological: He is alert and oriented to person, place, and time.   Skin: Skin is warm and dry.   Dressing changed, new dressing was applied, ne hematoma, no edema       Hemodynamics:  Temp (24hrs), Av.4 °C (97.6 °F), Min:36.2 °C (97.2 °F), Max:36.7 °C (98 °F)  Temperature: 36.4 °C (97.6 °F)  Pulse  Av.6  Min: 70  Max: 86Heart Rate (Monitored): 83  Blood Pressure: 112/77, NIBP: 112/78     Respiratory:    Respiration: 12, Pulse Oximetry: 95 %        RUL Breath Sounds: Diminished, RML Breath Sounds: Diminished, RLL Breath Sounds: Diminished, JOSIAH  Breath Sounds: Diminished, LLL Breath Sounds: Diminished  Fluids:  Date 04/27/18 0700 - 04/28/18 0659   Shift 7137-9363 2928-7349 3552-9363 24 Hour Total   I  N  T  A  K  E   P.O. 120   120    I.V. 450   450    Shift Total 570   570   O  U  T  P  U  T   Shift Total       Weight (kg) 133.8 133.8 133.8 133.8       Weight: (!) 133.8 kg (295 lb)  GI/Nutrition:  Orders Placed This Encounter   Procedures   • Diet Order     Standing Status:   Standing     Number of Occurrences:   1     Order Specific Question:   Diet:     Answer:   Cardiac [6]     Lab Results:  Recent Labs      04/27/18   0645   WBC  8.5   RBC  4.52*   HEMOGLOBIN  14.4   HEMATOCRIT  40.0*   MCV  88.5   MCH  31.9   MCHC  36.0*   RDW  38.9   PLATELETCT  205   MPV  10.5     Recent Labs      04/27/18   0645   SODIUM  137   POTASSIUM  4.4   CHLORIDE  103   CO2  24   GLUCOSE  146*   BUN  13   CREATININE  0.95   CALCIUM  9.2     Recent Labs      04/27/18   0645   INR  0.98                     Medical Decision Making, by Problem:   Ischemic cardiomyopathy  EF 35% despite medical therapy  Left bundle branch block  Diabetes  CAD  CABG ×3 2013, repeat sternotomy in 2014 secondary to complications  Sleep apnea  On CPap  Depression  Anxiety  On amitriptyline , Cymbalta,  and Lexapro    Assessment/Plan:    S/p BIV AICD , [employer] on 4/27/18 secondary to ischemic cardiomyopathy, EF 35%, refractory to medical therapy, primary prevention    Chest x-ray 4/28/18, no pneumothorax identified    Device was interrogated this morning, functioning well    Follow-up with our pacemaker clinic in one week, will arrange     no rhythm issues overnight    Stable for discharge          Quality-Core Measures

## 2018-04-27 NOTE — OR NURSING
0944: Patient from cath lab to PPU via rney s/p Bivi ICD placement. Patient is awake. VSS. LUE CMS intact. L chest site soft and dressing clean, dry and intact. L arm sling in place.  No c/o pain or nausea at this time. Will monitor closely. Vital signs per MD order.   0950: EKG done at bedside.   1000: Patient tolerated PO well. Wife at bedside.   1015: CXR done in PPU.

## 2018-04-28 ENCOUNTER — APPOINTMENT (OUTPATIENT)
Dept: RADIOLOGY | Facility: MEDICAL CENTER | Age: 53
End: 2018-04-28
Attending: INTERNAL MEDICINE
Payer: OTHER GOVERNMENT

## 2018-04-28 VITALS
DIASTOLIC BLOOD PRESSURE: 62 MMHG | OXYGEN SATURATION: 92 % | BODY MASS INDEX: 36.16 KG/M2 | HEIGHT: 76 IN | HEART RATE: 73 BPM | WEIGHT: 296.96 LBS | RESPIRATION RATE: 18 BRPM | TEMPERATURE: 97.5 F | SYSTOLIC BLOOD PRESSURE: 101 MMHG

## 2018-04-28 LAB
ALBUMIN SERPL BCP-MCNC: 3.9 G/DL (ref 3.2–4.9)
ALBUMIN/GLOB SERPL: 1.6 G/DL
ALP SERPL-CCNC: 47 U/L (ref 30–99)
ALT SERPL-CCNC: 30 U/L (ref 2–50)
ANION GAP SERPL CALC-SCNC: 9 MMOL/L (ref 0–11.9)
AST SERPL-CCNC: 21 U/L (ref 12–45)
BILIRUB SERPL-MCNC: 0.5 MG/DL (ref 0.1–1.5)
BUN SERPL-MCNC: 16 MG/DL (ref 8–22)
CALCIUM SERPL-MCNC: 8.4 MG/DL (ref 8.5–10.5)
CHLORIDE SERPL-SCNC: 100 MMOL/L (ref 96–112)
CO2 SERPL-SCNC: 24 MMOL/L (ref 20–33)
CREAT SERPL-MCNC: 0.97 MG/DL (ref 0.5–1.4)
EKG IMPRESSION: NORMAL
ERYTHROCYTE [DISTWIDTH] IN BLOOD BY AUTOMATED COUNT: 37.8 FL (ref 35.9–50)
GLOBULIN SER CALC-MCNC: 2.5 G/DL (ref 1.9–3.5)
GLUCOSE BLD-MCNC: 216 MG/DL (ref 65–99)
GLUCOSE SERPL-MCNC: 246 MG/DL (ref 65–99)
HCT VFR BLD AUTO: 38.2 % (ref 42–52)
HGB BLD-MCNC: 13.8 G/DL (ref 14–18)
MCH RBC QN AUTO: 31.9 PG (ref 27–33)
MCHC RBC AUTO-ENTMCNC: 36.1 G/DL (ref 33.7–35.3)
MCV RBC AUTO: 88.4 FL (ref 81.4–97.8)
PLATELET # BLD AUTO: 216 K/UL (ref 164–446)
PMV BLD AUTO: 10.9 FL (ref 9–12.9)
POTASSIUM SERPL-SCNC: 4.5 MMOL/L (ref 3.6–5.5)
PROT SERPL-MCNC: 6.4 G/DL (ref 6–8.2)
RBC # BLD AUTO: 4.32 M/UL (ref 4.7–6.1)
SODIUM SERPL-SCNC: 133 MMOL/L (ref 135–145)
WBC # BLD AUTO: 15.7 K/UL (ref 4.8–10.8)

## 2018-04-28 PROCEDURE — 93005 ELECTROCARDIOGRAM TRACING: CPT | Performed by: INTERNAL MEDICINE

## 2018-04-28 PROCEDURE — 82962 GLUCOSE BLOOD TEST: CPT

## 2018-04-28 PROCEDURE — A9270 NON-COVERED ITEM OR SERVICE: HCPCS | Performed by: INTERNAL MEDICINE

## 2018-04-28 PROCEDURE — 96372 THER/PROPH/DIAG INJ SC/IM: CPT

## 2018-04-28 PROCEDURE — 96376 TX/PRO/DX INJ SAME DRUG ADON: CPT

## 2018-04-28 PROCEDURE — 36415 COLL VENOUS BLD VENIPUNCTURE: CPT

## 2018-04-28 PROCEDURE — 80053 COMPREHEN METABOLIC PANEL: CPT

## 2018-04-28 PROCEDURE — G0378 HOSPITAL OBSERVATION PER HR: HCPCS

## 2018-04-28 PROCEDURE — 93010 ELECTROCARDIOGRAM REPORT: CPT | Performed by: INTERNAL MEDICINE

## 2018-04-28 PROCEDURE — 85027 COMPLETE CBC AUTOMATED: CPT

## 2018-04-28 PROCEDURE — 700111 HCHG RX REV CODE 636 W/ 250 OVERRIDE (IP): Performed by: NURSE PRACTITIONER

## 2018-04-28 PROCEDURE — 700102 HCHG RX REV CODE 250 W/ 637 OVERRIDE(OP): Performed by: INTERNAL MEDICINE

## 2018-04-28 PROCEDURE — 71045 X-RAY EXAM CHEST 1 VIEW: CPT

## 2018-04-28 RX ADMIN — OXYCODONE HYDROCHLORIDE AND ACETAMINOPHEN 2 TABLET: 5; 325 TABLET ORAL at 04:50

## 2018-04-28 RX ADMIN — OXYCODONE HYDROCHLORIDE AND ACETAMINOPHEN 2 TABLET: 5; 325 TABLET ORAL at 08:52

## 2018-04-28 RX ADMIN — ESCITALOPRAM OXALATE 10 MG: 10 TABLET ORAL at 06:19

## 2018-04-28 RX ADMIN — MORPHINE SULFATE 2 MG: 4 INJECTION INTRAVENOUS at 00:41

## 2018-04-28 RX ADMIN — SPIRONOLACTONE 25 MG: 25 TABLET, FILM COATED ORAL at 06:20

## 2018-04-28 RX ADMIN — ALLOPURINOL 100 MG: 100 TABLET ORAL at 06:20

## 2018-04-28 RX ADMIN — SACUBITRIL AND VALSARTAN 1 TABLET: 97; 103 TABLET, FILM COATED ORAL at 06:17

## 2018-04-28 RX ADMIN — INSULIN LISPRO 10 UNITS: 100 INJECTION, SOLUTION INTRAVENOUS; SUBCUTANEOUS at 06:24

## 2018-04-28 RX ADMIN — ASPIRIN 81 MG: 81 TABLET, CHEWABLE ORAL at 06:20

## 2018-04-28 RX ADMIN — OMEPRAZOLE 20 MG: 20 CAPSULE, DELAYED RELEASE ORAL at 06:19

## 2018-04-28 ASSESSMENT — ENCOUNTER SYMPTOMS
COUGH: 0
CLAUDICATION: 0
SHORTNESS OF BREATH: 0
PALPITATIONS: 0
ORTHOPNEA: 0
PND: 0

## 2018-04-28 ASSESSMENT — PAIN SCALES - GENERAL
PAINLEVEL_OUTOF10: 6
PAINLEVEL_OUTOF10: 6
PAINLEVEL_OUTOF10: 8

## 2018-04-28 NOTE — CARE PLAN
Problem: Safety  Goal: Will remain free from injury  Bed locked and in lowest position. Treaded socks. Call light and belongings within reach. Patient educated to call for assistance. Pt verbalized understanding. Hourly rounding in place.     Problem: Knowledge Deficit  Goal: Knowledge of disease process/condition, treatment plan, diagnostic tests, and medications will improve  Discussed POC and medications with patient, pt. verbalized understanding.

## 2018-04-28 NOTE — CARE PLAN
Problem: Safety  Goal: Will remain free from falls    Intervention: Assess risk factors for falls  Fall precautions in place. Bed in lowest position. Non-skid socks in place. Personal possessions within reach. Mobility sign on door.  Call light within reach. Pt educated regarding fall prevention and states understanding.       Problem: Knowledge Deficit  Goal: Knowledge of disease process/condition, treatment plan, diagnostic tests, and medications will improve  Pt educated regarding plan of care and medications. All questions answered.

## 2018-04-28 NOTE — PROGRESS NOTES
Pt. being discharged. Pt. educated on discharge instructions and new prescriptions. Pt verbalizes understanding. Follow up appointment made with Alisa Bañuelos May 7th. PIV removed, monitor checked in. Pt. Going home with wife. RN to call transport for escort out, will monitor pt. until off unit.

## 2018-04-28 NOTE — PROGRESS NOTES
Bedside report received. No events overnight per night RN. Patient A&O x 4. RA. Complains of 6/10 pain at pacemaker site will medicate per MAR. Morning EKG, CXR, and pacemaker check completed this AM. POC discussed with patient. Pt verbalizes understanding. Call light and belongings with in reach. Bed locked and in lowest position, alarm and fall precautions in place.

## 2018-04-28 NOTE — DISCHARGE INSTRUCTIONS
Discharge Instructions    Discharged to home by car with relative. Discharged via wheelchair, hospital escort: Yes.  Special equipment needed: Not Applicable    Be sure to schedule a follow-up appointment with your primary care doctor or any specialists as instructed.     Discharge Plan:   Diet Plan: Discussed  Activity Level: Discussed  Confirmed Follow up Appointment: Appointment Scheduled  Confirmed Symptoms Management: Discussed  Medication Reconciliation Updated: Yes  Pneumococcal Vaccine Administered/Refused: Given (See MAR)  Influenza Vaccine Indication: Not indicated: Previously immunized this influenza season and > 8 years of age    I understand that a diet low in cholesterol, fat, and sodium is recommended for good health. Unless I have been given specific instructions below for another diet, I accept this instruction as my diet prescription.   Other diet: diabetic cardiac     Special Instructions:    · Is patient discharged on Warfarin / Coumadin?   No       Pacemaker Implantation, Adult  Pacemaker implantation is a procedure to place a pacemaker inside your chest. A pacemaker is a small computer that sends electrical signals to the heart and helps your heart beat normally. A pacemaker also stores information about your heart rhythms. You may need pacemaker implantation if you:  · Have a slow heartbeat (bradycardia).  · Faint (syncope).  · Have shortness of breath (dyspnea) due to heart problems.  The pacemaker attaches to your heart through a wire, called a lead. Sometimes just one lead is needed. Other times, there will be two leads. There are two types of pacemakers:  · Transvenous pacemaker. This type is placed under the skin or muscle of your chest. The lead goes through a vein in the chest area to reach the inside of the heart.  · Epicardial pacemaker. This type is placed under the skin or muscle of your chest or belly. The lead goes through your chest to the outside of the heart.  Tell a health  care provider about:  · Any allergies you have.  · All medicines you are taking, including vitamins, herbs, eye drops, creams, and over-the-counter medicines.  · Any problems you or family members have had with anesthetic medicines.  · Any blood or bone disorders you have.  · Any surgeries you have had.  · Any medical conditions you have.  · Whether you are pregnant or may be pregnant.  What are the risks?  Generally, this is a safe procedure. However, problems may occur, including:  · Infection.  · Bleeding.  · Failure of the pacemaker or the lead.  · Collapse of a lung or bleeding into a lung.  · Blood clot inside a blood vessel with a lead.  · Damage to the heart.  · Infection inside the heart (endocarditis).  · Allergic reactions to medicines.  What happens before the procedure?  Staying hydrated   Follow instructions from your health care provider about hydration, which may include:  · Up to 2 hours before the procedure - you may continue to drink clear liquids, such as water, clear fruit juice, black coffee, and plain tea.  Eating and drinking restrictions   Follow instructions from your health care provider about eating and drinking, which may include:  · 8 hours before the procedure - stop eating heavy meals or foods such as meat, fried foods, or fatty foods.  · 6 hours before the procedure - stop eating light meals or foods, such as toast or cereal.  · 6 hours before the procedure - stop drinking milk or drinks that contain milk.  · 2 hours before the procedure - stop drinking clear liquids.  Medicines  · Ask your health care provider about:  ¨ Changing or stopping your regular medicines. This is especially important if you are taking diabetes medicines or blood thinners.  ¨ Taking medicines such as aspirin and ibuprofen. These medicines can thin your blood. Do not take these medicines before your procedure if your health care provider instructs you not to.  · You may be given antibiotic medicine to help  prevent infection.  General instructions  · You will have a heart evaluation. This may include an electrocardiogram (ECG), chest X-ray, and heart imaging (echocardiogram,  or echo) tests.  · You will have blood tests.  · Do not use any products that contain nicotine or tobacco, such as cigarettes and e-cigarettes. If you need help quitting, ask your health care provider.  · Plan to have someone take you home from the hospital or clinic.  · If you will be going home right after the procedure, plan to have someone with you for 24 hours.  · Ask your health care provider how your surgical site will be marked or identified.  What happens during the procedure?  · To reduce your risk of infection:  ¨ Your health care team will wash or sanitize their hands.  ¨ Your skin will be washed with soap.  ¨ Hair may be removed from the surgical area.  · An IV tube will be inserted into one of your veins.  · You will be given one or more of the following:  ¨ A medicine to help you relax (sedative).  ¨ A medicine to numb the area (local anesthetic).  ¨ A medicine to make you fall asleep (general anesthetic).  · If you are getting a transvenous pacemaker:  ¨ An incision will be made in your upper chest.  ¨ A pocket will be made for the pacemaker. It may be placed under the skin or between layers of muscle.  ¨ The lead will be inserted into a blood vessel that returns to the heart.  ¨ While X-rays are taken by an imaging machine (fluoroscopy), the lead will be advanced through the vein to the inside of your heart.  ¨ The other end of the lead will be tunneled under the skin and attached to the pacemaker.  · If you are getting an epicardial pacemaker:  ¨ An incision will be made near your ribs or breastbone (sternum) for the lead.  ¨ The lead will be attached to the outside of your heart.  ¨ Another incision will be made in your chest or upper belly to create a pocket for the pacemaker.  ¨ The free end of the lead will be tunneled under  the skin and attached to the pacemaker.  · The transvenous or epicardial pacemaker will be tested. Imaging studies may be done to check the lead position.  · The incisions will be closed with stitches (sutures), adhesive strips, or skin glue.  · Bandages (dressing) will be placed over the incisions.  The procedure may vary among health care providers and hospitals.  What happens after the procedure?  · Your blood pressure, heart rate, breathing rate, and blood oxygen level will be monitored until the medicines you were given have worn off.  · You will be given antibiotics and pain medicine.  · ECG and chest x-rays will be done.  · You will wear a continuous type of ECG (Holter monitor) to check your heart rhythm.  · Your health care provider will program the pacemaker.  · Do not drive for 24 hours if you received a sedative.  This information is not intended to replace advice given to you by your health care provider. Make sure you discuss any questions you have with your health care provider.  Document Released: 12/08/2003 Document Revised: 07/07/2017 Document Reviewed: 05/31/2017  [employer] Interactive Patient Education © 2017 [employer]      Pacemaker Implantation, Care After  Refer to this sheet over the next few weeks. These instructions provide you with information on caring for yourself after the procedure. Your health care provider may also give you more specific instructions. Your treatment has been planned according to current medical practices, but problems sometimes occur. Call your health care provider if you have any problems or questions regarding your pacemaker.   WHAT TO EXPECT AFTER THE PROCEDURE  · You may feel pain. Some pain is normal. It may last a few days.  · A slight bump may be seen over the skin where the device was placed. Sometimes, it is possible to feel the device under the skin. This is normal.  · In the months and years afterward, your health care provider will check the device,  the leads, and the battery every few months. Eventually, when the battery is low, the device will be replaced.  HOME CARE INSTRUCTIONS  Medicines  · Take medicines only as directed by your health care provider.  · If you were prescribed an antibiotic medicine, finish it all even if you start to feel better.  · Do not take any other medicines without asking your health care provider first. Some medicines, including certain painkillers, can cause bleeding in your stomach after surgery.  Wound Care  · Do not remove the bandage on your chest until directed to do so by your health care provider.  · After your bandage is removed, you may see pieces of tape called skin adhesive strips over the area where the cut was made (incision site). Let them fall off on their own.  · Check the incision site every day to make sure it is not infected, bleeding, or starting to pull apart.  · Do not use lotions or ointments near the incision site unless directed to do so.  · Keep the incision area clean and dry for 2-3 days after the procedure or as directed by your health care provider. It takes several weeks for the incision site to completely heal.  · Do not take baths, swim, or use a hot tub until your health care provider approves.  Activities  · Try to walk a little every day. Exercising is important after this procedure. It is also important to use your shoulder on the side of the pacemaker in daily tasks that do not require exaggerated motion.  · Avoid sudden jerking, pulling, or chopping movements that pull your upper arm far away from your body for at least 6 weeks.  · Do not lift your upper arm above your shoulders for at least 6 weeks. This means no tennis, golf, or swimming for this period of time. If you sleep with the arm above your head, use a restraint to prevent this from happening as you sleep.  · You may go back to work when your health care provider says it is okay. Check with your health care provider before you  start to drive or play sports.  Other Instructions  · Follow diet instructions if they were provided. You should be able to eat what you usually do right away, but you may need to limit your salt intake.  · Weigh yourself every day. If you suddenly gain weight, fluid may be building up in your body.  · Always carry your pacemaker identification card with you. The card should list the implant date, device model, and . Consider wearing a medical alert bracelet or necklace.  · Tell all health care providers that you have a pacemaker. This may prevent them from giving you a magnetic resource imaging scan (MRI) because of the strong magnets used during that test.  · If you must pass through a metal detector, quickly walk through it. Do not stop under the detector or stand near it.  · Avoid places or objects with a strong electric or magnetic field, including:  ¨ Airport security trinh. When at the airport, let officials know you have a pacemaker. Your ID card will let you be checked in a way that is safe for you and that will not damage your pacemaker. Also, do not let a security person wave a magnetic wand near your pacemaker. That can make it stop working.  ¨ Power plants.  ¨ Large electrical generators.  ¨ Radiofrequency transmission towers, such as cell phone and radio towers.  · Do not use amateur (ham) radio equipment or electric (arc) welding torches. Some devices are safe to use if held at least 1 foot from your pacemaker. These include power tools, lawn mowers, and speakers. If you are unsure of whether something is safe to use, ask your health care provider.  · You may safely use electric blankets, heating pads, computers, and microwave ovens.  · When using your cell phone, hold it to the ear opposite the pacemaker. Do not leave your cell phone in a pocket over the pacemaker.  · Keep all follow-up visits as directed by your health care provider. This is how your health care provider makes sure your  chest is healing the way it should. Ask your health care provider when you should come back to have your stitches or staples taken out.  · Have your pacemaker checked every 3-6 months or as directed by your health care provider. Most pacemakers last for 4-8 years before a new one is needed.  SEEK MEDICAL CARE IF:  · You gain weight suddenly.  · Your legs or feet swell more than they have before.  · It feels like your heart is fluttering or skipping beats (heart palpitations).  · You have a fever.  SEEK IMMEDIATE MEDICAL CARE IF:  · You have chest pain.  · You feel more short of breath than you have felt before.  · You feel more light-headed than you have felt before.  · You have problems with your incision site, such as swelling or bleeding, or it starts to open up.  · You have drainage, redness, swelling, or pain at your incision site.     This information is not intended to replace advice given to you by your health care provider. Make sure you discuss any questions you have with your health care provider.     Document Released: 07/07/2006 Document Revised: 01/08/2016 Document Reviewed: 04/19/2013  [employer] Interactive Patient Education ©2016 [employer]    Depression / Suicide Risk    As you are discharged from this [facility] facility, it is important to learn how to keep safe from harming yourself.    Recognize the warning signs:  · Abrupt changes in personality, positive or negative- including increase in energy   · Giving away possessions  · Change in eating patterns- significant weight changes-  positive or negative  · Change in sleeping patterns- unable to sleep or sleeping all the time   · Unwillingness or inability to communicate  · Depression  · Unusual sadness, discouragement and loneliness  · Talk of wanting to die  · Neglect of personal appearance   · Rebelliousness- reckless behavior  · Withdrawal from people/activities they love  · Confusion- inability to concentrate     If you or a loved one  observes any of these behaviors or has concerns about self-harm, here's what you can do:  · Talk about it- your feelings and reasons for harming yourself  · Remove any means that you might use to hurt yourself (examples: pills, rope, extension cords, firearm)  · Get professional help from the community (Mental Health, Substance Abuse, psychological counseling)  · Do not be alone:Call your Safe Contact- someone whom you trust who will be there for you.  · Call your local CRISIS HOTLINE 451-3287 or 809-546-8146  · Call your local Children's Mobile Crisis Response Team [place] (263) 738-3449 or www.SunFunder  · Call the toll free National Suicide Prevention Hotlines   · National Suicide Prevention Lifeline 662-666-JKZA (8626)  · National Hope Line Network 800-SUICIDE (449-7469)

## 2018-05-07 ENCOUNTER — NON-PROVIDER VISIT (OUTPATIENT)
Dept: CARDIOLOGY | Facility: MEDICAL CENTER | Age: 53
End: 2018-05-07
Payer: OTHER GOVERNMENT

## 2018-05-07 VITALS
WEIGHT: 290 LBS | HEIGHT: 76 IN | DIASTOLIC BLOOD PRESSURE: 60 MMHG | OXYGEN SATURATION: 91 % | SYSTOLIC BLOOD PRESSURE: 100 MMHG | BODY MASS INDEX: 35.31 KG/M2 | HEART RATE: 90 BPM

## 2018-05-07 DIAGNOSIS — I25.10 CORONARY ARTERY DISEASE INVOLVING NATIVE CORONARY ARTERY OF NATIVE HEART WITHOUT ANGINA PECTORIS: ICD-10-CM

## 2018-05-07 DIAGNOSIS — I50.9 HEART FAILURE, NYHA CLASS 3 (HCC): ICD-10-CM

## 2018-05-07 DIAGNOSIS — I50.20 ACC/AHA STAGE C SYSTOLIC HEART FAILURE (HCC): ICD-10-CM

## 2018-05-07 DIAGNOSIS — Z95.810 PRESENCE OF BIVENTRICULAR AICD: ICD-10-CM

## 2018-05-07 PROCEDURE — 93289 INTERROG DEVICE EVAL HEART: CPT | Performed by: NURSE PRACTITIONER

## 2018-05-07 NOTE — PROGRESS NOTES
Patient was referred to EP for CRT-D for CAD/ischemic cardiomyopathy. This was implanted on 4/27/2018. He is here today for device interrogation and wound chec.    Device is working normally.  No device therapy.  No mode switching episodes.  Normal sensing and capture of RA and RV leads; stable impedances. Battery charge time is 9.3 seconds; battery longevity is 8 years.  No changes are made today.    Incision is healing well, with no redness, swelling or drainage noted.    He is reminded about limited ROM of left arm, and given wound care instructions, as well as general device information.    FU in 1 months for next AICD check with me.    Collaborating MD: Mariaa

## 2018-06-06 ENCOUNTER — NON-PROVIDER VISIT (OUTPATIENT)
Dept: CARDIOLOGY | Facility: PHYSICIAN GROUP | Age: 53
End: 2018-06-06
Payer: OTHER GOVERNMENT

## 2018-06-06 VITALS
HEART RATE: 92 BPM | SYSTOLIC BLOOD PRESSURE: 140 MMHG | WEIGHT: 290 LBS | DIASTOLIC BLOOD PRESSURE: 60 MMHG | HEIGHT: 73 IN | BODY MASS INDEX: 38.43 KG/M2 | OXYGEN SATURATION: 93 %

## 2018-06-06 DIAGNOSIS — I25.10 CORONARY ARTERY DISEASE INVOLVING NATIVE CORONARY ARTERY OF NATIVE HEART WITHOUT ANGINA PECTORIS: ICD-10-CM

## 2018-06-06 DIAGNOSIS — Z95.810 PRESENCE OF BIVENTRICULAR AICD: ICD-10-CM

## 2018-06-06 DIAGNOSIS — I50.20 ACC/AHA STAGE C SYSTOLIC HEART FAILURE (HCC): ICD-10-CM

## 2018-06-06 PROCEDURE — 93289 INTERROG DEVICE EVAL HEART: CPT | Performed by: NURSE PRACTITIONER

## 2018-06-06 NOTE — PROGRESS NOTES
Patient had CRT-D implanted on 4/27/2018, and is here today for final threshold checks. He does notice that he seems less short of breath.    Device is working normally.  No device therapy.  No mode switching episodes.  Normal sensing and capture of RA, RV and LV leads; stable impedances. Battery charge time is 9.3 seconds; battery longevity is 8 years.      Changes today include decreasing RA output to 2.0 volts, RV output to 2.5 volts, and LV output to 3.0 volts.    Incision is well healed.    FU in 3 months for next AICD check with me.    Collaborating MD: JULIA Morris

## 2018-06-15 ENCOUNTER — TELEPHONE (OUTPATIENT)
Dept: CARDIOLOGY | Facility: MEDICAL CENTER | Age: 53
End: 2018-06-15

## 2018-06-15 NOTE — TELEPHONE ENCOUNTER
dental clearance   Received: Today   Message Contents   Venus Hahn R.N.   Phone Number: [phone]             TT/radha     Pt calling for dental clearance for extraction of 2 teeth on 6/19.  Please fax clearance to: [facility] in [place] [phone]     Please call pt at  if questions.      Dental office called. Placed on hold to speak with Melissa. Requested dental clearance be faxed. They don't have a fax machine. Melissa states she is aware of all of the medications he is on as patient supplied a full med list. She states they have no providers there until 6/19. I explained the requested clearning cardiologist would also be back at that time also.

## 2018-07-16 ENCOUNTER — NON-PROVIDER VISIT (OUTPATIENT)
Dept: NEUROLOGY | Facility: MEDICAL CENTER | Age: 53
End: 2018-07-16
Payer: OTHER GOVERNMENT

## 2018-07-16 DIAGNOSIS — E11.8 TYPE 2 DIABETES MELLITUS WITH COMPLICATION, WITH LONG-TERM CURRENT USE OF INSULIN (HCC): ICD-10-CM

## 2018-07-16 DIAGNOSIS — R41.89 COGNITIVE CHANGE: ICD-10-CM

## 2018-07-16 DIAGNOSIS — Z79.4 TYPE 2 DIABETES MELLITUS WITH COMPLICATION, WITH LONG-TERM CURRENT USE OF INSULIN (HCC): ICD-10-CM

## 2018-07-16 DIAGNOSIS — R68.89 SPELLS OF DECREASED ATTENTIVENESS: ICD-10-CM

## 2018-07-16 PROCEDURE — 95819 EEG AWAKE AND ASLEEP: CPT | Performed by: PSYCHIATRY & NEUROLOGY

## 2018-07-17 NOTE — PROGRESS NOTES
ROUTINE ELECTROENCEPHALOGRAM REPORT      NAME: Juaquin Solorzano    REFERRING Dr: LINDA    DURATION: 24 minutes    INDICATION: Starring spells and poor memory for 5+ years. Hx of brain contusion. DM, Heart attack S/P GABG 2015 (cardiac arrest - heart failure). Nauropathy.   No AED's or Benzo.    TECHNIQUE: 30 channel routine electroencephalogram (EEG) was performed in accordance with the international 10-20 system. The study was reviewed in bipolar and referential montages. The recording examined the patient during wakeful and drowsy state(s).     DESCRIPTION OF THE RECORD:      Background rhythm during awake stage shows well-organized, well-developed, average voltage 10 to 11 hertz alpha activity in the posterior regions.  It blocks with eye opening and it is bilaterally synchronous and symmetrical.  No spike-and-wave discharges but some delta bursts generalized or lateralized more to the left.  Photic stimulation did not produce any abnormalities. Hyperventilation did not produce any abnormalities.  No abnormalities were found during the procedure. Stage I sleep was achieved.      ACTIVATION PROCEDURES:      Photic Stimulation were done    ICTAL AND/OR INTERICTAL FINDINGS:     No spike-and-wave discharges but some delta bursts generalized or lateralized more to the left.   No clinical events or seizures were reported or recorded during the study.      EKG: sampling of the EKG recording demonstrated regular rhythm.        INTERPRETATION:    ________________________________________________________________________     This is abnormal routine EEG recording in the awake and drowsy/sleep state(s).    This scalp EEG is consistent with      1. Potential focal irritability more over right ( this would increase the risk of focal seizure)     ________________________________________________________________________

## 2018-07-17 NOTE — PROCEDURES
DATE OF SERVICE:  07/16/2018    This is an outpatient EEG that was done on 07/16/2018.    ROUTINE ELECTROENCEPHALOGRAM REPORT        NAME: Jeanine Juaquin Edwar     REFERRING Dr: LINDA     DURATION: 24 minutes     INDICATION: Starring spells and poor memory for 5+ years. Hx of brain contusion. DM, Heart attack S/P GABG 2015 (cardiac arrest - heart failure). Nauropathy.   No AED's or Benzo.     TECHNIQUE: 30 channel routine electroencephalogram (EEG) was performed in accordance with the international 10-20 system. The study was reviewed in bipolar and referential montages. The recording examined the patient during wakeful and drowsy state(s).      DESCRIPTION OF THE RECORD:        Background rhythm during awake stage shows well-organized, well-developed, average voltage 10 to 11 hertz alpha activity in the posterior regions.  It blocks with eye opening and it is bilaterally synchronous and symmetrical.  No spike-and-wave discharges but some delta bursts generalized or lateralized more to the left.  Photic stimulation did not produce any abnormalities. Hyperventilation did not produce any abnormalities.  No abnormalities were found during the procedure. Stage I sleep was achieved.        ACTIVATION PROCEDURES:       Photic Stimulation were done     ICTAL AND/OR INTERICTAL FINDINGS:     No spike-and-wave discharges but some delta bursts generalized or lateralized more to the left.   No clinical events or seizures were reported or recorded during the study.       EKG: sampling of the EKG recording demonstrated regular rhythm.          INTERPRETATION:     ________________________________________________________________________      This is abnormal routine EEG recording in the awake and drowsy/sleep state(s).     This scalp EEG is consistent with                  1. Potential focal irritability more over right ( this would increase the risk of focal seizure)       ________________________________________________________________________                               ____________________________________     MD TERRY PENALOZA    DD:  07/17/2018 16:52:28  DT:  07/17/2018 16:57:07    D#:  [id number]  Job#:  [id number]

## 2018-07-25 ENCOUNTER — OFFICE VISIT (OUTPATIENT)
Dept: NEUROLOGY | Facility: MEDICAL CENTER | Age: 53
End: 2018-07-25
Payer: OTHER GOVERNMENT

## 2018-07-25 VITALS
WEIGHT: 283 LBS | BODY MASS INDEX: 37.51 KG/M2 | HEIGHT: 73 IN | OXYGEN SATURATION: 92 % | SYSTOLIC BLOOD PRESSURE: 98 MMHG | DIASTOLIC BLOOD PRESSURE: 62 MMHG | HEART RATE: 87 BPM | TEMPERATURE: 97.2 F

## 2018-07-25 DIAGNOSIS — R41.89 COGNITIVE CHANGE: ICD-10-CM

## 2018-07-25 DIAGNOSIS — R68.89 SPELLS OF DECREASED ATTENTIVENESS: ICD-10-CM

## 2018-07-25 DIAGNOSIS — I10 ESSENTIAL HYPERTENSION: ICD-10-CM

## 2018-07-25 DIAGNOSIS — I50.9 HEART FAILURE, NYHA CLASS 3 (HCC): ICD-10-CM

## 2018-07-25 PROCEDURE — 99214 OFFICE O/P EST MOD 30 MIN: CPT | Performed by: PSYCHIATRY & NEUROLOGY

## 2018-07-25 RX ORDER — ZONISAMIDE 100 MG/1
200 CAPSULE ORAL DAILY
Qty: 60 CAP | Refills: 3 | Status: SHIPPED | OUTPATIENT
Start: 2018-07-25 | End: 2018-12-05 | Stop reason: SDUPTHER

## 2018-07-25 NOTE — PROGRESS NOTES
NEUROLOGY NOTE    Referring Physician  Victoria Callahan M.D.      CHIEF COMPLAINT:    Memory deteriorating more - since 2015  Forgot what he just did   Did not have super memory to begin with  Starring spells -- 10 times per day or more    Chief Complaint   Patient presents with   • Follow-Up     cognitive change       PRESENT ILLNESS:   Memory deteriorating more in the past couple of years  Forgot what he just did   Did not have super memory to begin with  Starring spells -- 10 times per day or more    Starring spells probably started 5 years    Head injuries at least 3 times when he was kid--- had to get scalp stiches    1. Starring Spells ( for 5 years since 2013), Poor Memory for years, Hx of brain contusion  2. DM, Heart Attack( 3 times status post CABG 2015), DM neuropathy for 4 years    Disability and not able to work for 4 years        RED FLAGS for reversible dementia  Headache X  Fluctuation course V  Tremor X  Rapid Progressive onset ( within 2 years) X  MRI hippocampus not atrophy ?      PAST MEDICAL HISTORY:  Past Medical History:   Diagnosis Date   • Arthritis     osteo, hands/elbows   • Block, bundle branch, left    • Breath shortness    • Bronchitis 2014   • CAD (coronary artery disease)    • Cataract 04/26/2018    early stages, not removed   • Diabetes (HCC) 2006    insulin and oral meds   • Fibromyalgia 04/26/2018 5-6/10   • Gout    • Heart burn    • High cholesterol    • HTN (hypertension)    • Hyperlipidemia    • Ischemic cardiomyopathy    • Myocardial infarct (HCC) 2007   • Myocardial infarct (HCC) 2012   • Neuropathy (HCC)     Bilateral feet   • Psychiatric problem     Depression and anxiety   • Sleep apnea     Uses CPAP       PAST SURGICAL HISTORY:  Past Surgical History:   Procedure Laterality Date   • AICD IMPLANT Left 04/27/2018    [employer] Inogen X4 CRT-D G148 implanted by Dr. Cullen.   • RECOVERY  7/22/2016    Procedure: CATH LAB-[facility] W/POSSIBLE RADIAL APPROACH-CARIE;   Surgeon: [facility] Surgery;  Location: SURGERY PRE-POST PROC UNIT [facility] [place];  Service:    • MULTIPLE CORONARY ARTERY BYPASS  2014    CABG x 3   • LEONILA BY LAPAROSCOPY  2010   • KNEE ARTHROSCOPY      Arthroscopy, Knee       FAMILY HISTORY:  Family History   Problem Relation Age of Onset   • Heart Disease Mother    • Diabetes Mother    • Hyperlipidemia Father    • Heart Disease Father    • Hypertension Father    • Stroke Father    • Lung Disease Sister        SOCIAL HISTORY:  Social History     Social History   • Marital status:      Spouse name: N/A   • Number of children: N/A   • Years of education: N/A     Occupational History   • Not on file.     Social History Main Topics   • Smoking status: Former Smoker     Packs/day: 0.50     Years: 12.00     Types: Cigarettes     Quit date: 7/15/2012   • Smokeless tobacco: Never Used   • Alcohol use Yes      Comment: 1/month   • Drug use: Yes     Types: Marijuana      Comment: edible marijuana   • Sexual activity: Not on file     Other Topics Concern   • Not on file     Social History Narrative   • No narrative on file     ALLERGIES:  Allergies   Allergen Reactions   • Contrast Media With Iodine [Iodine] Anaphylaxis     TOBHX  History   Smoking Status   • Former Smoker   • Packs/day: 0.50   • Years: 12.00   • Types: Cigarettes   • Quit date: 7/15/2012   Smokeless Tobacco   • Never Used     ALCHX  History   Alcohol Use   • Yes     Comment: 1/month     DRUGHX  History   Drug Use   • Types: Marijuana     Comment: edible marijuana           MEDICATIONS:  Current Outpatient Prescriptions   Medication   • DULoxetine HCl (CYMBALTA PO)   • sacubitril-valsartan (ENTRESTO)  MG Tab tablet   • spironolactone (ALDACTONE) 25 MG Tab   • metoprolol SR (TOPROL XL) 100 MG TABLET SR 24 HR   • atorvastatin (LIPITOR) 40 MG Tab   • clobetasol (TEMOVATE) 0.05 % Ointment   • amitriptyline (ELAVIL) 75 MG Tab   • allopurinol (ZYLOPRIM) 100 MG Tab   • Pyridoxine HCl (VITAMIN B-6 PO)   •  "insulin lispro (HUMALOG) 100 UNIT/ML Solution   • insulin glargine (LANTUS) 100 UNIT/ML Solution   • esomeprazole magnesium (NEXIUM) 40 MG Pack   • aspirin 81 MG tablet   • metformin (GLUCOPHAGE) 1000 MG tablet   • Omega-3 Fatty Acids (FISH OIL) 1000 MG Cap capsule   • escitalopram (LEXAPRO) 10 MG Tab     No current facility-administered medications for this visit.        REVIEW OF SYSTEM:    Constitutional: Denies fevers, Denies weight changes   Eyes: Denies changes in vision, no eye pain   Ears/Nose/Throat/Mouth: Denies nasal congestion or sore throat   Cardiovascular: heart attack,  Respiratory: Denies SOB.   Gastrointestinal/Hepatic: Denies abdominal pain, nausea, vomiting, diarrhea, constipation or GI bleeding   Genitourinary: Denies bladder dysfunction, dysuria or frequency   Musculoskeletal/Rheum: Denies joint pain and swelling   Skin/Breast: Denies rash, denies breast lumps or discharge   Neurological: memory problems, starring spells, head injury, neuropathy-- four years  Psychiatric: depression   Endocrine: DM   Heme/Oncology/Lymph Nodes: Denies enlarged lymph nodes, denies brusing or known bleeding disorder   Allergic/Immunologic: Denies hx of allergies         PHYSICAL AND NEUROLOGICAL EXMAINATIONS:  VITAL SIGNS: BP (!) 98/62   Pulse 87   Temp 36.2 °C (97.2 °F)   Ht 1.854 m (6' 1\")   Wt (!) 128.4 kg (283 lb)   SpO2 92%   BMI 37.34 kg/m²   CURRENT WEIGHT:   BMI: Body mass index is 37.34 kg/m².  PREVIOUS WEIGHTS:  Wt Readings from Last 25 Encounters:   07/25/18 (!) 128.4 kg (283 lb)   06/06/18 (!) 131.5 kg (290 lb)   05/07/18 (!) 131.5 kg (290 lb)   04/28/18 (!) 134.7 kg (296 lb 15.4 oz)   04/09/18 (!) 134.7 kg (297 lb)   03/20/18 (!) 131.8 kg (290 lb 9.1 oz)   03/05/18 (!) 131.7 kg (290 lb 6.4 oz)   01/08/18 (!) 134.7 kg (297 lb)   06/27/17 (!) 133.8 kg (295 lb)   05/18/17 (!) 132.5 kg (292 lb)   01/18/17 (!) 136.1 kg (300 lb)   10/12/16 (!) 133.8 kg (295 lb)   08/31/16 (!) 133.8 kg (295 lb)   "   07/21/16 (!) 134.9 kg (297 lb 6.4 oz)   07/15/16 (!) 132.5 kg (292 lb 2 oz)   06/17/16 (!) 133.8 kg (295 lb)       General appearance of patient: WDWN(+) NAD(+)    EYES  o Fundus : Papilledem(-) Exudates(-) Hemorrhage(-)  Nervous System  Orientation to time, place and person(+)  Memory normal(-)  Language: aphasia(-)  Knowledge: past(+) Current(+)  Attention(+)  Cranial Nerves  • Nerve 2: intact  • Nerve 3,4,6: intact  • Nerve 5 : intact  • Nerve 7: intact  • Nerve 8: intact  • Nerve 9 & 10: intact  • Nerve 11: intact  • Nerve 12: intact  Muscle Power and muscle tone: symmetric, normal in upper and lower  Sensory System: Pin sensation decreased over both feet  Reflexes: symmetric throughout  Cerebellar Function FNP normal   Gait : Steady(-) able to walk without help   Heart and Vascular  Peripheral Vasucular system : Edema (-) Swelling(-)  RHB, Breathing sound clear  abdomen bowel sound normoactive  Extremities freely moveable  Joints no contracture       NEUROIMAGING:       LAB:        Not able to function after cardiac attacks 4 years ago    IMPRESSION:    1. Starring Spells ( for 5 years since 2013), Poor Memory for years, Hx of brain contusion  2. DM, Heart Attack( 3 times status post CABG 2015), DM neuropathy for 4 years, Status Post Cardiac Arrest-- Heart Failure  3. Neuropathy ( DM neuropathy), Overweight, CPAP  4. Abnormal routine EEG    PLAN/RECOMMENDATIONS:      The patient had MRI of brain done in the local hospital 2018  We may repeat MRI here next year    We will start therapeutic trial of medication any time   Zonegran 100mg daily for one week, then up to 200mg daily since the second week-- this is to address starring spells  If any side effects, stop and notify us  If not helpful, please notify us too    We do tell the patient, not to drive 3 months after any spells of passing out    ________________________________________________________________________      Exercise muscle and brain    Weight  "loss    Quit alcohol altogether    Reduce anxiety by praying, yoga, meditation and etc  ________________________________________________________________________      It is fine to try the following nutritional supplementation-- no guarantee these would help though  However, it is worth to try   ________________________________________________________________________    Fish Oil -- Omega 3 1000mg 3# daily  Try Daily Probiotic too  Vit D-3 4000 unit daily  ________________________________________________________________________  ________________________________________________________________________    Magnesium -L-Threonate Capsules (\"Magtein\") 2000mg daily  Vit B3 500mg daily  Vitamin B1( thiamine) 250mg daily  Multiple Vitamin Daily  ________________________________________________________________________          ROUTINE ELECTROENCEPHALOGRAM REPORT      NAME: Juaquin Solorzano    INTERPRETATION:    ________________________________________________________________________     This is abnormal routine EEG recording in the awake and drowsy/sleep state(s).    This scalp EEG is consistent with      1. Potential focal irritability more over right ( this would increase the risk of focal seizure)     ________________________________________________________________________                              SIGNATURE:  Favio Rob    CC:  Victoria Callahan M.D.        "

## 2018-07-25 NOTE — PATIENT INSTRUCTIONS
"  IMPRESSION:    1. Starring Spells ( for 5 years since 2013), Poor Memory for years, Hx of brain contusion  2. DM, Heart Attack( 3 times status post CABG 2015), DM neuropathy for 4 years, Status Post Cardiac Arrest-- Heart Failure  3. Neuropathy ( DM neuropathy), Overweight, CPAP  4. Abnormal routine EEG    PLAN/RECOMMENDATIONS:      The patient had MRI of brain done in the local hospital 2018  We may repeat MRI here next year    We will start therapeutic trial of medication any time   Zonegran 100mg daily for one week, then up to 200mg daily since the second week-- this is to address starring spells  If any side effects, stop and notify us  If not helpful, please notify us too    We do tell the patient, not to drive 3 months after any spells of passing out    ________________________________________________________________________      Exercise muscle and brain    Weight loss    Quit alcohol altogether    Reduce anxiety by praying, yoga, meditation and etc  ________________________________________________________________________      It is fine to try the following nutritional supplementation-- no guarantee these would help though  However, it is worth to try   ________________________________________________________________________    Fish Oil -- Omega 3 1000mg 3# daily  Try Daily Probiotic too  Vit D-3 4000 unit daily  ________________________________________________________________________  ________________________________________________________________________    Magnesium -L-Threonate Capsules (\"Magtein\") 2000mg daily  Vit B3 500mg daily  Vitamin B1( thiamine) 250mg daily  Multiple Vitamin Daily  ________________________________________________________________________          ROUTINE ELECTROENCEPHALOGRAM REPORT      NAME: Juaquin Solorzano    INTERPRETATION:    ________________________________________________________________________     This is abnormal routine EEG recording in the awake and " drowsy/sleep state(s).    This scalp EEG is consistent with      1. Potential focal irritability more over right ( this would increase the risk of focal seizure)     ________________________________________________________________________                              SIGNATURE:  Favio Rob    CC:  Victoria Callahan M.D.

## 2018-08-01 ENCOUNTER — OFFICE VISIT (OUTPATIENT)
Dept: CARDIOLOGY | Facility: MEDICAL CENTER | Age: 53
End: 2018-08-01
Payer: OTHER GOVERNMENT

## 2018-08-01 VITALS
SYSTOLIC BLOOD PRESSURE: 90 MMHG | HEIGHT: 73 IN | OXYGEN SATURATION: 95 % | BODY MASS INDEX: 38.83 KG/M2 | WEIGHT: 293 LBS | DIASTOLIC BLOOD PRESSURE: 58 MMHG | HEART RATE: 86 BPM

## 2018-08-01 DIAGNOSIS — Z95.1 HX OF CABG: ICD-10-CM

## 2018-08-01 DIAGNOSIS — I44.7 LBBB (LEFT BUNDLE BRANCH BLOCK): ICD-10-CM

## 2018-08-01 DIAGNOSIS — Z79.899 HIGH RISK MEDICATION USE: ICD-10-CM

## 2018-08-01 DIAGNOSIS — I50.20 ACC/AHA STAGE C SYSTOLIC HEART FAILURE (HCC): ICD-10-CM

## 2018-08-01 DIAGNOSIS — I25.5 ISCHEMIC CARDIOMYOPATHY: ICD-10-CM

## 2018-08-01 DIAGNOSIS — E11.8 TYPE 2 DIABETES MELLITUS WITH COMPLICATION, WITH LONG-TERM CURRENT USE OF INSULIN (HCC): ICD-10-CM

## 2018-08-01 DIAGNOSIS — I50.9 HEART FAILURE, NYHA CLASS 3 (HCC): ICD-10-CM

## 2018-08-01 DIAGNOSIS — I25.10 CORONARY ARTERY DISEASE INVOLVING NATIVE CORONARY ARTERY OF NATIVE HEART WITHOUT ANGINA PECTORIS: ICD-10-CM

## 2018-08-01 DIAGNOSIS — G47.33 OSA ON CPAP: ICD-10-CM

## 2018-08-01 DIAGNOSIS — Z95.810 PRESENCE OF BIVENTRICULAR AICD: ICD-10-CM

## 2018-08-01 DIAGNOSIS — Z79.4 TYPE 2 DIABETES MELLITUS WITH COMPLICATION, WITH LONG-TERM CURRENT USE OF INSULIN (HCC): ICD-10-CM

## 2018-08-01 PROCEDURE — 99215 OFFICE O/P EST HI 40 MIN: CPT | Performed by: INTERNAL MEDICINE

## 2018-08-01 RX ORDER — SPIRONOLACTONE 25 MG/1
25 TABLET ORAL DAILY
Qty: 90 TAB | Refills: 3 | Status: SHIPPED | OUTPATIENT
Start: 2018-08-01 | End: 2019-04-05

## 2018-08-01 RX ORDER — METOPROLOL SUCCINATE 100 MG/1
100 TABLET, EXTENDED RELEASE ORAL 2 TIMES DAILY
Qty: 180 TAB | Refills: 3 | Status: SHIPPED | OUTPATIENT
Start: 2018-08-01 | End: 2019-04-05 | Stop reason: SDUPTHER

## 2018-08-01 RX ORDER — ATORVASTATIN CALCIUM 40 MG/1
40 TABLET, FILM COATED ORAL DAILY
Qty: 90 TAB | Refills: 3 | Status: SHIPPED | OUTPATIENT
Start: 2018-08-01 | End: 2019-04-05 | Stop reason: SDUPTHER

## 2018-08-01 ASSESSMENT — ENCOUNTER SYMPTOMS
SHORTNESS OF BREATH: 1
DIZZINESS: 0
DIAPHORESIS: 0
PND: 0
SENSORY CHANGE: 0
BLOOD IN STOOL: 0
VOMITING: 0
BLURRED VISION: 0
EYE PAIN: 0
CLAUDICATION: 0
LOSS OF CONSCIOUSNESS: 0
HALLUCINATIONS: 0
DEPRESSION: 0
ABDOMINAL PAIN: 0
FALLS: 0
PALPITATIONS: 0
WEIGHT LOSS: 0
DOUBLE VISION: 0
ORTHOPNEA: 0
COUGH: 0
MEMORY LOSS: 0
SPEECH CHANGE: 0
BRUISES/BLEEDS EASILY: 0
NAUSEA: 0
FEVER: 0
CHILLS: 0
HEADACHES: 0
MYALGIAS: 0
EYE DISCHARGE: 0

## 2018-08-01 NOTE — LETTER
[facility]   [address], [address]  [place] [address]  Phone: [phone]  Fax: [phone]              Juaquin Solorzano  1965    Encounter Date: 8/1/2018    Adrian Simms M.D.          PROGRESS NOTE:  Chief Complaint   Patient presents with   • CHF (Systolic)     F/V: 6 MO       Subjective:   Juaquin Solorzano is a 52 y.o. male who presents today for cardiac care and evaluation of his prior history of CABG, ischemic cardiomyopathy, heart failure. In 9/2013, patient underwent triple bypass surgery. In 2014 he underwent open sternotomy again because of complications. Patient also has a history of sleep apnea for which he has been using CPAP. Patient is obese.      Patient also has diabetes for which he is then controlled with insulin injections.     Patient was able to complete 314 m during his 6 minute walk test. his O2 saturation at baseline was 93% and at the end of the test, the O2 saturation was 95%. He reported 8 level of dyspnea on Anson scale.     Because of his report of exertional dyspnea, I obtain a transthoracic echocardiogram which shows left ventricular systolic function of 30%. Patient underwent ischemic evaluation with coronary angiogram. Essentially, patient is living off of of his [place] to [facility]. Patient still reports having shortness of breath. He is able to walk for half a mile however at slow. He is tolerating Entresto therapy well.      Patient continues to be short of breath. He gets symptomatic with tying his shoe still. He did have a PFT which shows mild evidence of restrictive lung disease but no evidence of obstruction. He is tolerating his medications well.     He is on AWP205 200 mg po bid, Toprol  mg po bid, Spironolactone 25 mg po daily.     After guidelines directed medical therapy, his left ventricular systolic function is still at 35%.    BiVICD in 04/2018.     Blood pressure is well controlled.        Past Medical  History:   Diagnosis Date   • Arthritis     osteo, hands/elbows   • Block, bundle branch, left    • Breath shortness    • Bronchitis 2014   • CAD (coronary artery disease)    • Cataract 04/26/2018    early stages, not removed   • Diabetes (HCC) 2006    insulin and oral meds   • Fibromyalgia 04/26/2018 5-6/10   • Gout    • Heart burn    • High cholesterol    • HTN (hypertension)    • Hyperlipidemia    • Ischemic cardiomyopathy    • Myocardial infarct (HCC) 2007   • Myocardial infarct (HCC) 2012   • Neuropathy (HCC)     Bilateral feet   • Psychiatric problem     Depression and anxiety   • Sleep apnea     Uses CPAP     Past Surgical History:   Procedure Laterality Date   • AICD IMPLANT Left 04/27/2018    [employer] Inogen X4 CRT-D G148 implanted by Dr. Cullen.   • RECOVERY  7/22/2016    Procedure: CATH LAB-[facility] W/POSSIBLE RADIAL APPROACH-CARIE;  Surgeon: Mireya Surgery;  Location: SURGERY PRE-POST PROC UNIT [facility] [place];  Service:    • MULTIPLE CORONARY ARTERY BYPASS  2014    CABG x 3   • LEONILA BY LAPAROSCOPY  2010   • KNEE ARTHROSCOPY      Arthroscopy, Knee     Family History   Problem Relation Age of Onset   • Heart Disease Mother    • Diabetes Mother    • Hyperlipidemia Father    • Heart Disease Father    • Hypertension Father    • Stroke Father    • Lung Disease Sister      Social History     Social History   • Marital status:      Spouse name: N/A   • Number of children: N/A   • Years of education: N/A     Occupational History   • Not on file.     Social History Main Topics   • Smoking status: Former Smoker     Packs/day: 0.50     Years: 12.00     Types: Cigarettes     Quit date: 7/15/2012   • Smokeless tobacco: Never Used   • Alcohol use Yes      Comment: 1/month   • Drug use: Yes     Types: Marijuana      Comment: edible marijuana   • Sexual activity: Not on file     Other Topics Concern   • Not on file     Social History Narrative   • No narrative on file     Allergies   Allergen Reactions   •  Contrast Media With Iodine [Iodine] Anaphylaxis     Outpatient Encounter Prescriptions as of 8/1/2018   Medication Sig Dispense Refill   • atorvastatin (LIPITOR) 40 MG Tab Take 1 Tab by mouth every day. 90 Tab 3   • metoprolol SR (TOPROL XL) 100 MG TABLET SR 24 HR Take 1 Tab by mouth 2 Times a Day. 180 Tab 3   • sacubitril-valsartan (ENTRESTO)  MG Tab tablet Take 1 Tab by mouth 2 Times a Day. 180 Tab 3   • spironolactone (ALDACTONE) 25 MG Tab Take 1 Tab by mouth every day. 90 Tab 3   • Empagliflozin (JARDIANCE) 10 MG Tab Take 10 mg by mouth every day. 30 Tab 11   • zonisamide (ZONEGRAN) 100 MG Cap Take 2 Caps by mouth every day. 60 Cap 3   • DULoxetine HCl (CYMBALTA PO) Take 60 mg by mouth every morning.     • amitriptyline (ELAVIL) 75 MG Tab Take 75 mg by mouth every evening.     • allopurinol (ZYLOPRIM) 100 MG Tab Take 100 mg by mouth 2 Times a Day.     • Pyridoxine HCl (VITAMIN B-6 PO) Take 1 Tab by mouth every day.     • insulin lispro (HUMALOG) 100 UNIT/ML Solution Inject 8-10 Units as instructed 3 times a day before meals.     • insulin glargine (LANTUS) 100 UNIT/ML Solution Inject 50 Units as instructed every evening.     • esomeprazole magnesium (NEXIUM) 40 MG Pack Take 40 mg by mouth every morning before breakfast.     • aspirin 81 MG tablet Take 81 mg by mouth every day.     • metformin (GLUCOPHAGE) 1000 MG tablet Take 1,000 mg by mouth 2 times a day, with meals.     • Omega-3 Fatty Acids (FISH OIL) 1000 MG Cap capsule Take 1,000 mg by mouth 3 times a day, with meals.     • [DISCONTINUED] sacubitril-valsartan (ENTRESTO)  MG Tab tablet Take 1 Tab by mouth 2 Times a Day. 180 Tab 3   • [DISCONTINUED] spironolactone (ALDACTONE) 25 MG Tab Take 1 Tab by mouth every day. 90 Tab 3   • [DISCONTINUED] metoprolol SR (TOPROL XL) 100 MG TABLET SR 24 HR Take 1 Tab by mouth 2 Times a Day. 180 Tab 3   • [DISCONTINUED] atorvastatin (LIPITOR) 40 MG Tab Take 1 Tab by mouth every day. 90 Tab 3   • [DISCONTINUED]  "clobetasol (TEMOVATE) 0.05 % Ointment BID x 7 days 30 g 0   • [DISCONTINUED] escitalopram (LEXAPRO) 10 MG Tab Take 10 mg by mouth every day.       No facility-administered encounter medications on file as of 8/1/2018.      Review of Systems   Constitutional: Negative for chills, diaphoresis, fever, malaise/fatigue and weight loss.   HENT: Negative for ear discharge, ear pain, hearing loss and nosebleeds.    Eyes: Negative for blurred vision, double vision, pain and discharge.   Respiratory: Positive for shortness of breath. Negative for cough.    Cardiovascular: Negative for chest pain, palpitations, orthopnea, claudication, leg swelling and PND.   Gastrointestinal: Negative for abdominal pain, blood in stool, melena, nausea and vomiting.   Genitourinary: Negative for dysuria, frequency and hematuria.   Musculoskeletal: Negative for falls, joint pain and myalgias.   Skin: Negative for itching and rash.   Neurological: Negative for dizziness, sensory change, speech change, loss of consciousness and headaches.   Endo/Heme/Allergies: Negative for environmental allergies. Does not bruise/bleed easily.   Psychiatric/Behavioral: Negative for depression, hallucinations, memory loss and suicidal ideas.        Objective:   BP (!) 90/58   Pulse 86   Ht 1.854 m (6' 1\")   Wt (!) 132.9 kg (293 lb)   SpO2 95%   BMI 38.66 kg/m²      Physical Exam   Constitutional: He is oriented to person, place, and time. No distress.   HENT:   Head: Normocephalic and atraumatic.   Right Ear: External ear normal.   Left Ear: External ear normal.   Eyes: Right eye exhibits no discharge. Left eye exhibits no discharge.   Neck: No JVD present. No thyromegaly present.   Cardiovascular: Normal rate, regular rhythm, normal heart sounds and intact distal pulses.  Exam reveals no gallop and no friction rub.    No murmur heard.  Pulmonary/Chest: Breath sounds normal. No respiratory distress.   Abdominal: Bowel sounds are normal. He exhibits no " distension. There is no tenderness.   Musculoskeletal: He exhibits no edema or tenderness.   Neurological: He is alert and oriented to person, place, and time. No cranial nerve deficit.   Skin: Skin is warm and dry. He is not diaphoretic.   Psychiatric: He has a normal mood and affect. His behavior is normal.   Nursing note and vitals reviewed.      Assessment:     1. ACC/AHA stage C systolic heart failure (HCC)  atorvastatin (LIPITOR) 40 MG Tab    metoprolol SR (TOPROL XL) 100 MG TABLET SR 24 HR    sacubitril-valsartan (ENTRESTO)  MG Tab tablet    spironolactone (ALDACTONE) 25 MG Tab    COMP METABOLIC PANEL    LIPID PANEL   2. Heart failure, NYHA class 3 (HCC)  atorvastatin (LIPITOR) 40 MG Tab    metoprolol SR (TOPROL XL) 100 MG TABLET SR 24 HR    sacubitril-valsartan (ENTRESTO)  MG Tab tablet    spironolactone (ALDACTONE) 25 MG Tab    COMP METABOLIC PANEL    LIPID PANEL   3. Coronary artery disease involving native coronary artery of native heart without angina pectoris  COMP METABOLIC PANEL    LIPID PANEL    Empagliflozin (JARDIANCE) 10 MG Tab   4. Ischemic cardiomyopathy  atorvastatin (LIPITOR) 40 MG Tab    sacubitril-valsartan (ENTRESTO)  MG Tab tablet    spironolactone (ALDACTONE) 25 MG Tab    COMP METABOLIC PANEL    LIPID PANEL    Empagliflozin (JARDIANCE) 10 MG Tab   5. Hx of CABG  atorvastatin (LIPITOR) 40 MG Tab    sacubitril-valsartan (ENTRESTO)  MG Tab tablet    spironolactone (ALDACTONE) 25 MG Tab    Empagliflozin (JARDIANCE) 10 MG Tab   6. Presence of biventricular AICD     7. Type 2 diabetes mellitus with complication, with long-term current use of insulin (HCC)  atorvastatin (LIPITOR) 40 MG Tab    sacubitril-valsartan (ENTRESTO)  MG Tab tablet    spironolactone (ALDACTONE) 25 MG Tab    Empagliflozin (JARDIANCE) 10 MG Tab   8. High risk medication use     9. CLARE on CPAP  atorvastatin (LIPITOR) 40 MG Tab    sacubitril-valsartan (ENTRESTO)  MG Tab tablet     spironolactone (ALDACTONE) 25 MG Tab   10. LBBB (left bundle branch block)  atorvastatin (LIPITOR) 40 MG Tab    sacubitril-valsartan (ENTRESTO)  MG Tab tablet    spironolactone (ALDACTONE) 25 MG Tab       Medical Decision Making:  Today's Assessment / Status / Plan:   Today, based on physical examination findings, patient is euvolemic. No JVD, lungs are clear to auscultation, no pitting edema in bilateral lower extremities, no ascites.    Dry weight is 293 lbs.    Cont current medications at current dose. Toprol  mg bid, Entresto 200 mg bid, Spironolactone 25 mg daily.    For CAD, continue Atorvastatin 40 mg po daily, ASA.    In terms of patient's co-morbiditie such as established cardiovascular disease and type II diabetes mellitus, based on recent trial, patient is indicated to be placed on Empagliflozin for mortality reduction benefit. Based on recent data, such therapy has a relative risk reduction of 38% and absolute risk reduction of 2.2% for cardiovascular death. I will start patient on Jardiance 10 mg once a day. I discussed with patient about potential benefits and risks. Patient understands that this is an adjunct to current regimen of sugar lowering agents and cardiac agents.    Already has ICD in place.    Will continue to closely monitor for side effects of patient's high risk medication(s) including liver, renal function and electrolytes.    I will see patient back in our Heart Failure Clinic with lab tests and studies results in 12 weeks.    I thank you Dr. Callahan for referring patient to our Heart Failure Clinic today.          Victoria Callahan M.D.  [address] [address]  VIA Facsimile: [phone]

## 2018-08-01 NOTE — PROGRESS NOTES
Chief Complaint   Patient presents with   • CHF (Systolic)     F/V: 6 MO       Subjective:   Juaquin Solorzano is a 52 y.o. male who presents today for cardiac care and evaluation of his prior history of CABG, ischemic cardiomyopathy, heart failure. In 9/2013, patient underwent triple bypass surgery. In 2014 he underwent open sternotomy again because of complications. Patient also has a history of sleep apnea for which he has been using CPAP. Patient is obese.      Patient also has diabetes for which he is then controlled with insulin injections.     Patient was able to complete 314 m during his 6 minute walk test. his O2 saturation at baseline was 93% and at the end of the test, the O2 saturation was 95%. He reported 8 level of dyspnea on Anson scale.     Because of his report of exertional dyspnea, I obtain a transthoracic echocardiogram which shows left ventricular systolic function of 30%. Patient underwent ischemic evaluation with coronary angiogram. Essentially, patient is living off of of his [place] to [facility]. Patient still reports having shortness of breath. He is able to walk for half a mile however at slow. He is tolerating Entresto therapy well.      Patient continues to be short of breath. He gets symptomatic with tying his shoe still. He did have a PFT which shows mild evidence of restrictive lung disease but no evidence of obstruction. He is tolerating his medications well.     He is on LJL348 200 mg po bid, Toprol  mg po bid, Spironolactone 25 mg po daily.     After guidelines directed medical therapy, his left ventricular systolic function is still at 35%.    BiVICD in 04/2018.     Blood pressure is well controlled.        Past Medical History:   Diagnosis Date   • Arthritis     osteo, hands/elbows   • Block, bundle branch, left    • Breath shortness    • Bronchitis 2014   • CAD (coronary artery disease)    • Cataract 04/26/2018    early stages, not removed   • Diabetes (HCC) 2006    insulin and oral  meds   • Fibromyalgia 04/26/2018 5-6/10   • Gout    • Heart burn    • High cholesterol    • HTN (hypertension)    • Hyperlipidemia    • Ischemic cardiomyopathy    • Myocardial infarct (HCC) 2007   • Myocardial infarct (HCC) 2012   • Neuropathy (HCC)     Bilateral feet   • Psychiatric problem     Depression and anxiety   • Sleep apnea     Uses CPAP     Past Surgical History:   Procedure Laterality Date   • AICD IMPLANT Left 04/27/2018    [employer] Inogen X4 CRT-D G148 implanted by Dr. Cullen.   • RECOVERY  7/22/2016    Procedure: CATH LAB-[facility] W/POSSIBLE RADIAL APPROACH-CARIE;  Surgeon: Mireya Surgery;  Location: SURGERY PRE-POST PROC UNIT [facility];  Service:    • MULTIPLE CORONARY ARTERY BYPASS  2014    CABG x 3   • LEONILA BY LAPAROSCOPY  2010   • KNEE ARTHROSCOPY      Arthroscopy, Knee     Family History   Problem Relation Age of Onset   • Heart Disease Mother    • Diabetes Mother    • Hyperlipidemia Father    • Heart Disease Father    • Hypertension Father    • Stroke Father    • Lung Disease Sister      Social History     Social History   • Marital status:      Spouse name: N/A   • Number of children: N/A   • Years of education: N/A     Occupational History   • Not on file.     Social History Main Topics   • Smoking status: Former Smoker     Packs/day: 0.50     Years: 12.00     Types: Cigarettes     Quit date: 7/15/2012   • Smokeless tobacco: Never Used   • Alcohol use Yes      Comment: 1/month   • Drug use: Yes     Types: Marijuana      Comment: edible marijuana   • Sexual activity: Not on file     Other Topics Concern   • Not on file     Social History Narrative   • No narrative on file     Allergies   Allergen Reactions   • Contrast Media With Iodine [Iodine] Anaphylaxis     Outpatient Encounter Prescriptions as of 8/1/2018   Medication Sig Dispense Refill   • atorvastatin (LIPITOR) 40 MG Tab Take 1 Tab by mouth every day. 90 Tab 3   • metoprolol SR (TOPROL XL) 100 MG TABLET SR 24 HR Take  1 Tab by mouth 2 Times a Day. 180 Tab 3   • sacubitril-valsartan (ENTRESTO)  MG Tab tablet Take 1 Tab by mouth 2 Times a Day. 180 Tab 3   • spironolactone (ALDACTONE) 25 MG Tab Take 1 Tab by mouth every day. 90 Tab 3   • Empagliflozin (JARDIANCE) 10 MG Tab Take 10 mg by mouth every day. 30 Tab 11   • zonisamide (ZONEGRAN) 100 MG Cap Take 2 Caps by mouth every day. 60 Cap 3   • DULoxetine HCl (CYMBALTA PO) Take 60 mg by mouth every morning.     • amitriptyline (ELAVIL) 75 MG Tab Take 75 mg by mouth every evening.     • allopurinol (ZYLOPRIM) 100 MG Tab Take 100 mg by mouth 2 Times a Day.     • Pyridoxine HCl (VITAMIN B-6 PO) Take 1 Tab by mouth every day.     • insulin lispro (HUMALOG) 100 UNIT/ML Solution Inject 8-10 Units as instructed 3 times a day before meals.     • insulin glargine (LANTUS) 100 UNIT/ML Solution Inject 50 Units as instructed every evening.     • esomeprazole magnesium (NEXIUM) 40 MG Pack Take 40 mg by mouth every morning before breakfast.     • aspirin 81 MG tablet Take 81 mg by mouth every day.     • metformin (GLUCOPHAGE) 1000 MG tablet Take 1,000 mg by mouth 2 times a day, with meals.     • Omega-3 Fatty Acids (FISH OIL) 1000 MG Cap capsule Take 1,000 mg by mouth 3 times a day, with meals.     • [DISCONTINUED] sacubitril-valsartan (ENTRESTO)  MG Tab tablet Take 1 Tab by mouth 2 Times a Day. 180 Tab 3   • [DISCONTINUED] spironolactone (ALDACTONE) 25 MG Tab Take 1 Tab by mouth every day. 90 Tab 3   • [DISCONTINUED] metoprolol SR (TOPROL XL) 100 MG TABLET SR 24 HR Take 1 Tab by mouth 2 Times a Day. 180 Tab 3   • [DISCONTINUED] atorvastatin (LIPITOR) 40 MG Tab Take 1 Tab by mouth every day. 90 Tab 3   • [DISCONTINUED] clobetasol (TEMOVATE) 0.05 % Ointment BID x 7 days 30 g 0   • [DISCONTINUED] escitalopram (LEXAPRO) 10 MG Tab Take 10 mg by mouth every day.       No facility-administered encounter medications on file as of 8/1/2018.      Review of Systems   Constitutional: Negative for  "chills, diaphoresis, fever, malaise/fatigue and weight loss.   HENT: Negative for ear discharge, ear pain, hearing loss and nosebleeds.    Eyes: Negative for blurred vision, double vision, pain and discharge.   Respiratory: Positive for shortness of breath. Negative for cough.    Cardiovascular: Negative for chest pain, palpitations, orthopnea, claudication, leg swelling and PND.   Gastrointestinal: Negative for abdominal pain, blood in stool, melena, nausea and vomiting.   Genitourinary: Negative for dysuria, frequency and hematuria.   Musculoskeletal: Negative for falls, joint pain and myalgias.   Skin: Negative for itching and rash.   Neurological: Negative for dizziness, sensory change, speech change, loss of consciousness and headaches.   Endo/Heme/Allergies: Negative for environmental allergies. Does not bruise/bleed easily.   Psychiatric/Behavioral: Negative for depression, hallucinations, memory loss and suicidal ideas.        Objective:   BP (!) 90/58   Pulse 86   Ht 1.854 m (6' 1\")   Wt (!) 132.9 kg (293 lb)   SpO2 95%   BMI 38.66 kg/m²     Physical Exam   Constitutional: He is oriented to person, place, and time. No distress.   HENT:   Head: Normocephalic and atraumatic.   Right Ear: External ear normal.   Left Ear: External ear normal.   Eyes: Right eye exhibits no discharge. Left eye exhibits no discharge.   Neck: No JVD present. No thyromegaly present.   Cardiovascular: Normal rate, regular rhythm, normal heart sounds and intact distal pulses.  Exam reveals no gallop and no friction rub.    No murmur heard.  Pulmonary/Chest: Breath sounds normal. No respiratory distress.   Abdominal: Bowel sounds are normal. He exhibits no distension. There is no tenderness.   Musculoskeletal: He exhibits no edema or tenderness.   Neurological: He is alert and oriented to person, place, and time. No cranial nerve deficit.   Skin: Skin is warm and dry. He is not diaphoretic.   Psychiatric: He has a normal mood and " affect. His behavior is normal.   Nursing note and vitals reviewed.      Assessment:     1. ACC/AHA stage C systolic heart failure (HCC)  atorvastatin (LIPITOR) 40 MG Tab    metoprolol SR (TOPROL XL) 100 MG TABLET SR 24 HR    sacubitril-valsartan (ENTRESTO)  MG Tab tablet    spironolactone (ALDACTONE) 25 MG Tab    COMP METABOLIC PANEL    LIPID PANEL   2. Heart failure, NYHA class 3 (HCC)  atorvastatin (LIPITOR) 40 MG Tab    metoprolol SR (TOPROL XL) 100 MG TABLET SR 24 HR    sacubitril-valsartan (ENTRESTO)  MG Tab tablet    spironolactone (ALDACTONE) 25 MG Tab    COMP METABOLIC PANEL    LIPID PANEL   3. Coronary artery disease involving native coronary artery of native heart without angina pectoris  COMP METABOLIC PANEL    LIPID PANEL    Empagliflozin (JARDIANCE) 10 MG Tab   4. Ischemic cardiomyopathy  atorvastatin (LIPITOR) 40 MG Tab    sacubitril-valsartan (ENTRESTO)  MG Tab tablet    spironolactone (ALDACTONE) 25 MG Tab    COMP METABOLIC PANEL    LIPID PANEL    Empagliflozin (JARDIANCE) 10 MG Tab   5. Hx of CABG  atorvastatin (LIPITOR) 40 MG Tab    sacubitril-valsartan (ENTRESTO)  MG Tab tablet    spironolactone (ALDACTONE) 25 MG Tab    Empagliflozin (JARDIANCE) 10 MG Tab   6. Presence of biventricular AICD     7. Type 2 diabetes mellitus with complication, with long-term current use of insulin (HCC)  atorvastatin (LIPITOR) 40 MG Tab    sacubitril-valsartan (ENTRESTO)  MG Tab tablet    spironolactone (ALDACTONE) 25 MG Tab    Empagliflozin (JARDIANCE) 10 MG Tab   8. High risk medication use     9. CLARE on CPAP  atorvastatin (LIPITOR) 40 MG Tab    sacubitril-valsartan (ENTRESTO)  MG Tab tablet    spironolactone (ALDACTONE) 25 MG Tab   10. LBBB (left bundle branch block)  atorvastatin (LIPITOR) 40 MG Tab    sacubitril-valsartan (ENTRESTO)  MG Tab tablet    spironolactone (ALDACTONE) 25 MG Tab       Medical Decision Making:  Today's Assessment / Status / Plan:   Today, based  on physical examination findings, patient is euvolemic. No JVD, lungs are clear to auscultation, no pitting edema in bilateral lower extremities, no ascites.    Dry weight is 293 lbs.    Cont current medications at current dose. Toprol  mg bid, Entresto 200 mg bid, Spironolactone 25 mg daily.    For CAD, continue Atorvastatin 40 mg po daily, ASA.    In terms of patient's co-morbiditie such as established cardiovascular disease and type II diabetes mellitus, based on recent trial, patient is indicated to be placed on Empagliflozin for mortality reduction benefit. Based on recent data, such therapy has a relative risk reduction of 38% and absolute risk reduction of 2.2% for cardiovascular death. I will start patient on Jardiance 10 mg once a day. I discussed with patient about potential benefits and risks. Patient understands that this is an adjunct to current regimen of sugar lowering agents and cardiac agents.    Already has ICD in place.    Will continue to closely monitor for side effects of patient's high risk medication(s) including liver, renal function and electrolytes.    I will see patient back in our Heart Failure Clinic with lab tests and studies results in 12 weeks.    I thank you Dr. Callahan for referring patient to our Heart Failure Clinic today.

## 2018-09-13 ENCOUNTER — NON-PROVIDER VISIT (OUTPATIENT)
Dept: CARDIOLOGY | Facility: PHYSICIAN GROUP | Age: 53
End: 2018-09-13
Payer: OTHER GOVERNMENT

## 2018-09-13 VITALS
SYSTOLIC BLOOD PRESSURE: 108 MMHG | HEART RATE: 80 BPM | DIASTOLIC BLOOD PRESSURE: 64 MMHG | BODY MASS INDEX: 36.84 KG/M2 | WEIGHT: 278 LBS | OXYGEN SATURATION: 95 % | HEIGHT: 73 IN

## 2018-09-13 DIAGNOSIS — I25.5 ISCHEMIC CARDIOMYOPATHY: ICD-10-CM

## 2018-09-13 DIAGNOSIS — I25.10 CORONARY ARTERY DISEASE INVOLVING NATIVE CORONARY ARTERY OF NATIVE HEART WITHOUT ANGINA PECTORIS: ICD-10-CM

## 2018-09-13 DIAGNOSIS — Z95.810 PRESENCE OF BIVENTRICULAR AICD: ICD-10-CM

## 2018-09-13 PROCEDURE — 93284 PRGRMG EVAL IMPLANTABLE DFB: CPT | Performed by: NURSE PRACTITIONER

## 2018-09-13 NOTE — PROGRESS NOTES
Patient had CRT-D implanted in April 2018, and remains on medical therapy. He is feeling somewhat better, and has been working on losing weight.    Device is working normally.  No device therapy.  No mode switching episodes.  Normal sensing and capture of RA, RV and LV leads; stable impedances. Battery charge time is 9.3 seconds; battery longevity is 8 years.  No changes are made today.    FU in 6 months for next AICD check with me.    He is considering changing cardiologist to [facility].    Collaborating MD: Bernard

## 2018-11-09 ENCOUNTER — TELEPHONE (OUTPATIENT)
Dept: CARDIOLOGY | Facility: MEDICAL CENTER | Age: 53
End: 2018-11-09

## 2018-11-09 NOTE — TELEPHONE ENCOUNTER
ITZEL to remind patient to complete fasting labs before upcoming appt with Dr. Simms on 11/13/2018.

## 2018-11-29 ENCOUNTER — NON-PROVIDER VISIT (OUTPATIENT)
Dept: MEDICAL GROUP | Facility: CLINIC | Age: 53
End: 2018-11-29
Payer: OTHER GOVERNMENT

## 2018-11-29 ENCOUNTER — HOSPITAL ENCOUNTER (OUTPATIENT)
Dept: LAB | Facility: MEDICAL CENTER | Age: 53
End: 2018-11-29
Attending: INTERNAL MEDICINE
Payer: OTHER GOVERNMENT

## 2018-11-29 PROCEDURE — 99000 SPECIMEN HANDLING OFFICE-LAB: CPT | Performed by: PHYSICIAN ASSISTANT

## 2018-11-29 PROCEDURE — 80048 BASIC METABOLIC PNL TOTAL CA: CPT

## 2018-11-29 PROCEDURE — 36415 COLL VENOUS BLD VENIPUNCTURE: CPT | Performed by: PHYSICIAN ASSISTANT

## 2018-11-30 LAB
ANION GAP SERPL CALC-SCNC: 7 MMOL/L (ref 0–11.9)
BUN SERPL-MCNC: 18 MG/DL (ref 8–22)
CALCIUM SERPL-MCNC: 9.2 MG/DL (ref 8.5–10.5)
CHLORIDE SERPL-SCNC: 107 MMOL/L (ref 96–112)
CO2 SERPL-SCNC: 26 MMOL/L (ref 20–33)
CREAT SERPL-MCNC: 1.28 MG/DL (ref 0.5–1.4)
GLUCOSE SERPL-MCNC: 156 MG/DL (ref 65–99)
POTASSIUM SERPL-SCNC: 4.6 MMOL/L (ref 3.6–5.5)
SODIUM SERPL-SCNC: 140 MMOL/L (ref 135–145)

## 2018-12-05 ENCOUNTER — APPOINTMENT (OUTPATIENT)
Dept: NEUROLOGY | Facility: MEDICAL CENTER | Age: 53
End: 2018-12-05
Payer: OTHER GOVERNMENT

## 2018-12-06 RX ORDER — ZONISAMIDE 100 MG/1
200 CAPSULE ORAL DAILY
Qty: 180 CAP | Refills: 1 | Status: SHIPPED | OUTPATIENT
Start: 2018-12-06 | End: 2018-12-10 | Stop reason: SDUPTHER

## 2018-12-10 RX ORDER — ZONISAMIDE 100 MG/1
200 CAPSULE ORAL DAILY
Qty: 180 CAP | Refills: 1 | Status: SHIPPED | OUTPATIENT
Start: 2018-12-10 | End: 2019-04-05 | Stop reason: SDUPTHER

## 2018-12-10 NOTE — TELEPHONE ENCOUNTER
Juaquin Rodriguez M.D. 6 days ago         I need a refill on zonisamide 100mg. My appt with dr rodriguez is in may and I'm out of refills. Thank you, juaquin Solorzano. [facility] is my local pharmacy in [place]

## 2019-04-05 ENCOUNTER — OFFICE VISIT (OUTPATIENT)
Dept: MEDICAL GROUP | Facility: CLINIC | Age: 54
End: 2019-04-05
Payer: MEDICARE

## 2019-04-05 ENCOUNTER — TELEPHONE (OUTPATIENT)
Dept: HEALTH INFORMATION MANAGEMENT | Facility: OTHER | Age: 54
End: 2019-04-05

## 2019-04-05 VITALS
SYSTOLIC BLOOD PRESSURE: 112 MMHG | TEMPERATURE: 98 F | DIASTOLIC BLOOD PRESSURE: 68 MMHG | BODY MASS INDEX: 35.04 KG/M2 | HEART RATE: 103 BPM | WEIGHT: 273 LBS | OXYGEN SATURATION: 93 % | RESPIRATION RATE: 14 BRPM | HEIGHT: 74 IN

## 2019-04-05 DIAGNOSIS — G89.29 CHRONIC PAIN OF LEFT KNEE: ICD-10-CM

## 2019-04-05 DIAGNOSIS — M25.562 CHRONIC PAIN OF LEFT KNEE: ICD-10-CM

## 2019-04-05 DIAGNOSIS — I50.20 ACC/AHA STAGE C SYSTOLIC HEART FAILURE (HCC): ICD-10-CM

## 2019-04-05 DIAGNOSIS — M25.551 RIGHT HIP PAIN: ICD-10-CM

## 2019-04-05 DIAGNOSIS — E11.8 TYPE 2 DIABETES MELLITUS WITH COMPLICATION, WITH LONG-TERM CURRENT USE OF INSULIN (HCC): ICD-10-CM

## 2019-04-05 DIAGNOSIS — Z76.89 ENCOUNTER TO ESTABLISH CARE WITH NEW DOCTOR: ICD-10-CM

## 2019-04-05 DIAGNOSIS — Z76.0 ENCOUNTER FOR MEDICATION REFILL: ICD-10-CM

## 2019-04-05 DIAGNOSIS — Z79.4 TYPE 2 DIABETES MELLITUS WITH COMPLICATION, WITH LONG-TERM CURRENT USE OF INSULIN (HCC): ICD-10-CM

## 2019-04-05 DIAGNOSIS — Z12.5 PROSTATE CANCER SCREENING: ICD-10-CM

## 2019-04-05 PROBLEM — M10.9 GOUT OF FOOT: Status: ACTIVE | Noted: 2019-04-05

## 2019-04-05 PROBLEM — R41.89 COGNITIVE CHANGE: Status: RESOLVED | Noted: 2018-03-20 | Resolved: 2019-04-05

## 2019-04-05 PROBLEM — R56.9 FOCAL SEIZURES (HCC): Status: ACTIVE | Noted: 2019-04-05

## 2019-04-05 PROCEDURE — 99204 OFFICE O/P NEW MOD 45 MIN: CPT | Performed by: FAMILY MEDICINE

## 2019-04-05 RX ORDER — ALLOPURINOL 300 MG/1
300 TABLET ORAL DAILY
Qty: 90 TAB | Refills: 3 | Status: SHIPPED | OUTPATIENT
Start: 2019-04-05 | End: 2020-04-27 | Stop reason: SDUPTHER

## 2019-04-05 RX ORDER — AMITRIPTYLINE HYDROCHLORIDE 100 MG/1
100 TABLET ORAL NIGHTLY PRN
Qty: 90 TAB | Refills: 3 | Status: SHIPPED | OUTPATIENT
Start: 2019-04-05 | End: 2019-04-05 | Stop reason: SDUPTHER

## 2019-04-05 RX ORDER — ATORVASTATIN CALCIUM 40 MG/1
40 TABLET, FILM COATED ORAL DAILY
Qty: 90 TAB | Refills: 3 | Status: SHIPPED | OUTPATIENT
Start: 2019-04-05 | End: 2020-04-27 | Stop reason: SDUPTHER

## 2019-04-05 RX ORDER — METOPROLOL SUCCINATE 100 MG/1
100 TABLET, EXTENDED RELEASE ORAL 2 TIMES DAILY
Qty: 180 TAB | Refills: 3 | Status: SHIPPED | OUTPATIENT
Start: 2019-04-05 | End: 2020-04-27 | Stop reason: SDUPTHER

## 2019-04-05 RX ORDER — DULOXETIN HYDROCHLORIDE 60 MG/1
60 CAPSULE, DELAYED RELEASE ORAL EVERY MORNING
Qty: 90 CAP | Refills: 3 | Status: SHIPPED | OUTPATIENT
Start: 2019-04-05 | End: 2020-04-27 | Stop reason: SDUPTHER

## 2019-04-05 RX ORDER — ZONISAMIDE 100 MG/1
200 CAPSULE ORAL DAILY
Qty: 180 CAP | Refills: 3 | Status: SHIPPED | OUTPATIENT
Start: 2019-04-05 | End: 2020-02-19 | Stop reason: SDUPTHER

## 2019-04-05 RX ORDER — AMITRIPTYLINE HYDROCHLORIDE 100 MG/1
100 TABLET ORAL NIGHTLY
COMMUNITY
End: 2019-04-05 | Stop reason: SDUPTHER

## 2019-04-05 RX ORDER — LANCETS 28 GAUGE
EACH MISCELLANEOUS
COMMUNITY
End: 2022-05-12

## 2019-04-05 RX ORDER — AMITRIPTYLINE HYDROCHLORIDE 100 MG/1
100 TABLET ORAL NIGHTLY PRN
Qty: 90 TAB | Refills: 3 | Status: SHIPPED | OUTPATIENT
Start: 2019-04-05 | End: 2020-04-27 | Stop reason: SDUPTHER

## 2019-04-05 ASSESSMENT — PATIENT HEALTH QUESTIONNAIRE - PHQ9: CLINICAL INTERPRETATION OF PHQ2 SCORE: 0

## 2019-04-06 PROBLEM — Z76.89 ENCOUNTER TO ESTABLISH CARE WITH NEW DOCTOR: Status: ACTIVE | Noted: 2019-04-06

## 2019-04-06 NOTE — ASSESSMENT & PLAN NOTE
BS running low 100's. Monitors regularly. No hypo spells. No rashes. On Jardiance, Lantus, Novolog and metformin.

## 2019-04-06 NOTE — ASSESSMENT & PLAN NOTE
Injured right groin when he fell a couple months ago falling back on backwards. Hurts when he rotates out his leg. Can bear weight without any discomfort.

## 2019-04-06 NOTE — ASSESSMENT & PLAN NOTE
Previously followed [facility] in [place], change in insurance, needs new PCP. Also followed by pulmonary, neurology and cardiology.

## 2019-04-06 NOTE — PROGRESS NOTES
Complaint: Establish care, med refills     Subjective:     Juaquin Solorzano is a 53 y.o. male here today to establish care.    Encounter to establish care with new doctor  Previously followed [facility] in [place], change in insurance, needs new PCP. Also followed by pulmonary, neurology and cardiology.     Encounter for medication refill  Needs all his meds refilled and sent to various pharmacies.    Right hip pain  Injured right groin when he fell a couple months ago falling back on backwards. Hurts when he rotates out his leg. Can bear weight without any discomfort.    Left knee pain  Twisted left knee when he felll a couple months ago. Hurts on inside of knee.    Type 2 diabetes mellitus with complication, with long-term current use of insulin (HCC)  BS running low 100's. Monitors regularly. No hypo spells. No rashes. On Jardiance, Lantus, Novolog and metformin.     No other concerns or complaints today.    Current medicines (including changes today)  Current Outpatient Prescriptions   Medication Sig Dispense Refill   • glucose blood (FREESTYLE LITE) strip 1 Each by Other route as needed.     • FREESTYLE LANCETS Misc by Does not apply route.     • Insulin Pen Needle (SURE COMFORT PEN NEEDLES) by Does not apply route.     • allopurinol (ZYLOPRIM) 300 MG Tab Take 1 Tab by mouth every day. 90 Tab 3   • atorvastatin (LIPITOR) 40 MG Tab Take 1 Tab by mouth every day. 90 Tab 3   • DULoxetine (CYMBALTA) 60 MG Cap DR Particles delayed-release capsule Take 1 Cap by mouth every morning. 90 Cap 3   • NOVOLOG, insulin aspart, (NOVOLOG FLEXPEN) 100 UNIT/ML Solution Pen-injector injection As directed by physician 3x/day before meals 5 PEN 3   • metformin (GLUCOPHAGE) 1000 MG tablet Take 1 Tab by mouth 2 times a day, with meals. 180 Tab 3   • metoprolol SR (TOPROL XL) 100 MG TABLET SR 24 HR Take 1 Tab by mouth 2 Times a Day. 180 Tab 3   • sacubitril-valsartan (ENTRESTO)  MG Tab tablet Take 1 Tab by mouth 2 Times a Day. 180  Tab 3   • Empagliflozin (JARDIANCE) 10 MG Tab Take 10 mg by mouth every day. 90 Tab 3   • zonisamide (ZONEGRAN) 100 MG Cap Take 2 Caps by mouth every day. 180 Cap 3   • amitriptyline (ELAVIL) 100 MG Tab Take 1 Tab by mouth at bedtime as needed. 90 Tab 3   • insulin glargine (LANTUS) 100 UNIT/ML Solution Inject 50 Units as instructed every evening.     • aspirin 81 MG tablet Take 81 mg by mouth every day.     • Omega-3 Fatty Acids (FISH OIL) 1000 MG Cap capsule Take 1,000 mg by mouth 3 times a day, with meals.       No current facility-administered medications for this visit.      He  has a past medical history of Arthritis; Block, bundle branch, left; Breath shortness; Bronchitis (2014); CAD (coronary artery disease); Cataract (04/26/2018); Diabetes ([facility]) (2006); Fibromyalgia (04/26/2018); Gout; Heart burn; High cholesterol; HTN (hypertension); Hyperlipidemia; Ischemic cardiomyopathy (02/2018); Myocardial infarct ([facility]) (2007); Myocardial infarct ([facility]) (2012); Neuropathy ([facility]); Psychiatric problem; and Sleep apnea.    Health Maintenance: needs labs      Allergies: Contrast media with iodine [iodine]    Current Outpatient Prescriptions Ordered in [facility]   Medication Sig Dispense Refill   • glucose blood (FREESTYLE LITE) strip 1 Each by Other route as needed.     • FREESTYLE LANCETS Misc by Does not apply route.     • Insulin Pen Needle (SURE COMFORT PEN NEEDLES) by Does not apply route.     • allopurinol (ZYLOPRIM) 300 MG Tab Take 1 Tab by mouth every day. 90 Tab 3   • atorvastatin (LIPITOR) 40 MG Tab Take 1 Tab by mouth every day. 90 Tab 3   • DULoxetine (CYMBALTA) 60 MG Cap DR Particles delayed-release capsule Take 1 Cap by mouth every morning. 90 Cap 3   • NOVOLOG, insulin aspart, (NOVOLOG FLEXPEN) 100 UNIT/ML Solution Pen-injector injection As directed by physician 3x/day before meals 5 PEN 3   • metformin (GLUCOPHAGE) 1000 MG tablet Take 1 Tab by mouth 2 times a day, with meals. 180 Tab 3   • metoprolol SR (TOPROL  XL) 100 MG TABLET SR 24 HR Take 1 Tab by mouth 2 Times a Day. 180 Tab 3   • sacubitril-valsartan (ENTRESTO)  MG Tab tablet Take 1 Tab by mouth 2 Times a Day. 180 Tab 3   • Empagliflozin (JARDIANCE) 10 MG Tab Take 10 mg by mouth every day. 90 Tab 3   • zonisamide (ZONEGRAN) 100 MG Cap Take 2 Caps by mouth every day. 180 Cap 3   • amitriptyline (ELAVIL) 100 MG Tab Take 1 Tab by mouth at bedtime as needed. 90 Tab 3   • insulin glargine (LANTUS) 100 UNIT/ML Solution Inject 50 Units as instructed every evening.     • aspirin 81 MG tablet Take 81 mg by mouth every day.     • Omega-3 Fatty Acids (FISH OIL) 1000 MG Cap capsule Take 1,000 mg by mouth 3 times a day, with meals.       No current Epic-ordered facility-administered medications on file.        Past Medical History:   Diagnosis Date   • Arthritis     osteo, hands/elbows   • Block, bundle branch, left    • Breath shortness    • Bronchitis 2014   • CAD (coronary artery disease)    • Cataract 04/26/2018    early stages, not removed   • Diabetes (HCC) 2006    insulin and oral meds   • Fibromyalgia 04/26/2018 5-6/10   • Gout    • Heart burn    • High cholesterol    • HTN (hypertension)    • Hyperlipidemia    • Ischemic cardiomyopathy 02/2018    Echocardiogram with normal LV size, mild concentric LVH, LVEF 35%. Mild MR.   • Myocardial infarct (HCC) 2007   • Myocardial infarct (HCC) 2012   • Neuropathy (HCC)     Bilateral feet   • Psychiatric problem     Depression and anxiety   • Sleep apnea     Uses CPAP       Past Surgical History:   Procedure Laterality Date   • AICD IMPLANT Left 04/27/2018    [employer] Inogen X4 CRT-D G148 implanted by Dr. Cullen.   • RECOVERY  7/22/2016    Procedure: CATH LAB-[facility] W/POSSIBLE RADIAL APPROACH-CARIE;  Surgeon: [facility]ch Surgery;  Location: SURGERY PRE-POST PROC UNIT [facility] [place];  Service:    • MULTIPLE CORONARY ARTERY BYPASS  2014    CABG x 3   • LEONILA BY LAPAROSCOPY  2010   • KNEE ARTHROSCOPY      Arthroscopy, Knee  "      Social History   Substance Use Topics   • Smoking status: Former Smoker     Packs/day: 0.50     Years: 12.00     Types: Cigarettes     Quit date: 7/15/2012   • Smokeless tobacco: Never Used   • Alcohol use Yes      Comment: 1/month       Social History     Social History Narrative   • No narrative on file       Family History   Problem Relation Age of Onset   • Heart Disease Mother    • Diabetes Mother    • Hyperlipidemia Father    • Heart Disease Father    • Hypertension Father    • Stroke Father    • Lung Disease Sister          ROS Positive for pain in right groin, inside left knee.  Patient denies any fever, chills, unintentional weight gain/loss, fatigue, stroke symptoms, dizziness, headache, nasal congestion, sore-throat, cough, heartburn, chest pain, difficulty breathing, abdominal discomfort, diarrhea/constipation, burning with urination or frequency, back pain, skin rashes, depression or anxiety.       Objective:     /68 (BP Location: Left arm, Patient Position: Sitting, BP Cuff Size: Large adult)   Pulse (!) 103   Temp 36.7 °C (98 °F)   Resp 14   Ht 1.88 m (6' 2\")   Wt 123.8 kg (273 lb)   SpO2 93%  Body mass index is 35.05 kg/m².   Physical Exam:  Constitutional: Alert, no distress.  Skin: Warm, dry, good turgor, no rashes in visible areas.  Eye: Equal, round and reactive, conjunctiva clear, lids normal.  ENMT: Lips without lesions, good dentition, oropharynx clear.  Neck: Trachea midline, no masses, no thyromegaly. No cervical or supraclavicular lymphadenopathy  Respiratory: Unlabored respiratory effort, lungs clear to auscultation, no wheezes, no ronchi.  Cardiovascular: Normal S1, S2, no murmur, no extremity edema.  Abdomen: Soft, non-tender, no masses, no hepatosplenomegaly.  Right hip: FROM, marked discomfort with external rotation.  Left knee: no swelling, deformity, no laxity, tender along MCL.  Psych: Alert and oriented x3, appropriate affect and mood.        Assessment and Plan: "   The following treatment plan was discussed:    1. Type 2 diabetes mellitus with complication, with long-term current use of insulin (HCC)  Chronic problem, controlled on current treatment regimen. A1c at endo visit. Lbas prior to endo visit.  - REFERRAL TO ENDOCRINOLOGY  - atorvastatin (LIPITOR) 40 MG Tab; Take 1 Tab by mouth every day.  Dispense: 90 Tab; Refill: 3  - CBC WITH DIFFERENTIAL; Future  - Comp Metabolic Panel; Future  - Lipid Profile; Future  - MICROALBUMIN CREAT RATIO URINE; Future    2. Encounter for medication refill  All to be refilled.  - allopurinol (ZYLOPRIM) 300 MG Tab; Take 1 Tab by mouth every day.  Dispense: 90 Tab; Refill: 3  - DULoxetine (CYMBALTA) 60 MG Cap DR Particles delayed-release capsule; Take 1 Cap by mouth every morning.  Dispense: 90 Cap; Refill: 3  - NOVOLOG, insulin aspart, (NOVOLOG FLEXPEN) 100 UNIT/ML Solution Pen-injector injection; As directed by physician 3x/day before meals  Dispense: 5 PEN; Refill: 3  - metformin (GLUCOPHAGE) 1000 MG tablet; Take 1 Tab by mouth 2 times a day, with meals.  Dispense: 180 Tab; Refill: 3  - sacubitril-valsartan (ENTRESTO)  MG Tab tablet; Take 1 Tab by mouth 2 Times a Day.  Dispense: 180 Tab; Refill: 3  - Empagliflozin (JARDIANCE) 10 MG Tab; Take 10 mg by mouth every day.  Dispense: 90 Tab; Refill: 3  - zonisamide (ZONEGRAN) 100 MG Cap; Take 2 Caps by mouth every day.  Dispense: 180 Cap; Refill: 3  - amitriptyline (ELAVIL) 100 MG Tab; Take 1 Tab by mouth at bedtime as needed.  Dispense: 90 Tab; Refill: 3    3. ACC/AHA stage C systolic heart failure (HCC)  Clinically stable.  - atorvastatin (LIPITOR) 40 MG Tab; Take 1 Tab by mouth every day.  Dispense: 90 Tab; Refill: 3  - metoprolol SR (TOPROL XL) 100 MG TABLET SR 24 HR; Take 1 Tab by mouth 2 Times a Day.  Dispense: 180 Tab; Refill: 3    4. Right hip pain  - REFERRAL TO ORTHOPEDICS    5. Chronic pain of left knee  - REFERRAL TO ORTHOPEDICS    6. Prostate cancer screening  Will notify  with result  - PROSTATE SPECIFIC AG SCREENING; Future      Followup: Return in about 3 months (around 7/5/2019).    Please note that this dictation was created using voice recognition software. I have made every reasonable attempt to correct obvious errors, but I expect that there are errors of grammar and possibly content that I did not discover before finalizing the note.

## 2019-04-08 ENCOUNTER — TELEPHONE (OUTPATIENT)
Dept: MEDICAL GROUP | Facility: PHYSICIAN GROUP | Age: 54
End: 2019-04-08

## 2019-04-08 NOTE — TELEPHONE ENCOUNTER
The pharmacy called and wanted clarification on the amount injected with the Novolog. I did not see anything in your notes regarding the dosage. Please clarify.

## 2019-06-18 ENCOUNTER — HOSPITAL ENCOUNTER (OUTPATIENT)
Facility: MEDICAL CENTER | Age: 54
End: 2019-06-18
Attending: FAMILY MEDICINE
Payer: MEDICARE

## 2019-06-18 ENCOUNTER — NON-PROVIDER VISIT (OUTPATIENT)
Dept: MEDICAL GROUP | Facility: CLINIC | Age: 54
End: 2019-06-18
Payer: MEDICARE

## 2019-06-18 DIAGNOSIS — Z79.4 TYPE 2 DIABETES MELLITUS WITH COMPLICATION, WITH LONG-TERM CURRENT USE OF INSULIN (HCC): ICD-10-CM

## 2019-06-18 DIAGNOSIS — E11.8 TYPE 2 DIABETES MELLITUS WITH COMPLICATION, WITH LONG-TERM CURRENT USE OF INSULIN (HCC): ICD-10-CM

## 2019-06-18 DIAGNOSIS — Z12.5 PROSTATE CANCER SCREENING: ICD-10-CM

## 2019-06-18 PROCEDURE — 84153 ASSAY OF PSA TOTAL: CPT

## 2019-06-18 PROCEDURE — 36415 COLL VENOUS BLD VENIPUNCTURE: CPT | Performed by: PHYSICIAN ASSISTANT

## 2019-06-18 PROCEDURE — 85025 COMPLETE CBC W/AUTO DIFF WBC: CPT

## 2019-06-18 PROCEDURE — 80053 COMPREHEN METABOLIC PANEL: CPT

## 2019-06-18 PROCEDURE — 82570 ASSAY OF URINE CREATININE: CPT

## 2019-06-18 PROCEDURE — 99000 SPECIMEN HANDLING OFFICE-LAB: CPT | Performed by: PHYSICIAN ASSISTANT

## 2019-06-18 PROCEDURE — 82043 UR ALBUMIN QUANTITATIVE: CPT

## 2019-06-18 PROCEDURE — 80061 LIPID PANEL: CPT

## 2019-06-19 ENCOUNTER — OFFICE VISIT (OUTPATIENT)
Dept: MEDICAL GROUP | Facility: CLINIC | Age: 54
End: 2019-06-19
Payer: MEDICARE

## 2019-06-19 VITALS
DIASTOLIC BLOOD PRESSURE: 66 MMHG | OXYGEN SATURATION: 97 % | HEART RATE: 87 BPM | SYSTOLIC BLOOD PRESSURE: 110 MMHG | RESPIRATION RATE: 14 BRPM | TEMPERATURE: 98.1 F | HEIGHT: 74 IN | BODY MASS INDEX: 35.42 KG/M2 | WEIGHT: 276 LBS

## 2019-06-19 DIAGNOSIS — Z79.4 TYPE 2 DIABETES MELLITUS WITH COMPLICATION, WITH LONG-TERM CURRENT USE OF INSULIN (HCC): ICD-10-CM

## 2019-06-19 DIAGNOSIS — E11.8 TYPE 2 DIABETES MELLITUS WITH COMPLICATION, WITH LONG-TERM CURRENT USE OF INSULIN (HCC): ICD-10-CM

## 2019-06-19 DIAGNOSIS — Z79.4 ENCOUNTER FOR LONG-TERM (CURRENT) USE OF INSULIN (HCC): ICD-10-CM

## 2019-06-19 DIAGNOSIS — I10 ESSENTIAL HYPERTENSION: ICD-10-CM

## 2019-06-19 LAB
ALBUMIN SERPL BCP-MCNC: 3.9 G/DL (ref 3.2–4.9)
ALBUMIN/GLOB SERPL: 1.7 G/DL
ALP SERPL-CCNC: 62 U/L (ref 30–99)
ALT SERPL-CCNC: 20 U/L (ref 2–50)
ANION GAP SERPL CALC-SCNC: 10 MMOL/L (ref 0–11.9)
AST SERPL-CCNC: 14 U/L (ref 12–45)
BASOPHILS # BLD AUTO: 1.1 % (ref 0–1.8)
BASOPHILS # BLD: 0.1 K/UL (ref 0–0.12)
BILIRUB SERPL-MCNC: 0.5 MG/DL (ref 0.1–1.5)
BUN SERPL-MCNC: 19 MG/DL (ref 8–22)
CALCIUM SERPL-MCNC: 9.5 MG/DL (ref 8.5–10.5)
CHLORIDE SERPL-SCNC: 107 MMOL/L (ref 96–112)
CHOLEST SERPL-MCNC: 110 MG/DL (ref 100–199)
CO2 SERPL-SCNC: 24 MMOL/L (ref 20–33)
CREAT SERPL-MCNC: 1 MG/DL (ref 0.5–1.4)
CREAT UR-MCNC: 128.3 MG/DL
EOSINOPHIL # BLD AUTO: 0.2 K/UL (ref 0–0.51)
EOSINOPHIL NFR BLD: 2.1 % (ref 0–6.9)
ERYTHROCYTE [DISTWIDTH] IN BLOOD BY AUTOMATED COUNT: 49.4 FL (ref 35.9–50)
GLOBULIN SER CALC-MCNC: 2.3 G/DL (ref 1.9–3.5)
GLUCOSE SERPL-MCNC: 125 MG/DL (ref 65–99)
HBA1C MFR BLD: 6.4 % (ref 0–5.6)
HCT VFR BLD AUTO: 46.9 % (ref 42–52)
HDLC SERPL-MCNC: 34 MG/DL
HGB BLD-MCNC: 15 G/DL (ref 14–18)
IMM GRANULOCYTES # BLD AUTO: 0.18 K/UL (ref 0–0.11)
IMM GRANULOCYTES NFR BLD AUTO: 1.9 % (ref 0–0.9)
INT CON NEG: NEGATIVE
INT CON POS: POSITIVE
LDLC SERPL CALC-MCNC: 50 MG/DL
LYMPHOCYTES # BLD AUTO: 3.37 K/UL (ref 1–4.8)
LYMPHOCYTES NFR BLD: 35.8 % (ref 22–41)
MCH RBC QN AUTO: 31.5 PG (ref 27–33)
MCHC RBC AUTO-ENTMCNC: 32 G/DL (ref 33.7–35.3)
MCV RBC AUTO: 98.5 FL (ref 81.4–97.8)
MICROALBUMIN UR-MCNC: 2.7 MG/DL
MICROALBUMIN/CREAT UR: 21 MG/G (ref 0–30)
MONOCYTES # BLD AUTO: 0.77 K/UL (ref 0–0.85)
MONOCYTES NFR BLD AUTO: 8.2 % (ref 0–13.4)
NEUTROPHILS # BLD AUTO: 4.8 K/UL (ref 1.82–7.42)
NEUTROPHILS NFR BLD: 50.9 % (ref 44–72)
NRBC # BLD AUTO: 0.02 K/UL
NRBC BLD-RTO: 0.2 /100 WBC
PLATELET # BLD AUTO: 202 K/UL (ref 164–446)
PMV BLD AUTO: 10.6 FL (ref 9–12.9)
POTASSIUM SERPL-SCNC: 4.6 MMOL/L (ref 3.6–5.5)
PROT SERPL-MCNC: 6.2 G/DL (ref 6–8.2)
PSA SERPL-MCNC: 0.19 NG/ML (ref 0–4)
RBC # BLD AUTO: 4.76 M/UL (ref 4.7–6.1)
SODIUM SERPL-SCNC: 141 MMOL/L (ref 135–145)
TRIGL SERPL-MCNC: 131 MG/DL (ref 0–149)
WBC # BLD AUTO: 9.4 K/UL (ref 4.8–10.8)

## 2019-06-19 PROCEDURE — 83036 HEMOGLOBIN GLYCOSYLATED A1C: CPT | Performed by: PHYSICIAN ASSISTANT

## 2019-06-19 PROCEDURE — 99214 OFFICE O/P EST MOD 30 MIN: CPT | Performed by: PHYSICIAN ASSISTANT

## 2019-06-19 NOTE — PROGRESS NOTES
New Patient Consult Note  Referred by: Juaquin Martin M.D.    Reason for consult: Diabetes Management Type 2    HPI:  Juaquin Solorzano is a 53 y.o. old patient who is seeing us today for diabetes care.  This is a pleasant patient with diabetes and I appreciate the opportunity to participate in the care of this patient.  This is a new patient with me today.    Labs of 6/19/2019 HbA1c is 6.4  , GFR >60, microalbumin negative,     BG Diary:6/19/2019  In the AM:  No log    Has been Diabetic since T2 10+ years  Has a Glucagon pen at home: no    1. Type 2 diabetes mellitus with complication, with long-term current use of insulin (HCC)  This is a new patient with me on 6/19/2019  They are on:  1.  Novolog sliding scale before each meal (3 times a day)    6 units  101-120 8 units  2.  Lantus 35 at night   3.  Metfomrin  4.  Jardiance 10    2. Essential hypertension  This is stable today and no new changes are needed or required in today's visit      3. Encounter for long-term (current) use of insulin (HCC)  Is on a high risk medication Insulin and we will continue to follow       ROS:   Constitutional: No change in weight , No fatigue, No night sweats.  HEENT: No Headache.  Eyes:  No blurred vision, No visual changes.  Cardiac: No chest pain, No palpitations.  Resp: No shortness of breath, No cough,   Gastro: No nausea or vomiting, No diarrhea.  Neuro: Denies numbness or tinging in bilateral feet or hands, and no loss of sensation.  Endo: No heat or cold intolerance.  : No polyuria, No polydipsia, No chronic UTI's.  Lower extremities: No lower leg edema bilateral.  All other systems were reviewed and were negative.    Past Medical History:  Patient Active Problem List    Diagnosis Date Noted   • Ischemic cardiomyopathy 09/13/2018     Priority: High   • Presence of biventricular AICD 05/07/2018     Priority: High   • CAD (coronary artery disease) 05/07/2018     Priority: High   • ACC/AHA stage C systolic  heart failure ([facility]) 05/18/2017     Priority: High   • S/P CABG x 3 05/18/2017     Priority: High   • Heart failure, NYHA class 3 ([facility]) 05/18/2017     Priority: High   • Essential hypertension 05/18/2017     Priority: High   • Type 2 diabetes mellitus with complication, with long-term current use of insulin ([facility]) 05/18/2017     Priority: Medium   • Encounter for long-term (current) use of insulin ([facility]) 06/19/2019   • Encounter to establish care with new doctor 04/06/2019   • Gout of foot 04/05/2019   • Encounter for medication refill 04/05/2019   • Focal seizures ([facility]) 04/05/2019   • Right hip pain 04/05/2019   • Left knee pain 04/05/2019   • Spells of decreased attentiveness 03/20/2018   • Diabetic neuropathy associated with diabetes mellitus due to underlying condition ([facility]) 03/20/2018   • Vitamin deficiency 03/20/2018       Past Surgical History:  Past Surgical History:   Procedure Laterality Date   • AICD IMPLANT Left 04/27/2018    [employer] Inogen X4 CRT-D G148 implanted by Dr. Cullen.   • RECOVERY  7/22/2016    Procedure: CATH LAB-[facility] W/POSSIBLE RADIAL APPROACH-CARIE;  Surgeon: [facility] Surgery;  Location: SURGERY PRE-POST PROC UNIT [place];  Service:    • MULTIPLE CORONARY ARTERY BYPASS  2014    CABG x 3   • LEONILA BY LAPAROSCOPY  2010   • KNEE ARTHROSCOPY      Arthroscopy, Knee       Allergies:  Contrast media with iodine [iodine]    Social History:  Social History     Social History   • Marital status:      Spouse name: N/A   • Number of children: N/A   • Years of education: N/A     Occupational History   • Not on file.     Social History Main Topics   • Smoking status: Former Smoker     Packs/day: 0.50     Years: 12.00     Types: Cigarettes     Quit date: 7/15/2012   • Smokeless tobacco: Never Used   • Alcohol use Yes      Comment: 1/month   • Drug use: Yes     Types: Marijuana      Comment: edible marijuana   • Sexual activity: Not on file     Other Topics Concern   • Not on file      Social History Narrative   • No narrative on file       Family History:  Family History   Problem Relation Age of Onset   • Heart Disease Mother    • Diabetes Mother    • Hyperlipidemia Father    • Heart Disease Father    • Hypertension Father    • Stroke Father    • Lung Disease Sister        Medications:    Current Outpatient Prescriptions:   •  Empagliflozin-metFORMIN HCl ER 12.5-1000 MG TABLET SR 24 HR, Take 1 Tab by mouth 2 times a day., Disp: 180 Tab, Rfl: 3  •  Semaglutide (OZEMPIC) 1 MG/DOSE Solution Pen-injector, Inject 1 mg as instructed every 7 days., Disp: 6 PEN, Rfl: 4  •  glucose blood (FREESTYLE LITE) strip, 1 Strip by Other route 4 times a day., Disp: 300 Strip, Rfl: 4  •  FREESTYLE LANCETS Misc, by Does not apply route., Disp: , Rfl:   •  Insulin Pen Needle (SURE COMFORT PEN NEEDLES), by Does not apply route., Disp: , Rfl:   •  allopurinol (ZYLOPRIM) 300 MG Tab, Take 1 Tab by mouth every day., Disp: 90 Tab, Rfl: 3  •  atorvastatin (LIPITOR) 40 MG Tab, Take 1 Tab by mouth every day., Disp: 90 Tab, Rfl: 3  •  DULoxetine (CYMBALTA) 60 MG Cap DR Particles delayed-release capsule, Take 1 Cap by mouth every morning., Disp: 90 Cap, Rfl: 3  •  NOVOLOG, insulin aspart, (NOVOLOG FLEXPEN) 100 UNIT/ML Solution Pen-injector injection, As directed by physician 3x/day before meals, Disp: 5 PEN, Rfl: 3  •  metoprolol SR (TOPROL XL) 100 MG TABLET SR 24 HR, Take 1 Tab by mouth 2 Times a Day., Disp: 180 Tab, Rfl: 3  •  sacubitril-valsartan (ENTRESTO)  MG Tab tablet, Take 1 Tab by mouth 2 Times a Day., Disp: 180 Tab, Rfl: 3  •  zonisamide (ZONEGRAN) 100 MG Cap, Take 2 Caps by mouth every day., Disp: 180 Cap, Rfl: 3  •  amitriptyline (ELAVIL) 100 MG Tab, Take 1 Tab by mouth at bedtime as needed., Disp: 90 Tab, Rfl: 3  •  insulin glargine (LANTUS) 100 UNIT/ML Solution, Inject 50 Units as instructed every evening., Disp: , Rfl:   •  aspirin 81 MG tablet, Take 81 mg by mouth every day., Disp: , Rfl:   •  Omega-3  "Fatty Acids (FISH OIL) 1000 MG Cap capsule, Take 1,000 mg by mouth 3 times a day, with meals., Disp: , Rfl:       Physical Examination:   Vital signs: /66 (BP Location: Left arm, Patient Position: Sitting, BP Cuff Size: Adult)   Pulse 87   Temp 36.7 °C (98.1 °F) (Temporal)   Resp 14   Ht 1.88 m (6' 2\")   Wt (!) 125.2 kg (276 lb)   SpO2 97%   BMI 35.44 kg/m²   General: No distress, cooperative, well dressed and well nourished.   Eyes: No scleral icterus or discharge, No hyposphagma  ENMT: Normal on external inspection of nose, lips, No nasal drainage   Neck: No abnormal masses on inspection  Resp: Normal effort, Bilateral clear to auscultation, No wheezing, No rales  CVS: Regular rate and rhythm, S1 S2 normal, No murmur. No gallop  Extremities: No edema bilateral extremities  Neuro: Alert and oriented  Skin: No rash, No Ulcers  Psych: Normal mood and affect    Assessment and Plan:    1. Type 2 diabetes mellitus with complication, with long-term current use of insulin (HCC)    They are on:  1.  Novolog sliding scale before each meal (3 times a day)    6 units  101-120 8 units  2.  Lantus 35 at night   3.  Metformin (Stop)  4.  Jardiance 10 (Stop)    Start:  2.  Lantus 35 at night   5.  Synjardy 12.5/1000 one in the Am one in the PM  6.  Ozempic 0.25 once a week for 3 weeks then INCREASE to 0.5 for three weeks then increase to 1.0. Ozempic can cause nausea and upset stomach feelings but this generally only lasts a day or two.  If this persists longer than 2 weeks and is not tolerable please discontinue the medication and let us know of the issue.  7.  Novolog 6 units with each meal    2. Essential hypertension  This is stable today and no new changes are needed or required in today's visit      3. Encounter for long-term (current) use of insulin (HCC)  Is on a high risk medication Insulin and we will continue to follow       Return in about 1 month (around 7/19/2019).       This patient during there " office visit was started on new medication Ozempic.  Side effects of new medications were discussed with the patient today in the office. The patient was supplied paperwork on this new medication.    Thank you kindly for allowing me to participate in the diabetes care plan for this patient.    Junior Shepherd PA-C, BC-[facility]  Board Certified - Advanced Diabetes Management  06/19/19    CC:   Juaquin Martin M.D.

## 2019-06-19 NOTE — PATIENT INSTRUCTIONS
Start:  2.  Lantus 35 at night   5.  Synjardy 12.5/1000 one in the Am one in the PM  6.  Ozempic 0.25 once a week for 3 weeks then INCREASE to 0.5 for three weeks then increase to 1.0. Ozempic can cause nausea and upset stomach feelings but this generally only lasts a day or two.  If this persists longer than 2 weeks and is not tolerable please discontinue the medication and let us know of the issue.  7.  Novolog 6 units with each meal

## 2019-06-26 ENCOUNTER — TELEPHONE (OUTPATIENT)
Dept: ENDOCRINOLOGY | Facility: MEDICAL CENTER | Age: 54
End: 2019-06-26

## 2019-06-27 NOTE — TELEPHONE ENCOUNTER
DOCUMENTATION OF PAR STATUS:    1. Name of Medication & Dose: Ozempic 1mg     2. Name of Prescription Coverage Company & phone #: [employer] ph: [phone]    3. Date Prior Auth Submitted: 6/27/19    4. What information was given to obtain insurance decision? E11.8     5. Prior Auth Status? Pending *PA on Junior's desk for a signature, then fax*    6. Action Taken: Pharmacy/Patient Notified: N\A

## 2019-07-10 ENCOUNTER — OFFICE VISIT (OUTPATIENT)
Dept: MEDICAL GROUP | Facility: CLINIC | Age: 54
End: 2019-07-10
Payer: MEDICARE

## 2019-07-10 VITALS
HEART RATE: 80 BPM | WEIGHT: 274 LBS | HEIGHT: 73 IN | DIASTOLIC BLOOD PRESSURE: 66 MMHG | SYSTOLIC BLOOD PRESSURE: 116 MMHG | TEMPERATURE: 98 F | BODY MASS INDEX: 36.31 KG/M2 | RESPIRATION RATE: 14 BRPM | OXYGEN SATURATION: 97 %

## 2019-07-10 DIAGNOSIS — Z79.4 TYPE 2 DIABETES MELLITUS WITH COMPLICATION, WITH LONG-TERM CURRENT USE OF INSULIN (HCC): ICD-10-CM

## 2019-07-10 DIAGNOSIS — Z76.0 ENCOUNTER FOR MEDICATION REFILL: ICD-10-CM

## 2019-07-10 DIAGNOSIS — I50.20 ACC/AHA STAGE C SYSTOLIC HEART FAILURE (HCC): ICD-10-CM

## 2019-07-10 DIAGNOSIS — E66.01 CLASS 2 SEVERE OBESITY DUE TO EXCESS CALORIES WITH SERIOUS COMORBIDITY AND BODY MASS INDEX (BMI) OF 36.0 TO 36.9 IN ADULT (HCC): ICD-10-CM

## 2019-07-10 DIAGNOSIS — E11.8 TYPE 2 DIABETES MELLITUS WITH COMPLICATION, WITH LONG-TERM CURRENT USE OF INSULIN (HCC): ICD-10-CM

## 2019-07-10 DIAGNOSIS — I10 ESSENTIAL HYPERTENSION: ICD-10-CM

## 2019-07-10 PROBLEM — E66.812 CLASS 2 OBESITY DUE TO EXCESS CALORIES IN ADULT: Status: ACTIVE | Noted: 2019-07-10

## 2019-07-10 PROBLEM — M25.562 LEFT KNEE PAIN: Status: RESOLVED | Noted: 2019-04-05 | Resolved: 2019-07-10

## 2019-07-10 PROBLEM — E66.09 CLASS 2 OBESITY DUE TO EXCESS CALORIES IN ADULT: Status: ACTIVE | Noted: 2019-07-10

## 2019-07-10 PROBLEM — Z76.89 ENCOUNTER TO ESTABLISH CARE WITH NEW DOCTOR: Status: RESOLVED | Noted: 2019-04-06 | Resolved: 2019-07-10

## 2019-07-10 PROBLEM — I50.9 HEART FAILURE, NYHA CLASS 3 (HCC): Status: RESOLVED | Noted: 2017-05-18 | Resolved: 2019-07-10

## 2019-07-10 PROCEDURE — 99214 OFFICE O/P EST MOD 30 MIN: CPT | Performed by: FAMILY MEDICINE

## 2019-07-10 RX ORDER — SPIRONOLACTONE 25 MG/1
25 TABLET ORAL DAILY
Qty: 90 TAB | Refills: 3 | Status: SHIPPED | OUTPATIENT
Start: 2019-07-10 | End: 2019-11-06

## 2019-07-10 NOTE — PROGRESS NOTES
Complaint: Med refills     Subjective:     Juaquin Solorzano is a 54 y.o. male here today for f/u.    Right hip pain  Saw Natasha Sorensen at [facility], diagnosed with bursitis, received steroid injection, no more pain in hip region or in knee.    Essential hypertension  Controlled on Toprol , Entresto. Needs refill of the latter.    Type 2 diabetes mellitus with complication, with long-term current use of insulin ([facility])  Saw Junior, made some changes to his treatment regimen. BS under better control. Has f/u appointment next week.    Encounter for medication refill  Needs refill Aldactone, Entresto.     Has not seen Dr. Rob in past year. Was being seen by [facility], but would like to switch back to our cardiologists.    Current medicines (including changes today)  Current Outpatient Prescriptions   Medication Sig Dispense Refill   • sacubitril-valsartan (ENTRESTO)  MG Tab tablet Take 1 Tab by mouth 2 Times a Day. 180 Tab 3   • spironolactone (ALDACTONE) 25 MG Tab Take 1 Tab by mouth every day. 90 Tab 3   • Empagliflozin-metFORMIN HCl ER 12.5-1000 MG TABLET SR 24 HR Take 1 Tab by mouth 2 times a day. 180 Tab 3   • Semaglutide (OZEMPIC) 1 MG/DOSE Solution Pen-injector Inject 1 mg as instructed every 7 days. 6 PEN 4   • glucose blood (FREESTYLE LITE) strip 1 Strip by Other route 4 times a day. 300 Strip 4   • FREESTYLE LANCETS Misc by Does not apply route.     • Insulin Pen Needle (SURE COMFORT PEN NEEDLES) by Does not apply route.     • allopurinol (ZYLOPRIM) 300 MG Tab Take 1 Tab by mouth every day. 90 Tab 3   • atorvastatin (LIPITOR) 40 MG Tab Take 1 Tab by mouth every day. 90 Tab 3   • DULoxetine (CYMBALTA) 60 MG Cap DR Particles delayed-release capsule Take 1 Cap by mouth every morning. 90 Cap 3   • NOVOLOG, insulin aspart, (NOVOLOG FLEXPEN) 100 UNIT/ML Solution Pen-injector injection As directed by physician 3x/day before meals 5 PEN 3   • metoprolol SR (TOPROL XL) 100 MG TABLET  SR 24 HR Take 1 Tab by mouth 2 Times a Day. 180 Tab 3   • zonisamide (ZONEGRAN) 100 MG Cap Take 2 Caps by mouth every day. 180 Cap 3   • amitriptyline (ELAVIL) 100 MG Tab Take 1 Tab by mouth at bedtime as needed. 90 Tab 3   • insulin glargine (LANTUS) 100 UNIT/ML Solution Inject 50 Units as instructed every evening.     • aspirin 81 MG tablet Take 81 mg by mouth every day.     • Omega-3 Fatty Acids (FISH OIL) 1000 MG Cap capsule Take 1,000 mg by mouth 3 times a day, with meals.       No current facility-administered medications for this visit.      He  has a past medical history of Arthritis; Block, bundle branch, left; Breath shortness; Bronchitis (2014); CAD (coronary artery disease); Cataract (04/26/2018); Diabetes ([facility]) (2006); Fibromyalgia (04/26/2018); Gout; Heart burn; High cholesterol; HTN (hypertension); Hyperlipidemia; Ischemic cardiomyopathy (02/2018); Myocardial infarct ([facility]) (2007); Myocardial infarct ([facility]) (2012); Neuropathy ([facility]); Psychiatric problem; and Sleep apnea.    Health Maintenance:       Allergies: Contrast media with iodine [iodine]    Current Outpatient Prescriptions Ordered in [facility]   Medication Sig Dispense Refill   • sacubitril-valsartan (ENTRESTO)  MG Tab tablet Take 1 Tab by mouth 2 Times a Day. 180 Tab 3   • spironolactone (ALDACTONE) 25 MG Tab Take 1 Tab by mouth every day. 90 Tab 3   • Empagliflozin-metFORMIN HCl ER 12.5-1000 MG TABLET SR 24 HR Take 1 Tab by mouth 2 times a day. 180 Tab 3   • Semaglutide (OZEMPIC) 1 MG/DOSE Solution Pen-injector Inject 1 mg as instructed every 7 days. 6 PEN 4   • glucose blood (FREESTYLE LITE) strip 1 Strip by Other route 4 times a day. 300 Strip 4   • FREESTYLE LANCETS Misc by Does not apply route.     • Insulin Pen Needle (SURE COMFORT PEN NEEDLES) by Does not apply route.     • allopurinol (ZYLOPRIM) 300 MG Tab Take 1 Tab by mouth every day. 90 Tab 3   • atorvastatin (LIPITOR) 40 MG Tab Take 1 Tab by mouth every day. 90 Tab 3   • DULoxetine  (CYMBALTA) 60 MG Cap DR Particles delayed-release capsule Take 1 Cap by mouth every morning. 90 Cap 3   • NOVOLOG, insulin aspart, (NOVOLOG FLEXPEN) 100 UNIT/ML Solution Pen-injector injection As directed by physician 3x/day before meals 5 PEN 3   • metoprolol SR (TOPROL XL) 100 MG TABLET SR 24 HR Take 1 Tab by mouth 2 Times a Day. 180 Tab 3   • zonisamide (ZONEGRAN) 100 MG Cap Take 2 Caps by mouth every day. 180 Cap 3   • amitriptyline (ELAVIL) 100 MG Tab Take 1 Tab by mouth at bedtime as needed. 90 Tab 3   • insulin glargine (LANTUS) 100 UNIT/ML Solution Inject 50 Units as instructed every evening.     • aspirin 81 MG tablet Take 81 mg by mouth every day.     • Omega-3 Fatty Acids (FISH OIL) 1000 MG Cap capsule Take 1,000 mg by mouth 3 times a day, with meals.       No current Epic-ordered facility-administered medications on file.        Past Medical History:   Diagnosis Date   • Arthritis     osteo, hands/elbows   • Block, bundle branch, left    • Breath shortness    • Bronchitis 2014   • CAD (coronary artery disease)    • Cataract 04/26/2018    early stages, not removed   • Diabetes (HCC) 2006    insulin and oral meds   • Fibromyalgia 04/26/2018 5-6/10   • Gout    • Heart burn    • High cholesterol    • HTN (hypertension)    • Hyperlipidemia    • Ischemic cardiomyopathy 02/2018    Echocardiogram with normal LV size, mild concentric LVH, LVEF 35%. Mild MR.   • Myocardial infarct ([facility]) 2007   • Myocardial infarct ([facility]) 2012   • Neuropathy ([facility])     Bilateral feet   • Psychiatric problem     Depression and anxiety   • Sleep apnea     Uses CPAP       Past Surgical History:   Procedure Laterality Date   • AICD IMPLANT Left 04/27/2018    [employer] Inogen X4 CRT-D G148 implanted by Dr. Cullen.   • RECOVERY  7/22/2016    Procedure: CATH LAB-[facility] W/POSSIBLE RADIAL APPROACH-CARIE;  Surgeon: Mireya Surgery;  Location: SURGERY PRE-POST PROC UNIT [facility];  Service:    • MULTIPLE CORONARY ARTERY BYPASS  2014  "   CABG x 3   • LEONILA BY LAPAROSCOPY  2010   • KNEE ARTHROSCOPY      Arthroscopy, Knee       Social History   Substance Use Topics   • Smoking status: Former Smoker     Packs/day: 0.50     Years: 12.00     Types: Cigarettes     Quit date: 7/15/2012   • Smokeless tobacco: Never Used   • Alcohol use Yes      Comment: 1/month       Social History     Social History Narrative   • No narrative on file       Family History   Problem Relation Age of Onset   • Heart Disease Mother    • Diabetes Mother    • Hyperlipidemia Father    • Heart Disease Father    • Hypertension Father    • Stroke Father    • Lung Disease Sister          ROS Positive for SOBOE  Patient denies any fever, chills, unintentional weight gain/loss, fatigue, stroke symptoms, dizziness, headache, nasal congestion, sore-throat, cough, heartburn, chest pain,  abdominal discomfort, diarrhea/constipation, burning with urination or frequency, joint or back pain, skin rashes, depression or anxiety.       Objective:     /66   Pulse 80   Temp 36.7 °C (98 °F) (Temporal)   Resp 14   Ht 1.854 m (6' 1\")   Wt 124.3 kg (274 lb)   SpO2 97%  Body mass index is 36.15 kg/m².   Physical Exam:  Constitutional: Alert, no distress.  No formal exam      Assessment and Plan:   The following treatment plan was discussed    1. Encounter for medication refill  - sacubitril-valsartan (ENTRESTO)  MG Tab tablet; Take 1 Tab by mouth 2 Times a Day.  Dispense: 180 Tab; Refill: 3  - spironolactone (ALDACTONE) 25 MG Tab; Take 1 Tab by mouth every day.  Dispense: 90 Tab; Refill: 3    2. ACC/AHA stage C systolic heart failure (HCC)  Needs to make appointment for f/u with Alisa VELA in [place].  - spironolactone (ALDACTONE) 25 MG Tab; Take 1 Tab by mouth every day.  Dispense: 90 Tab; Refill: 3    3. Essential hypertension  Controlled on meds.    4. Type 2 diabetes mellitus with complication, with long-term current use of insulin (HCC)  Improved control. F/u as scheduled with " Junior.    Needs to lose more weight. Would benefit from nutritionist and weight management. Referral done.    Followup: Return in about 6 months (around 1/10/2020), or if symptoms worsen or fail to improve.    Please note that this dictation was created using voice recognition software. I have made every reasonable attempt to correct obvious errors, but I expect that there are errors of grammar and possibly content that I did not discover before finalizing the note.

## 2019-07-10 NOTE — ASSESSMENT & PLAN NOTE
Saw Natasha Sorensen at [facility], diagnosed with bursitis, received steroid injection, no more pain in hip region or in knee.

## 2019-07-10 NOTE — ASSESSMENT & PLAN NOTE
Saw Junior, made some changes to his treatment regimen. BS under better control. Has f/u appointment next week.

## 2019-07-31 ENCOUNTER — NON-PROVIDER VISIT (OUTPATIENT)
Dept: CARDIOLOGY | Facility: PHYSICIAN GROUP | Age: 54
End: 2019-07-31
Payer: MEDICARE

## 2019-07-31 VITALS
WEIGHT: 266 LBS | OXYGEN SATURATION: 93 % | DIASTOLIC BLOOD PRESSURE: 66 MMHG | HEART RATE: 70 BPM | BODY MASS INDEX: 35.25 KG/M2 | SYSTOLIC BLOOD PRESSURE: 98 MMHG | HEIGHT: 73 IN

## 2019-07-31 DIAGNOSIS — I25.5 ISCHEMIC CARDIOMYOPATHY: ICD-10-CM

## 2019-07-31 DIAGNOSIS — I25.10 CORONARY ARTERY DISEASE INVOLVING NATIVE CORONARY ARTERY OF NATIVE HEART WITHOUT ANGINA PECTORIS: ICD-10-CM

## 2019-07-31 DIAGNOSIS — I50.20 ACC/AHA STAGE C SYSTOLIC HEART FAILURE (HCC): ICD-10-CM

## 2019-07-31 DIAGNOSIS — Z95.810 PRESENCE OF BIVENTRICULAR AICD: ICD-10-CM

## 2019-07-31 PROBLEM — Z76.0 ENCOUNTER FOR MEDICATION REFILL: Status: RESOLVED | Noted: 2019-04-05 | Resolved: 2019-07-31

## 2019-07-31 PROCEDURE — 93284 PRGRMG EVAL IMPLANTABLE DFB: CPT | Performed by: NURSE PRACTITIONER

## 2019-07-31 NOTE — PROGRESS NOTES
Patient is here today for overdue CRT-D interrogation. As last follow-up, he mentioned possible switching to [facility], but for now, he will stay with [facility].    Device is working normally.  No device therapy.  No mode switching episodes.  Normal sensing and capture of RA, RV and LV leads; stable impedances. Battery charge time is 10.4 seconds; battery longevity is 8 years.  No changes are made today.    We will get him in to see Dr. Sweeney in the next 4-6 weeks to establish care.    Same medications for now.    FU in 6 months for next AICD check with me.    Collaborating MD: Patel

## 2019-08-06 RX ORDER — INSULIN GLARGINE 100 [IU]/ML
50 INJECTION, SOLUTION SUBCUTANEOUS 2 TIMES DAILY
Qty: 10 ML | Refills: 3 | Status: SHIPPED | OUTPATIENT
Start: 2019-08-06 | End: 2019-12-18

## 2019-08-06 NOTE — TELEPHONE ENCOUNTER
Was the patient seen in the last year in this department? Yes    Does patient have an active prescription for medications requested? Yes    Received Request Via: Patient     Want it sent to [facility] in [place]

## 2019-08-19 ENCOUNTER — OFFICE VISIT (OUTPATIENT)
Dept: CARDIOLOGY | Facility: MEDICAL CENTER | Age: 54
End: 2019-08-19
Payer: MEDICARE

## 2019-08-19 VITALS
WEIGHT: 271 LBS | HEART RATE: 96 BPM | SYSTOLIC BLOOD PRESSURE: 110 MMHG | OXYGEN SATURATION: 96 % | HEIGHT: 74 IN | DIASTOLIC BLOOD PRESSURE: 60 MMHG | BODY MASS INDEX: 34.78 KG/M2

## 2019-08-19 DIAGNOSIS — I50.20 ACC/AHA STAGE C SYSTOLIC HEART FAILURE (HCC): ICD-10-CM

## 2019-08-19 DIAGNOSIS — Z95.810 PRESENCE OF BIVENTRICULAR AICD: ICD-10-CM

## 2019-08-19 DIAGNOSIS — I10 ESSENTIAL HYPERTENSION: ICD-10-CM

## 2019-08-19 DIAGNOSIS — I44.7 LBBB (LEFT BUNDLE BRANCH BLOCK): ICD-10-CM

## 2019-08-19 DIAGNOSIS — Z95.1 HX OF CABG: ICD-10-CM

## 2019-08-19 DIAGNOSIS — G47.33 OSA ON CPAP: ICD-10-CM

## 2019-08-19 DIAGNOSIS — I25.5 ISCHEMIC CARDIOMYOPATHY: ICD-10-CM

## 2019-08-19 DIAGNOSIS — Z79.4 TYPE 2 DIABETES MELLITUS WITH COMPLICATION, WITH LONG-TERM CURRENT USE OF INSULIN (HCC): ICD-10-CM

## 2019-08-19 DIAGNOSIS — Z76.0 ENCOUNTER FOR MEDICATION REFILL: ICD-10-CM

## 2019-08-19 DIAGNOSIS — I50.9 HEART FAILURE, NYHA CLASS 3 (HCC): ICD-10-CM

## 2019-08-19 DIAGNOSIS — E11.8 TYPE 2 DIABETES MELLITUS WITH COMPLICATION, WITH LONG-TERM CURRENT USE OF INSULIN (HCC): ICD-10-CM

## 2019-08-19 DIAGNOSIS — I25.10 CORONARY ARTERY DISEASE INVOLVING NATIVE CORONARY ARTERY OF NATIVE HEART WITHOUT ANGINA PECTORIS: ICD-10-CM

## 2019-08-19 DIAGNOSIS — Z79.899 HIGH RISK MEDICATION USE: ICD-10-CM

## 2019-08-19 PROCEDURE — 99214 OFFICE O/P EST MOD 30 MIN: CPT | Performed by: INTERNAL MEDICINE

## 2019-08-19 RX ORDER — NITROGLYCERIN 0.4 MG/1
0.4 TABLET SUBLINGUAL PRN
Qty: 25 TAB | Refills: 11 | Status: SHIPPED | OUTPATIENT
Start: 2019-08-19 | End: 2020-11-12

## 2019-08-19 RX ORDER — CYCLOBENZAPRINE HCL 10 MG
TABLET ORAL
Refills: 0 | COMMUNITY
Start: 2019-05-29 | End: 2020-05-29

## 2019-08-19 NOTE — PROGRESS NOTES
Subjective:   Chief Complaint:   Chief Complaint   Patient presents with   • CHF (Systolic)       Juaquin Solorzano is a 54 y.o. male who returns today for coronary artery disease, prior bypass, ischemic cardiomyopathy. He was 40 when he started having severe SOB, was ACS, had stent put in, then had ACS a year later, only symptoms were SOB/diaphoresis, had stent in stent ([facility]).      Then had bypass x3 in 2013 ([facility] in [place]), does not recall symptoms at that time.  In  he had open sternotomy due to complications (wires broke, had non union, had titanium plate with screws).      Had a repeat left heart catheterization 2016 which showed occluded vein grafts, diffuse distal RCA disease and 100% occluded LAD, moderate nonobstructive circumflex disease.  Fortunately, limited his LAD is patent. This was done for low EF.    Had an ICD/BiV PM placed at [facility], about 2 years ago.  [facility] 3 symptoms    Has lost 35 pounds.    LDL cholesterol is 50, HDL low at 34, on Lipitor 40 mg.  Has insulin-dependent type 2 diabetes.  GLP-1 Receptor antagonist.  On Jardiance.  Taking Entresto, spironolactone, metoprolol succinate and aspirin.  Does get orthostasis from time to time, has passed out briefly once, gets up slower now.    Has sleep apnea, uses CPAP.    Mother's side had premature CAD, MI, DM.  Father  of CVA, had carotid artery disease.    Here with his wife, her family is here.  She is retired from [employer].    DATA REVIEWED by me:  ECG 2018  Sinus, ventricular paced, rate 72    Device interrogation 19  CRT-D, rate 60, V 90%, A 1%,    Echo 2018  EF 35%, septal and apical hypokinesis, mild MR, unable to estimate RVSP    Left heart catheterization 2016  PREOPERATIVE DIAGNOSES:  1.  Dyspnea on exertion.  2.  Congestive heart failure with unclear EF due to poor preoperative imaging.  3.  Coronary artery disease status post CABG with unknown  graft anatomy.  4.  Insulin-dependent diabetes mellitus.  5.  Morbid obesity.     POSTOPERATIVE DIAGNOSES:  1.  Occluded vein grafts.  2.  Patent LIMA to LAD.  3.  Diffuse distal RCA disease.  4.  A 100% occluded LAD stent.  5.  Moderate nonobstructive left circumflex disease.  6.  LVEF 50%.    PFTs 1/30/2017  ASSESSMENT:  Spirometry suggests mild restrictive lung disease and no evidence   of obstruction.  Plethysmography reveals mild hyperinflation and no evidence   of restrictive lung disease.  Gas transfer is normal as is airway resistance.    Clinical correlation is recommended.    Chest x-ray 4/27/2018  FINDINGS:  Cardiomediastinal contour is unchanged.  No focal consolidation.  No pneumothorax identified.  Supportive tubing is unchanged.  Postoperative change from prior open heart surgery.    Most recent labs:     6/18/2019 hemoglobin 15, post 202, sodium 141, potassium 4.6, creatinine 1, LFTs normal, LDL 50, HDL 34, triglycerides 131, total cholesterol 110    Past Medical History:   Diagnosis Date   • Arthritis     osteo, hands/elbows   • Block, bundle branch, left    • Breath shortness    • Bronchitis 2014   • CAD (coronary artery disease)    • Cataract 04/26/2018    early stages, not removed   • Diabetes (HCC) 2006    insulin and oral meds   • Fibromyalgia 04/26/2018 5-6/10   • Gout    • Heart burn    • High cholesterol    • HTN (hypertension)    • Hyperlipidemia    • Ischemic cardiomyopathy 02/2018    Echocardiogram with normal LV size, mild concentric LVH, LVEF 35%. Mild MR.   • Myocardial infarct ([facility]) 2007   • Myocardial infarct ([facility]) 2012   • Neuropathy ([facility])     Bilateral feet   • Psychiatric problem     Depression and anxiety   • Sleep apnea     Uses CPAP     Past Surgical History:   Procedure Laterality Date   • AICD IMPLANT Left 04/27/2018    [employer] Inogen X4 CRT-D G148 implanted by Dr. Cullen.   • RECOVERY  7/22/2016    Procedure: CATH LAB-[facility] W/POSSIBLE RADIAL APPROACH-CARIE;   Surgeon: [facility] Surgery;  [place] [facility]  Service:    • MULTIPLE CORONARY ARTERY BYPASS  2014    CABG x 3   • LEONILA BY LAPAROSCOPY     • KNEE ARTHROSCOPY      Arthroscopy, Knee     Family History   Problem Relation Age of Onset   • Heart Disease Mother    • Diabetes Mother    • Hyperlipidemia Father    • Heart Disease Father    • Hypertension Father    • Stroke Father    • Lung Disease Sister      Social History     Socioeconomic History   • Marital status:      Spouse name: Not on file   • Number of children: Not on file   • Years of education: Not on file   • Highest education level: Not on file   Occupational History   • Not on file   Social Needs   • Financial resource strain: Not on file   • Food insecurity:     Worry: Not on file     Inability: Not on file   • Transportation needs:     Medical: Not on file     Non-medical: Not on file   Tobacco Use   • Smoking status: Former Smoker     Packs/day: 0.50     Years: 12.00     Pack years: 6.00     Types: Cigarettes     Last attempt to quit: 7/15/2012     Years since quittin.0   • Smokeless tobacco: Never Used   Substance and Sexual Activity   • Alcohol use: Yes     Comment: 1/month   • Drug use: Yes     Types: Marijuana     Comment: edible marijuana   • Sexual activity: Not on file   Lifestyle   • Physical activity:     Days per week: Not on file     Minutes per session: Not on file   • Stress: Not on file   Relationships   • Social connections:     Talks on phone: Not on file     Gets together: Not on file     Attends Evangelical service: Not on file     Active member of club or organization: Not on file     Attends meetings of clubs or organizations: Not on file     Relationship status: Not on file   • Intimate partner violence:     Fear of current or ex partner: Not on file     Emotionally abused: Not on file     Physically abused: Not on file     Forced sexual activity: Not on file   Other Topics Concern   • Not on  file   Social History Narrative   • Not on file     Allergies   Allergen Reactions   • Contrast Media With Iodine [Iodine] Anaphylaxis       Current Outpatient Medications   Medication Sig Dispense Refill   • cyclobenzaprine (FLEXERIL) 10 MG Tab   0   • nitroglycerin (NITROSTAT) 0.4 MG SL Tab Place 1 Tab under tongue as needed for Chest Pain. 25 Tab 11   • insulin glargine (LANTUS) 100 UNIT/ML Solution Inject 50 Units as instructed 2 times a day. 10 mL 3   • sacubitril-valsartan (ENTRESTO)  MG Tab tablet Take 1 Tab by mouth 2 Times a Day. 180 Tab 3   • spironolactone (ALDACTONE) 25 MG Tab Take 1 Tab by mouth every day. 90 Tab 3   • Empagliflozin-metFORMIN HCl ER 12.5-1000 MG TABLET SR 24 HR Take 1 Tab by mouth 2 times a day. 180 Tab 3   • Semaglutide (OZEMPIC) 1 MG/DOSE Solution Pen-injector Inject 1 mg as instructed every 7 days. 6 PEN 4   • glucose blood (FREESTYLE LITE) strip 1 Strip by Other route 4 times a day. 300 Strip 4   • FREESTYLE LANCETS Misc by Does not apply route.     • Insulin Pen Needle (SURE COMFORT PEN NEEDLES) by Does not apply route.     • allopurinol (ZYLOPRIM) 300 MG Tab Take 1 Tab by mouth every day. 90 Tab 3   • atorvastatin (LIPITOR) 40 MG Tab Take 1 Tab by mouth every day. 90 Tab 3   • DULoxetine (CYMBALTA) 60 MG Cap DR Particles delayed-release capsule Take 1 Cap by mouth every morning. 90 Cap 3   • metoprolol SR (TOPROL XL) 100 MG TABLET SR 24 HR Take 1 Tab by mouth 2 Times a Day. 180 Tab 3   • zonisamide (ZONEGRAN) 100 MG Cap Take 2 Caps by mouth every day. 180 Cap 3   • amitriptyline (ELAVIL) 100 MG Tab Take 1 Tab by mouth at bedtime as needed. 90 Tab 3   • aspirin 81 MG tablet Take 81 mg by mouth every day.     • Omega-3 Fatty Acids (FISH OIL) 1000 MG Cap capsule Take 1,000 mg by mouth 3 times a day, with meals.       No current facility-administered medications for this visit.        ROS  All others systems reviewed and negative.     Objective:     /60 (BP Location: Left  "arm, Patient Position: Sitting)   Pulse 96   Ht 1.88 m (6' 2\")   Wt 122.9 kg (271 lb)   SpO2 96%  Body mass index is 34.79 kg/m².    Physical Exam   General: No acute distress. Well nourished.  HEENT: EOM grossly intact, no scleral icterus, no pharyngeal erythema.   Neck:  No JVD, no bruits, trachea midline  CVS: RRR. Normal S1, S2. No M/R/G. No LE edema.  2+ radial pulses, 2+ DP pulses, ICD pocket in tact, midline incision.  Resp: CTAB. No wheezing or crackles/rhonchi. Normal respiratory effort.  Abdomen: Soft, NT, no irais hepatomegaly.  MSK/Ext: No clubbing or cyanosis.  Skin: Warm and dry, no rashes.  Neurological: CN III-XII grossly intact. No focal deficits.   Psych: A&O x 3, appropriate affect, good judgement      Assessment:     1. Ischemic cardiomyopathy     2. ACC/AHA stage C systolic heart failure (HCC)     3. Heart failure, NYHA class 3 (HCC)     4. Coronary artery disease involving native coronary artery of native heart without angina pectoris     5. Presence of biventricular AICD     6. Encounter for medication refill     7. Hx of CABG     8. Type 2 diabetes mellitus with complication, with long-term current use of insulin (HCC)     9. High risk medication use     10. CLARE on CPAP     11. Essential hypertension     12. LBBB (left bundle branch block)         Medical Decision Making:  Today's Assessment / Status / Plan:     -Trial nitro for symptoms  -Keep on eye on BP  -Overall he is doing well.   -V paced more than 90  -[facility] 3 symptoms      Return in about 6 months (around 2/19/2020), or [place] with Patel.    It is my pleasure to participate in the care of Mr. Solorzano.  Please do not hesitate to contact me with questions or concerns.    Nancy Sweeney MD, [facility]  Cardiologist [facility]    Please note that this dictation was created using voice recognition software. I have made every reasonable attempt to correct obvious errors, but it is possible there are " errors of grammar and possibly content that I did not discover before finalizing the note.

## 2019-08-19 NOTE — LETTER
[facility]   [address]   [address]  [place] [address]  Phone: [phone]  Fax: [phone]              Juaquin Solorzano  1965    Encounter Date: 2019    Nancy Sweeney M.D.          PROGRESS NOTE:  Subjective:   Chief Complaint:   Chief Complaint   Patient presents with   • CHF (Systolic)       Juaquin Solorzano is a 54 y.o. male who returns today for coronary artery disease, prior bypass, ischemic cardiomyopathy. He was 40 when he started having severe SOB, was ACS, had stent put in, then had ACS a year later, only symptoms were SOB/diaphoresis, had stent in stent ([facility]).      Then had bypass x3 in 2013 ([facility] in [place]), does not recall symptoms at that time.  In  he had open sternotomy due to complications (wires broke, had non union, had titanium plate with screws).      Had a repeat left heart catheterization 2016 which showed occluded vein grafts, diffuse distal RCA disease and 100% occluded LAD, moderate nonobstructive circumflex disease.  Fortunately, limited his LAD is patent. This was done for low EF.    Had an ICD/BiV PM placed at [facility], about 2 years ago.  [facility] 3 symptoms    Has lost 35 pounds.    LDL cholesterol is 50, HDL low at 34, on Lipitor 40 mg.  Has insulin-dependent type 2 diabetes.  GLP-1 Receptor antagonist.  On Jardiance.  Taking Entresto, spironolactone, metoprolol succinate and aspirin.  Does get orthostasis from time to time, has passed out briefly once, gets up slower now.    Has sleep apnea, uses CPAP.    Mother's side had premature CAD, MI, DM.  Father  of CVA, had carotid artery disease.    Here with his wife, her family is here.  She is retired from [employer].    DATA REVIEWED by me:  ECG 2018  Sinus, ventricular paced, rate 72    Device interrogation 19  CRT-D, rate 60, V 90%, A 1%,    Echo 2018  EF 35%, septal  and apical hypokinesis, mild MR, unable to estimate RVSP    Left heart catheterization 7/22/2016  PREOPERATIVE DIAGNOSES:  1.  Dyspnea on exertion.  2.  Congestive heart failure with unclear EF due to poor preoperative imaging.  3.  Coronary artery disease status post CABG with unknown graft anatomy.  4.  Insulin-dependent diabetes mellitus.  5.  Morbid obesity.     POSTOPERATIVE DIAGNOSES:  1.  Occluded vein grafts.  2.  Patent LIMA to LAD.  3.  Diffuse distal RCA disease.  4.  A 100% occluded LAD stent.  5.  Moderate nonobstructive left circumflex disease.  6.  LVEF 50%.    PFTs 1/30/2017  ASSESSMENT:  Spirometry suggests mild restrictive lung disease and no evidence   of obstruction.  Plethysmography reveals mild hyperinflation and no evidence   of restrictive lung disease.  Gas transfer is normal as is airway resistance.    Clinical correlation is recommended.    Chest x-ray 4/27/2018  FINDINGS:  Cardiomediastinal contour is unchanged.  No focal consolidation.  No pneumothorax identified.  Supportive tubing is unchanged.  Postoperative change from prior open heart surgery.    Most recent labs:     6/18/2019 hemoglobin 15, post 202, sodium 141, potassium 4.6, creatinine 1, LFTs normal, LDL 50, HDL 34, triglycerides 131, total cholesterol 110    Past Medical History:   Diagnosis Date   • Arthritis     osteo, hands/elbows   • Block, bundle branch, left    • Breath shortness    • Bronchitis 2014   • CAD (coronary artery disease)    • Cataract 04/26/2018    early stages, not removed   • Diabetes (HCC) 2006    insulin and oral meds   • Fibromyalgia 04/26/2018    5-6/10   • Gout    • Heart burn    • High cholesterol    • HTN (hypertension)    • Hyperlipidemia    • Ischemic cardiomyopathy 02/2018    Echocardiogram with normal LV size, mild concentric LVH, LVEF 35%. Mild MR.   • Myocardial infarct (HCC) 2007   • Myocardial infarct (HCC) 2012   • Neuropathy ([facility])     Bilateral feet   • Psychiatric problem     Depression  and anxiety   • Sleep apnea     Uses CPAP     Past Surgical History:   Procedure Laterality Date   • AICD IMPLANT Left 2018    [employer] Inogen X4 CRT-D G148 implanted by Dr. Cullen.   • RECOVERY  2016    Procedure: CATH LAB-[facility] W/POSSIBLE RADIAL APPROACH-CARIE;  Surgeon: Mireya Surgery;  [place] [facility] [place]  Service:    • MULTIPLE CORONARY ARTERY BYPASS  2014    CABG x 3   • LEONILA BY LAPAROSCOPY     • KNEE ARTHROSCOPY      Arthroscopy, Knee     Family History   Problem Relation Age of Onset   • Heart Disease Mother    • Diabetes Mother    • Hyperlipidemia Father    • Heart Disease Father    • Hypertension Father    • Stroke Father    • Lung Disease Sister      Social History     Socioeconomic History   • Marital status:      Spouse name: Not on file   • Number of children: Not on file   • Years of education: Not on file   • Highest education level: Not on file   Occupational History   • Not on file   Social Needs   • Financial resource strain: Not on file   • Food insecurity:     Worry: Not on file     Inability: Not on file   • Transportation needs:     Medical: Not on file     Non-medical: Not on file   Tobacco Use   • Smoking status: Former Smoker     Packs/day: 0.50     Years: 12.00     Pack years: 6.00     Types: Cigarettes     Last attempt to quit: 7/15/2012     Years since quittin.0   • Smokeless tobacco: Never Used   Substance and Sexual Activity   • Alcohol use: Yes     Comment: 1/month   • Drug use: Yes     Types: Marijuana     Comment: edible marijuana   • Sexual activity: Not on file   Lifestyle   • Physical activity:     Days per week: Not on file     Minutes per session: Not on file   • Stress: Not on file   Relationships   • Social connections:     Talks on phone: Not on file     Gets together: Not on file     Attends Yazidism service: Not on file     Active member of club or organization: Not on file     Attends meetings of clubs or  organizations: Not on file     Relationship status: Not on file   • Intimate partner violence:     Fear of current or ex partner: Not on file     Emotionally abused: Not on file     Physically abused: Not on file     Forced sexual activity: Not on file   Other Topics Concern   • Not on file   Social History Narrative   • Not on file     Allergies   Allergen Reactions   • Contrast Media With Iodine [Iodine] Anaphylaxis       Current Outpatient Medications   Medication Sig Dispense Refill   • cyclobenzaprine (FLEXERIL) 10 MG Tab   0   • nitroglycerin (NITROSTAT) 0.4 MG SL Tab Place 1 Tab under tongue as needed for Chest Pain. 25 Tab 11   • insulin glargine (LANTUS) 100 UNIT/ML Solution Inject 50 Units as instructed 2 times a day. 10 mL 3   • sacubitril-valsartan (ENTRESTO)  MG Tab tablet Take 1 Tab by mouth 2 Times a Day. 180 Tab 3   • spironolactone (ALDACTONE) 25 MG Tab Take 1 Tab by mouth every day. 90 Tab 3   • Empagliflozin-metFORMIN HCl ER 12.5-1000 MG TABLET SR 24 HR Take 1 Tab by mouth 2 times a day. 180 Tab 3   • Semaglutide (OZEMPIC) 1 MG/DOSE Solution Pen-injector Inject 1 mg as instructed every 7 days. 6 PEN 4   • glucose blood (FREESTYLE LITE) strip 1 Strip by Other route 4 times a day. 300 Strip 4   • FREESTYLE LANCETS Misc by Does not apply route.     • Insulin Pen Needle (SURE COMFORT PEN NEEDLES) by Does not apply route.     • allopurinol (ZYLOPRIM) 300 MG Tab Take 1 Tab by mouth every day. 90 Tab 3   • atorvastatin (LIPITOR) 40 MG Tab Take 1 Tab by mouth every day. 90 Tab 3   • DULoxetine (CYMBALTA) 60 MG Cap DR Particles delayed-release capsule Take 1 Cap by mouth every morning. 90 Cap 3   • metoprolol SR (TOPROL XL) 100 MG TABLET SR 24 HR Take 1 Tab by mouth 2 Times a Day. 180 Tab 3   • zonisamide (ZONEGRAN) 100 MG Cap Take 2 Caps by mouth every day. 180 Cap 3   • amitriptyline (ELAVIL) 100 MG Tab Take 1 Tab by mouth at bedtime as needed. 90 Tab 3   • aspirin 81 MG tablet Take 81 mg by mouth  "every day.     • Omega-3 Fatty Acids (FISH OIL) 1000 MG Cap capsule Take 1,000 mg by mouth 3 times a day, with meals.       No current facility-administered medications for this visit.        ROS  All others systems reviewed and negative.     Objective:     /60 (BP Location: Left arm, Patient Position: Sitting)   Pulse 96   Ht 1.88 m (6' 2\")   Wt 122.9 kg (271 lb)   SpO2 96%  Body mass index is 34.79 kg/m².    Physical Exam   General: No acute distress. Well nourished.  HEENT: EOM grossly intact, no scleral icterus, no pharyngeal erythema.   Neck:  No JVD, no bruits, trachea midline  CVS: RRR. Normal S1, S2. No M/R/G. No LE edema.  2+ radial pulses, 2+ DP pulses, ICD pocket in tact, midline incision.  Resp: CTAB. No wheezing or crackles/rhonchi. Normal respiratory effort.  Abdomen: Soft, NT, no irais hepatomegaly.  MSK/Ext: No clubbing or cyanosis.  Skin: Warm and dry, no rashes.  Neurological: CN III-XII grossly intact. No focal deficits.   Psych: A&O x 3, appropriate affect, good judgement      Assessment:     1. Ischemic cardiomyopathy     2. ACC/AHA stage C systolic heart failure (HCC)     3. Heart failure, NYHA class 3 (HCC)     4. Coronary artery disease involving native coronary artery of native heart without angina pectoris     5. Presence of biventricular AICD     6. Encounter for medication refill     7. Hx of CABG     8. Type 2 diabetes mellitus with complication, with long-term current use of insulin (HCC)     9. High risk medication use     10. CLARE on CPAP     11. Essential hypertension     12. LBBB (left bundle branch block)         Medical Decision Making:  Today's Assessment / Status / Plan:     -Trial nitro for symptoms  -Keep on eye on BP  -Overall he is doing well.   -V paced more than 90  -[facility] 3 symptoms      Return in about 6 months (around 2/19/2020), or [place] with Patel.    It is my pleasure to participate in the care of Mr. Solorzano.  Please do not hesitate to contact me " with questions or concerns.    Nancy Sweeney MD, [facility]  Cardiologist [facility]    Please note that this dictation was created using voice recognition software. I have made every reasonable attempt to correct obvious errors, but it is possible there are errors of grammar and possibly content that I did not discover before finalizing the note.      Juaquin Martin M.D.  [address] [id number]  VIA In Basket

## 2019-08-30 ENCOUNTER — APPOINTMENT (OUTPATIENT)
Dept: HEALTH INFORMATION MANAGEMENT | Facility: MEDICAL CENTER | Age: 54
End: 2019-08-30
Payer: MEDICARE

## 2019-09-18 ENCOUNTER — TELEPHONE (OUTPATIENT)
Dept: MEDICAL GROUP | Facility: CLINIC | Age: 54
End: 2019-09-18

## 2019-09-18 ENCOUNTER — OFFICE VISIT (OUTPATIENT)
Dept: MEDICAL GROUP | Facility: CLINIC | Age: 54
End: 2019-09-18
Payer: MEDICARE

## 2019-09-18 DIAGNOSIS — Z79.4 TYPE 2 DIABETES MELLITUS WITH COMPLICATION, WITH LONG-TERM CURRENT USE OF INSULIN (HCC): ICD-10-CM

## 2019-09-18 DIAGNOSIS — Z79.4 ENCOUNTER FOR LONG-TERM (CURRENT) USE OF INSULIN (HCC): ICD-10-CM

## 2019-09-18 DIAGNOSIS — I10 ESSENTIAL HYPERTENSION: ICD-10-CM

## 2019-09-18 DIAGNOSIS — E11.8 TYPE 2 DIABETES MELLITUS WITH COMPLICATION, WITH LONG-TERM CURRENT USE OF INSULIN (HCC): ICD-10-CM

## 2019-09-18 PROCEDURE — 99213 OFFICE O/P EST LOW 20 MIN: CPT | Performed by: PHYSICIAN ASSISTANT

## 2019-09-18 NOTE — PROGRESS NOTES
Return to office Patient Consult Note  Referred by: uJaquin Martin M.D.    Reason for consult: Diabetes Management Type 2    HPI:  Juaquin Solorzano is a 54 y.o. old patient who is seeing us today for diabetes care.  This is a pleasant patient with diabetes and I appreciate the opportunity to participate in the care of this patient.    Labs of 9/18/2019 HbA1c is   Labs of 6/19/2019 HbA1c is 6.4  , GFR >60, microalbumin negative,        BG Diary:9/18/2019  In the AM:      1. Type 2 diabetes mellitus with complication, with long-term current use of insulin (HCC)    This is a new patient with me on 6/19/2019  They were on:  1.  Novolog sliding scale before each meal (3 times a day)    6 units  101-120 8 units  2.  Lantus 35 at night   3.  Metfomrin  4.  Jardiance 10    Now on:  2.  Lantus 35 at night   5.  Synjardy 12.5/1000 one in the Am one in the PM  6.  Ozempic 1.0 once a week   7.  Novolog 6 units with each meal     2. Essential hypertension  This is stable today and no new changes are needed or required in today's visit        3. Encounter for long-term (current) use of insulin (HCC)  Is on a high risk medication Insulin and we will continue to follow          ROS:   Constitutional: No night sweats.  Eyes:  No visual changes.  Cardiac: No chest pain, No palpitations or racing heart rate.  Resp: No shortness of breath, No cough,   Gi: No Diarrhea    All other systems were reviewed and were/are negative.     Past Medical History:  Patient Active Problem List    Diagnosis Date Noted   • Ischemic cardiomyopathy 09/13/2018     Priority: High   • Presence of biventricular AICD 05/07/2018     Priority: High   • CAD (coronary artery disease) 05/07/2018     Priority: High   • ACC/AHA stage C systolic heart failure (HCC) 05/18/2017     Priority: High   • S/P CABG x 3 05/18/2017     Priority: High   • Essential hypertension 05/18/2017     Priority: High   • Type 2 diabetes mellitus with complication, with  long-term current use of insulin ([facility]) 2017     Priority: Medium   • Class 2 obesity due to excess calories in adult 07/10/2019   • Encounter for long-term (current) use of insulin ([facility]) 2019   • Gout of foot 2019   • Focal seizures ([facility]) 2019   • Right hip pain 2019   • Spells of decreased attentiveness 2018   • Diabetic neuropathy associated with diabetes mellitus due to underlying condition ([facility]) 2018   • Vitamin deficiency 2018       Past Surgical History:  Past Surgical History:   Procedure Laterality Date   • AICD IMPLANT Left 2018    [employer] Inogen X4 CRT-D G148 implanted by Dr. Cullen.   • RECOVERY  2016    Procedure: CATH LAB-[facility] W/POSSIBLE RADIAL APPROACH-CARIE;  Surgeon: [facility];  Location: SURGERY PRE-POST PROC UNIT [facility];  Service:    • MULTIPLE CORONARY ARTERY BYPASS  2014    CABG x 3   • LEONILA BY LAPAROSCOPY     • KNEE ARTHROSCOPY      Arthroscopy, Knee       Allergies:  Contrast media with iodine [iodine]    Social History:  Social History     Socioeconomic History   • Marital status:      Spouse name: Not on file   • Number of children: Not on file   • Years of education: Not on file   • Highest education level: Not on file   Occupational History   • Not on file   Social Needs   • Financial resource strain: Not on file   • Food insecurity:     Worry: Not on file     Inability: Not on file   • Transportation needs:     Medical: Not on file     Non-medical: Not on file   Tobacco Use   • Smoking status: Former Smoker     Packs/day: 0.50     Years: 12.00     Pack years: 6.00     Types: Cigarettes     Last attempt to quit: 7/15/2012     Years since quittin.1   • Smokeless tobacco: Never Used   Substance and Sexual Activity   • Alcohol use: Yes     Comment: 1/month   • Drug use: Yes     Types: Marijuana     Comment: edible marijuana   • Sexual activity: Not on file   Lifestyle   • Physical activity:     Days  per week: Not on file     Minutes per session: Not on file   • Stress: Not on file   Relationships   • Social connections:     Talks on phone: Not on file     Gets together: Not on file     Attends Faith service: Not on file     Active member of club or organization: Not on file     Attends meetings of clubs or organizations: Not on file     Relationship status: Not on file   • Intimate partner violence:     Fear of current or ex partner: Not on file     Emotionally abused: Not on file     Physically abused: Not on file     Forced sexual activity: Not on file   Other Topics Concern   • Not on file   Social History Narrative   • Not on file       Family History:  Family History   Problem Relation Age of Onset   • Heart Disease Mother    • Diabetes Mother    • Hyperlipidemia Father    • Heart Disease Father    • Hypertension Father    • Stroke Father    • Lung Disease Sister        Medications:    Current Outpatient Medications:   •  Semaglutide (OZEMPIC) 1 MG/DOSE Solution Pen-injector, Inject 1 mg as instructed every 7 days., Disp: 6 PEN, Rfl: 4  •  cyclobenzaprine (FLEXERIL) 10 MG Tab, , Disp: , Rfl: 0  •  nitroglycerin (NITROSTAT) 0.4 MG SL Tab, Place 1 Tab under tongue as needed for Chest Pain., Disp: 25 Tab, Rfl: 11  •  insulin glargine (LANTUS) 100 UNIT/ML Solution, Inject 50 Units as instructed 2 times a day., Disp: 10 mL, Rfl: 3  •  sacubitril-valsartan (ENTRESTO)  MG Tab tablet, Take 1 Tab by mouth 2 Times a Day., Disp: 180 Tab, Rfl: 3  •  spironolactone (ALDACTONE) 25 MG Tab, Take 1 Tab by mouth every day., Disp: 90 Tab, Rfl: 3  •  Empagliflozin-metFORMIN HCl ER 12.5-1000 MG TABLET SR 24 HR, Take 1 Tab by mouth 2 times a day., Disp: 180 Tab, Rfl: 3  •  glucose blood (FREESTYLE LITE) strip, 1 Strip by Other route 4 times a day., Disp: 300 Strip, Rfl: 4  •  FREESTYLE LANCETS Misc, by Does not apply route., Disp: , Rfl:   •  Insulin Pen Needle (SURE COMFORT PEN NEEDLES), by Does not apply route.,  Disp: , Rfl:   •  allopurinol (ZYLOPRIM) 300 MG Tab, Take 1 Tab by mouth every day., Disp: 90 Tab, Rfl: 3  •  atorvastatin (LIPITOR) 40 MG Tab, Take 1 Tab by mouth every day., Disp: 90 Tab, Rfl: 3  •  DULoxetine (CYMBALTA) 60 MG Cap DR Particles delayed-release capsule, Take 1 Cap by mouth every morning., Disp: 90 Cap, Rfl: 3  •  metoprolol SR (TOPROL XL) 100 MG TABLET SR 24 HR, Take 1 Tab by mouth 2 Times a Day., Disp: 180 Tab, Rfl: 3  •  zonisamide (ZONEGRAN) 100 MG Cap, Take 2 Caps by mouth every day., Disp: 180 Cap, Rfl: 3  •  amitriptyline (ELAVIL) 100 MG Tab, Take 1 Tab by mouth at bedtime as needed., Disp: 90 Tab, Rfl: 3  •  aspirin 81 MG tablet, Take 81 mg by mouth every day., Disp: , Rfl:   •  Omega-3 Fatty Acids (FISH OIL) 1000 MG Cap capsule, Take 1,000 mg by mouth 3 times a day, with meals., Disp: , Rfl:         Physical Examination:   Vital signs: There were no vitals taken for this visit.  General: No distress, cooperative, well dressed and well nourished.   Eyes: No scleral icterus or discharge, No hyposphagma  ENMT: Normal on external inspection of nose, lips, No nasal drainage   Neck: No abnormal masses on inspection  Resp: Normal effort, Bilateral clear to auscultation, No wheezing, No rales  CVS: Regular rate and rhythm, S1 S2 normal, No murmur. No gallop  Extremities: No edema bilateral extremities  Neuro: Alert and oriented  Skin: No rash, No Ulcers  Psych: Normal mood and affect      Assessment and Plan:    1. Type 2 diabetes mellitus with complication, with long-term current use of insulin (HCC)      Now on:  2.  Lantus 35 at night  (Decrease to 10)  5.  Synjardy 12.5/1000 one in the Am one in the PM  6.  Ozempic 1.0 once a week   7.  Novolog 6 units with each meal (not on)    2. Essential hypertension  This is stable today and no new changes are needed or required in today's visit      3. Encounter for long-term (current) use of insulin (HCC)  Is on a high risk medication Insulin and we will  continue to follow     Return in about 3 months (around 12/18/2019).      Thank you kindly for allowing me to participate in the diabetes care plan for this patient.    Junior Shepherd PA-C, [facility]  Board Certified - Advanced Diabetes Management  09/18/19    CC:   Juaquin Martin M.D.

## 2019-10-07 NOTE — PROGRESS NOTES
Subjective:   Chief Complaint:   Chief Complaint   Patient presents with   • Hypotension       Juaquin Solorzano is a 54 y.o. male who returns today for coronary artery disease, prior bypass, ischemic cardiomyopathy, chest pain, pacemaker and now hypotension and syncope.    He was bent over working, came up, passed out, was witnessed.  He is having lightheadedness, also fell between at home. Fell a third time outside. Heart beat increased a bit.    He was 40 when he started having severe SOB, was ACS, had stent put in, then had ACS a year later, only symptoms were SOB/diaphoresis, had stent in stent ([facility]).      Then had bypass x3 in 2013 ([facility] in [place]), does not recall symptoms at that time.  In  he had open sternotomy due to complications (wires broke, had non union, had titanium plate with screws).      Had a repeat left heart catheterization 2016 which showed occluded vein grafts, diffuse distal RCA disease and 100% occluded LAD, moderate nonobstructive circumflex disease.  Fortunately, limited his LAD is patent. This was done for low EF.    Had an ICD/BiV placed with Dr. Cullen .  [facility] 3 symptoms  The paced 90 to 96%.    Has lost 35 pounds.    LDL cholesterol is 50, HDL low at 34, on Lipitor 40 mg.  Has insulin-dependent type 2 diabetes.  GLP-1 Receptor antagonist.  On Jardiance.  Taking Entresto, spironolactone, metoprolol succinate and aspirin.  Does get orthostasis from time to time, has passed out briefly once, gets up slower now.    Has sleep apnea, uses CPAP.    Now having symptomatic low blood pressure.  Last blood work 2019 with normal hemoglobin, sodium and potassium.  Blood pressure today 80 systolic.    Mother's side had premature CAD, MI, DM.  Father  of CVA, had carotid artery disease.    Here with his wife, her family is here.  She is retired from [employer].    DATA REVIEWED by me:  ECG 2018  Sinus, ventricular paced,  rate 72    Device interrogation 7-31-19  CRT-D, rate 60, V 90%, A 1%,    Echo 7/21/2018  EF 35%, septal and apical hypokinesis, mild MR, unable to estimate RVSP    Left heart catheterization 7/22/2016  PREOPERATIVE DIAGNOSES:  1.  Dyspnea on exertion.  2.  Congestive heart failure with unclear EF due to poor preoperative imaging.  3.  Coronary artery disease status post CABG with unknown graft anatomy.  4.  Insulin-dependent diabetes mellitus.  5.  Morbid obesity.     POSTOPERATIVE DIAGNOSES:  1.  Occluded vein grafts.  2.  Patent LIMA to LAD.  3.  Diffuse distal RCA disease.  4.  A 100% occluded LAD stent.  5.  Moderate nonobstructive left circumflex disease.  6.  LVEF 50%.    PFTs 1/30/2017  ASSESSMENT:  Spirometry suggests mild restrictive lung disease and no evidence   of obstruction.  Plethysmography reveals mild hyperinflation and no evidence   of restrictive lung disease.  Gas transfer is normal as is airway resistance.    Clinical correlation is recommended.    Chest x-ray 4/27/2018  FINDINGS:  Cardiomediastinal contour is unchanged.  No focal consolidation.  No pneumothorax identified.  Supportive tubing is unchanged.  Postoperative change from prior open heart surgery.    Most recent labs:     6/18/2019 hemoglobin 15, post 202, sodium 141, potassium 4.6, creatinine 1, LFTs normal, LDL 50, HDL 34, triglycerides 131, total cholesterol 110    Past Medical History:   Diagnosis Date   • Arthritis     osteo, hands/elbows   • Block, bundle branch, left    • Breath shortness    • Bronchitis 2014   • CAD (coronary artery disease)    • Cataract 04/26/2018    early stages, not removed   • Diabetes (HCC) 2006    insulin and oral meds   • Fibromyalgia 04/26/2018    5-6/10   • Gout    • Heart burn    • High cholesterol    • HTN (hypertension)    • Hyperlipidemia    • Ischemic cardiomyopathy 02/2018    Echocardiogram with normal LV size, mild concentric LVH, LVEF 35%. Mild MR.   • Myocardial infarct ([facility]) 2007   •  Myocardial infarct (HCC) 2012   • Neuropathy (HCC)     Bilateral feet   • Psychiatric problem     Depression and anxiety   • Sleep apnea     Uses CPAP     Past Surgical History:   Procedure Laterality Date   • AICD IMPLANT Left 2018    [employer] Inogen X4 CRT-D G148 implanted by Dr. Cullen.   • RECOVERY  2016    Procedure: CATH LAB-[facility] W/POSSIBLE RADIAL APPROACH-CARIE;  Surgeon: [facility] Surgery;  Location: SURGERY PRE-POST PROC UNIT [facility];  Service:    • MULTIPLE CORONARY ARTERY BYPASS  2014    CABG x 3   • LEONILA BY LAPAROSCOPY     • KNEE ARTHROSCOPY      Arthroscopy, Knee     Family History   Problem Relation Age of Onset   • Heart Disease Mother    • Diabetes Mother    • Hyperlipidemia Father    • Heart Disease Father    • Hypertension Father    • Stroke Father    • Lung Disease Sister      Social History     Socioeconomic History   • Marital status:      Spouse name: Not on file   • Number of children: Not on file   • Years of education: Not on file   • Highest education level: Not on file   Occupational History   • Not on file   Social Needs   • Financial resource strain: Not on file   • Food insecurity:     Worry: Not on file     Inability: Not on file   • Transportation needs:     Medical: Not on file     Non-medical: Not on file   Tobacco Use   • Smoking status: Former Smoker     Packs/day: 0.50     Years: 12.00     Pack years: 6.00     Types: Cigarettes     Last attempt to quit: 7/15/2012     Years since quittin.2   • Smokeless tobacco: Never Used   Substance and Sexual Activity   • Alcohol use: Yes     Comment: 1/month   • Drug use: Yes     Types: Marijuana     Comment: edible marijuana   • Sexual activity: Not on file   Lifestyle   • Physical activity:     Days per week: Not on file     Minutes per session: Not on file   • Stress: Not on file   Relationships   • Social connections:     Talks on phone: Not on file     Gets together: Not on file     Attends [employer]ism  service: Not on file     Active member of club or organization: Not on file     Attends meetings of clubs or organizations: Not on file     Relationship status: Not on file   • Intimate partner violence:     Fear of current or ex partner: Not on file     Emotionally abused: Not on file     Physically abused: Not on file     Forced sexual activity: Not on file   Other Topics Concern   • Not on file   Social History Narrative   • Not on file     Allergies   Allergen Reactions   • Contrast Media With Iodine [Iodine] Anaphylaxis       Current Outpatient Medications   Medication Sig Dispense Refill   • Blood Pressure Kit 1 Each by Does not apply route Once for 1 dose. 1 Kit 0   • Semaglutide (OZEMPIC) 1 MG/DOSE Solution Pen-injector Inject 1 mg as instructed every 7 days. 6 PEN 4   • cyclobenzaprine (FLEXERIL) 10 MG Tab   0   • nitroglycerin (NITROSTAT) 0.4 MG SL Tab Place 1 Tab under tongue as needed for Chest Pain. 25 Tab 11   • insulin glargine (LANTUS) 100 UNIT/ML Solution Inject 50 Units as instructed 2 times a day. 10 mL 3   • spironolactone (ALDACTONE) 25 MG Tab Take 1 Tab by mouth every day. 90 Tab 3   • Empagliflozin-metFORMIN HCl ER 12.5-1000 MG TABLET SR 24 HR Take 1 Tab by mouth 2 times a day. 180 Tab 3   • glucose blood (FREESTYLE LITE) strip 1 Strip by Other route 4 times a day. 300 Strip 4   • FREESTYLE LANCETS Misc by Does not apply route.     • Insulin Pen Needle (SURE COMFORT PEN NEEDLES) by Does not apply route.     • allopurinol (ZYLOPRIM) 300 MG Tab Take 1 Tab by mouth every day. 90 Tab 3   • atorvastatin (LIPITOR) 40 MG Tab Take 1 Tab by mouth every day. 90 Tab 3   • DULoxetine (CYMBALTA) 60 MG Cap DR Particles delayed-release capsule Take 1 Cap by mouth every morning. 90 Cap 3   • metoprolol SR (TOPROL XL) 100 MG TABLET SR 24 HR Take 1 Tab by mouth 2 Times a Day. 180 Tab 3   • zonisamide (ZONEGRAN) 100 MG Cap Take 2 Caps by mouth every day. 180 Cap 3   • amitriptyline (ELAVIL) 100 MG Tab Take 1 Tab  "by mouth at bedtime as needed. 90 Tab 3   • aspirin 81 MG tablet Take 81 mg by mouth every day.     • Omega-3 Fatty Acids (FISH OIL) 1000 MG Cap capsule Take 1,000 mg by mouth 3 times a day, with meals.       No current facility-administered medications for this visit.        ROS  All others systems reviewed and negative.     Objective:     BP (!) 80/50 (BP Location: Left arm, Patient Position: Sitting)   Pulse 92   Ht 1.88 m (6' 2\")   Wt 119.7 kg (264 lb)   SpO2 95%  Body mass index is 33.9 kg/m².    Physical Exam   General: No acute distress. Well nourished.  HEENT: EOM grossly intact, no scleral icterus, no pharyngeal erythema.   Neck:  No JVD, no bruits, trachea midline  CVS: RRR. Normal S1, S2. No M/R/G. No LE edema.  2+ radial pulses, 2+ DP pulses, ICD pocket in tact, midline incision.  Resp: CTAB. No wheezing or crackles/rhonchi. Normal respiratory effort.  Abdomen: Soft, NT, no irais hepatomegaly.  MSK/Ext: No clubbing or cyanosis.  Skin: Warm and dry, no rashes.  Neurological: CN III-XII grossly intact. No focal deficits.   Psych: A&O x 3, appropriate affect, good judgement      Assessment:     1. Ischemic cardiomyopathy  EC-ECHOCARDIOGRAM LTD W/O CONT    Basic Metabolic Panel   2. Coronary artery disease involving native coronary artery of native heart without angina pectoris     3. Presence of biventricular AICD     4. Hx of CABG     5. Type 2 diabetes mellitus with complication, with long-term current use of insulin ([facility])     6. High risk medication use     7. CLARE on CPAP     8. Essential hypertension     9. LBBB (left bundle branch block)     10. S/P CABG x 3     11. Class 2 severe obesity due to excess calories with serious comorbidity and body mass index (BMI) of 36.0 to 36.9 in adult ([facility])     12. Syncope and collapse     13. Other specified hypotension         Medical Decision Making:  Today's Assessment / Status / Plan:     -His BP is too low, will need to stop Entresto, see if he improves, " then resume lower dose  -blood work today to look for dehydration  -Limited echo to look at EF  -V paced more than 90 as he should be getting therapy  -[facility] 3 symptoms, stage C, not changed.    Written instructions given today:    -Blood pressure cuff, spend in the $40-65, with good return policy  -It should be automatic, upper arm, measure your arm to get the correct size, probably adult Large  -Put the cuff in place, sit quietly for 5 min, legs uncrossed, with back support, the take your blood pressure, write it down, keep a log      -Get non fasting blood work  -You should always hear results of testing within 5 days with my interpretation, if you do not, send a [employer] message or call the office: [phone].    -Stop Entresto for now, give it 2 weeks to see if your symptoms resolve, assuming you feel better, we will restart Entresto at a lower dose.  You are currently taking Entresto 97/103 mg twice a day, we will start it back at 49/51 mg AM and PM. You can achieve this dose by cutting your  in half.    -You can use [employer] to send your BP for review if you are concerned it is too low        Return in about 4 weeks (around 11/6/2019), or Alisa CARRERO 4-5 weeks, MD 6 months.    It is my pleasure to participate in the care of Mr. Solorzano.  Please do not hesitate to contact me with questions or concerns.    Nancy Sweeney MD, [facility]  Cardiologist [facility]    Please note that this dictation was created using voice recognition software. I have made every reasonable attempt to correct obvious errors, but it is possible there are errors of grammar and possibly content that I did not discover before finalizing the note.

## 2019-10-09 ENCOUNTER — OFFICE VISIT (OUTPATIENT)
Dept: CARDIOLOGY | Facility: PHYSICIAN GROUP | Age: 54
End: 2019-10-09
Payer: MEDICARE

## 2019-10-09 ENCOUNTER — HOSPITAL ENCOUNTER (OUTPATIENT)
Dept: LAB | Facility: MEDICAL CENTER | Age: 54
End: 2019-10-09
Attending: INTERNAL MEDICINE
Payer: MEDICARE

## 2019-10-09 VITALS
SYSTOLIC BLOOD PRESSURE: 80 MMHG | HEART RATE: 92 BPM | BODY MASS INDEX: 33.88 KG/M2 | WEIGHT: 264 LBS | OXYGEN SATURATION: 95 % | DIASTOLIC BLOOD PRESSURE: 50 MMHG | HEIGHT: 74 IN

## 2019-10-09 DIAGNOSIS — I25.5 ISCHEMIC CARDIOMYOPATHY: ICD-10-CM

## 2019-10-09 DIAGNOSIS — I10 ESSENTIAL HYPERTENSION: ICD-10-CM

## 2019-10-09 DIAGNOSIS — E66.01 CLASS 2 SEVERE OBESITY DUE TO EXCESS CALORIES WITH SERIOUS COMORBIDITY AND BODY MASS INDEX (BMI) OF 36.0 TO 36.9 IN ADULT (HCC): ICD-10-CM

## 2019-10-09 DIAGNOSIS — I95.89 OTHER SPECIFIED HYPOTENSION: ICD-10-CM

## 2019-10-09 DIAGNOSIS — Z79.899 HIGH RISK MEDICATION USE: ICD-10-CM

## 2019-10-09 DIAGNOSIS — Z79.4 TYPE 2 DIABETES MELLITUS WITH COMPLICATION, WITH LONG-TERM CURRENT USE OF INSULIN (HCC): ICD-10-CM

## 2019-10-09 DIAGNOSIS — I44.7 LBBB (LEFT BUNDLE BRANCH BLOCK): ICD-10-CM

## 2019-10-09 DIAGNOSIS — R55 SYNCOPE AND COLLAPSE: ICD-10-CM

## 2019-10-09 DIAGNOSIS — Z95.1 S/P CABG X 3: ICD-10-CM

## 2019-10-09 DIAGNOSIS — Z95.1 HX OF CABG: ICD-10-CM

## 2019-10-09 DIAGNOSIS — G47.33 OSA ON CPAP: ICD-10-CM

## 2019-10-09 DIAGNOSIS — Z95.810 PRESENCE OF BIVENTRICULAR AICD: ICD-10-CM

## 2019-10-09 DIAGNOSIS — I25.10 CORONARY ARTERY DISEASE INVOLVING NATIVE CORONARY ARTERY OF NATIVE HEART WITHOUT ANGINA PECTORIS: ICD-10-CM

## 2019-10-09 DIAGNOSIS — E11.8 TYPE 2 DIABETES MELLITUS WITH COMPLICATION, WITH LONG-TERM CURRENT USE OF INSULIN (HCC): ICD-10-CM

## 2019-10-09 LAB
ANION GAP SERPL CALC-SCNC: 9 MMOL/L (ref 0–11.9)
BUN SERPL-MCNC: 14 MG/DL (ref 8–22)
CALCIUM SERPL-MCNC: 9.6 MG/DL (ref 8.5–10.5)
CHLORIDE SERPL-SCNC: 105 MMOL/L (ref 96–112)
CO2 SERPL-SCNC: 24 MMOL/L (ref 20–33)
CREAT SERPL-MCNC: 0.95 MG/DL (ref 0.5–1.4)
GLUCOSE SERPL-MCNC: 90 MG/DL (ref 65–99)
POTASSIUM SERPL-SCNC: 4.4 MMOL/L (ref 3.6–5.5)
SODIUM SERPL-SCNC: 138 MMOL/L (ref 135–145)

## 2019-10-09 PROCEDURE — 99215 OFFICE O/P EST HI 40 MIN: CPT | Performed by: INTERNAL MEDICINE

## 2019-10-09 PROCEDURE — 36415 COLL VENOUS BLD VENIPUNCTURE: CPT

## 2019-10-09 PROCEDURE — 80048 BASIC METABOLIC PNL TOTAL CA: CPT

## 2019-10-09 RX ORDER — BLOOD PRESSURE TEST KIT
1 KIT MISCELLANEOUS ONCE
Qty: 1 KIT | Refills: 0 | Status: SHIPPED | OUTPATIENT
Start: 2019-10-09 | End: 2019-10-09

## 2019-10-09 NOTE — LETTER
[facility]   [address] [place] [address]  Phone: [phone]  Fax: [phone]              Juaquin Solorzano  1965    Encounter Date: 10/9/2019    Nancy Sweeney M.D.          PROGRESS NOTE:  Subjective:   Chief Complaint:   Chief Complaint   Patient presents with   • Hypotension       Juaquin Solorzano is a 54 y.o. male who returns today for coronary artery disease, prior bypass, ischemic cardiomyopathy, chest pain, pacemaker and now hypotension and syncope.    He was bent over working, came up, passed out, was witnessed.  He is having lightheadedness, also fell between at home. Fell a third time outside. Heart beat increased a bit.    He was 40 when he started having severe SOB, was ACS, had stent put in, then had ACS a year later, only symptoms were SOB/diaphoresis, had stent in stent ([facility]).      Then had bypass x3 in September 2013 ([facility] in [place]), does not recall symptoms at that time.  In 2014 he had open sternotomy due to complications (wires broke, had non union, had titanium plate with screws).      Had a repeat left heart catheterization July 2016 which showed occluded vein grafts, diffuse distal RCA disease and 100% occluded LAD, moderate nonobstructive circumflex disease.  Fortunately, limited his LAD is patent. This was done for low EF.    Had an ICD/BiV placed with Dr. Cullen 4-2019.  [facility] 3 symptoms  The paced 90 to 96%.    Has lost 35 pounds.    LDL cholesterol is 50, HDL low at 34, on Lipitor 40 mg.  Has insulin-dependent type 2 diabetes.  GLP-1 Receptor antagonist.  On Jardiance.  Taking Entresto, spironolactone, metoprolol succinate and aspirin.  Does get orthostasis from time to time, has passed out briefly once, gets up slower now.    Has sleep apnea, uses CPAP.    Now having symptomatic low blood pressure.  Last blood work June 2019 with normal hemoglobin,  sodium and potassium.  Blood pressure today 80 systolic.    Mother's side had premature CAD, MI, DM.  Father  of CVA, had carotid artery disease.    Here with his wife, her family is here.  She is retired from [employer].    DATA REVIEWED by me:  ECG 2018  Sinus, ventricular paced, rate 72    Device interrogation 19  CRT-D, rate 60, V 90%, A 1%,    Echo 2018  EF 35%, septal and apical hypokinesis, mild MR, unable to estimate RVSP    Left heart catheterization 2016  PREOPERATIVE DIAGNOSES:  1.  Dyspnea on exertion.  2.  Congestive heart failure with unclear EF due to poor preoperative imaging.  3.  Coronary artery disease status post CABG with unknown graft anatomy.  4.  Insulin-dependent diabetes mellitus.  5.  Morbid obesity.     POSTOPERATIVE DIAGNOSES:  1.  Occluded vein grafts.  2.  Patent LIMA to LAD.  3.  Diffuse distal RCA disease.  4.  A 100% occluded LAD stent.  5.  Moderate nonobstructive left circumflex disease.  6.  LVEF 50%.    PFTs 2017  ASSESSMENT:  Spirometry suggests mild restrictive lung disease and no evidence   of obstruction.  Plethysmography reveals mild hyperinflation and no evidence   of restrictive lung disease.  Gas transfer is normal as is airway resistance.    Clinical correlation is recommended.    Chest x-ray 2018  FINDINGS:  Cardiomediastinal contour is unchanged.  No focal consolidation.  No pneumothorax identified.  Supportive tubing is unchanged.  Postoperative change from prior open heart surgery.    Most recent labs:     2019 hemoglobin 15, post 202, sodium 141, potassium 4.6, creatinine 1, LFTs normal, LDL 50, HDL 34, triglycerides 131, total cholesterol 110    Past Medical History:   Diagnosis Date   • Arthritis     osteo, hands/elbows   • Block, bundle branch, left    • Breath shortness    • Bronchitis    • CAD (coronary artery disease)    • Cataract 2018    early stages, not removed   • Diabetes (HCC)     insulin and oral meds   •  Fibromyalgia 2018    5-6/10   • Gout    • Heart burn    • High cholesterol    • HTN (hypertension)    • Hyperlipidemia    • Ischemic cardiomyopathy 2018    Echocardiogram with normal LV size, mild concentric LVH, LVEF 35%. Mild MR.   • Myocardial infarct (HCC)    • Myocardial infarct (HCC)    • Neuropathy (HCC)     Bilateral feet   • Psychiatric problem     Depression and anxiety   • Sleep apnea     Uses CPAP     Past Surgical History:   Procedure Laterality Date   • AICD IMPLANT Left 2018    [employer] Inogen X4 CRT-D G148 implanted by Dr. Cullen.   • RECOVERY  2016    Procedure: CATH LAB-[facility] W/POSSIBLE RADIAL APPROACH-CARIE;  Surgeon: Mireya Surgery;  Location: SURGERY PRE-POST PROC UNIT [facility];  Service:    • MULTIPLE CORONARY ARTERY BYPASS  2014    CABG x 3   • LEONILA BY LAPAROSCOPY     • KNEE ARTHROSCOPY      Arthroscopy, Knee     Family History   Problem Relation Age of Onset   • Heart Disease Mother    • Diabetes Mother    • Hyperlipidemia Father    • Heart Disease Father    • Hypertension Father    • Stroke Father    • Lung Disease Sister      Social History     Socioeconomic History   • Marital status:      Spouse name: Not on file   • Number of children: Not on file   • Years of education: Not on file   • Highest education level: Not on file   Occupational History   • Not on file   Social Needs   • Financial resource strain: Not on file   • Food insecurity:     Worry: Not on file     Inability: Not on file   • Transportation needs:     Medical: Not on file     Non-medical: Not on file   Tobacco Use   • Smoking status: Former Smoker     Packs/day: 0.50     Years: 12.00     Pack years: 6.00     Types: Cigarettes     Last attempt to quit: 7/15/2012     Years since quittin.2   • Smokeless tobacco: Never Used   Substance and Sexual Activity   • Alcohol use: Yes     Comment: 1/month   • Drug use: Yes     Types: Marijuana     Comment: edible marijuana   •  Sexual activity: Not on file   Lifestyle   • Physical activity:     Days per week: Not on file     Minutes per session: Not on file   • Stress: Not on file   Relationships   • Social connections:     Talks on phone: Not on file     Gets together: Not on file     Attends Advent service: Not on file     Active member of club or organization: Not on file     Attends meetings of clubs or organizations: Not on file     Relationship status: Not on file   • Intimate partner violence:     Fear of current or ex partner: Not on file     Emotionally abused: Not on file     Physically abused: Not on file     Forced sexual activity: Not on file   Other Topics Concern   • Not on file   Social History Narrative   • Not on file     Allergies   Allergen Reactions   • Contrast Media With Iodine [Iodine] Anaphylaxis       Current Outpatient Medications   Medication Sig Dispense Refill   • Blood Pressure Kit 1 Each by Does not apply route Once for 1 dose. 1 Kit 0   • Semaglutide (OZEMPIC) 1 MG/DOSE Solution Pen-injector Inject 1 mg as instructed every 7 days. 6 PEN 4   • cyclobenzaprine (FLEXERIL) 10 MG Tab   0   • nitroglycerin (NITROSTAT) 0.4 MG SL Tab Place 1 Tab under tongue as needed for Chest Pain. 25 Tab 11   • insulin glargine (LANTUS) 100 UNIT/ML Solution Inject 50 Units as instructed 2 times a day. 10 mL 3   • spironolactone (ALDACTONE) 25 MG Tab Take 1 Tab by mouth every day. 90 Tab 3   • Empagliflozin-metFORMIN HCl ER 12.5-1000 MG TABLET SR 24 HR Take 1 Tab by mouth 2 times a day. 180 Tab 3   • glucose blood (FREESTYLE LITE) strip 1 Strip by Other route 4 times a day. 300 Strip 4   • FREESTYLE LANCETS Misc by Does not apply route.     • Insulin Pen Needle (SURE COMFORT PEN NEEDLES) by Does not apply route.     • allopurinol (ZYLOPRIM) 300 MG Tab Take 1 Tab by mouth every day. 90 Tab 3   • atorvastatin (LIPITOR) 40 MG Tab Take 1 Tab by mouth every day. 90 Tab 3   • DULoxetine (CYMBALTA) 60 MG Cap DR Particles  "delayed-release capsule Take 1 Cap by mouth every morning. 90 Cap 3   • metoprolol SR (TOPROL XL) 100 MG TABLET SR 24 HR Take 1 Tab by mouth 2 Times a Day. 180 Tab 3   • zonisamide (ZONEGRAN) 100 MG Cap Take 2 Caps by mouth every day. 180 Cap 3   • amitriptyline (ELAVIL) 100 MG Tab Take 1 Tab by mouth at bedtime as needed. 90 Tab 3   • aspirin 81 MG tablet Take 81 mg by mouth every day.     • Omega-3 Fatty Acids (FISH OIL) 1000 MG Cap capsule Take 1,000 mg by mouth 3 times a day, with meals.       No current facility-administered medications for this visit.        ROS  All others systems reviewed and negative.     Objective:     BP (!) 80/50 (BP Location: Left arm, Patient Position: Sitting)   Pulse 92   Ht 1.88 m (6' 2\")   Wt 119.7 kg (264 lb)   SpO2 95%  Body mass index is 33.9 kg/m².    Physical Exam   General: No acute distress. Well nourished.  HEENT: EOM grossly intact, no scleral icterus, no pharyngeal erythema.   Neck:  No JVD, no bruits, trachea midline  CVS: RRR. Normal S1, S2. No M/R/G. No LE edema.  2+ radial pulses, 2+ DP pulses, ICD pocket in tact, midline incision.  Resp: CTAB. No wheezing or crackles/rhonchi. Normal respiratory effort.  Abdomen: Soft, NT, no irais hepatomegaly.  MSK/Ext: No clubbing or cyanosis.  Skin: Warm and dry, no rashes.  Neurological: CN III-XII grossly intact. No focal deficits.   Psych: A&O x 3, appropriate affect, good judgement      Assessment:     1. Ischemic cardiomyopathy  EC-ECHOCARDIOGRAM LTD W/O CONT    Basic Metabolic Panel   2. Coronary artery disease involving native coronary artery of native heart without angina pectoris     3. Presence of biventricular AICD     4. Hx of CABG     5. Type 2 diabetes mellitus with complication, with long-term current use of insulin (HCC)     6. High risk medication use     7. CLARE on CPAP     8. Essential hypertension     9. LBBB (left bundle branch block)     10. S/P CABG x 3     11. Class 2 severe obesity due to excess " calories with serious comorbidity and body mass index (BMI) of 36.0 to 36.9 in adult ([facility])     12. Syncope and collapse     13. Other specified hypotension         Medical Decision Making:  Today's Assessment / Status / Plan:     -His BP is too low, will need to stop Entresto, see if he improves, then resume lower dose  -blood work today to look for dehydration  -Limited echo to look at EF  -V paced more than 90 as he should be getting therapy  -Psychiatric 3 symptoms, stage C, not changed.    Written instructions given today:    -Blood pressure cuff, spend in the $40-65, with good return policy  -It should be automatic, upper arm, measure your arm to get the correct size, probably adult Large  -Put the cuff in place, sit quietly for 5 min, legs uncrossed, with back support, the take your blood pressure, write it down, keep a log      -Get non fasting blood work  -You should always hear results of testing within 5 days with my interpretation, if you do not, send a [employer] message or call the office: [phone].    -Stop Entresto for now, give it 2 weeks to see if your symptoms resolve, assuming you feel better, we will restart Entresto at a lower dose.  You are currently taking Entresto 97/103 mg twice a day, we will start it back at 49/51 mg AM and PM. You can achieve this dose by cutting your  in half.    -You can use [employer] to send your BP for review if you are concerned it is too low        Return in about 4 weeks (around 11/6/2019), or Alisa CARRERO 4-5 weeks, MD 6 months.    It is my pleasure to participate in the care of Mr. Solorzano.  Please do not hesitate to contact me with questions or concerns.    Nancy Sweeney MD, [facility]  Cardiologist [facility]    Please note that this dictation was created using voice recognition software. I have made every reasonable attempt to correct obvious errors, but it is possible there are errors of grammar and possibly content that I did not  discover before finalizing the note.      Juaquin Martin M.D.  [address] [address]  VIA In Basket

## 2019-10-09 NOTE — PATIENT INSTRUCTIONS
-Blood pressure cuff, spend in the $40-65, with good return policy  -It should be automatic, upper arm, measure your arm to get the correct size, probably adult Large  -Put the cuff in place, sit quietly for 5 min, legs uncrossed, with back support, the take your blood pressure, write it down, keep a log    -Get non fasting blood work  -You should always hear results of testing within 5 days with my interpretation, if you do not, send a [employer] message or call the office: [phone].    -Stop Entresto for now, give it 2 weeks to see if your symptoms resolve, assuming you feel better, we will restart Entresto at a lower dose.  You are currently taking Entresto 97/103 mg twice a day, we will start it back at 49/51 mg AM and PM. You can achieve this dose by cutting your  in half.    -You can use [employer] to send your BP for review if you are concerned it is too low

## 2019-10-16 ENCOUNTER — ANCILLARY PROCEDURE (OUTPATIENT)
Dept: CARDIOLOGY | Facility: IMAGING CENTER | Age: 54
End: 2019-10-16
Attending: INTERNAL MEDICINE
Payer: MEDICARE

## 2019-10-16 DIAGNOSIS — I25.5 ISCHEMIC CARDIOMYOPATHY: ICD-10-CM

## 2019-10-16 LAB
LV EJECT FRACT  99904: 45
LV EJECT FRACT MOD 2C 99903: 48.92
LV EJECT FRACT MOD 4C 99902: 46.8
LV EJECT FRACT MOD BP 99901: 44.57

## 2019-10-16 PROCEDURE — 93308 TTE F-UP OR LMTD: CPT | Performed by: INTERNAL MEDICINE

## 2019-11-06 ENCOUNTER — OFFICE VISIT (OUTPATIENT)
Dept: CARDIOLOGY | Facility: PHYSICIAN GROUP | Age: 54
End: 2019-11-06
Payer: MEDICARE

## 2019-11-06 VITALS
DIASTOLIC BLOOD PRESSURE: 60 MMHG | BODY MASS INDEX: 33.88 KG/M2 | HEART RATE: 94 BPM | SYSTOLIC BLOOD PRESSURE: 84 MMHG | HEIGHT: 74 IN | WEIGHT: 264 LBS | OXYGEN SATURATION: 93 %

## 2019-11-06 DIAGNOSIS — Z95.810 PRESENCE OF BIVENTRICULAR AICD: ICD-10-CM

## 2019-11-06 DIAGNOSIS — I25.10 CORONARY ARTERY DISEASE INVOLVING NATIVE CORONARY ARTERY OF NATIVE HEART WITHOUT ANGINA PECTORIS: ICD-10-CM

## 2019-11-06 DIAGNOSIS — E11.8 TYPE 2 DIABETES MELLITUS WITH COMPLICATION, WITH LONG-TERM CURRENT USE OF INSULIN (HCC): ICD-10-CM

## 2019-11-06 DIAGNOSIS — I10 ESSENTIAL HYPERTENSION: ICD-10-CM

## 2019-11-06 DIAGNOSIS — E78.2 MIXED HYPERLIPIDEMIA: ICD-10-CM

## 2019-11-06 DIAGNOSIS — Z79.4 TYPE 2 DIABETES MELLITUS WITH COMPLICATION, WITH LONG-TERM CURRENT USE OF INSULIN (HCC): ICD-10-CM

## 2019-11-06 DIAGNOSIS — I25.5 ISCHEMIC CARDIOMYOPATHY: ICD-10-CM

## 2019-11-06 DIAGNOSIS — I50.20 ACC/AHA STAGE C SYSTOLIC HEART FAILURE (HCC): ICD-10-CM

## 2019-11-06 DIAGNOSIS — Z76.0 ENCOUNTER FOR MEDICATION REFILL: ICD-10-CM

## 2019-11-06 PROBLEM — E78.5 HYPERLIPIDEMIA: Status: ACTIVE | Noted: 2019-11-06

## 2019-11-06 PROBLEM — G47.33 OBSTRUCTIVE SLEEP APNEA SYNDROME: Status: ACTIVE | Noted: 2019-11-06

## 2019-11-06 PROCEDURE — 99214 OFFICE O/P EST MOD 30 MIN: CPT | Performed by: NURSE PRACTITIONER

## 2019-11-06 RX ORDER — SPIRONOLACTONE 25 MG/1
12.5 TABLET ORAL DAILY
Qty: 45 TAB | Refills: 3
Start: 2019-11-06 | End: 2020-07-06

## 2019-11-06 ASSESSMENT — ENCOUNTER SYMPTOMS
FEVER: 0
SHORTNESS OF BREATH: 0
MYALGIAS: 0
COUGH: 0
ABDOMINAL PAIN: 0
CHILLS: 0
PND: 0
HEADACHES: 0
PALPITATIONS: 0
NAUSEA: 0
ORTHOPNEA: 0
DIZZINESS: 0
BRUISES/BLEEDS EASILY: 0
LOSS OF CONSCIOUSNESS: 0

## 2019-11-06 NOTE — PROGRESS NOTES
Chief Complaint   Patient presents with   • Follow-Up   • Evaluation Of Medication Change       Subjective:   Juaquin Solorzano is a 54 y.o. male who presents today for one month follow-up of medication change evaluation.    Juaquin is a 54 year old male with history of CAD/ischemic cardiomyopathy with LVEF 45% and AICD implanted in April 2018, hypertension, hyperlipidemia, sleep apnea and diabetes mellitus, last seen by Dr. Sweeney. At that time, BP was quite low, and Entresto was held for a week, and then restarted at 1/2 the dose. Limited echocardiogram was also done.    He is here today for follow-up. He is taking Entresto 1/2 day twice daily; BP is still quite low, but he denies any dizziness, lightheadedness or syncope/presyncope. No device therapy. No chest pain, pressure or discomfort; no palpitations; breathing is stable with no orthopnea or PND; no LE edema. Glucose is fairly well controlled.    Past Medical History:   Diagnosis Date   • Arthritis     osteo, hands/elbows   • Block, bundle branch, left    • CAD (coronary artery disease)    • Cataract 04/26/2018    early stages, not removed   • Diabetes ([facility]) 2006    insulin and oral meds   • Fibromyalgia 04/26/2018 5-6/10   • Gout    • Heart burn    • Hyperlipidemia    • Hypertension    • Ischemic cardiomyopathy 10/2019    Echocardiogram with normal LV size, LVEF 45% (Previously 35-40% in February 2018)   • Myocardial infarct ([facility]) 2007   • Myocardial infarct ([facility]) 2012   • Neuropathy ([facility])     Bilateral feet   • Psychiatric problem     Depression and anxiety   • Sleep apnea     Uses CPAP     Past Surgical History:   Procedure Laterality Date   • AICD IMPLANT Left 04/27/2018    [employer] Inogen X4 CRT-D G148 implanted by Dr. Cullen.   • RECOVERY  7/22/2016    Procedure: CATH LAB-[facility] W/POSSIBLE RADIAL APPROACH-CARIE;  Surgeon: Mireya Surgery;  Location: SURGERY PRE-POST PROC UNIT [facility];  Service:    • MULTIPLE CORONARY ARTERY BYPASS   2014    CABG x 3   • LEONILA BY LAPAROSCOPY     • KNEE ARTHROSCOPY      Arthroscopy, Knee     Family History   Problem Relation Age of Onset   • Heart Disease Mother    • Diabetes Mother    • Hyperlipidemia Father    • Heart Disease Father    • Hypertension Father    • Stroke Father    • Lung Disease Sister      Social History     Socioeconomic History   • Marital status:      Spouse name: Not on file   • Number of children: Not on file   • Years of education: Not on file   • Highest education level: Not on file   Occupational History   • Not on file   Social Needs   • Financial resource strain: Not on file   • Food insecurity:     Worry: Not on file     Inability: Not on file   • Transportation needs:     Medical: Not on file     Non-medical: Not on file   Tobacco Use   • Smoking status: Former Smoker     Packs/day: 0.50     Years: 12.00     Pack years: 6.00     Types: Cigarettes     Last attempt to quit: 7/15/2012     Years since quittin.3   • Smokeless tobacco: Never Used   Substance and Sexual Activity   • Alcohol use: Yes     Comment: 1/month   • Drug use: Yes     Types: Marijuana     Comment: edible marijuana   • Sexual activity: Not on file   Lifestyle   • Physical activity:     Days per week: Not on file     Minutes per session: Not on file   • Stress: Not on file   Relationships   • Social connections:     Talks on phone: Not on file     Gets together: Not on file     Attends Oriental orthodox service: Not on file     Active member of club or organization: Not on file     Attends meetings of clubs or organizations: Not on file     Relationship status: Not on file   • Intimate partner violence:     Fear of current or ex partner: Not on file     Emotionally abused: Not on file     Physically abused: Not on file     Forced sexual activity: Not on file   Other Topics Concern   • Not on file   Social History Narrative   • Not on file     Allergies   Allergen Reactions   • Contrast Media With Iodine [Iodine]  Anaphylaxis     Outpatient Encounter Medications as of 11/6/2019   Medication Sig Dispense Refill   • sacubitril-valsartan (ENTRESTO)  MG Tab tablet Take 0.5 Tabs by mouth 2 Times a Day.     • spironolactone (ALDACTONE) 25 MG Tab Take 0.5 Tabs by mouth every day. 45 Tab 3   • Semaglutide (OZEMPIC) 1 MG/DOSE Solution Pen-injector Inject 1 mg as instructed every 7 days. 6 PEN 4   • cyclobenzaprine (FLEXERIL) 10 MG Tab   0   • nitroglycerin (NITROSTAT) 0.4 MG SL Tab Place 1 Tab under tongue as needed for Chest Pain. 25 Tab 11   • insulin glargine (LANTUS) 100 UNIT/ML Solution Inject 50 Units as instructed 2 times a day. 10 mL 3   • Empagliflozin-metFORMIN HCl ER 12.5-1000 MG TABLET SR 24 HR Take 1 Tab by mouth 2 times a day. 180 Tab 3   • glucose blood (FREESTYLE LITE) strip 1 Strip by Other route 4 times a day. 300 Strip 4   • FREESTYLE LANCETS Misc by Does not apply route.     • Insulin Pen Needle (SURE COMFORT PEN NEEDLES) by Does not apply route.     • allopurinol (ZYLOPRIM) 300 MG Tab Take 1 Tab by mouth every day. 90 Tab 3   • atorvastatin (LIPITOR) 40 MG Tab Take 1 Tab by mouth every day. 90 Tab 3   • DULoxetine (CYMBALTA) 60 MG Cap DR Particles delayed-release capsule Take 1 Cap by mouth every morning. 90 Cap 3   • metoprolol SR (TOPROL XL) 100 MG TABLET SR 24 HR Take 1 Tab by mouth 2 Times a Day. 180 Tab 3   • zonisamide (ZONEGRAN) 100 MG Cap Take 2 Caps by mouth every day. 180 Cap 3   • amitriptyline (ELAVIL) 100 MG Tab Take 1 Tab by mouth at bedtime as needed. 90 Tab 3   • aspirin 81 MG tablet Take 81 mg by mouth every day.     • Omega-3 Fatty Acids (FISH OIL) 1000 MG Cap capsule Take 1,000 mg by mouth 3 times a day, with meals.     • [DISCONTINUED] spironolactone (ALDACTONE) 25 MG Tab Take 1 Tab by mouth every day. 90 Tab 3     No facility-administered encounter medications on file as of 11/6/2019.      Review of Systems   Constitutional: Negative for chills and fever.   HENT: Negative for congestion.  "   Respiratory: Negative for cough and shortness of breath.    Cardiovascular: Negative for chest pain, palpitations, orthopnea, leg swelling and PND.   Gastrointestinal: Negative for abdominal pain and nausea.   Musculoskeletal: Negative for myalgias.   Skin: Negative for rash.   Neurological: Negative for dizziness, loss of consciousness and headaches.   Endo/Heme/Allergies: Does not bruise/bleed easily.        Objective:   BP (!) 84/60 (BP Location: Right arm, Patient Position: Sitting)   Pulse 94   Ht 1.88 m (6' 2\")   Wt 119.7 kg (264 lb)   SpO2 93%   BMI 33.90 kg/m²     Physical Exam   Constitutional: He is oriented to person, place, and time. He appears well-developed and well-nourished.   BMI 33.90   HENT:   Head: Normocephalic.   Eyes: EOM are normal.   Neck: Normal range of motion. Neck supple. No JVD present.   Cardiovascular: Normal rate, regular rhythm and normal heart sounds.   Pulmonary/Chest: Effort normal and breath sounds normal. No respiratory distress. He has no wheezes. He has no rales.   AICD in left chest wall.   Abdominal: Soft. Bowel sounds are normal. He exhibits no distension. There is no tenderness.   Musculoskeletal: Normal range of motion.         General: No edema.   Neurological: He is alert and oriented to person, place, and time.   Skin: Skin is warm and dry. No rash noted.   Psychiatric: He has a normal mood and affect.     CONCLUSIONS OF ECHOCARDIOGRAM OF 10/16/2019:  Limited Study for LVEF.   TDS - Limited acoustical window, contrast not available.  Prior Echo - 2/12/18, prior EF probably 40%, currently    45%.  Mildly reduced left ventricular systolic function.  Left ventricular ejection fraction is visually estimated to be 45%.  Global hypokinesis.  Abnormal septal motion consistent with postoperative status.     Lab Results   Component Value Date/Time    SODIUM 138 10/09/2019 01:47 PM    POTASSIUM 4.4 10/09/2019 01:47 PM    CHLORIDE 105 10/09/2019 01:47 PM    CO2 24 " 10/09/2019 01:47 PM    GLUCOSE 90 10/09/2019 01:47 PM    BUN 14 10/09/2019 01:47 PM    CREATININE 0.95 10/09/2019 01:47 PM     Lab Results   Component Value Date/Time    CHOLSTRLTOT 110 06/18/2019 09:30 AM    LDL 50 06/18/2019 09:30 AM    HDL 34 (A) 06/18/2019 09:30 AM    TRIGLYCERIDE 131 06/18/2019 09:30 AM       Lab Results   Component Value Date/Time    ASTSGOT 14 06/18/2019 09:30 AM    ALTSGPT 20 06/18/2019 09:30 AM        Assessment:     1. Presence of biventricular AICD     2. ACC/AHA stage C systolic heart failure (HCC)  spironolactone (ALDACTONE) 25 MG Tab   3. Coronary artery disease involving native coronary artery of native heart without angina pectoris     4. Ischemic cardiomyopathy     5. Encounter for medication refill  spironolactone (ALDACTONE) 25 MG Tab   6. Essential hypertension     7. Mixed hyperlipidemia     8. Type 2 diabetes mellitus with complication, with long-term current use of insulin (HCC)         Medical Decision Making:  Today's Assessment / Status / Plan:     1. CAD/ischemic cardiomyopathy (LVEF 45%) with systolic HF and biventricular AICD. He is now back on 1/2 dose on Entresto BID. Given low BP, to cut Aldactone from 25mg to 12.5mg once daily. Watch BP at home.    2. Hypertension, treated, but on the lower side. As above, cut Aldactone in 1/2. Watch getting up quickly, and monitor BP at home.    3. Hyperlipidemia, treated with Lipitor. Recent LDL was 50.    4. Diabetes mellitus, treated.    As above, cut Aldactone in half. Keep February 2020 follow-up for next device interrogation. Watch BP between now and then.    Collaborating MD: Patel

## 2019-11-22 ENCOUNTER — OFFICE VISIT (OUTPATIENT)
Dept: MEDICAL GROUP | Facility: CLINIC | Age: 54
End: 2019-11-22
Payer: MEDICARE

## 2019-11-22 ENCOUNTER — PATIENT MESSAGE (OUTPATIENT)
Dept: CARDIOLOGY | Facility: PHYSICIAN GROUP | Age: 54
End: 2019-11-22

## 2019-11-22 VITALS
WEIGHT: 271.8 LBS | SYSTOLIC BLOOD PRESSURE: 100 MMHG | DIASTOLIC BLOOD PRESSURE: 58 MMHG | BODY MASS INDEX: 34.88 KG/M2 | TEMPERATURE: 98.7 F | OXYGEN SATURATION: 95 % | RESPIRATION RATE: 20 BRPM | HEIGHT: 74 IN | HEART RATE: 89 BPM

## 2019-11-22 DIAGNOSIS — E08.42 DIABETIC POLYNEUROPATHY ASSOCIATED WITH DIABETES MELLITUS DUE TO UNDERLYING CONDITION (HCC): ICD-10-CM

## 2019-11-22 DIAGNOSIS — R56.9 FOCAL SEIZURES (HCC): ICD-10-CM

## 2019-11-22 DIAGNOSIS — Z12.11 SCREENING FOR COLON CANCER: ICD-10-CM

## 2019-11-22 DIAGNOSIS — I48.0 PAROXYSMAL ATRIAL FIBRILLATION (HCC): ICD-10-CM

## 2019-11-22 PROCEDURE — 99214 OFFICE O/P EST MOD 30 MIN: CPT | Performed by: FAMILY MEDICINE

## 2019-11-22 ASSESSMENT — PAIN SCALES - GENERAL: PAINLEVEL: NO PAIN

## 2019-11-22 NOTE — ASSESSMENT & PLAN NOTE
Has not had any more bouts, he maintains, since correction made to his BP meds. Has not seen a neurologist in 1 year. Still having staring spells, bouts of decreased attentiveness.

## 2019-11-22 NOTE — ASSESSMENT & PLAN NOTE
Last night remote device picked up bout of Afib lasting about 20 min. Patient was cooking at the time, did not notice. Will be speaking to [facility] later this PM.

## 2019-11-22 NOTE — PROGRESS NOTES
Complaint: f/u after bout Afib     Subjective:     Juaquin Solorzano is a 54 y.o. male here today following    Paroxysmal atrial fibrillation (HCC)  Last night remote device picked up bout of Afib lasting about 20 min. Patient was cooking at the time, did not notice. Will be speaking to [employer] later this PM.    Diabetic neuropathy associated with diabetes mellitus due to underlying condition (HCC)  Managed with Cymbalta.    Focal seizures (HCC)  Has not had any more bouts, he maintains, since correction made to his BP meds. Has not seen a neurologist in 1 year. Still having staring spells, bouts of decreased attentiveness.     No other concerns or complaints today.    Current medicines (including changes today)  Current Outpatient Medications   Medication Sig Dispense Refill   • sacubitril-valsartan (ENTRESTO)  MG Tab tablet Take 0.5 Tabs by mouth 2 Times a Day.     • spironolactone (ALDACTONE) 25 MG Tab Take 0.5 Tabs by mouth every day. 45 Tab 3   • Semaglutide (OZEMPIC) 1 MG/DOSE Solution Pen-injector Inject 1 mg as instructed every 7 days. 6 PEN 4   • cyclobenzaprine (FLEXERIL) 10 MG Tab   0   • nitroglycerin (NITROSTAT) 0.4 MG SL Tab Place 1 Tab under tongue as needed for Chest Pain. 25 Tab 11   • insulin glargine (LANTUS) 100 UNIT/ML Solution Inject 50 Units as instructed 2 times a day. 10 mL 3   • Empagliflozin-metFORMIN HCl ER 12.5-1000 MG TABLET SR 24 HR Take 1 Tab by mouth 2 times a day. 180 Tab 3   • allopurinol (ZYLOPRIM) 300 MG Tab Take 1 Tab by mouth every day. 90 Tab 3   • atorvastatin (LIPITOR) 40 MG Tab Take 1 Tab by mouth every day. 90 Tab 3   • DULoxetine (CYMBALTA) 60 MG Cap DR Particles delayed-release capsule Take 1 Cap by mouth every morning. 90 Cap 3   • metoprolol SR (TOPROL XL) 100 MG TABLET SR 24 HR Take 1 Tab by mouth 2 Times a Day. 180 Tab 3   • zonisamide (ZONEGRAN) 100 MG Cap Take 2 Caps by mouth every day. 180 Cap 3   • amitriptyline (ELAVIL) 100 MG Tab Take 1 Tab by  mouth at bedtime as needed. 90 Tab 3   • aspirin 81 MG tablet Take 81 mg by mouth every day.     • Omega-3 Fatty Acids (FISH OIL) 1000 MG Cap capsule Take 1,000 mg by mouth 3 times a day, with meals.     • glucose blood (FREESTYLE LITE) strip 1 Strip by Other route 4 times a day. 300 Strip 4   • FREESTYLE LANCETS Misc by Does not apply route.     • Insulin Pen Needle (SURE COMFORT PEN NEEDLES) by Does not apply route.       No current facility-administered medications for this visit.      He  has a past medical history of Arthritis, Block, bundle branch, left, CAD (coronary artery disease), Cataract (04/26/2018), Diabetes ([facility]) (2006), Fibromyalgia (04/26/2018), Gout, Heart burn, Hyperlipidemia, Hypertension, Ischemic cardiomyopathy (10/2019), Myocardial infarct ([facility]) (2007), Myocardial infarct ([facility]) (2012), Neuropathy ([facility]), Psychiatric problem, and Sleep apnea.    Health Maintenance: has had eye exam earlier this year at optometrist      Allergies: Contrast media with iodine [iodine]    Current Outpatient Medications Ordered in Epic   Medication Sig Dispense Refill   • sacubitril-valsartan (ENTRESTO)  MG Tab tablet Take 0.5 Tabs by mouth 2 Times a Day.     • spironolactone (ALDACTONE) 25 MG Tab Take 0.5 Tabs by mouth every day. 45 Tab 3   • Semaglutide (OZEMPIC) 1 MG/DOSE Solution Pen-injector Inject 1 mg as instructed every 7 days. 6 PEN 4   • cyclobenzaprine (FLEXERIL) 10 MG Tab   0   • nitroglycerin (NITROSTAT) 0.4 MG SL Tab Place 1 Tab under tongue as needed for Chest Pain. 25 Tab 11   • insulin glargine (LANTUS) 100 UNIT/ML Solution Inject 50 Units as instructed 2 times a day. 10 mL 3   • Empagliflozin-metFORMIN HCl ER 12.5-1000 MG TABLET SR 24 HR Take 1 Tab by mouth 2 times a day. 180 Tab 3   • allopurinol (ZYLOPRIM) 300 MG Tab Take 1 Tab by mouth every day. 90 Tab 3   • atorvastatin (LIPITOR) 40 MG Tab Take 1 Tab by mouth every day. 90 Tab 3   • DULoxetine (CYMBALTA) 60 MG Cap DR Particles  delayed-release capsule Take 1 Cap by mouth every morning. 90 Cap 3   • metoprolol SR (TOPROL XL) 100 MG TABLET SR 24 HR Take 1 Tab by mouth 2 Times a Day. 180 Tab 3   • zonisamide (ZONEGRAN) 100 MG Cap Take 2 Caps by mouth every day. 180 Cap 3   • amitriptyline (ELAVIL) 100 MG Tab Take 1 Tab by mouth at bedtime as needed. 90 Tab 3   • aspirin 81 MG tablet Take 81 mg by mouth every day.     • Omega-3 Fatty Acids (FISH OIL) 1000 MG Cap capsule Take 1,000 mg by mouth 3 times a day, with meals.     • glucose blood (FREESTYLE LITE) strip 1 Strip by Other route 4 times a day. 300 Strip 4   • FREESTYLE LANCETS Misc by Does not apply route.     • Insulin Pen Needle (SURE COMFORT PEN NEEDLES) by Does not apply route.       No current [facility]-ordered facility-administered medications on file.        Past Medical History:   Diagnosis Date   • Arthritis     osteo, hands/elbows   • Block, bundle branch, left    • CAD (coronary artery disease)    • Cataract 04/26/2018    early stages, not removed   • Diabetes (HCC) 2006    insulin and oral meds   • Fibromyalgia 04/26/2018 5-6/10   • Gout    • Heart burn    • Hyperlipidemia    • Hypertension    • Ischemic cardiomyopathy 10/2019    Echocardiogram with normal LV size, LVEF 45% (Previously 35-40% in February 2018)   • Myocardial infarct ([facility]) 2007   • Myocardial infarct ([facility]) 2012   • Neuropathy ([facility])     Bilateral feet   • Psychiatric problem     Depression and anxiety   • Sleep apnea     Uses CPAP       Past Surgical History:   Procedure Laterality Date   • AICD IMPLANT Left 04/27/2018    [employer] Inogen X4 CRT-D G148 implanted by Dr. Cullen.   • RECOVERY  7/22/2016    Procedure: CATH LAB-[facility] W/POSSIBLE RADIAL APPROACH-CARIE;  Surgeon: Mireya Surgery;  Location: SURGERY PRE-POST PROC UNIT [facility];  Service:    • MULTIPLE CORONARY ARTERY BYPASS  2014    CABG x 3   • LEONILA BY LAPAROSCOPY  2010   • KNEE ARTHROSCOPY      Arthroscopy, Knee       Social History  "    Tobacco Use   • Smoking status: Former Smoker     Packs/day: 0.50     Years: 12.00     Pack years: 6.00     Types: Cigarettes     Last attempt to quit: 7/15/2012     Years since quittin.3   • Smokeless tobacco: Never Used   Substance Use Topics   • Alcohol use: Yes     Comment: 1/month   • Drug use: Yes     Types: Marijuana     Comment: edible marijuana       Social History     Patient does not qualify to have social determinant information on file (likely too young).   Social History Narrative   • Not on file       Family History   Problem Relation Age of Onset   • Heart Disease Mother    • Diabetes Mother    • Hyperlipidemia Father    • Heart Disease Father    • Hypertension Father    • Stroke Father    • Lung Disease Sister          ROS   Patient denies any fever, chills, unintentional weight gain/loss, fatigue, stroke symptoms, dizziness, headache, nasal congestion, sore-throat, cough, heartburn, chest pain, difficulty breathing, abdominal discomfort, diarrhea/constipation, burning with urination or frequency, joint or back pain, skin rashes, depression or anxiety.       Objective:     /58 (BP Location: Right arm, Patient Position: Sitting, BP Cuff Size: Adult)   Pulse 89   Temp 37.1 °C (98.7 °F) (Temporal)   Resp 20   Ht 1.88 m (6' 2\")   Wt 123.3 kg (271 lb 12.8 oz)   SpO2 95%  Body mass index is 34.9 kg/m².   Physical Exam:  Constitutional: Alert, no distress.  Respiratory: Unlabored respiratory effort, lungs clear to auscultation, no wheezes, no ronchi.  Cardiovascular: RRR, Normal S1, S2, no murmur, no extremity edema.    Diabetic Foot Exam:     Normal gross anatomy of bilateral feet without abnormal curvature or arch, no toe deformities  No ulcers/laceration/blisters present on bilateral feet,   No toenail discoloration or thickening, no ingrown toenails,   No difference in skin coloration or temperature between feet,   Bilateral dorsalis pedis and posterior tibial pulses feeble and " equal, Refill less than 2 seconds bilaterally,   Jennifer 10 g monofilament testing normal in bilateral great toes, bilateral balls of feet at medial/lateral/mid ball of foot    Psych: Alert and oriented x3, appropriate affect and mood.        Assessment and Plan:   The following treatment plan was discussed    1. Paroxysmal atrial fibrillation (HCC)  Resolved. F/u with [facility].    2. Screening for colon cancer  Will notify with result.  - OCCULT BLOOD FECES IMMUNOASSAY (FIT); Future    3. Focal seizures (HCC)  - REFERRAL TO NEUROLOGY    4. Diabetic polyneuropathy associated with diabetes mellitus due to underlying condition (HCC)  Controlled on meds.      Followup: Return in about 6 months (around 5/22/2020), or if symptoms worsen or fail to improve.    Please note that this dictation was created using voice recognition software. I have made every reasonable attempt to correct obvious errors, but I expect that there are errors of grammar and possibly content that I did not discover before finalizing the note.

## 2019-11-25 ENCOUNTER — TELEPHONE (OUTPATIENT)
Dept: CARDIOLOGY | Facility: MEDICAL CENTER | Age: 54
End: 2019-11-25

## 2019-11-25 DIAGNOSIS — I48.91 ATRIAL FIBRILLATION, UNSPECIFIED TYPE (HCC): ICD-10-CM

## 2019-11-25 NOTE — TELEPHONE ENCOUNTER
Juaquin Solorzano   to Alisa Bañuelos A.P.N.      10:01 AM   Got a call this morning from [facility] letting me know my monitor notified them last night around 10Pm I went into afib for 10 to 20 min. I thought [facility] was monitoring this and why would I get notified the day after. I have made an appointment with my primary for 140 pm today with [facility] in [place] unless you need me to go somewhere else. Please let me know one way or the other and who should be monitoring my equipment.  Thanks, Juaquin      10:38 AM   Kristina Miner, Med Ass't routed this conversation to Idania Whiteside R.N.   Idania Whiteside R.N.   to Maya Dalton Med Ass't • Damari Black      1:37 PM   Can you please transfer the transmissions to our office- [facility] is currently getting them, and we are now in charge.  Please let me know about this transmission.  Patient will be home after 2:30 and he is waiting for further information.   Kami Black   to Juaquin Solorzano      2:13 PM   Patient will need to manually transmit from remote monitor. If having issues with connectivity please call [employer] at [phone].   Otherwise, leave a message at [phone] or [phone] that pt has transmitted remotely so we can ensure that we have received transmission.    This [email or web address] message has not been read.   Idania Whiteside R.N.   to Damari Black • Maya Dalton Med Ass't     4:58 PM   The patient's Latitude needs to be transferred from [facility] to [facility].  [facility] is aware of the transfer.   Idania Whiteside R.N.   to Idania Whiteside R.N. • Nancy Sweeney M.D. • VIKAS Dos Santos      5:14 PM   I contacted Dr. Sweeney for direction:  Anticoagulation is needed but not a big rush.  Will send as high priority to her for later.   November 25, 2019   Nancy E J Sweeney, M.D.   to Me        9:36 AM   Pt has device detected afib, 1 episode, 38  min, no symptoms. Still getting adequate BiV pacing. Has CHADS2 vasc of 4, no prior TIA/CVA.  Standard of care is to stop ASA, start NOAC or warfarin. Plse offer him appt tomorrow with me as add on or EP APN, general APN. Offer NOAC, or anticaog clinic for warfarin. Tx!   LS   _________________    Contacted patient.  Discussed device data.  Educated patient on AFib and use of NOAC vs ASA.  Purpose of NOAC.  Patient agreeable and states he receives his medications at [facility] for free so finances are not an issue. Educated patient on possible side effects and when to contact clinic or seek emergency evaluation. Instructed patient to stop aspirin. Do not take together.  Patient will take last dose of ASA and begin the NOAC the following day. Patient verbalizes all understanding. Offered patient a FV with LS or APRN to discuss more.  Patient declines and again states he understands.  Contacted [facility] to confirm they carry Eliquis and/or Xarelto.  They confirm yes, they do.      Eliquis 5 mg BID submitted to [facility] pharmacy.

## 2019-11-27 ENCOUNTER — TELEPHONE (OUTPATIENT)
Dept: CARDIOLOGY | Facility: MEDICAL CENTER | Age: 54
End: 2019-11-27

## 2019-11-27 NOTE — TELEPHONE ENCOUNTER
[employer] faxed us download from Latitude monitor from 11/22/2019.  I faxed the report to Maya at [phone] and Alisa Bañuelos in [place].

## 2019-11-27 NOTE — TELEPHONE ENCOUNTER
Patient home monitor is now transmitting to our clinic-- patient had AF episode 11/21 lasting 40 minutes.  No other episodes noted.  Patient taking Eliquis per medication list.

## 2019-12-02 NOTE — TELEPHONE ENCOUNTER
"Called pt. To give all recommendations. He said he is \"feeling alright\" and does not remember sx from 11-21-19.  "

## 2019-12-02 NOTE — TELEPHONE ENCOUNTER
Please let pt know that Latitude is now being routed to our office. He should continue taking Eliquis, as he did have a 40minute AFib episode.  Can you make sure he is feeling alright?  Thanks, AB

## 2019-12-12 ENCOUNTER — HOSPITAL ENCOUNTER (OUTPATIENT)
Facility: MEDICAL CENTER | Age: 54
End: 2019-12-12
Attending: FAMILY MEDICINE
Payer: MEDICARE

## 2019-12-12 PROCEDURE — 82274 ASSAY TEST FOR BLOOD FECAL: CPT

## 2019-12-18 ENCOUNTER — OFFICE VISIT (OUTPATIENT)
Dept: MEDICAL GROUP | Facility: CLINIC | Age: 54
End: 2019-12-18
Payer: MEDICARE

## 2019-12-18 VITALS
HEIGHT: 74 IN | HEART RATE: 87 BPM | RESPIRATION RATE: 16 BRPM | BODY MASS INDEX: 34.78 KG/M2 | SYSTOLIC BLOOD PRESSURE: 98 MMHG | DIASTOLIC BLOOD PRESSURE: 54 MMHG | TEMPERATURE: 98.3 F | WEIGHT: 271 LBS | OXYGEN SATURATION: 98 %

## 2019-12-18 DIAGNOSIS — E11.8 TYPE 2 DIABETES MELLITUS WITH COMPLICATION, WITH LONG-TERM CURRENT USE OF INSULIN (HCC): Primary | ICD-10-CM

## 2019-12-18 DIAGNOSIS — Z79.4 TYPE 2 DIABETES MELLITUS WITH COMPLICATION, WITH LONG-TERM CURRENT USE OF INSULIN (HCC): Primary | ICD-10-CM

## 2019-12-18 DIAGNOSIS — I10 ESSENTIAL HYPERTENSION: ICD-10-CM

## 2019-12-18 LAB
HBA1C MFR BLD: 5.3 % (ref 0–5.6)
INT CON NEG: NEGATIVE
INT CON POS: POSITIVE

## 2019-12-18 PROCEDURE — 83036 HEMOGLOBIN GLYCOSYLATED A1C: CPT | Performed by: PHYSICIAN ASSISTANT

## 2019-12-18 PROCEDURE — 99214 OFFICE O/P EST MOD 30 MIN: CPT | Performed by: PHYSICIAN ASSISTANT

## 2019-12-18 ASSESSMENT — PAIN SCALES - GENERAL: PAINLEVEL: NO PAIN

## 2019-12-18 NOTE — PROGRESS NOTES
Return to office Patient Consult Note  Referred by: Juaquin Martin M.D.    Reason for consult: Diabetes Management Type 2    HPI:  Juaquin Solorzano is a 54 y.o. old patient who is seeing us today for diabetes care.  This is a pleasant patient with diabetes and I appreciate the opportunity to participate in the care of this patient.    Labs of 12/18/2019 HbA1c is 5.3  Labs of 6/19/2019 HbA1c is 6.4  , GFR >60, microalbumin negative,        BG Diary:12/18/2019  In the AM:  No log    1. Type 2 diabetes mellitus with complication, with long-term current use of insulin (HCC)    This is a new patient with me on 6/19/2019  They were on:  1.  Novolog sliding scale before each meal (3 times a day)    6 units  101-120 8 units  2.  Lantus 35 at night   3.  Metfomrin  4.  Jardiance 10     Now on:  2.  Lantus 10 at night   5.  Synjardy 12.5/1000 one in the Am one in the PM  6.  Ozempic 1.0 once a week   7.  Novolog 6 units with each meal (not on right now)     2. Essential hypertension  This is stable today and no new changes are needed or required in today's visit        3. Encounter for long-term (current) use of insulin (HCC)  Is on a high risk medication Insulin and we will continue to follow        ROS:   Constitutional: No night sweats.  Eyes:  No visual changes.  Cardiac: No chest pain, No palpitations or racing heart rate.  Resp: No shortness of breath, No cough,   Gi: No Diarrhea    All other systems were reviewed and were/are negative.      Past Medical History:  Patient Active Problem List    Diagnosis Date Noted   • Hyperlipidemia 11/06/2019     Priority: High   • Ischemic cardiomyopathy 09/13/2018     Priority: High   • Presence of biventricular AICD 05/07/2018     Priority: High   • CAD (coronary artery disease) 05/07/2018     Priority: High   • ACC/AHA stage C systolic heart failure (HCC) 05/18/2017     Priority: High   • S/P CABG x 3 05/18/2017     Priority: High   • Essential hypertension  2017     Priority: High   • Class 2 obesity due to excess calories in adult 07/10/2019     Priority: Medium   • Diabetic neuropathy associated with diabetes mellitus due to underlying condition ([facility]) 2018     Priority: Medium   • Type 2 diabetes mellitus with complication, with long-term current use of insulin ([facility]) 2017     Priority: Medium   • Paroxysmal atrial fibrillation ([facility]) 2019   • Obstructive sleep apnea syndrome 2019   • Encounter for long-term (current) use of insulin ([facility]) 2019   • Gout of foot 2019   • Focal seizures ([facility]) 2019   • Right hip pain 2019   • Spells of decreased attentiveness 2018   • Vitamin deficiency 2018       Past Surgical History:  Past Surgical History:   Procedure Laterality Date   • AICD IMPLANT Left 2018    [employer] Inogen X4 CRT-D G148 implanted by Dr. Cullen.   • RECOVERY  2016    Procedure: CATH LAB-[facility] W/POSSIBLE RADIAL APPROACH-CARIE;  Surgeon: Recovery[facility] Surgery;  Location: SURGERY PRE-POST PROC UNIT [facility];  Service:    • MULTIPLE CORONARY ARTERY BYPASS  2014    CABG x 3   • LEONILA BY LAPAROSCOPY     • KNEE ARTHROSCOPY      Arthroscopy, Knee       Allergies:  Contrast media with iodine [iodine]    Social History:  Social History     Socioeconomic History   • Marital status:      Spouse name: Not on file   • Number of children: Not on file   • Years of education: Not on file   • Highest education level: Not on file   Occupational History   • Not on file   Social Needs   • Financial resource strain: Not on file   • Food insecurity:     Worry: Not on file     Inability: Not on file   • Transportation needs:     Medical: Not on file     Non-medical: Not on file   Tobacco Use   • Smoking status: Former Smoker     Packs/day: 0.50     Years: 12.00     Pack years: 6.00     Types: Cigarettes     Last attempt to quit: 7/15/2012     Years since quittin.4   • Smokeless tobacco: Never  Used   Substance and Sexual Activity   • Alcohol use: Yes     Comment: 1/month   • Drug use: Yes     Types: Marijuana     Comment: edible marijuana   • Sexual activity: Not Currently   Lifestyle   • Physical activity:     Days per week: Not on file     Minutes per session: Not on file   • Stress: Not on file   Relationships   • Social connections:     Talks on phone: Not on file     Gets together: Not on file     Attends Spiritism service: Not on file     Active member of club or organization: Not on file     Attends meetings of clubs or organizations: Not on file     Relationship status: Not on file   • Intimate partner violence:     Fear of current or ex partner: Not on file     Emotionally abused: Not on file     Physically abused: Not on file     Forced sexual activity: Not on file   Other Topics Concern   • Not on file   Social History Narrative   • Not on file       Family History:  Family History   Problem Relation Age of Onset   • Heart Disease Mother    • Diabetes Mother    • Hyperlipidemia Father    • Heart Disease Father    • Hypertension Father    • Stroke Father    • Lung Disease Sister        Medications:    Current Outpatient Medications:   •  Semaglutide, 1 MG/DOSE, (OZEMPIC, 1 MG/DOSE,) 2 MG/1.5ML Solution Pen-injector, Inject 1 mg as instructed every 7 days., Disp: 6 PEN, Rfl: 4  •  Empagliflozin-metFORMIN HCl ER 12.5-1000 MG TABLET SR 24 HR, Take 1 Tab by mouth 2 times a day., Disp: 180 Tab, Rfl: 4  •  apixaban (ELIQUIS) 5mg Tab, Take 1 Tab by mouth 2 Times a Day., Disp: 90 Tab, Rfl: 3  •  sacubitril-valsartan (ENTRESTO)  MG Tab tablet, Take 0.5 Tabs by mouth 2 Times a Day., Disp: , Rfl:   •  spironolactone (ALDACTONE) 25 MG Tab, Take 0.5 Tabs by mouth every day., Disp: 45 Tab, Rfl: 3  •  cyclobenzaprine (FLEXERIL) 10 MG Tab, , Disp: , Rfl: 0  •  nitroglycerin (NITROSTAT) 0.4 MG SL Tab, Place 1 Tab under tongue as needed for Chest Pain., Disp: 25 Tab, Rfl: 11  •  allopurinol (ZYLOPRIM) 300  "MG Tab, Take 1 Tab by mouth every day., Disp: 90 Tab, Rfl: 3  •  atorvastatin (LIPITOR) 40 MG Tab, Take 1 Tab by mouth every day., Disp: 90 Tab, Rfl: 3  •  DULoxetine (CYMBALTA) 60 MG Cap DR Particles delayed-release capsule, Take 1 Cap by mouth every morning., Disp: 90 Cap, Rfl: 3  •  metoprolol SR (TOPROL XL) 100 MG TABLET SR 24 HR, Take 1 Tab by mouth 2 Times a Day., Disp: 180 Tab, Rfl: 3  •  zonisamide (ZONEGRAN) 100 MG Cap, Take 2 Caps by mouth every day., Disp: 180 Cap, Rfl: 3  •  amitriptyline (ELAVIL) 100 MG Tab, Take 1 Tab by mouth at bedtime as needed., Disp: 90 Tab, Rfl: 3  •  Omega-3 Fatty Acids (FISH OIL) 1000 MG Cap capsule, Take 1,000 mg by mouth 3 times a day, with meals., Disp: , Rfl:   •  glucose blood (FREESTYLE LITE) strip, 1 Strip by Other route 4 times a day., Disp: 300 Strip, Rfl: 4  •  FREESTYLE LANCETS Misc, by Does not apply route., Disp: , Rfl:   •  Insulin Pen Needle (SURE COMFORT PEN NEEDLES), by Does not apply route., Disp: , Rfl:         Physical Examination:   Vital signs: BP (!) 98/54 (BP Location: Left arm, Patient Position: Sitting, BP Cuff Size: Adult)   Pulse 87   Temp 36.8 °C (98.3 °F) (Temporal)   Resp 16   Ht 1.88 m (6' 2\")   Wt 122.9 kg (271 lb)   SpO2 98%   BMI 34.79 kg/m²   General: No distress, cooperative, well dressed and well nourished.   Eyes: No scleral icterus or discharge, No hyposphagma  ENMT: Normal on external inspection of nose, lips, No nasal drainage   Neck: No abnormal masses on inspection  Resp: Normal effort, Bilateral clear to auscultation, No wheezing, No rales  CVS: Regular rate and rhythm, S1 S2 normal, No murmur. No gallop  Extremities: No edema bilateral extremities  Neuro: Alert and oriented  Skin: No rash, No Ulcers  Psych: Normal mood and affect      Assessment and Plan:    1. Type 2 diabetes mellitus with complication, with long-term current use of insulin (HCC)    Now on:  2.  Lantus 10 at night  (STOP)  5.  Synjardy 12.5/1000 one in the " Am one in the PM  6.  Ozempic 1.0 once a week     2. Essential hypertension  This is stable today and no new changes are needed or required in today's visit      Return in about 1 year (around 12/18/2020).      Thank you kindly for allowing me to participate in the diabetes care plan for this patient.    Junior Shepherd PA-C, BC-ADM  Board Certified - Advanced Diabetes Management  12/18/19    CC:   Juaquin Martin M.D.

## 2019-12-28 DIAGNOSIS — Z12.11 SCREENING FOR COLON CANCER: ICD-10-CM

## 2019-12-28 LAB — AMBIGUOUS DTTM AMBI4: NORMAL

## 2019-12-30 LAB — HEMOCCULT STL QL IA: NEGATIVE

## 2020-01-15 ENCOUNTER — TELEPHONE (OUTPATIENT)
Dept: MEDICAL GROUP | Facility: CLINIC | Age: 55
End: 2020-01-15

## 2020-01-15 DIAGNOSIS — N52.9 ERECTILE DYSFUNCTION, UNSPECIFIED ERECTILE DYSFUNCTION TYPE: ICD-10-CM

## 2020-01-15 RX ORDER — VARDENAFIL HYDROCHLORIDE 10 MG/1
10 TABLET ORAL PRN
Qty: 10 TAB | Refills: 3 | Status: SHIPPED
Start: 2020-01-15 | End: 2020-01-15 | Stop reason: SDUPTHER

## 2020-01-15 RX ORDER — VARDENAFIL HYDROCHLORIDE 10 MG/1
10 TABLET ORAL PRN
Qty: 10 TAB | Refills: 3 | Status: SHIPPED
Start: 2020-01-15 | End: 2020-02-05

## 2020-01-15 NOTE — TELEPHONE ENCOUNTER
Pt came in and stated that the [facility] pharmacy does not carry vardenafil (LEVITRA) 10 MG tablet  Can you please sent it to [facility] for him to

## 2020-01-21 ENCOUNTER — TELEPHONE (OUTPATIENT)
Dept: MEDICAL GROUP | Facility: CLINIC | Age: 55
End: 2020-01-21

## 2020-01-21 NOTE — TELEPHONE ENCOUNTER
Pt came in and stated that he needs a switch to a generic Viagra. If you could please send in a script for it

## 2020-02-05 ENCOUNTER — NON-PROVIDER VISIT (OUTPATIENT)
Dept: CARDIOLOGY | Facility: PHYSICIAN GROUP | Age: 55
End: 2020-02-05
Payer: MEDICARE

## 2020-02-05 VITALS
HEART RATE: 94 BPM | WEIGHT: 253 LBS | SYSTOLIC BLOOD PRESSURE: 92 MMHG | DIASTOLIC BLOOD PRESSURE: 60 MMHG | BODY MASS INDEX: 33.53 KG/M2 | OXYGEN SATURATION: 96 % | HEIGHT: 73 IN

## 2020-02-05 DIAGNOSIS — N52.9 ERECTILE DYSFUNCTION, UNSPECIFIED ERECTILE DYSFUNCTION TYPE: ICD-10-CM

## 2020-02-05 DIAGNOSIS — I25.5 ISCHEMIC CARDIOMYOPATHY: ICD-10-CM

## 2020-02-05 DIAGNOSIS — I25.10 CORONARY ARTERY DISEASE INVOLVING NATIVE CORONARY ARTERY OF NATIVE HEART WITHOUT ANGINA PECTORIS: ICD-10-CM

## 2020-02-05 DIAGNOSIS — I50.20 ACC/AHA STAGE C SYSTOLIC HEART FAILURE (HCC): ICD-10-CM

## 2020-02-05 DIAGNOSIS — Z95.810 PRESENCE OF BIVENTRICULAR AICD: ICD-10-CM

## 2020-02-05 PROCEDURE — 93284 PRGRMG EVAL IMPLANTABLE DFB: CPT | Performed by: NURSE PRACTITIONER

## 2020-02-05 RX ORDER — SILDENAFIL 50 MG/1
50 TABLET, FILM COATED ORAL PRN
Qty: 10 TAB | Refills: 3 | Status: SHIPPED | OUTPATIENT
Start: 2020-02-05 | End: 2020-03-11 | Stop reason: SDUPTHER

## 2020-02-05 NOTE — PROGRESS NOTES
Since the last visit, patient has been working on losing weight (eating less), and feels better. BP remains low, but he seems to tolerate without problems.    Device is working normally.  No device therapy.  One mode switching episode lasting 39 minutes on 11/21/2019; he is anticoagulated with Eliquis.  Normal sensing and capture of RA, RV and LV leads; stable impedances. Battery charge time is 10.7 seconds; battery longevity is 8 years.  No changes are made today.    He is due to see Dr. Sweeney in April/May 2020, so we will get this scheduled.    FU in 6 months for next AICD check with me.    Collaborating MD: Patel

## 2020-02-19 DIAGNOSIS — Z76.0 ENCOUNTER FOR MEDICATION REFILL: ICD-10-CM

## 2020-02-19 RX ORDER — ZONISAMIDE 100 MG/1
200 CAPSULE ORAL DAILY
Qty: 180 CAP | Refills: 1 | Status: SHIPPED | OUTPATIENT
Start: 2020-02-19 | End: 2020-09-14 | Stop reason: SDUPTHER

## 2020-02-19 NOTE — TELEPHONE ENCOUNTER
Was the patient seen in the last year in this department? Yes    Does patient have an active prescription for medications requested? Yes    Received Request Via: Pharmacy    Office Visit on 12/18/2019   Component Date Value   • Glycohemoglobin 12/18/2019 5.3    • Internal Control Negative 12/18/2019 Negative    • Internal Control Positive 12/18/2019 Positive    Hospital Outpatient Visit on 12/12/2019   Component Date Value   • Occult Blood, IA 12/12/2019 Negative    • Ambiguous Date/Time 12/28/2019 See Below:    Ancillary Procedure on 10/16/2019   Component Date Value   • Eject.Frac. MOD BP 10/16/2019 44.57    • Eject.Frac. MOD 4C 10/16/2019 46.8    • Eject.Frac. MOD 2C 10/16/2019 48.92    • Left Ventrical Ejection * 10/16/2019 45    Hospital Outpatient Visit on 10/09/2019   Component Date Value   • Sodium 10/09/2019 138    • Potassium 10/09/2019 4.4    • Chloride 10/09/2019 105    • Co2 10/09/2019 24    • Glucose 10/09/2019 90    • Bun 10/09/2019 14    • Creatinine 10/09/2019 0.95    • Calcium 10/09/2019 9.6    • Anion Gap 10/09/2019 9.0    • GFR If  10/09/2019 >60    • GFR If Non  Ameri* 10/09/2019 >60    Office Visit on 06/19/2019   Component Date Value   • Glycohemoglobin 06/19/2019 6.4*   • Internal Control Negative 06/19/2019 Negative    • Internal Control Positive 06/19/2019 Positive    Hospital Outpatient Visit on 06/18/2019   Component Date Value   • WBC 06/18/2019 9.4    • RBC 06/18/2019 4.76    • Hemoglobin 06/18/2019 15.0    • Hematocrit 06/18/2019 46.9    • MCV 06/18/2019 98.5*   • MCH 06/18/2019 31.5    • MCHC 06/18/2019 32.0*   • RDW 06/18/2019 49.4    • Platelet Count 06/18/2019 202    • MPV 06/18/2019 10.6    • Neutrophils-Polys 06/18/2019 50.90    • Lymphocytes 06/18/2019 35.80    • Monocytes 06/18/2019 8.20    • Eosinophils 06/18/2019 2.10    • Basophils 06/18/2019 1.10    • Immature Granulocytes 06/18/2019 1.90*   • Nucleated RBC 06/18/2019 0.20    • Neutrophils (Absolute)  06/18/2019 4.80    • Lymphs (Absolute) 06/18/2019 3.37    • Monos (Absolute) 06/18/2019 0.77    • Eos (Absolute) 06/18/2019 0.20    • Baso (Absolute) 06/18/2019 0.10    • Immature Granulocytes (a* 06/18/2019 0.18*   • NRBC (Absolute) 06/18/2019 0.02    • Sodium 06/18/2019 141    • Potassium 06/18/2019 4.6    • Chloride 06/18/2019 107    • Co2 06/18/2019 24    • Anion Gap 06/18/2019 10.0    • Glucose 06/18/2019 125*   • Bun 06/18/2019 19    • Creatinine 06/18/2019 1.00    • Calcium 06/18/2019 9.5    • AST(SGOT) 06/18/2019 14    • ALT(SGPT) 06/18/2019 20    • Alkaline Phosphatase 06/18/2019 62    • Total Bilirubin 06/18/2019 0.5    • Albumin 06/18/2019 3.9    • Total Protein 06/18/2019 6.2    • Globulin 06/18/2019 2.3    • A-G Ratio 06/18/2019 1.7    • Cholesterol,Tot 06/18/2019 110    • Triglycerides 06/18/2019 131    • HDL 06/18/2019 34*   • LDL 06/18/2019 50    • Prostatic Specific Antig* 06/18/2019 0.19    • Creatinine, Urine 06/18/2019 128.30    • Microalbumin, Urine Rand* 06/18/2019 2.7    • Micro Alb Creat Ratio 06/18/2019 21    • GFR If  06/18/2019 >60    • GFR If Non  Ameri* 06/18/2019 >60    ]

## 2020-03-11 ENCOUNTER — OFFICE VISIT (OUTPATIENT)
Dept: CARDIOLOGY | Facility: PHYSICIAN GROUP | Age: 55
End: 2020-03-11
Payer: MEDICARE

## 2020-03-11 VITALS
OXYGEN SATURATION: 96 % | DIASTOLIC BLOOD PRESSURE: 70 MMHG | HEIGHT: 73 IN | BODY MASS INDEX: 31.68 KG/M2 | HEART RATE: 88 BPM | SYSTOLIC BLOOD PRESSURE: 102 MMHG | WEIGHT: 239 LBS

## 2020-03-11 DIAGNOSIS — G47.33 OSA ON CPAP: ICD-10-CM

## 2020-03-11 DIAGNOSIS — I10 ESSENTIAL HYPERTENSION: ICD-10-CM

## 2020-03-11 DIAGNOSIS — R55 SYNCOPE AND COLLAPSE: ICD-10-CM

## 2020-03-11 DIAGNOSIS — I44.7 LBBB (LEFT BUNDLE BRANCH BLOCK): ICD-10-CM

## 2020-03-11 DIAGNOSIS — I25.10 CORONARY ARTERY DISEASE INVOLVING NATIVE CORONARY ARTERY OF NATIVE HEART WITHOUT ANGINA PECTORIS: ICD-10-CM

## 2020-03-11 DIAGNOSIS — Z95.1 HX OF CABG: ICD-10-CM

## 2020-03-11 DIAGNOSIS — E11.8 TYPE 2 DIABETES MELLITUS WITH COMPLICATION, WITH LONG-TERM CURRENT USE OF INSULIN (HCC): ICD-10-CM

## 2020-03-11 DIAGNOSIS — E78.2 MIXED HYPERLIPIDEMIA: ICD-10-CM

## 2020-03-11 DIAGNOSIS — Z79.899 HIGH RISK MEDICATION USE: ICD-10-CM

## 2020-03-11 DIAGNOSIS — I25.5 ISCHEMIC CARDIOMYOPATHY: ICD-10-CM

## 2020-03-11 DIAGNOSIS — I48.0 PAROXYSMAL ATRIAL FIBRILLATION (HCC): ICD-10-CM

## 2020-03-11 DIAGNOSIS — I48.91 ATRIAL FIBRILLATION, UNSPECIFIED TYPE (HCC): ICD-10-CM

## 2020-03-11 DIAGNOSIS — Z95.1 S/P CABG X 3: ICD-10-CM

## 2020-03-11 DIAGNOSIS — Z95.810 PRESENCE OF BIVENTRICULAR AICD: ICD-10-CM

## 2020-03-11 DIAGNOSIS — Z79.4 TYPE 2 DIABETES MELLITUS WITH COMPLICATION, WITH LONG-TERM CURRENT USE OF INSULIN (HCC): ICD-10-CM

## 2020-03-11 DIAGNOSIS — F12.20 MODERATE TETRAHYDROCANNABINOL (THC) DEPENDENCE (HCC): ICD-10-CM

## 2020-03-11 DIAGNOSIS — N52.9 ERECTILE DYSFUNCTION, UNSPECIFIED ERECTILE DYSFUNCTION TYPE: ICD-10-CM

## 2020-03-11 DIAGNOSIS — Z76.0 ENCOUNTER FOR MEDICATION REFILL: ICD-10-CM

## 2020-03-11 PROCEDURE — 99215 OFFICE O/P EST HI 40 MIN: CPT | Performed by: INTERNAL MEDICINE

## 2020-03-11 RX ORDER — SILDENAFIL 50 MG/1
50 TABLET, FILM COATED ORAL PRN
Qty: 10 TAB | Refills: 10 | Status: SHIPPED | OUTPATIENT
Start: 2020-03-11 | End: 2020-11-30 | Stop reason: SDUPTHER

## 2020-03-11 ASSESSMENT — FIBROSIS 4 INDEX: FIB4 SCORE: 0.82

## 2020-03-11 NOTE — LETTER
[facility]   [address] [place] [address] [place] [address]  Phone: [phone]  Fax: [phone]              Juaquin Solorzano  1965    Encounter Date: 3/11/2020    Nancy Sweeney M.D.          PROGRESS NOTE:  Subjective:   Chief Complaint:   Chief Complaint   Patient presents with   • Cardiomyopathy (Ischemic)       Juaquin Solorzano is a 54 y.o. male who returns today for coronary artery disease, prior bypass, ischemic cardiomyopathy, chest pain, pacemaker and med indued hypotension on full entresto.     He was 40 when he started having severe SOB, was ACS, had stent put in, then had ACS a year later, only symptoms were SOB/diaphoresis, had stent in stent ([facility]).    Then had bypass x3 in September 2013 ([facility] in [place]), does not recall symptoms at that time.  In 2014 he had open sternotomy due to complications (wires broke, had non union, had titanium plate with screws).    Had a repeat left heart catheterization July 2016 which showed occluded vein grafts, diffuse distal RCA disease and 100% occluded LAD, moderate nonobstructive circumflex disease.  Fortunately, limited his LAD is patent. This was done for low EF, he recalls lowest 32%.    Had an ICD/BiV placed with Dr. Cullen 4-2019. No shocks.  Has ischemic CMO, [facility] 3 symptoms, gets DING walking to end of driveway.  No orthopnea or lower extremity swelling.   EF 40-45%.  Gets CRT 95%.    Has had PAF, no prior CVA, on apixaban for CHADS2 vasc of 4, no prior TIA/CVA.  Does not feel it, new recently.    LDL cholesterol is 50, HDL low at 34, on Lipitor 40 mg.  Has insulin-dependent type 2 diabetes.  GLP-1 Receptor antagonist.  On Jardiance.  He has lost weight w/o really trying, eating less, wife concerned but he is not.    Has sleep apnea, uses CPAP.    Was having symptomatic low blood pressure on full dose Entresto, lightheaded, syncope. Did have  syncope once from this but has not recurred. BP in the 80s at the time.  Felt better on half dose.  BMP has been fine.    He is not limited by chest pain, pressure or tightness with activity.   No significant palpitations, lightheadedness, or presyncope/syncope.   No symptoms of leg claudication.   No stroke/TIA like symptoms.    He reports being forgetful at times, cannot recall all details.    Mother's side had premature CAD, MI, DM.  Father  of CVA, had carotid artery disease.    Here with his wife, her family is here.  She is retired from [employer].  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    DATA REVIEWED by me:  ECG 2018  Sinus, ventricular paced, rate 72    Device check 2020  1 mode switch 39 min, V paced 95%, a paced 1%.    Device interrogation 19  CRT-D, rate 60, V 90%, A 1%    Echo [facility] 10-16-19  Limited Study for LVEF.   TDS - Limited acoustical window, contrast not available.  Prior Echo - 18, prior EF probably 40%, currently    45%.  Mildly reduced left ventricular systolic function.  Left ventricular ejection fraction is visually estimated to be 45%.  Global hypokinesis.  Abnormal septal motion consistent with postoperative status.    Echo 2018  EF 35%, septal and apical hypokinesis, mild MR, unable to estimate RVSP    Left heart catheterization 2016  PREOPERATIVE DIAGNOSES:  1.  Dyspnea on exertion.  2.  Congestive heart failure with unclear EF due to poor preoperative imaging.  3.  Coronary artery disease status post CABG with unknown graft anatomy.  4.  Insulin-dependent diabetes mellitus.  5.  Morbid obesity.     POSTOPERATIVE DIAGNOSES:  1.  Occluded vein grafts.  2.  Patent LIMA to LAD.  3.  Diffuse distal RCA disease.  4.  A 100% occluded LAD stent.  5.  Moderate nonobstructive left circumflex disease.  6.  LVEF 50%.    PFTs 2017  ASSESSMENT:  Spirometry suggests mild restrictive lung disease and no  evidence   of obstruction.  Plethysmography reveals mild hyperinflation and no evidence   of restrictive lung disease.  Gas transfer is normal as is airway resistance.    Clinical correlation is recommended.    Chest x-ray 4/27/2018  FINDINGS:  Cardiomediastinal contour is unchanged.  No focal consolidation.  No pneumothorax identified.  Supportive tubing is unchanged.  Postoperative change from prior open heart surgery.    Most recent labs:     Lab Results   Component Value Date/Time    HEMOGLOBIN 15.0 06/18/2019 09:30 AM    HEMATOCRIT 46.9 06/18/2019 09:30 AM    MCV 98.5 (H) 06/18/2019 09:30 AM    INR 0.98 04/27/2018 06:45 AM      Lab Results   Component Value Date/Time    SODIUM 138 10/09/2019 01:47 PM    POTASSIUM 4.4 10/09/2019 01:47 PM    CHLORIDE 105 10/09/2019 01:47 PM    CO2 24 10/09/2019 01:47 PM    GLUCOSE 90 10/09/2019 01:47 PM    BUN 14 10/09/2019 01:47 PM    CREATININE 0.95 10/09/2019 01:47 PM      Lab Results   Component Value Date/Time    ASTSGOT 14 06/18/2019 09:30 AM    ALTSGPT 20 06/18/2019 09:30 AM    ALBUMIN 3.9 06/18/2019 09:30 AM      Lab Results   Component Value Date/Time    CHOLSTRLTOT 110 06/18/2019 09:30 AM    LDL 50 06/18/2019 09:30 AM    HDL 34 (A) 06/18/2019 09:30 AM    TRIGLYCERIDE 131 06/18/2019 09:30 AM           6/18/2019 hemoglobin 15, post 202, sodium 141, potassium 4.6, creatinine 1, LFTs normal, LDL 50, HDL 34, triglycerides 131, total cholesterol 110    Past Medical History:   Diagnosis Date   • Arthritis     osteo, hands/elbows   • Block, bundle branch, left    • CAD (coronary artery disease)    • Cataract 04/26/2018    early stages, not removed   • Diabetes (HCC) 2006    insulin and oral meds   • Fibromyalgia 04/26/2018 5-6/10   • Gout    • Heart burn    • Hyperlipidemia    • Hypertension    • Ischemic cardiomyopathy 10/2019    Echocardiogram with normal LV size, LVEF 45% (Previously 35-40% in February 2018)   • Myocardial infarct ([facility]) 2007   • Myocardial infarct ([facility])     • Neuropathy (HCC)     Bilateral feet   • Psychiatric problem     Depression and anxiety   • Sleep apnea     Uses CPAP     Past Surgical History:   Procedure Laterality Date   • AICD IMPLANT Left 2018    [employer] Inogen X4 CRT-D G148 implanted by Dr. Cullen.   • RECOVERY  2016    Procedure: CATH LAB-[facility] W/POSSIBLE RADIAL APPROACH-CARIE;  Surgeon: Recoveryonly Surgery;  Location: SURGERY PRE-POST PROC UNIT [facility];  Service:    • MULTIPLE CORONARY ARTERY BYPASS  2014    CABG x 3   • LEONILA BY LAPAROSCOPY     • KNEE ARTHROSCOPY      Arthroscopy, Knee     Family History   Problem Relation Age of Onset   • Heart Disease Mother    • Diabetes Mother    • Hyperlipidemia Father    • Heart Disease Father    • Hypertension Father    • Stroke Father    • Lung Disease Sister      Social History     Socioeconomic History   • Marital status:      Spouse name: Not on file   • Number of children: Not on file   • Years of education: Not on file   • Highest education level: Not on file   Occupational History   • Not on file   Social Needs   • Financial resource strain: Not on file   • Food insecurity     Worry: Not on file     Inability: Not on file   • Transportation needs     Medical: Not on file     Non-medical: Not on file   Tobacco Use   • Smoking status: Former Smoker     Packs/day: 0.50     Years: 12.00     Pack years: 6.00     Types: Cigarettes     Last attempt to quit: 7/15/2012     Years since quittin.6   • Smokeless tobacco: Never Used   Substance and Sexual Activity   • Alcohol use: Yes     Comment: 1/month   • Drug use: Yes     Types: Marijuana     Comment: edible marijuana   • Sexual activity: Not Currently   Lifestyle   • Physical activity     Days per week: Not on file     Minutes per session: Not on file   • Stress: Not on file   Relationships   • Social connections     Talks on phone: Not on file     Gets together: Not on file     Attends [employer] service: Not on file      Active member of club or organization: Not on file     Attends meetings of clubs or organizations: Not on file     Relationship status: Not on file   • Intimate partner violence     Fear of current or ex partner: Not on file     Emotionally abused: Not on file     Physically abused: Not on file     Forced sexual activity: Not on file   Other Topics Concern   • Not on file   Social History Narrative   • Not on file     Allergies   Allergen Reactions   • Iodine Anaphylaxis     Other reaction(s): Unknown       Current Outpatient Medications   Medication Sig Dispense Refill   • sildenafil citrate (VIAGRA) 50 MG tablet Take 1 Tab by mouth as needed for Erectile Dysfunction. 10 Tab 10   • apixaban (ELIQUIS) 5mg Tab Take 1 Tab by mouth 2 Times a Day. 180 Tab 3   • zonisamide (ZONEGRAN) 100 MG Cap Take 2 Caps by mouth every day. 180 Cap 1   • Semaglutide, 1 MG/DOSE, (OZEMPIC, 1 MG/DOSE,) 2 MG/1.5ML Solution Pen-injector Inject 1 mg as instructed every 7 days. 6 PEN 4   • Empagliflozin-metFORMIN HCl ER 12.5-1000 MG TABLET SR 24 HR Take 1 Tab by mouth 2 times a day. 180 Tab 4   • sacubitril-valsartan (ENTRESTO)  MG Tab tablet Take 0.5 Tabs by mouth 2 Times a Day.     • spironolactone (ALDACTONE) 25 MG Tab Take 0.5 Tabs by mouth every day. 45 Tab 3   • cyclobenzaprine (FLEXERIL) 10 MG Tab   0   • nitroglycerin (NITROSTAT) 0.4 MG SL Tab Place 1 Tab under tongue as needed for Chest Pain. 25 Tab 11   • glucose blood (FREESTYLE LITE) strip 1 Strip by Other route 4 times a day. 300 Strip 4   • FREESTYLE LANCETS Misc by Does not apply route.     • Insulin Pen Needle (SURE COMFORT PEN NEEDLES) by Does not apply route.     • allopurinol (ZYLOPRIM) 300 MG Tab Take 1 Tab by mouth every day. 90 Tab 3   • atorvastatin (LIPITOR) 40 MG Tab Take 1 Tab by mouth every day. 90 Tab 3   • DULoxetine (CYMBALTA) 60 MG Cap DR Particles delayed-release capsule Take 1 Cap by mouth every morning. 90 Cap 3   • metoprolol SR (TOPROL XL) 100 MG  "TABLET SR 24 HR Take 1 Tab by mouth 2 Times a Day. 180 Tab 3   • amitriptyline (ELAVIL) 100 MG Tab Take 1 Tab by mouth at bedtime as needed. 90 Tab 3   • Omega-3 Fatty Acids (FISH OIL) 1000 MG Cap capsule Take 1,000 mg by mouth 3 times a day, with meals.       No current facility-administered medications for this visit.        ROS    All others systems reviewed and negative.     Objective:     /70 (BP Location: Right arm, Patient Position: Sitting)   Pulse 88   Ht 1.854 m (6' 1\")   Wt 108.4 kg (239 lb)   SpO2 96%  Body mass index is 31.53 kg/m².    Physical Exam   General: No acute distress. Well nourished.  HEENT: EOM grossly intact, no scleral icterus, no pharyngeal erythema.   Neck:  No JVD, no bruits, trachea midline  CVS: RRR. Normal S1, S2. No M/R/G. No LE edema.  2+ radial pulses, 2+ DP pulses, ICD pocket in tact, midline incision.  Resp: CTAB. No wheezing or crackles/rhonchi. Normal respiratory effort.  Abdomen: Soft, NT, no irais hepatomegaly.  MSK/Ext: No clubbing or cyanosis.  Skin: Warm and dry, no rashes.  Neurological: CN III-XII grossly intact. No focal deficits.   Psych: A&O x 3, appropriate affect, good judgement    Physical exam performed today and unchanged, except what is noted, compared to 10-9-19      Assessment:     1. Coronary artery disease involving native coronary artery of native heart without angina pectoris     2. Ischemic cardiomyopathy     3. Presence of biventricular AICD     4. Encounter for medication refill     5. Essential hypertension     6. Type 2 diabetes mellitus with complication, with long-term current use of insulin (HCC)     7. Mixed hyperlipidemia     8. Hx of CABG     9. CLARE on CPAP     10. S/P CABG x 3     11. Syncope and collapse     12. LBBB (left bundle branch block)     13. High risk medication use     14. Erectile dysfunction, unspecified erectile dysfunction type  sildenafil citrate (VIAGRA) 50 MG tablet   15. Moderate tetrahydrocannabinol (THC) " dependence (HCC)     16. Atrial fibrillation, unspecified type (HCC)  apixaban (ELIQUIS) 5mg Tab   17. Paroxysmal atrial fibrillation (HCC)         Medical Decision Making:  Today's Assessment / Status / Plan:     -Very complex care  -PAF, new/recent, on apixaban  -CMO, [facility] 3, stage C, on entresto, prior syncope  -CAD, MI, Stents, CABG, ok  -Prior HTN, now recent hypotension  -ED, no nitro, I gave him refill  -PAF, on apixaban  -CRT, getting therapy, EF 40-45%  -Cont close FU  -I am not concerned about his weight loss  -He is planning to travel for trade show, I told him not to go and to practice social distancing, he is driving, staying in air BnB.  -Short FU, at risk for [id number]  -RTC 3 months, I will order BMP at that time    Return in about 3 months (around 6/11/2020).    It is my pleasure to participate in the care of Mr. Solorzano.  Please do not hesitate to contact me with questions or concerns.    Nancy Sweeney MD, [facility]  Cardiologist [facility]    Please note that this dictation was created using voice recognition software. I have made every reasonable attempt to correct obvious errors, but it is possible there are errors of grammar and possibly content that I did not discover before finalizing the note.      Juaquin Martin M.D.  [address] [address]  VIA In Basket

## 2020-03-11 NOTE — PROGRESS NOTES
Subjective:   Chief Complaint:   Chief Complaint   Patient presents with   • Cardiomyopathy (Ischemic)       Juaquin Solorzano is a 54 y.o. male who returns today for coronary artery disease, prior bypass, ischemic cardiomyopathy, chest pain, pacemaker and med indued hypotension on full entresto.     He was 40 when he started having severe SOB, was ACS, had stent put in, then had ACS a year later, only symptoms were SOB/diaphoresis, had stent in stent ([facility]).    Then had bypass x3 in September 2013 ([facility] in [place]), does not recall symptoms at that time.  In 2014 he had open sternotomy due to complications (wires broke, had non union, had titanium plate with screws).    Had a repeat left heart catheterization July 2016 which showed occluded vein grafts, diffuse distal RCA disease and 100% occluded LAD, moderate nonobstructive circumflex disease.  Fortunately, limited his LAD is patent. This was done for low EF, he recalls lowest 32%.    Had an ICD/BiV placed with Dr. Cullen 4-2019. No shocks.  Has ischemic CMO, [facility] 3 symptoms, gets DING walking to end of driveway.  No orthopnea or lower extremity swelling.   EF 40-45%.  Gets CRT 95%.    Has had PAF, no prior CVA, on apixaban for CHADS2 vasc of 4, no prior TIA/CVA.  Does not feel it, new recently.    LDL cholesterol is 50, HDL low at 34, on Lipitor 40 mg.  Has insulin-dependent type 2 diabetes.  GLP-1 Receptor antagonist.  On Jardiance.  He has lost weight w/o really trying, eating less, wife concerned but he is not.    Has sleep apnea, uses CPAP.    Was having symptomatic low blood pressure on full dose Entresto, lightheaded, syncope. Did have syncope once from this but has not recurred. BP in the 80s at the time.  Felt better on half dose.  BMP has been fine.    He is not limited by chest pain, pressure or tightness with activity.   No significant palpitations, lightheadedness, or presyncope/syncope.   No symptoms of leg  claudication.   No stroke/TIA like symptoms.    He reports being forgetful at times, cannot recall all details.    Mother's side had premature CAD, MI, DM.  Father  of CVA, had carotid artery disease.    Here with his wife, her family is here.  She is retired from [employer].  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    DATA REVIEWED by me:  ECG 2018  Sinus, ventricular paced, rate 72    Device check 2020  1 mode switch 39 min, V paced 95%, a paced 1%.    Device interrogation 19  CRT-D, rate 60, V 90%, A 1%    Echo ltd 10-16-19  Limited Study for LVEF.   TDS - Limited acoustical window, contrast not available.  Prior Echo - 18, prior EF probably 40%, currently    45%.  Mildly reduced left ventricular systolic function.  Left ventricular ejection fraction is visually estimated to be 45%.  Global hypokinesis.  Abnormal septal motion consistent with postoperative status.    Echo 2018  EF 35%, septal and apical hypokinesis, mild MR, unable to estimate RVSP    Left heart catheterization 2016  PREOPERATIVE DIAGNOSES:  1.  Dyspnea on exertion.  2.  Congestive heart failure with unclear EF due to poor preoperative imaging.  3.  Coronary artery disease status post CABG with unknown graft anatomy.  4.  Insulin-dependent diabetes mellitus.  5.  Morbid obesity.     POSTOPERATIVE DIAGNOSES:  1.  Occluded vein grafts.  2.  Patent LIMA to LAD.  3.  Diffuse distal RCA disease.  4.  A 100% occluded LAD stent.  5.  Moderate nonobstructive left circumflex disease.  6.  LVEF 50%.    PFTs 2017  ASSESSMENT:  Spirometry suggests mild restrictive lung disease and no evidence   of obstruction.  Plethysmography reveals mild hyperinflation and no evidence   of restrictive lung disease.  Gas transfer is normal as is airway resistance.    Clinical correlation is recommended.    Chest x-ray 2018  FINDINGS:  Cardiomediastinal contour is unchanged.  No  focal consolidation.  No pneumothorax identified.  Supportive tubing is unchanged.  Postoperative change from prior open heart surgery.    Most recent labs:     Lab Results   Component Value Date/Time    HEMOGLOBIN 15.0 06/18/2019 09:30 AM    HEMATOCRIT 46.9 06/18/2019 09:30 AM    MCV 98.5 (H) 06/18/2019 09:30 AM    INR 0.98 04/27/2018 06:45 AM      Lab Results   Component Value Date/Time    SODIUM 138 10/09/2019 01:47 PM    POTASSIUM 4.4 10/09/2019 01:47 PM    CHLORIDE 105 10/09/2019 01:47 PM    CO2 24 10/09/2019 01:47 PM    GLUCOSE 90 10/09/2019 01:47 PM    BUN 14 10/09/2019 01:47 PM    CREATININE 0.95 10/09/2019 01:47 PM      Lab Results   Component Value Date/Time    ASTSGOT 14 06/18/2019 09:30 AM    ALTSGPT 20 06/18/2019 09:30 AM    ALBUMIN 3.9 06/18/2019 09:30 AM      Lab Results   Component Value Date/Time    CHOLSTRLTOT 110 06/18/2019 09:30 AM    LDL 50 06/18/2019 09:30 AM    HDL 34 (A) 06/18/2019 09:30 AM    TRIGLYCERIDE 131 06/18/2019 09:30 AM           6/18/2019 hemoglobin 15, post 202, sodium 141, potassium 4.6, creatinine 1, LFTs normal, LDL 50, HDL 34, triglycerides 131, total cholesterol 110    Past Medical History:   Diagnosis Date   • Arthritis     osteo, hands/elbows   • Block, bundle branch, left    • CAD (coronary artery disease)    • Cataract 04/26/2018    early stages, not removed   • Diabetes ([facility]) 2006    insulin and oral meds   • Fibromyalgia 04/26/2018 5-6/10   • Gout    • Heart burn    • Hyperlipidemia    • Hypertension    • Ischemic cardiomyopathy 10/2019    Echocardiogram with normal LV size, LVEF 45% (Previously 35-40% in February 2018)   • Myocardial infarct ([facility]) 2007   • Myocardial infarct ([facility]) 2012   • Neuropathy ([facility])     Bilateral feet   • Psychiatric problem     Depression and anxiety   • Sleep apnea     Uses CPAP     Past Surgical History:   Procedure Laterality Date   • AICD IMPLANT Left 04/27/2018    [employer] Inogen X4 CRT-D G148 implanted by Dr. Cullen.   •  RECOVERY  2016    Procedure: CATH LAB-[facility] W/POSSIBLE RADIAL APPROACH-CARIE;  Surgeon: Recoveryonly Surgery;  [place] [facility] [place]:    • MULTIPLE CORONARY ARTERY BYPASS  2014    CABG x 3   • LEONILA BY LAPAROSCOPY     • KNEE ARTHROSCOPY      Arthroscopy, Knee     Family History   Problem Relation Age of Onset   • Heart Disease Mother    • Diabetes Mother    • Hyperlipidemia Father    • Heart Disease Father    • Hypertension Father    • Stroke Father    • Lung Disease Sister      Social History     Socioeconomic History   • Marital status:      Spouse name: Not on file   • Number of children: Not on file   • Years of education: Not on file   • Highest education level: Not on file   Occupational History   • Not on file   Social Needs   • Financial resource strain: Not on file   • Food insecurity     Worry: Not on file     Inability: Not on file   • Transportation needs     Medical: Not on file     Non-medical: Not on file   Tobacco Use   • Smoking status: Former Smoker     Packs/day: 0.50     Years: 12.00     Pack years: 6.00     Types: Cigarettes     Last attempt to quit: 7/15/2012     Years since quittin.6   • Smokeless tobacco: Never Used   Substance and Sexual Activity   • Alcohol use: Yes     Comment: 1/month   • Drug use: Yes     Types: Marijuana     Comment: edible marijuana   • Sexual activity: Not Currently   Lifestyle   • Physical activity     Days per week: Not on file     Minutes per session: Not on file   • Stress: Not on file   Relationships   • Social connections     Talks on phone: Not on file     Gets together: Not on file     Attends Tenriism service: Not on file     Active member of club or organization: Not on file     Attends meetings of clubs or organizations: Not on file     Relationship status: Not on file   • Intimate partner violence     Fear of current or ex partner: Not on file     Emotionally abused: Not on file     Physically abused:  Not on file     Forced sexual activity: Not on file   Other Topics Concern   • Not on file   Social History Narrative   • Not on file     Allergies   Allergen Reactions   • Iodine Anaphylaxis     Other reaction(s): Unknown       Current Outpatient Medications   Medication Sig Dispense Refill   • sildenafil citrate (VIAGRA) 50 MG tablet Take 1 Tab by mouth as needed for Erectile Dysfunction. 10 Tab 10   • apixaban (ELIQUIS) 5mg Tab Take 1 Tab by mouth 2 Times a Day. 180 Tab 3   • zonisamide (ZONEGRAN) 100 MG Cap Take 2 Caps by mouth every day. 180 Cap 1   • Semaglutide, 1 MG/DOSE, (OZEMPIC, 1 MG/DOSE,) 2 MG/1.5ML Solution Pen-injector Inject 1 mg as instructed every 7 days. 6 PEN 4   • Empagliflozin-metFORMIN HCl ER 12.5-1000 MG TABLET SR 24 HR Take 1 Tab by mouth 2 times a day. 180 Tab 4   • sacubitril-valsartan (ENTRESTO)  MG Tab tablet Take 0.5 Tabs by mouth 2 Times a Day.     • spironolactone (ALDACTONE) 25 MG Tab Take 0.5 Tabs by mouth every day. 45 Tab 3   • cyclobenzaprine (FLEXERIL) 10 MG Tab   0   • nitroglycerin (NITROSTAT) 0.4 MG SL Tab Place 1 Tab under tongue as needed for Chest Pain. 25 Tab 11   • glucose blood (FREESTYLE LITE) strip 1 Strip by Other route 4 times a day. 300 Strip 4   • FREESTYLE LANCETS Misc by Does not apply route.     • Insulin Pen Needle (SURE COMFORT PEN NEEDLES) by Does not apply route.     • allopurinol (ZYLOPRIM) 300 MG Tab Take 1 Tab by mouth every day. 90 Tab 3   • atorvastatin (LIPITOR) 40 MG Tab Take 1 Tab by mouth every day. 90 Tab 3   • DULoxetine (CYMBALTA) 60 MG Cap DR Particles delayed-release capsule Take 1 Cap by mouth every morning. 90 Cap 3   • metoprolol SR (TOPROL XL) 100 MG TABLET SR 24 HR Take 1 Tab by mouth 2 Times a Day. 180 Tab 3   • amitriptyline (ELAVIL) 100 MG Tab Take 1 Tab by mouth at bedtime as needed. 90 Tab 3   • Omega-3 Fatty Acids (FISH OIL) 1000 MG Cap capsule Take 1,000 mg by mouth 3 times a day, with meals.       No current  "facility-administered medications for this visit.        ROS    All others systems reviewed and negative.     Objective:     /70 (BP Location: Right arm, Patient Position: Sitting)   Pulse 88   Ht 1.854 m (6' 1\")   Wt 108.4 kg (239 lb)   SpO2 96%  Body mass index is 31.53 kg/m².    Physical Exam   General: No acute distress. Well nourished.  HEENT: EOM grossly intact, no scleral icterus, no pharyngeal erythema.   Neck:  No JVD, no bruits, trachea midline  CVS: RRR. Normal S1, S2. No M/R/G. No LE edema.  2+ radial pulses, 2+ DP pulses, ICD pocket in tact, midline incision.  Resp: CTAB. No wheezing or crackles/rhonchi. Normal respiratory effort.  Abdomen: Soft, NT, no irais hepatomegaly.  MSK/Ext: No clubbing or cyanosis.  Skin: Warm and dry, no rashes.  Neurological: CN III-XII grossly intact. No focal deficits.   Psych: A&O x 3, appropriate affect, good judgement    Physical exam performed today and unchanged, except what is noted, compared to 10-9-19      Assessment:     1. Coronary artery disease involving native coronary artery of native heart without angina pectoris     2. Ischemic cardiomyopathy     3. Presence of biventricular AICD     4. Encounter for medication refill     5. Essential hypertension     6. Type 2 diabetes mellitus with complication, with long-term current use of insulin (HCC)     7. Mixed hyperlipidemia     8. Hx of CABG     9. CLARE on CPAP     10. S/P CABG x 3     11. Syncope and collapse     12. LBBB (left bundle branch block)     13. High risk medication use     14. Erectile dysfunction, unspecified erectile dysfunction type  sildenafil citrate (VIAGRA) 50 MG tablet   15. Moderate tetrahydrocannabinol (THC) dependence (HCC)     16. Atrial fibrillation, unspecified type (HCC)  apixaban (ELIQUIS) 5mg Tab   17. Paroxysmal atrial fibrillation (HCC)         Medical Decision Making:  Today's Assessment / Status / Plan:     -Very complex care  -PAF, new/recent, on apixaban  -[facility] 3, " stage C, on entresto, prior syncope  -CAD, MI, Stents, CABG, ok  -Prior HTN, now recent hypotension  -ED, no nitro, I gave him refill  -PAF, on apixaban  -CRT, getting therapy, EF 40-45%  -Cont close FU  -I am not concerned about his weight loss  -He is planning to travel for trade show, I told him not to go and to practice social distancing, he is driving, staying in air BnB.  -Short FU, at risk for [id number]  -RTC 3 months, I will order BMP at that time    Return in about 3 months (around 6/11/2020).    It is my pleasure to participate in the care of Mr. Solorzano.  Please do not hesitate to contact me with questions or concerns.    Nancy Sweeney MD, [facility]  Cardiologist [facility]    Please note that this dictation was created using voice recognition software. I have made every reasonable attempt to correct obvious errors, but it is possible there are errors of grammar and possibly content that I did not discover before finalizing the note.

## 2020-04-23 ENCOUNTER — TELEPHONE (OUTPATIENT)
Dept: CARDIOLOGY | Facility: MEDICAL CENTER | Age: 55
End: 2020-04-23

## 2020-04-23 NOTE — TELEPHONE ENCOUNTER
Device Clinic    Judy called from [facility]. This Pt was supposed to be enrolled with home monitoring system. The PMC is [employer], the device is [employer]. If you have question you can call the device clinic [phone].    Thank you,  Alisa PAEZ

## 2020-04-27 DIAGNOSIS — E11.8 TYPE 2 DIABETES MELLITUS WITH COMPLICATION, WITH LONG-TERM CURRENT USE OF INSULIN (HCC): ICD-10-CM

## 2020-04-27 DIAGNOSIS — Z76.0 ENCOUNTER FOR MEDICATION REFILL: ICD-10-CM

## 2020-04-27 DIAGNOSIS — I50.20 ACC/AHA STAGE C SYSTOLIC HEART FAILURE (HCC): ICD-10-CM

## 2020-04-27 DIAGNOSIS — Z79.4 TYPE 2 DIABETES MELLITUS WITH COMPLICATION, WITH LONG-TERM CURRENT USE OF INSULIN (HCC): ICD-10-CM

## 2020-04-27 RX ORDER — ALLOPURINOL 300 MG/1
300 TABLET ORAL DAILY
Qty: 90 TAB | Refills: 0 | Status: SHIPPED | OUTPATIENT
Start: 2020-04-27 | End: 2020-05-29 | Stop reason: SDUPTHER

## 2020-04-27 RX ORDER — AMITRIPTYLINE HYDROCHLORIDE 100 MG/1
100 TABLET ORAL NIGHTLY PRN
Qty: 90 TAB | Refills: 0 | Status: SHIPPED | OUTPATIENT
Start: 2020-04-27 | End: 2020-05-29 | Stop reason: SDUPTHER

## 2020-04-27 RX ORDER — DULOXETIN HYDROCHLORIDE 60 MG/1
60 CAPSULE, DELAYED RELEASE ORAL EVERY MORNING
Qty: 90 CAP | Refills: 0 | Status: SHIPPED | OUTPATIENT
Start: 2020-04-27 | End: 2020-05-29 | Stop reason: SDUPTHER

## 2020-04-27 RX ORDER — METOPROLOL SUCCINATE 100 MG/1
100 TABLET, EXTENDED RELEASE ORAL 2 TIMES DAILY
Qty: 180 TAB | Refills: 0 | Status: SHIPPED | OUTPATIENT
Start: 2020-04-27 | End: 2020-05-29 | Stop reason: SDUPTHER

## 2020-04-27 RX ORDER — ATORVASTATIN CALCIUM 40 MG/1
40 TABLET, FILM COATED ORAL DAILY
Qty: 90 TAB | Refills: 0 | Status: SHIPPED | OUTPATIENT
Start: 2020-04-27 | End: 2020-07-07 | Stop reason: SDUPTHER

## 2020-04-27 NOTE — TELEPHONE ENCOUNTER
Was the patient seen in the last year in this department? Yes    Does patient have an active prescription for medications requested? Yes    Received Request Via: Patient    Office Visit on 12/18/2019   Component Date Value   • Glycohemoglobin 12/18/2019 5.3    • Internal Control Negative 12/18/2019 Negative    • Internal Control Positive 12/18/2019 Positive    Hospital Outpatient Visit on 12/12/2019   Component Date Value   • Occult Blood, IA 12/12/2019 Negative    • Ambiguous Date/Time 12/28/2019 See Below:    Ancillary Procedure on 10/16/2019   Component Date Value   • Eject.Frac. MOD BP 10/16/2019 44.57    • Eject.Frac. MOD 4C 10/16/2019 46.8    • Eject.Frac. MOD 2C 10/16/2019 48.92    • Left Ventrical Ejection * 10/16/2019 45    Hospital Outpatient Visit on 10/09/2019   Component Date Value   • Sodium 10/09/2019 138    • Potassium 10/09/2019 4.4    • Chloride 10/09/2019 105    • Co2 10/09/2019 24    • Glucose 10/09/2019 90    • Bun 10/09/2019 14    • Creatinine 10/09/2019 0.95    • Calcium 10/09/2019 9.6    • Anion Gap 10/09/2019 9.0    • GFR If  10/09/2019 >60    • GFR If Non  Ameri* 10/09/2019 >60    Office Visit on 06/19/2019   Component Date Value   • Glycohemoglobin 06/19/2019 6.4*   • Internal Control Negative 06/19/2019 Negative    • Internal Control Positive 06/19/2019 Positive    Hospital Outpatient Visit on 06/18/2019   Component Date Value   • WBC 06/18/2019 9.4    • RBC 06/18/2019 4.76    • Hemoglobin 06/18/2019 15.0    • Hematocrit 06/18/2019 46.9    • MCV 06/18/2019 98.5*   • MCH 06/18/2019 31.5    • MCHC 06/18/2019 32.0*   • RDW 06/18/2019 49.4    • Platelet Count 06/18/2019 202    • MPV 06/18/2019 10.6    • Neutrophils-Polys 06/18/2019 50.90    • Lymphocytes 06/18/2019 35.80    • Monocytes 06/18/2019 8.20    • Eosinophils 06/18/2019 2.10    • Basophils 06/18/2019 1.10    • Immature Granulocytes 06/18/2019 1.90*   • Nucleated RBC 06/18/2019 0.20    • Neutrophils (Absolute)  06/18/2019 4.80    • Lymphs (Absolute) 06/18/2019 3.37    • Monos (Absolute) 06/18/2019 0.77    • Eos (Absolute) 06/18/2019 0.20    • Baso (Absolute) 06/18/2019 0.10    • Immature Granulocytes (a* 06/18/2019 0.18*   • NRBC (Absolute) 06/18/2019 0.02    • Sodium 06/18/2019 141    • Potassium 06/18/2019 4.6    • Chloride 06/18/2019 107    • Co2 06/18/2019 24    • Anion Gap 06/18/2019 10.0    • Glucose 06/18/2019 125*   • Bun 06/18/2019 19    • Creatinine 06/18/2019 1.00    • Calcium 06/18/2019 9.5    • AST(SGOT) 06/18/2019 14    • ALT(SGPT) 06/18/2019 20    • Alkaline Phosphatase 06/18/2019 62    • Total Bilirubin 06/18/2019 0.5    • Albumin 06/18/2019 3.9    • Total Protein 06/18/2019 6.2    • Globulin 06/18/2019 2.3    • A-G Ratio 06/18/2019 1.7    • Cholesterol,Tot 06/18/2019 110    • Triglycerides 06/18/2019 131    • HDL 06/18/2019 34*   • LDL 06/18/2019 50    • Prostatic Specific Antig* 06/18/2019 0.19    • Creatinine, Urine 06/18/2019 128.30    • Microalbumin, Urine Rand* 06/18/2019 2.7    • Micro Alb Creat Ratio 06/18/2019 21    • GFR If  06/18/2019 >60    • GFR If Non  Ameri* 06/18/2019 >60    ]

## 2020-05-29 ENCOUNTER — OFFICE VISIT (OUTPATIENT)
Dept: MEDICAL GROUP | Facility: CLINIC | Age: 55
End: 2020-05-29
Payer: MEDICARE

## 2020-05-29 VITALS
RESPIRATION RATE: 16 BRPM | BODY MASS INDEX: 31.38 KG/M2 | HEIGHT: 73 IN | SYSTOLIC BLOOD PRESSURE: 100 MMHG | WEIGHT: 236.8 LBS | TEMPERATURE: 97.6 F | DIASTOLIC BLOOD PRESSURE: 80 MMHG | OXYGEN SATURATION: 95 % | HEART RATE: 87 BPM

## 2020-05-29 DIAGNOSIS — E66.01 CLASS 2 SEVERE OBESITY DUE TO EXCESS CALORIES WITH SERIOUS COMORBIDITY AND BODY MASS INDEX (BMI) OF 36.0 TO 36.9 IN ADULT (HCC): ICD-10-CM

## 2020-05-29 DIAGNOSIS — E11.8 TYPE 2 DIABETES MELLITUS WITH COMPLICATION, WITH LONG-TERM CURRENT USE OF INSULIN (HCC): ICD-10-CM

## 2020-05-29 DIAGNOSIS — Z76.0 ENCOUNTER FOR MEDICATION REFILL: ICD-10-CM

## 2020-05-29 DIAGNOSIS — R56.9 FOCAL SEIZURES (HCC): ICD-10-CM

## 2020-05-29 DIAGNOSIS — I48.91 ATRIAL FIBRILLATION, UNSPECIFIED TYPE (HCC): ICD-10-CM

## 2020-05-29 DIAGNOSIS — M10.9 GOUT OF RIGHT FOOT, UNSPECIFIED CAUSE, UNSPECIFIED CHRONICITY: ICD-10-CM

## 2020-05-29 DIAGNOSIS — E08.42 DIABETIC POLYNEUROPATHY ASSOCIATED WITH DIABETES MELLITUS DUE TO UNDERLYING CONDITION (HCC): ICD-10-CM

## 2020-05-29 DIAGNOSIS — I10 ESSENTIAL HYPERTENSION: ICD-10-CM

## 2020-05-29 DIAGNOSIS — E78.2 MIXED HYPERLIPIDEMIA: ICD-10-CM

## 2020-05-29 DIAGNOSIS — Z79.4 TYPE 2 DIABETES MELLITUS WITH COMPLICATION, WITH LONG-TERM CURRENT USE OF INSULIN (HCC): ICD-10-CM

## 2020-05-29 DIAGNOSIS — I50.20 ACC/AHA STAGE C SYSTOLIC HEART FAILURE (HCC): ICD-10-CM

## 2020-05-29 LAB
HBA1C MFR BLD: 5.4 % (ref 0–5.6)
INT CON NEG: NORMAL
INT CON POS: NORMAL

## 2020-05-29 PROCEDURE — 83036 HEMOGLOBIN GLYCOSYLATED A1C: CPT | Performed by: FAMILY MEDICINE

## 2020-05-29 PROCEDURE — 99214 OFFICE O/P EST MOD 30 MIN: CPT | Performed by: FAMILY MEDICINE

## 2020-05-29 RX ORDER — ALLOPURINOL 300 MG/1
300 TABLET ORAL DAILY
Qty: 90 TAB | Refills: 1 | Status: SHIPPED | OUTPATIENT
Start: 2020-05-29 | End: 2020-11-30 | Stop reason: SDUPTHER

## 2020-05-29 RX ORDER — METOPROLOL SUCCINATE 100 MG/1
100 TABLET, EXTENDED RELEASE ORAL 2 TIMES DAILY
Qty: 180 TAB | Refills: 1 | Status: SHIPPED
Start: 2020-05-29 | End: 2020-06-11

## 2020-05-29 RX ORDER — DULOXETIN HYDROCHLORIDE 60 MG/1
60 CAPSULE, DELAYED RELEASE ORAL EVERY MORNING
Qty: 90 CAP | Refills: 1 | Status: SHIPPED | OUTPATIENT
Start: 2020-05-29 | End: 2020-11-30 | Stop reason: SDUPTHER

## 2020-05-29 RX ORDER — AMITRIPTYLINE HYDROCHLORIDE 100 MG/1
100 TABLET ORAL NIGHTLY PRN
Qty: 90 TAB | Refills: 1 | Status: SHIPPED | OUTPATIENT
Start: 2020-05-29 | End: 2020-09-14

## 2020-05-29 SDOH — HEALTH STABILITY: MENTAL HEALTH: HOW OFTEN DO YOU HAVE A DRINK CONTAINING ALCOHOL?: 2-4 TIMES A MONTH

## 2020-05-29 ASSESSMENT — FIBROSIS 4 INDEX: FIB4 SCORE: 0.82

## 2020-05-29 NOTE — ASSESSMENT & PLAN NOTE
Previously followed by Junior ROMERO. Last A1c was 5.4%. Currently on Ozempic, Synjardy. Denies any low BS spells, rashes. Will get his eyes checked by optometrist in [place] soon.

## 2020-05-29 NOTE — PROGRESS NOTES
Complaint: med refills     Subjective:     Juaquin Solorzano is a 54 y.o. male here today for f/u. Doing well.    Type 2 diabetes mellitus with complication, with long-term current use of insulin (Formerly [facility])  Previously followed by Junior ROMERO. Last A1c was 5.4%. Currently on Ozempic, Synjardy. Denies any low BS spells, rashes. Will get his eyes checked by optometrist in [place] soon.    Class 2 obesity due to excess calories in adult  Continues to lose weight. No specific symptoms.    Diabetic neuropathy associated with diabetes mellitus due to underlying condition (Formerly [facility])  Well controlled on Cymbalta, needs refill.    Essential hypertension  Managed with Toprol  mg,  On no ACE/ARB.    Gout of foot  No recent flair. On allopurinol.    Hyperlipidemia  Has not been taking his Lipitor.    ACC/AHA stage C systolic heart failure ([facility])  Currently asymptomatic, followed by cards (Nancy Sweeney MD)    Focal seizures ([facility])  No changes, still having absences/staring spells.     No other concerns or complaints today.    Current medicines (including changes today)  Current Outpatient Medications   Medication Sig Dispense Refill   • DULoxetine (CYMBALTA) 60 MG Cap DR Particles delayed-release capsule Take 1 Cap by mouth every morning. 90 Cap 1   • metoprolol SR (TOPROL XL) 100 MG TABLET SR 24 HR Take 1 Tab by mouth 2 Times a Day. 180 Tab 1   • allopurinol (ZYLOPRIM) 300 MG Tab Take 1 Tab by mouth every day. 90 Tab 1   • apixaban (ELIQUIS) 5mg Tab Take 1 Tab by mouth 2 Times a Day. 180 Tab 3   • amitriptyline (ELAVIL) 100 MG Tab Take 1 Tab by mouth at bedtime as needed. 90 Tab 1   • sildenafil citrate (VIAGRA) 50 MG tablet Take 1 Tab by mouth as needed for Erectile Dysfunction. 10 Tab 10   • zonisamide (ZONEGRAN) 100 MG Cap Take 2 Caps by mouth every day. 180 Cap 1   • Semaglutide, 1 MG/DOSE, (OZEMPIC, 1 MG/DOSE,) 2 MG/1.5ML Solution Pen-injector Inject 1 mg as instructed every 7 days. 6 PEN 4   • Empagliflozin-metFORMIN  HCl ER 12.5-1000 MG TABLET SR 24 HR Take 1 Tab by mouth 2 times a day. 180 Tab 4   • sacubitril-valsartan (ENTRESTO)  MG Tab tablet Take 0.5 Tabs by mouth 2 Times a Day.     • spironolactone (ALDACTONE) 25 MG Tab Take 0.5 Tabs by mouth every day. 45 Tab 3   • nitroglycerin (NITROSTAT) 0.4 MG SL Tab Place 1 Tab under tongue as needed for Chest Pain. 25 Tab 11   • glucose blood (FREESTYLE LITE) strip 1 Strip by Other route 4 times a day. 300 Strip 4   • FREESTYLE LANCETS Misc by Does not apply route.     • Insulin Pen Needle (SURE COMFORT PEN NEEDLES) by Does not apply route.     • atorvastatin (LIPITOR) 40 MG Tab Take 1 Tab by mouth every day. (Patient not taking: Reported on 5/29/2020) 90 Tab 0     No current facility-administered medications for this visit.      He  has a past medical history of Arthritis, Block, bundle branch, left, CAD (coronary artery disease), Cataract (04/26/2018), Diabetes (Formerly [facility]) (2006), Fibromyalgia (04/26/2018), Gout, Heart burn, Hyperlipidemia, Hypertension, Ischemic cardiomyopathy (10/2019), Myocardial infarct ([facility]) (2007), Myocardial infarct ([facility]) (2012), Neuropathy ([facility]), Psychiatric problem, and Sleep apnea.    Health Maintenance: had stool test in Dec 2019      Allergies: Iodine    Current Outpatient Medications Ordered in Epic   Medication Sig Dispense Refill   • DULoxetine (CYMBALTA) 60 MG Cap DR Particles delayed-release capsule Take 1 Cap by mouth every morning. 90 Cap 1   • metoprolol SR (TOPROL XL) 100 MG TABLET SR 24 HR Take 1 Tab by mouth 2 Times a Day. 180 Tab 1   • allopurinol (ZYLOPRIM) 300 MG Tab Take 1 Tab by mouth every day. 90 Tab 1   • apixaban (ELIQUIS) 5mg Tab Take 1 Tab by mouth 2 Times a Day. 180 Tab 3   • amitriptyline (ELAVIL) 100 MG Tab Take 1 Tab by mouth at bedtime as needed. 90 Tab 1   • sildenafil citrate (VIAGRA) 50 MG tablet Take 1 Tab by mouth as needed for Erectile Dysfunction. 10 Tab 10   • zonisamide (ZONEGRAN) 100 MG Cap Take 2 Caps by mouth  every day. 180 Cap 1   • Semaglutide, 1 MG/DOSE, (OZEMPIC, 1 MG/DOSE,) 2 MG/1.5ML Solution Pen-injector Inject 1 mg as instructed every 7 days. 6 PEN 4   • Empagliflozin-metFORMIN HCl ER 12.5-1000 MG TABLET SR 24 HR Take 1 Tab by mouth 2 times a day. 180 Tab 4   • sacubitril-valsartan (ENTRESTO)  MG Tab tablet Take 0.5 Tabs by mouth 2 Times a Day.     • spironolactone (ALDACTONE) 25 MG Tab Take 0.5 Tabs by mouth every day. 45 Tab 3   • nitroglycerin (NITROSTAT) 0.4 MG SL Tab Place 1 Tab under tongue as needed for Chest Pain. 25 Tab 11   • glucose blood (FREESTYLE LITE) strip 1 Strip by Other route 4 times a day. 300 Strip 4   • FREESTYLE LANCETS Misc by Does not apply route.     • Insulin Pen Needle (SURE COMFORT PEN NEEDLES) by Does not apply route.     • atorvastatin (LIPITOR) 40 MG Tab Take 1 Tab by mouth every day. (Patient not taking: Reported on 5/29/2020) 90 Tab 0     No current Epic-ordered facility-administered medications on file.        Past Medical History:   Diagnosis Date   • Arthritis     osteo, hands/elbows   • Block, bundle branch, left    • CAD (coronary artery disease)    • Cataract 04/26/2018    early stages, not removed   • Diabetes (HCC) 2006    insulin and oral meds   • Fibromyalgia 04/26/2018    5-6/10   • Gout    • Heart burn    • Hyperlipidemia    • Hypertension    • Ischemic cardiomyopathy 10/2019    Echocardiogram with normal LV size, LVEF 45% (Previously 35-40% in February 2018)   • Myocardial infarct (HCC) 2007   • Myocardial infarct (HCC) 2012   • Neuropathy ([facility])     Bilateral feet   • Psychiatric problem     Depression and anxiety   • Sleep apnea     Uses CPAP       Past Surgical History:   Procedure Laterality Date   • AICD IMPLANT Left 04/27/2018    [employer] Inogen X4 CRT-D G148 implanted by Dr. Cullen.   • RECOVERY  7/22/2016    Procedure: CATH LAB-[facility] W/POSSIBLE RADIAL APPROACH-CARIE;  Surgeon: Recoveryonly Surgery;  [place] [facility] [place]   "Service:    • MULTIPLE CORONARY ARTERY BYPASS  2014    CABG x 3   • LEONILA BY LAPAROSCOPY     • KNEE ARTHROSCOPY      Arthroscopy, Knee       Social History     Tobacco Use   • Smoking status: Former Smoker     Packs/day: 0.50     Years: 12.00     Pack years: 6.00     Types: Cigarettes     Last attempt to quit: 7/15/2012     Years since quittin.8   • Smokeless tobacco: Never Used   Substance Use Topics   • Alcohol use: Yes     Alcohol/week: 0.6 oz     Types: 1 Standard drinks or equivalent per week     Frequency: 2-4 times a month   • Drug use: Yes     Types: Marijuana, Oral     Comment: nightly        Social History     Social History Narrative   • Not on file       Family History   Problem Relation Age of Onset   • Heart Disease Mother    • Diabetes Mother    • Hyperlipidemia Father    • Heart Disease Father    • Hypertension Father    • Stroke Father    • Lung Disease Sister          ROS   Patient denies any fever, chills, unintentional weight gain/loss, fatigue, stroke symptoms, dizziness, headache, nasal congestion, sore-throat, cough, heartburn, chest pain, difficulty breathing, abdominal discomfort, diarrhea/constipation, burning with urination or frequency, joint or back pain, skin rashes, depression or anxiety.       Objective:     /80 (BP Location: Left arm, Patient Position: Sitting, BP Cuff Size: Adult)   Pulse 87   Temp 36.4 °C (97.6 °F) (Temporal)   Resp 16   Ht 1.854 m (6' 1\")   Wt 107.4 kg (236 lb 12.8 oz)   SpO2 95%  Body mass index is 31.24 kg/m².   Physical Exam:  Constitutional: Alert, no distress.  Psych: Alert and oriented x3, appropriate affect and mood.        Assessment and Plan:   The following treatment plan was discussed    1. Type 2 diabetes mellitus with complication, with long-term current use of insulin ([facility])  A1c today 5.4%. Controlled. No change meds.      2. Encounter for medication refill  - DULoxetine (CYMBALTA) 60 MG Cap DR Particles delayed-release capsule; " Take 1 Cap by mouth every morning.  Dispense: 90 Cap; Refill: 1  - allopurinol (ZYLOPRIM) 300 MG Tab; Take 1 Tab by mouth every day.  Dispense: 90 Tab; Refill: 1  - amitriptyline (ELAVIL) 100 MG Tab; Take 1 Tab by mouth at bedtime as needed.  Dispense: 90 Tab; Refill: 1    3. ACC/AHA stage C systolic heart failure (HCC)  Stable.  - metoprolol SR (TOPROL XL) 100 MG TABLET SR 24 HR; Take 1 Tab by mouth 2 Times a Day.  Dispense: 180 Tab; Refill: 1    4. Atrial fibrillation, unspecified type (HCC)  Stable.  - apixaban (ELIQUIS) 5mg Tab; Take 1 Tab by mouth 2 Times a Day.  Dispense: 180 Tab; Refill: 3    5. Mixed hyperlipidemia  Recommended he restart his Lipitor.    6. Gout of right foot, unspecified cause, unspecified chronicity  Currently not active.    7. Focal seizures (HCC)  Stable.    8. Essential hypertension  Controlled.    9. Class 2 severe obesity due to excess calories with serious comorbidity and body mass index (BMI) of 36.0 to 36.9 in adult (HCC)  Improving.    10. Diabetic polyneuropathy associated with diabetes mellitus due to underlying condition (HCC)  Controlled on meds.      Followup: Return in about 6 months (around 11/29/2020) for with new PCP.    Please note that this dictation was created using voice recognition software. I have made every reasonable attempt to correct obvious errors, but I expect that there are errors of grammar and possibly content that I did not discover before finalizing the note.

## 2020-06-10 ENCOUNTER — TELEPHONE (OUTPATIENT)
Dept: CARDIOLOGY | Facility: MEDICAL CENTER | Age: 55
End: 2020-06-10

## 2020-06-11 ENCOUNTER — OFFICE VISIT (OUTPATIENT)
Dept: CARDIOLOGY | Facility: MEDICAL CENTER | Age: 55
End: 2020-06-11
Payer: MEDICARE

## 2020-06-11 ENCOUNTER — TELEPHONE (OUTPATIENT)
Dept: CARDIOLOGY | Facility: MEDICAL CENTER | Age: 55
End: 2020-06-11

## 2020-06-11 ENCOUNTER — NON-PROVIDER VISIT (OUTPATIENT)
Dept: CARDIOLOGY | Facility: MEDICAL CENTER | Age: 55
End: 2020-06-11
Payer: MEDICARE

## 2020-06-11 ENCOUNTER — HOSPITAL ENCOUNTER (OUTPATIENT)
Dept: LAB | Facility: MEDICAL CENTER | Age: 55
End: 2020-06-11
Attending: NURSE PRACTITIONER
Payer: MEDICARE

## 2020-06-11 VITALS
HEIGHT: 73 IN | SYSTOLIC BLOOD PRESSURE: 126 MMHG | DIASTOLIC BLOOD PRESSURE: 72 MMHG | OXYGEN SATURATION: 92 % | HEART RATE: 89 BPM | BODY MASS INDEX: 32.74 KG/M2 | WEIGHT: 247 LBS

## 2020-06-11 DIAGNOSIS — Z95.1 S/P CABG X 3: ICD-10-CM

## 2020-06-11 DIAGNOSIS — I50.20 ACC/AHA STAGE C SYSTOLIC HEART FAILURE (HCC): ICD-10-CM

## 2020-06-11 DIAGNOSIS — I25.5 ISCHEMIC CARDIOMYOPATHY: ICD-10-CM

## 2020-06-11 DIAGNOSIS — I49.8 ATRIAL ARRHYTHMIA: ICD-10-CM

## 2020-06-11 DIAGNOSIS — G47.33 OBSTRUCTIVE SLEEP APNEA SYNDROME: ICD-10-CM

## 2020-06-11 DIAGNOSIS — E78.2 MIXED HYPERLIPIDEMIA: ICD-10-CM

## 2020-06-11 DIAGNOSIS — Z95.810 PRESENCE OF BIVENTRICULAR AICD: ICD-10-CM

## 2020-06-11 DIAGNOSIS — Z51.81 ENCOUNTER FOR MONITORING SOTALOL THERAPY: ICD-10-CM

## 2020-06-11 DIAGNOSIS — Z79.899 ENCOUNTER FOR MONITORING SOTALOL THERAPY: ICD-10-CM

## 2020-06-11 LAB
ALBUMIN SERPL BCP-MCNC: 3.9 G/DL (ref 3.2–4.9)
ALBUMIN/GLOB SERPL: 1.5 G/DL
ALP SERPL-CCNC: 68 U/L (ref 30–99)
ALT SERPL-CCNC: 19 U/L (ref 2–50)
ANION GAP SERPL CALC-SCNC: 12 MMOL/L (ref 7–16)
AST SERPL-CCNC: 18 U/L (ref 12–45)
BILIRUB SERPL-MCNC: 0.9 MG/DL (ref 0.1–1.5)
BUN SERPL-MCNC: 9 MG/DL (ref 8–22)
CALCIUM SERPL-MCNC: 9.1 MG/DL (ref 8.5–10.5)
CHLORIDE SERPL-SCNC: 107 MMOL/L (ref 96–112)
CHOLEST SERPL-MCNC: 140 MG/DL (ref 100–199)
CO2 SERPL-SCNC: 23 MMOL/L (ref 20–33)
CREAT SERPL-MCNC: 0.96 MG/DL (ref 0.5–1.4)
GLOBULIN SER CALC-MCNC: 2.6 G/DL (ref 1.9–3.5)
GLUCOSE SERPL-MCNC: 101 MG/DL (ref 65–99)
HDLC SERPL-MCNC: 38 MG/DL
LDLC SERPL CALC-MCNC: 75 MG/DL
MAGNESIUM SERPL-MCNC: 2.2 MG/DL (ref 1.5–2.5)
POTASSIUM SERPL-SCNC: 4.6 MMOL/L (ref 3.6–5.5)
PROT SERPL-MCNC: 6.5 G/DL (ref 6–8.2)
SODIUM SERPL-SCNC: 142 MMOL/L (ref 135–145)
TRIGL SERPL-MCNC: 135 MG/DL (ref 0–149)
TSH SERPL DL<=0.005 MIU/L-ACNC: 1.77 UIU/ML (ref 0.38–5.33)

## 2020-06-11 PROCEDURE — 36415 COLL VENOUS BLD VENIPUNCTURE: CPT

## 2020-06-11 PROCEDURE — 93000 ELECTROCARDIOGRAM COMPLETE: CPT | Mod: 59 | Performed by: NURSE PRACTITIONER

## 2020-06-11 PROCEDURE — 80061 LIPID PANEL: CPT

## 2020-06-11 PROCEDURE — 99215 OFFICE O/P EST HI 40 MIN: CPT | Mod: 25 | Performed by: NURSE PRACTITIONER

## 2020-06-11 PROCEDURE — 93284 PRGRMG EVAL IMPLANTABLE DFB: CPT | Performed by: INTERNAL MEDICINE

## 2020-06-11 PROCEDURE — 84443 ASSAY THYROID STIM HORMONE: CPT

## 2020-06-11 PROCEDURE — 80053 COMPREHEN METABOLIC PANEL: CPT

## 2020-06-11 PROCEDURE — 83735 ASSAY OF MAGNESIUM: CPT

## 2020-06-11 RX ORDER — METOPROLOL SUCCINATE 100 MG/1
50 TABLET, EXTENDED RELEASE ORAL 2 TIMES DAILY
Qty: 180 TAB | Refills: 1 | Status: SHIPPED | OUTPATIENT
Start: 2020-06-11 | End: 2020-06-11 | Stop reason: SDUPTHER

## 2020-06-11 RX ORDER — SOTALOL HYDROCHLORIDE 80 MG/1
80 TABLET ORAL 2 TIMES DAILY
Qty: 60 TAB | Refills: 3 | Status: SHIPPED | OUTPATIENT
Start: 2020-06-11 | End: 2020-07-23 | Stop reason: SDUPTHER

## 2020-06-11 RX ORDER — METOPROLOL SUCCINATE 100 MG/1
50 TABLET, EXTENDED RELEASE ORAL 2 TIMES DAILY
Qty: 30 TAB | Refills: 0 | Status: SHIPPED | OUTPATIENT
Start: 2020-06-11 | End: 2020-12-08 | Stop reason: SDUPTHER

## 2020-06-11 RX ORDER — SOTALOL HYDROCHLORIDE 80 MG/1
80 TABLET ORAL 2 TIMES DAILY
Qty: 60 TAB | Refills: 3 | Status: SHIPPED | OUTPATIENT
Start: 2020-06-11 | End: 2020-06-11 | Stop reason: SDUPTHER

## 2020-06-11 ASSESSMENT — ENCOUNTER SYMPTOMS
CHILLS: 0
ORTHOPNEA: 0
COUGH: 0
DIAPHORESIS: 0
SHORTNESS OF BREATH: 1
LOSS OF CONSCIOUSNESS: 0
PND: 0
BLOOD IN STOOL: 0
ABDOMINAL PAIN: 0
DIZZINESS: 0
CLAUDICATION: 0
WHEEZING: 0
FEVER: 0
PALPITATIONS: 0

## 2020-06-11 ASSESSMENT — FIBROSIS 4 INDEX: FIB4 SCORE: 0.82

## 2020-06-11 NOTE — TELEPHONE ENCOUNTER
Mireya went off last night. [place] like a grenade exploding in my chest. What do I do now. Having some bad anxiety issues and can't sleep. I read I'm supposed to notify you if this happens. Ryan recieved notice of acid 2 days ago. For some reason [facility] is not recieving the data. [phone]. Thank you     ---------------------------------------------------------------------------------------------------------------------------------------      Called pt to discuss AICD firing.Pt reports little pain in left shoulder, otherwise asymptomatic. Strips delivered to APRN. Appt scheduled for today at 2pm w/ ED. COVID screening completed and pt will wear a mask.

## 2020-06-11 NOTE — LETTER
[facility]   [address]  [place] [address]  Phone: [phone]  Fax: [phone]              Juaquin Solorzano  1965    Encounter Date: 6/11/2020    LAINA Neves          PROGRESS NOTE:  Cardiology/Electrophysiology Follow-up Note    Subjective:   Chief Complaint:   Chief Complaint   Patient presents with   • Cardiomyopathy (Ischemic)     PP DX:CARDIOMYOPATHY/ AICD SHOCK RECEIVED     Juaquin Solorzano is a 54 y.o. male who presents today for follow up for CAD/ischemic cardiomyopathy s/p BSI CRT-D ICD discharge.     He is followed by Dr. Sweeney, Alisa VELA, and HF.  Last seen by Dr. Sweeney on 3/11/20.  History of CAD/ischemic cardiomyopathy and LBBB s/p BSI CRT-D by Dr. Cullen on 04/27/18. Previous history of some hypotension resulting in decrease and spironolactone and Entresto, now resolved. New atrial arrhythmias noted on device 11/2019 and he was started on Eliquis.     Medical history significant for CAD/ischemic cardiomyopathy, CABG x3, LBBB, s/p BSI CRT-D (04/27/18), HTN, HDL, DMT2, seizures, CLARE on CPAP, Atrial arrhythmias (11/2019), on chronic anticoagulation with Eliquis.    Today in follow up, reports anxiety related to recent ICD shock. On Toprol  mg twice daily. Denies syncope. Denies any precipitating factors including alcohol or drugs. He currently denies chest pain, dizziness, palpitations, pre syncope or syncope, or lower extremity edema. Some exertional dyspnea. Encouraged to monitor weights at home. Denies any signs or symptoms of bleeding or bleeding issues. Patient endorses medication compliance. Provided reassurance related to anxiety, encouraged to f/u with PCP.     Past Medical History:   Diagnosis Date   • Arthritis     osteo, hands/elbows   • Block, bundle branch, left    • CAD (coronary artery disease)    • Cataract 04/26/2018    early stages, not removed   • Diabetes (HCC) 2006    insulin  and oral meds   • Fibromyalgia 2018-6/10   • Gout    • Heart burn    • Hyperlipidemia    • Hypertension    • Ischemic cardiomyopathy 10/2019    Echocardiogram with normal LV size, LVEF 45% (Previously 35-40% in 2018)   • Myocardial infarct (HCC)    • Myocardial infarct (HCC)    • Neuropathy (HCC)     Bilateral feet   • Psychiatric problem     Depression and anxiety   • Sleep apnea     Uses CPAP     Past Surgical History:   Procedure Laterality Date   • AICD IMPLANT Left 2018    [employer] Inogen X4 CRT-D G148 implanted by Dr. Cullen.   • RECOVERY  2016    Procedure: CATH LAB-[facility] W/POSSIBLE RADIAL APPROACH-CARIE;  Surgeon: M[facility]ya Surgery;  Location: SURGERY PRE-[place] PROC UN[place] [facility] [place];  Service:    • MULTIPLE CORONARY ARTERY BYPASS  2014    CABG x 3   • LEONILA BY LAPAROSCOPY     • KNEE ARTHROSCOPY      Arthroscopy, Knee     Family History   Problem Relation Age of Onset   • Heart Disease Mother    • Diabetes Mother    • Hyperlipidemia Father    • Heart Disease Father    • Hypertension Father    • Stroke Father    • Lung Disease Sister      Social History     Socioeconomic History   • Marital status:      Spouse name: Not on file   • Number of children: Not on file   • Years of education: Not on file   • Highest education level: Not on file   Occupational History   • Not on file   Social Needs   • Financial resource strain: Not on file   • Food insecurity     Worry: Not on file     Inability: Not on file   • Transportation needs     Medical: Not on file     Non-medical: Not on file   Tobacco Use   • Smoking status: Former Smoker     Packs/day: 0.50     Years: 12.00     Pack years: 6.00     Types: Cigarettes     Last attempt to quit: 7/15/2012     Years since quittin.9   • Smokeless tobacco: Never Used   Substance and Sexual Activity   • Alcohol use: Yes     Alcohol/week: 0.6 oz     Types: 1 Standard drinks or equivalent per week     Frequency: 2-4 times  a month   • Drug use: Yes     Types: Marijuana, Oral     Comment: nightly    • Sexual activity: Not Currently   Lifestyle   • Physical activity     Days per week: Not on file     Minutes per session: Not on file   • Stress: Not on file   Relationships   • Social connections     Talks on phone: Not on file     Gets together: Not on file     Attends Mosque service: Not on file     Active member of club or organization: Not on file     Attends meetings of clubs or organizations: Not on file     Relationship status: Not on file   • Intimate partner violence     Fear of current or ex partner: Not on file     Emotionally abused: Not on file     Physically abused: Not on file     Forced sexual activity: Not on file   Other Topics Concern   • Not on file   Social History Narrative   • Not on file     Allergies   Allergen Reactions   • Iodine Anaphylaxis     Other reaction(s): Unknown       Current Outpatient Medications   Medication Sig Dispense Refill   • DULoxetine (CYMBALTA) 60 MG Cap DR Particles delayed-release capsule Take 1 Cap by mouth every morning. 90 Cap 1   • allopurinol (ZYLOPRIM) 300 MG Tab Take 1 Tab by mouth every day. 90 Tab 1   • apixaban (ELIQUIS) 5mg Tab Take 1 Tab by mouth 2 Times a Day. 180 Tab 3   • amitriptyline (ELAVIL) 100 MG Tab Take 1 Tab by mouth at bedtime as needed. 90 Tab 1   • atorvastatin (LIPITOR) 40 MG Tab Take 1 Tab by mouth every day. 90 Tab 0   • sildenafil citrate (VIAGRA) 50 MG tablet Take 1 Tab by mouth as needed for Erectile Dysfunction. 10 Tab 10   • zonisamide (ZONEGRAN) 100 MG Cap Take 2 Caps by mouth every day. 180 Cap 1   • Semaglutide, 1 MG/DOSE, (OZEMPIC, 1 MG/DOSE,) 2 MG/1.5ML Solution Pen-injector Inject 1 mg as instructed every 7 days. 6 PEN 4   • Empagliflozin-metFORMIN HCl ER 12.5-1000 MG TABLET SR 24 HR Take 1 Tab by mouth 2 times a day. 180 Tab 4   • sacubitril-valsartan (ENTRESTO)  MG Tab tablet Take 0.5 Tabs by mouth 2 Times a Day.     • spironolactone  "(ALDACTONE) 25 MG Tab Take 0.5 Tabs by mouth every day. 45 Tab 3   • nitroglycerin (NITROSTAT) 0.4 MG SL Tab Place 1 Tab under tongue as needed for Chest Pain. 25 Tab 11   • sotalol (BETAPACE) 80 MG Tab Take 1 Tab by mouth 2 times a day. 60 Tab 3   • metoprolol SR (TOPROL XL) 100 MG TABLET SR 24 HR Take 0.5 Tabs by mouth 2 Times a Day. 30 Tab 0   • glucose blood (FREESTYLE LITE) strip 1 Strip by Other route 4 times a day. 300 Strip 4   • FREESTYLE LANCETS Misc by Does not apply route.     • Insulin Pen Needle (SURE COMFORT PEN NEEDLES) by Does not apply route.       No current facility-administered medications for this visit.        Review of Systems   Constitutional: Negative for chills, diaphoresis and fever.   Respiratory: Positive for shortness of breath (Dyspnea upon exertion). Negative for cough and wheezing.    Cardiovascular: Negative for chest pain, palpitations, orthopnea, claudication, leg swelling and PND.   Gastrointestinal: Negative for abdominal pain and blood in stool.   Genitourinary: Negative for hematuria.   Neurological: Negative for dizziness and loss of consciousness.     All others systems reviewed and negative.   Objective:     /72 (BP Location: Left arm, Patient Position: Sitting, BP Cuff Size: Large adult)   Pulse 89   Ht 1.854 m (6' 1\")   Wt 112 kg (247 lb)   SpO2 92%  Body mass index is 32.59 kg/m².    Physical Exam   Constitutional: He is oriented to person, place, and time. No distress.   Neck: No JVD present.   Cardiovascular: Normal rate and regular rhythm.   No murmur heard.  Pulses:       Radial pulses are 2+ on the right side and 2+ on the left side.   Left upper chest device site CDI, no erosion.   Pulmonary/Chest: Effort normal and breath sounds normal. No respiratory distress. He has no decreased breath sounds. He has no wheezes. He has no rales. He exhibits no tenderness.   Musculoskeletal:         General: No edema.   Neurological: He is alert and oriented to person, " place, and time.   Skin: Skin is warm and dry. He is not diaphoretic. No erythema.   Psychiatric: Mood, memory, affect and judgment normal.   Nursing note and vitals reviewed.    Cardiac Imaging and Procedures Review:    EKG 06/11/2020: Ventricular paced    Limited Echocardiogram (11/16/2019):   Limited Study for LVEF.   TDS - Limited acoustical window, contrast not available.  Prior Echo - 2/12/18, prior EF probably 40%, currently    45%. Mildly reduced left ventricular systolic function.  Left ventricular ejection fraction is visually estimated to be 45%.Global hypokinesis.  Abnormal septal motion consistent with postoperative status.    Labs (personally reviewed and notable for):   Lab Results   Component Value Date/Time    SODIUM 138 10/09/2019 01:47 PM    POTASSIUM 4.4 10/09/2019 01:47 PM    CHLORIDE 105 10/09/2019 01:47 PM    CO2 24 10/09/2019 01:47 PM    GLUCOSE 90 10/09/2019 01:47 PM    BUN 14 10/09/2019 01:47 PM    CREATININE 0.95 10/09/2019 01:47 PM      Lab Results   Component Value Date/Time    WBC 9.4 06/18/2019 09:30 AM    RBC 4.76 06/18/2019 09:30 AM    HEMOGLOBIN 15.0 06/18/2019 09:30 AM    HEMATOCRIT 46.9 06/18/2019 09:30 AM    MCV 98.5 (H) 06/18/2019 09:30 AM    MCH 31.5 06/18/2019 09:30 AM    MCHC 32.0 (L) 06/18/2019 09:30 AM    MPV 10.6 06/18/2019 09:30 AM    NEUTSPOLYS 50.90 06/18/2019 09:30 AM    LYMPHOCYTES 35.80 06/18/2019 09:30 AM    MONOCYTES 8.20 06/18/2019 09:30 AM    EOSINOPHILS 2.10 06/18/2019 09:30 AM    BASOPHILS 1.10 06/18/2019 09:30 AM      PT/INR:   Lab Results   Component Value Date/Time    PROTHROMBTM 12.7 04/27/2018 06:45 AM    INR 0.98 04/27/2018 06:45 AM   ]  Assessment:     1. Ischemic cardiomyopathy  EKG    TSH WITH REFLEX TO FT4    Comp Metabolic Panel    Lipid Profile    MAGNESIUM   2. Presence of biventricular AICD  EKG   3. Atrial arrhythmia  TSH WITH REFLEX TO FT4    Comp Metabolic Panel    MAGNESIUM    EKG   4. Encounter for monitoring sotalol therapy     5. S/P CABG x  3  Lipid Profile   6. ACC/AHA stage C systolic heart failure (HCC)  DISCONTINUED: metoprolol SR (TOPROL XL) 100 MG TABLET SR 24 HR   7. Mixed hyperlipidemia     8. Obstructive sleep apnea syndrome       Medical Decision Making:  Today's Assessment / Status / Plan:   1. CAD/Ischemic CMO S/P BSI CRT-D ICD Discharge  - CAD/ICM S/P BSI CRT-D (04/27/18).  - Device EGMs reviewed, appears burst AT/AFL with ATP therapy x 15, resulting in inappropriate ICD discharge x 1. VT zone 180 bpm. No previous shocks. Decreased  burden, will continue to monitor.   - Per echo (11/16/19) reduced LV function, EF 45% with global hypokinesis.   - [id number] score: 5, on OAC with Eliquis, no s/sx bleeding, continue.   - Reviewed with Dr. Miller, AT/AFL, likely slow AFL. No device changes at this time. Will start Sotalol 80 mg BID as outpatient (has ICD), calculated baseline QTc: 503.1 ms.   - Decrease Toprol XL 50 mg BID. Pt to monitor BP. Hx hypotension.   - Outpatient EKGs tomorrow 6/12 and Monday 06/15 for close monitoring. Pt. May completed with PCP and fax to Cardiology office.   - Continue GDMT with Entresto, Statin, BB, Aldactone.   - Will check labs CBC/BMP/TSH/Mg+/Lipids, pt to complete labs prior to starting sotalol. CrCl: >100 mL/min.   - Discussed sotalol education and S/E per below.   - Pt to monitor daily weights and BP, and call office if s/sx HF.     2. High Risk Medication: Sotalol Start    3. Chronic Anticoagulation: Eliquis     Plan reviewed in detail with the patient and he verbalizes understanding and is in agreement. RTC 4-6 weeks with Dr. Miller, sooner if clinical condition changes.     Sotalol Medication Education Instructions:   Please take Sotalol as prescribed and do not miss any doses.  If you miss a dose, do NOT double dose. Do not take medication less than 11-12 hours apart between doses. Please seek emergency medical attention or go to your nearest ER if you have symptoms of severe dizziness or palpitations,  shortness of breath unreleaved by rest, and syncope or near syncope. Please notify us if you have any side effects from the medication or questions/concerns. Please do not stop taking the medication until you contact your provider.     Thank you for allowing me to participate in this patients care.  Please contact me with any questions or concerns.     KRISTI Neves.   [facility]  [phone]    Collaborating MD/ADD: Dr. Angela MD.        Esequiel Cullen M.D.  [address]  [address]  [facility] [id number]  VIA In Basket

## 2020-06-11 NOTE — LETTER
[facility]   [address], [address]  [place] [address]  Phone: [phone]  Fax: [phone]              Juaquin Solorzano  1965    Encounter Date: 6/11/2020    LAINA Neves          PROGRESS NOTE:  Cardiology/Electrophysiology Follow-up Note    Subjective:   Chief Complaint:   Chief Complaint   Patient presents with   • Cardiomyopathy (Ischemic)     PP DX:CARDIOMYOPATHY/ AICD SHOCK RECEIVED     Juaquin Solorzano is a 54 y.o. male who presents today for follow up for CAD/ischemic cardiomyopathy s/p BSI CRT-D ICD discharge.     He is followed by Dr. Sweeney, Alisa VELA, and HF.  Last seen by Dr. Sweeney on 3/11/20.  History of CAD/ischemic cardiomyopathy and LBBB s/p BSI CRT-D by Dr. Cullen on 04/27/18. Previous history of some hypotension resulting in decrease and spironolactone and Entresto, now resolved. New atrial arrhythmias noted on device 11/2019 and he was started on Eliquis.     Medical history significant for CAD/ischemic cardiomyopathy, CABG x3, LBBB, s/p BSI CRT-D (04/27/18), HTN, HDL, DMT2, seizures, CLARE on CPAP, Atrial arrhythmias (11/2019), on chronic anticoagulation with Eliquis.    Today in follow up, reports anxiety related to recent ICD shock. On Toprol  mg twice daily. Denies syncope. Denies any precipitating factors including alcohol or drugs. He currently denies chest pain, dizziness, palpitations, pre syncope or syncope, or lower extremity edema. Some exertional dyspnea. Encouraged to monitor weights at home. Denies any signs or symptoms of bleeding or bleeding issues. Patient endorses medication compliance. Provided reassurance related to anxiety, encouraged to f/u with PCP.     Past Medical History:   Diagnosis Date   • Arthritis     osteo, hands/elbows   • Block, bundle branch, left    • CAD (coronary artery disease)    • Cataract 04/26/2018    early stages, not removed   • Diabetes (HCC) 2006    insulin  and oral meds   • Fibromyalgia 2018-6/10   • Gout    • Heart burn    • Hyperlipidemia    • Hypertension    • Ischemic cardiomyopathy 10/2019    Echocardiogram with normal LV size, LVEF 45% (Previously 35-40% in 2018)   • Myocardial infarct (HCC)    • Myocardial infarct (HCC)    • Neuropathy (HCC)     Bilateral feet   • Psychiatric problem     Depression and anxiety   • Sleep apnea     Uses CPAP     Past Surgical History:   Procedure Laterality Date   • AICD IMPLANT Left 2018    [employer] Inogen X4 CRT-D G148 implanted by Dr. Cullen.   • RECOVERY  2016    Procedure: CATH LAB-[facility] W/POSSIBLE RADIAL APPROACH-CARIE;  Surgeon: Mireya Surgery;  [place] [facility] [place]  Service:    • MULTIPLE CORONARY ARTERY BYPASS  2014    CABG x 3   • LEONILA BY LAPAROSCOPY     • KNEE ARTHROSCOPY      Arthroscopy, Knee     Family History   Problem Relation Age of Onset   • Heart Disease Mother    • Diabetes Mother    • Hyperlipidemia Father    • Heart Disease Father    • Hypertension Father    • Stroke Father    • Lung Disease Sister      Social History     Socioeconomic History   • Marital status:      Spouse name: Not on file   • Number of children: Not on file   • Years of education: Not on file   • Highest education level: Not on file   Occupational History   • Not on file   Social Needs   • Financial resource strain: Not on file   • Food insecurity     Worry: Not on file     Inability: Not on file   • Transportation needs     Medical: Not on file     Non-medical: Not on file   Tobacco Use   • Smoking status: Former Smoker     Packs/day: 0.50     Years: 12.00     Pack years: 6.00     Types: Cigarettes     Last attempt to quit: 7/15/2012     Years since quittin.9   • Smokeless tobacco: Never Used   Substance and Sexual Activity   • Alcohol use: Yes     Alcohol/week: 0.6 oz     Types: 1 Standard drinks or equivalent per week     Frequency: 2-4 times  a month   • Drug use: Yes     Types: Marijuana, Oral     Comment: nightly    • Sexual activity: Not Currently   Lifestyle   • Physical activity     Days per week: Not on file     Minutes per session: Not on file   • Stress: Not on file   Relationships   • Social connections     Talks on phone: Not on file     Gets together: Not on file     Attends Religion service: Not on file     Active member of club or organization: Not on file     Attends meetings of clubs or organizations: Not on file     Relationship status: Not on file   • Intimate partner violence     Fear of current or ex partner: Not on file     Emotionally abused: Not on file     Physically abused: Not on file     Forced sexual activity: Not on file   Other Topics Concern   • Not on file   Social History Narrative   • Not on file     Allergies   Allergen Reactions   • Iodine Anaphylaxis     Other reaction(s): Unknown       Current Outpatient Medications   Medication Sig Dispense Refill   • DULoxetine (CYMBALTA) 60 MG Cap DR Particles delayed-release capsule Take 1 Cap by mouth every morning. 90 Cap 1   • allopurinol (ZYLOPRIM) 300 MG Tab Take 1 Tab by mouth every day. 90 Tab 1   • apixaban (ELIQUIS) 5mg Tab Take 1 Tab by mouth 2 Times a Day. 180 Tab 3   • amitriptyline (ELAVIL) 100 MG Tab Take 1 Tab by mouth at bedtime as needed. 90 Tab 1   • atorvastatin (LIPITOR) 40 MG Tab Take 1 Tab by mouth every day. 90 Tab 0   • sildenafil citrate (VIAGRA) 50 MG tablet Take 1 Tab by mouth as needed for Erectile Dysfunction. 10 Tab 10   • zonisamide (ZONEGRAN) 100 MG Cap Take 2 Caps by mouth every day. 180 Cap 1   • Semaglutide, 1 MG/DOSE, (OZEMPIC, 1 MG/DOSE,) 2 MG/1.5ML Solution Pen-injector Inject 1 mg as instructed every 7 days. 6 PEN 4   • Empagliflozin-metFORMIN HCl ER 12.5-1000 MG TABLET SR 24 HR Take 1 Tab by mouth 2 times a day. 180 Tab 4   • sacubitril-valsartan (ENTRESTO)  MG Tab tablet Take 0.5 Tabs by mouth 2 Times a Day.     • spironolactone  "(ALDACTONE) 25 MG Tab Take 0.5 Tabs by mouth every day. 45 Tab 3   • nitroglycerin (NITROSTAT) 0.4 MG SL Tab Place 1 Tab under tongue as needed for Chest Pain. 25 Tab 11   • sotalol (BETAPACE) 80 MG Tab Take 1 Tab by mouth 2 times a day. 60 Tab 3   • metoprolol SR (TOPROL XL) 100 MG TABLET SR 24 HR Take 0.5 Tabs by mouth 2 Times a Day. 30 Tab 0   • glucose blood (FREESTYLE LITE) strip 1 Strip by Other route 4 times a day. 300 Strip 4   • FREESTYLE LANCETS Misc by Does not apply route.     • Insulin Pen Needle (SURE COMFORT PEN NEEDLES) by Does not apply route.       No current facility-administered medications for this visit.        Review of Systems   Constitutional: Negative for chills, diaphoresis and fever.   Respiratory: Positive for shortness of breath (Dyspnea upon exertion). Negative for cough and wheezing.    Cardiovascular: Negative for chest pain, palpitations, orthopnea, claudication, leg swelling and PND.   Gastrointestinal: Negative for abdominal pain and blood in stool.   Genitourinary: Negative for hematuria.   Neurological: Negative for dizziness and loss of consciousness.     All others systems reviewed and negative.   Objective:     /72 (BP Location: Left arm, Patient Position: Sitting, BP Cuff Size: Large adult)   Pulse 89   Ht 1.854 m (6' 1\")   Wt 112 kg (247 lb)   SpO2 92%  Body mass index is 32.59 kg/m².    Physical Exam   Constitutional: He is oriented to person, place, and time. No distress.   Neck: No JVD present.   Cardiovascular: Normal rate and regular rhythm.   No murmur heard.  Pulses:       Radial pulses are 2+ on the right side and 2+ on the left side.   Left upper chest device site CDI, no erosion.   Pulmonary/Chest: Effort normal and breath sounds normal. No respiratory distress. He has no decreased breath sounds. He has no wheezes. He has no rales. He exhibits no tenderness.   Musculoskeletal:         General: No edema.   Neurological: He is alert and oriented to person, " place, and time.   Skin: Skin is warm and dry. He is not diaphoretic. No erythema.   Psychiatric: Mood, memory, affect and judgment normal.   Nursing note and vitals reviewed.    Cardiac Imaging and Procedures Review:    EKG 06/11/2020: Ventricular paced    Limited Echocardiogram (11/16/2019):   Limited Study for LVEF.   TDS - Limited acoustical window, contrast not available.  Prior Echo - 2/12/18, prior EF probably 40%, currently    45%. Mildly reduced left ventricular systolic function.  Left ventricular ejection fraction is visually estimated to be 45%.Global hypokinesis.  Abnormal septal motion consistent with postoperative status.    Labs (personally reviewed and notable for):   Lab Results   Component Value Date/Time    SODIUM 138 10/09/2019 01:47 PM    POTASSIUM 4.4 10/09/2019 01:47 PM    CHLORIDE 105 10/09/2019 01:47 PM    CO2 24 10/09/2019 01:47 PM    GLUCOSE 90 10/09/2019 01:47 PM    BUN 14 10/09/2019 01:47 PM    CREATININE 0.95 10/09/2019 01:47 PM      Lab Results   Component Value Date/Time    WBC 9.4 06/18/2019 09:30 AM    RBC 4.76 06/18/2019 09:30 AM    HEMOGLOBIN 15.0 06/18/2019 09:30 AM    HEMATOCRIT 46.9 06/18/2019 09:30 AM    MCV 98.5 (H) 06/18/2019 09:30 AM    MCH 31.5 06/18/2019 09:30 AM    MCHC 32.0 (L) 06/18/2019 09:30 AM    MPV 10.6 06/18/2019 09:30 AM    NEUTSPOLYS 50.90 06/18/2019 09:30 AM    LYMPHOCYTES 35.80 06/18/2019 09:30 AM    MONOCYTES 8.20 06/18/2019 09:30 AM    EOSINOPHILS 2.10 06/18/2019 09:30 AM    BASOPHILS 1.10 06/18/2019 09:30 AM      PT/INR:   Lab Results   Component Value Date/Time    PROTHROMBTM 12.7 04/27/2018 06:45 AM    INR 0.98 04/27/2018 06:45 AM   ]  Assessment:     1. Ischemic cardiomyopathy  EKG    TSH WITH REFLEX TO FT4    Comp Metabolic Panel    Lipid Profile    MAGNESIUM   2. Presence of biventricular AICD  EKG   3. Atrial arrhythmia  TSH WITH REFLEX TO FT4    Comp Metabolic Panel    MAGNESIUM    EKG   4. Encounter for monitoring sotalol therapy     5. S/P CABG x  3  Lipid Profile   6. ACC/AHA stage C systolic heart failure (HCC)  DISCONTINUED: metoprolol SR (TOPROL XL) 100 MG TABLET SR 24 HR   7. Mixed hyperlipidemia     8. Obstructive sleep apnea syndrome       Medical Decision Making:  Today's Assessment / Status / Plan:   1. CAD/Ischemic CMO S/P BSI CRT-D ICD Discharge  - CAD/ICM S/P BSI CRT-D (04/27/18).  - Device EGMs reviewed, appears burst AT/AFL with ATP therapy x 15, resulting in inappropriate ICD discharge x 1. VT zone 180 bpm. No previous shocks. Decreased  burden, will continue to monitor.   - Per echo (11/16/19) reduced LV function, EF 45% with global hypokinesis.   - [id number] score: 5, on OAC with Eliquis, no s/sx bleeding, continue.   - Reviewed with Dr. Miller, AT/AFL, likely slow AFL. No device changes at this time. Will start Sotalol 80 mg BID as outpatient (has ICD), calculated baseline QTc: 503.1 ms.   - Decrease Toprol XL 50 mg BID. Pt to monitor BP. Hx hypotension.   - Outpatient EKGs tomorrow 6/12 and Monday 06/15 for close monitoring. Pt. May completed with PCP and fax to Cardiology office.   - Continue GDMT with Entresto, Statin, BB, Aldactone.   - Will check labs CBC/BMP/TSH/Mg+/Lipids, pt to complete labs prior to starting sotalol. CrCl: >100 mL/min.   - Discussed sotalol education and S/E per below.   - Pt to monitor daily weights and BP, and call office if s/sx HF.     2. High Risk Medication: Sotalol Start    3. Chronic Anticoagulation: Eliquis     Plan reviewed in detail with the patient and he verbalizes understanding and is in agreement. RTC 4-6 weeks with Dr. Miller, sooner if clinical condition changes.     Sotalol Medication Education Instructions:   Please take Sotalol as prescribed and do not miss any doses.  If you miss a dose, do NOT double dose. Do not take medication less than 11-12 hours apart between doses. Please seek emergency medical attention or go to your nearest ER if you have symptoms of severe dizziness or palpitations,  shortness of breath unreleaved by rest, and syncope or near syncope. Please notify us if you have any side effects from the medication or questions/concerns. Please do not stop taking the medication until you contact your provider.     Thank you for allowing me to participate in this patients care.  Please contact me with any questions or concerns.     KRISTI Neves.   [facility]  [phone]    Collaborating MD/ADD: Dr. Angela MD.        VIKAS Dos Santos  [address]  [address]  [place] [address]  VIA In Basket     Nancy Sweeney M.D.  [address]  [address]  [place] [address]  VIA In Basket

## 2020-06-11 NOTE — PROGRESS NOTES
Cardiology/Electrophysiology Follow-up Note    Subjective:   Chief Complaint:   Chief Complaint   Patient presents with   • Cardiomyopathy (Ischemic)     PP DX:CARDIOMYOPATHY/ AICD SHOCK RECEIVED     Juaquin Solorzano is a 54 y.o. male who presents today for follow up for CAD/ischemic cardiomyopathy s/p BSI CRT-D ICD discharge.     He is followed by Dr. Sweeney, Alisa VELA, and HF.  Last seen by Dr. Sweeney on 3/11/20.  History of CAD/ischemic cardiomyopathy and LBBB s/p BSI CRT-D by Dr. Cullen on 04/27/18. Previous history of some hypotension resulting in decrease and spironolactone and Entresto, now resolved. New atrial arrhythmias noted on device 11/2019 and he was started on Eliquis.     Medical history significant for CAD/ischemic cardiomyopathy, CABG x3, LBBB, s/p BSI CRT-D (04/27/18), HTN, HDL, DMT2, seizures, CLARE on CPAP, Atrial arrhythmias (11/2019), on chronic anticoagulation with Eliquis.    Today in follow up, reports anxiety related to recent ICD shock. On Toprol  mg twice daily. Denies syncope. Denies any precipitating factors including alcohol or drugs. He currently denies chest pain, dizziness, palpitations, pre syncope or syncope, or lower extremity edema. Some exertional dyspnea. Encouraged to monitor weights at home. Denies any signs or symptoms of bleeding or bleeding issues. Patient endorses medication compliance. Provided reassurance related to anxiety, encouraged to f/u with PCP.     Past Medical History:   Diagnosis Date   • Arthritis     osteo, hands/elbows   • Block, bundle branch, left    • CAD (coronary artery disease)    • Cataract 04/26/2018    early stages, not removed   • Diabetes (HCC) 2006    insulin and oral meds   • Fibromyalgia 04/26/2018    5-6/10   • Gout    • Heart burn    • Hyperlipidemia    • Hypertension    • Ischemic cardiomyopathy 10/2019    Echocardiogram with normal LV size, LVEF 45% (Previously 35-40% in February 2018)   • Myocardial infarct ([facility]) 2007    • Myocardial infarct (HCC)    • Neuropathy (HCC)     Bilateral feet   • Psychiatric problem     Depression and anxiety   • Sleep apnea     Uses CPAP     Past Surgical History:   Procedure Laterality Date   • AICD IMPLANT Left 2018    [employer] Inogen X4 CRT-D G148 implanted by Dr. Cullen.   • RECOVERY  2016    Procedure: CATH LAB-[facility] W/POSSIBLE RADIAL APPROACH-CARIE;  Surgeon: Recoveryonly Surgery;  Location: SURGERY PRE-POST PROC UNIT [facility];  Service:    • MULTIPLE CORONARY ARTERY BYPASS  2014    CABG x 3   • LEONILA BY LAPAROSCOPY     • KNEE ARTHROSCOPY      Arthroscopy, Knee     Family History   Problem Relation Age of Onset   • Heart Disease Mother    • Diabetes Mother    • Hyperlipidemia Father    • Heart Disease Father    • Hypertension Father    • Stroke Father    • Lung Disease Sister      Social History     Socioeconomic History   • Marital status:      Spouse name: Not on file   • Number of children: Not on file   • Years of education: Not on file   • Highest education level: Not on file   Occupational History   • Not on file   Social Needs   • Financial resource strain: Not on file   • Food insecurity     Worry: Not on file     Inability: Not on file   • Transportation needs     Medical: Not on file     Non-medical: Not on file   Tobacco Use   • Smoking status: Former Smoker     Packs/day: 0.50     Years: 12.00     Pack years: 6.00     Types: Cigarettes     Last attempt to quit: 7/15/2012     Years since quittin.9   • Smokeless tobacco: Never Used   Substance and Sexual Activity   • Alcohol use: Yes     Alcohol/week: 0.6 oz     Types: 1 Standard drinks or equivalent per week     Frequency: 2-4 times a month   • Drug use: Yes     Types: Marijuana, Oral     Comment: nightly    • Sexual activity: Not Currently   Lifestyle   • Physical activity     Days per week: Not on file     Minutes per session: Not on file   • Stress: Not on file   Relationships   • Social  connections     Talks on phone: Not on file     Gets together: Not on file     Attends Tenri[employer] service: Not on file     Active member of club or organization: Not on file     Attends meetings of clubs or organizations: Not on file     Relationship status: Not on file   • Intimate partner violence     Fear of current or ex partner: Not on file     Emotionally abused: Not on file     Physically abused: Not on file     Forced sexual activity: Not on file   Other Topics Concern   • Not on file   Social History Narrative   • Not on file     Allergies   Allergen Reactions   • Iodine Anaphylaxis     Other reaction(s): Unknown       Current Outpatient Medications   Medication Sig Dispense Refill   • DULoxetine (CYMBALTA) 60 MG Cap DR Particles delayed-release capsule Take 1 Cap by mouth every morning. 90 Cap 1   • allopurinol (ZYLOPRIM) 300 MG Tab Take 1 Tab by mouth every day. 90 Tab 1   • apixaban (ELIQUIS) 5mg Tab Take 1 Tab by mouth 2 Times a Day. 180 Tab 3   • amitriptyline (ELAVIL) 100 MG Tab Take 1 Tab by mouth at bedtime as needed. 90 Tab 1   • atorvastatin (LIPITOR) 40 MG Tab Take 1 Tab by mouth every day. 90 Tab 0   • sildenafil citrate (VIAGRA) 50 MG tablet Take 1 Tab by mouth as needed for Erectile Dysfunction. 10 Tab 10   • zonisamide (ZONEGRAN) 100 MG Cap Take 2 Caps by mouth every day. 180 Cap 1   • Semaglutide, 1 MG/DOSE, (OZEMPIC, 1 MG/DOSE,) 2 MG/1.5ML Solution Pen-injector Inject 1 mg as instructed every 7 days. 6 PEN 4   • Empagliflozin-metFORMIN HCl ER 12.5-1000 MG TABLET SR 24 HR Take 1 Tab by mouth 2 times a day. 180 Tab 4   • sacubitril-valsartan (ENTRESTO)  MG Tab tablet Take 0.5 Tabs by mouth 2 Times a Day.     • spironolactone (ALDACTONE) 25 MG Tab Take 0.5 Tabs by mouth every day. 45 Tab 3   • nitroglycerin (NITROSTAT) 0.4 MG SL Tab Place 1 Tab under tongue as needed for Chest Pain. 25 Tab 11   • sotalol (BETAPACE) 80 MG Tab Take 1 Tab by mouth 2 times a day. 60 Tab 3   • metoprolol SR  "(TOPROL XL) 100 MG TABLET SR 24 HR Take 0.5 Tabs by mouth 2 Times a Day. 30 Tab 0   • glucose blood (FREESTYLE LITE) strip 1 Strip by Other route 4 times a day. 300 Strip 4   • FREESTYLE LANCETS Misc by Does not apply route.     • Insulin Pen Needle (SURE COMFORT PEN NEEDLES) by Does not apply route.       No current facility-administered medications for this visit.        Review of Systems   Constitutional: Negative for chills, diaphoresis and fever.   Respiratory: Positive for shortness of breath (Dyspnea upon exertion). Negative for cough and wheezing.    Cardiovascular: Negative for chest pain, palpitations, orthopnea, claudication, leg swelling and PND.   Gastrointestinal: Negative for abdominal pain and blood in stool.   Genitourinary: Negative for hematuria.   Neurological: Negative for dizziness and loss of consciousness.     All others systems reviewed and negative.   Objective:     /72 (BP Location: Left arm, Patient Position: Sitting, BP Cuff Size: Large adult)   Pulse 89   Ht 1.854 m (6' 1\")   Wt 112 kg (247 lb)   SpO2 92%  Body mass index is 32.59 kg/m².    Physical Exam   Constitutional: He is oriented to person, place, and time. No distress.   Neck: No JVD present.   Cardiovascular: Normal rate and regular rhythm.   No murmur heard.  Pulses:       Radial pulses are 2+ on the right side and 2+ on the left side.   Left upper chest device site CDI, no erosion.   Pulmonary/Chest: Effort normal and breath sounds normal. No respiratory distress. He has no decreased breath sounds. He has no wheezes. He has no rales. He exhibits no tenderness.   Musculoskeletal:         General: No edema.   Neurological: He is alert and oriented to person, place, and time.   Skin: Skin is warm and dry. He is not diaphoretic. No erythema.   Psychiatric: Mood, memory, affect and judgment normal.   Nursing note and vitals reviewed.    Cardiac Imaging and Procedures Review:    EKG 06/11/2020: Ventricular " paced    Limited Echocardiogram (11/16/2019):   Limited Study for LVEF.   TDS - Limited acoustical window, contrast not available.  Prior Echo - 2/12/18, prior EF probably 40%, currently    45%. Mildly reduced left ventricular systolic function.  Left ventricular ejection fraction is visually estimated to be 45%.Global hypokinesis.  Abnormal septal motion consistent with postoperative status.    Labs (personally reviewed and notable for):   Lab Results   Component Value Date/Time    SODIUM 138 10/09/2019 01:47 PM    POTASSIUM 4.4 10/09/2019 01:47 PM    CHLORIDE 105 10/09/2019 01:47 PM    CO2 24 10/09/2019 01:47 PM    GLUCOSE 90 10/09/2019 01:47 PM    BUN 14 10/09/2019 01:47 PM    CREATININE 0.95 10/09/2019 01:47 PM      Lab Results   Component Value Date/Time    WBC 9.4 06/18/2019 09:30 AM    RBC 4.76 06/18/2019 09:30 AM    HEMOGLOBIN 15.0 06/18/2019 09:30 AM    HEMATOCRIT 46.9 06/18/2019 09:30 AM    MCV 98.5 (H) 06/18/2019 09:30 AM    MCH 31.5 06/18/2019 09:30 AM    MCHC 32.0 (L) 06/18/2019 09:30 AM    MPV 10.6 06/18/2019 09:30 AM    NEUTSPOLYS 50.90 06/18/2019 09:30 AM    LYMPHOCYTES 35.80 06/18/2019 09:30 AM    MONOCYTES 8.20 06/18/2019 09:30 AM    EOSINOPHILS 2.10 06/18/2019 09:30 AM    BASOPHILS 1.10 06/18/2019 09:30 AM      PT/INR:   Lab Results   Component Value Date/Time    PROTHROMBTM 12.7 04/27/2018 06:45 AM    INR 0.98 04/27/2018 06:45 AM   ]  Assessment:     1. Ischemic cardiomyopathy  EKG    TSH WITH REFLEX TO FT4    Comp Metabolic Panel    Lipid Profile    MAGNESIUM   2. Presence of biventricular AICD  EKG   3. Atrial arrhythmia  TSH WITH REFLEX TO FT4    Comp Metabolic Panel    MAGNESIUM    EKG   4. Encounter for monitoring sotalol therapy     5. S/P CABG x 3  Lipid Profile   6. ACC/AHA stage C systolic heart failure (HCC)  DISCONTINUED: metoprolol SR (TOPROL XL) 100 MG TABLET SR 24 HR   7. Mixed hyperlipidemia     8. Obstructive sleep apnea syndrome       Medical Decision Making:  Today's Assessment  / Status / Plan:   1. CAD/Ischemic CMO S/P BSI CRT-D ICD Discharge  - CAD/ICM S/P BSI CRT-D (04/27/18).  - Device EGMs reviewed, appears burst AT/AFL with ATP therapy x 15, resulting in inappropriate ICD discharge x 1. VT zone 180 bpm. No previous shocks. Decreased  burden, will continue to monitor.   - Per echo (11/16/19) reduced LV function, EF 45% with global hypokinesis.   - [id number] score: 5, on OAC with Eliquis, no s/sx bleeding, continue.   - Reviewed with Dr. Miller, AT/AFL, likely slow AFL. No device changes at this time. Will start Sotalol 80 mg BID as outpatient (has ICD), calculated baseline QTc: 519 ms.   - Decrease Toprol XL 50 mg BID. Pt to monitor BP. Hx hypotension.   - Outpatient EKGs tomorrow 6/12 and Monday 06/15 for close monitoring. Pt. May completed with PCP and fax to Cardiology office.   - Continue GDMT with Entresto, Statin, BB, Aldactone.   - Will check labs CBC/BMP/TSH/Mg+/Lipids, pt to complete labs prior to starting sotalol. CrCl: >100 mL/min.   - Discussed sotalol education and S/E per below.   - Pt to monitor daily weights and BP, and call office if s/sx HF.     2. High Risk Medication: Sotalol Start    3. Chronic Anticoagulation: Eliquis     Plan reviewed in detail with the patient and he verbalizes understanding and is in agreement. RTC 4-6 weeks with Dr. Miller, sooner if clinical condition changes.     Sotalol Medication Education Instructions:   Please take Sotalol as prescribed and do not miss any doses.  If you miss a dose, do NOT double dose. Do not take medication less than 11-12 hours apart between doses. Please seek emergency medical attention or go to your nearest ER if you have symptoms of severe dizziness or palpitations, shortness of breath unreleaved by rest, and syncope or near syncope. Please notify us if you have any side effects from the medication or questions/concerns. Please do not stop taking the medication until you contact your provider.     Thank you for  allowing me to participate in this patients care.  Please contact me with any questions or concerns.     KRISTI Neves.   [facility]  [phone]    Collaborating MD/ADD: Dr. Angela MD.

## 2020-06-12 ENCOUNTER — TELEPHONE (OUTPATIENT)
Dept: CARDIOLOGY | Facility: MEDICAL CENTER | Age: 55
End: 2020-06-12

## 2020-06-12 ENCOUNTER — NON-PROVIDER VISIT (OUTPATIENT)
Dept: MEDICAL GROUP | Facility: CLINIC | Age: 55
End: 2020-06-12
Payer: MEDICARE

## 2020-06-12 PROCEDURE — 93000 ELECTROCARDIOGRAM COMPLETE: CPT | Performed by: FAMILY MEDICINE

## 2020-06-12 NOTE — TELEPHONE ENCOUNTER
----- Message from LAINA Neves sent at 6/11/2020  7:33 PM PDT -----  Regarding: f/u appt with Dr. Cullen- Sotalol start  Dear Filomena,     Could we schedule pt for f/u 3-4 weeks with Dr. Cullen. S/p ICD shock. Started sotalol. Thanks again for your help today!     Thanks,     LAINA Neves   [facility]   [phone]

## 2020-06-16 ENCOUNTER — TELEPHONE (OUTPATIENT)
Dept: MEDICAL GROUP | Facility: CLINIC | Age: 55
End: 2020-06-16

## 2020-06-16 NOTE — TELEPHONE ENCOUNTER
Pt called and stated that the Benadryl is not helping him sleep at night. He has tried taking zquil as well and it is not helping either. Please advise on what he can take to help him sleep

## 2020-06-17 DIAGNOSIS — G47.00 INSOMNIA, UNSPECIFIED TYPE: ICD-10-CM

## 2020-06-17 RX ORDER — MIRTAZAPINE 15 MG/1
15 TABLET, FILM COATED ORAL
Qty: 30 TAB | Refills: 2 | Status: SHIPPED | OUTPATIENT
Start: 2020-06-17 | End: 2020-10-15

## 2020-06-17 NOTE — TELEPHONE ENCOUNTER
Juaquin Martin M.D.  Marcelino Novoa; Janel Pace, Med Ass't    Caller: Unspecified (Yesterday, 10:58 AM)               Let him know, we are going to try Remeron.  at pharmacy, report back on efficacy on sleep.     Lyly      Pt notified

## 2020-06-23 LAB — EKG IMPRESSION: NORMAL

## 2020-07-04 DIAGNOSIS — I50.20 ACC/AHA STAGE C SYSTOLIC HEART FAILURE (HCC): ICD-10-CM

## 2020-07-04 DIAGNOSIS — Z76.0 ENCOUNTER FOR MEDICATION REFILL: ICD-10-CM

## 2020-07-06 RX ORDER — SACUBITRIL AND VALSARTAN 97; 103 MG/1; MG/1
TABLET, FILM COATED ORAL
Qty: 180 TAB | Refills: 3 | Status: SHIPPED | OUTPATIENT
Start: 2020-07-06 | End: 2022-04-13 | Stop reason: SDUPTHER

## 2020-07-06 RX ORDER — SPIRONOLACTONE 25 MG/1
TABLET ORAL
Qty: 90 TAB | Refills: 3 | Status: SHIPPED | OUTPATIENT
Start: 2020-07-06 | End: 2021-09-01 | Stop reason: SDUPTHER

## 2020-07-07 DIAGNOSIS — E11.8 TYPE 2 DIABETES MELLITUS WITH COMPLICATION, WITH LONG-TERM CURRENT USE OF INSULIN (HCC): ICD-10-CM

## 2020-07-07 DIAGNOSIS — I50.20 ACC/AHA STAGE C SYSTOLIC HEART FAILURE (HCC): ICD-10-CM

## 2020-07-07 DIAGNOSIS — Z79.4 TYPE 2 DIABETES MELLITUS WITH COMPLICATION, WITH LONG-TERM CURRENT USE OF INSULIN (HCC): ICD-10-CM

## 2020-07-07 RX ORDER — ATORVASTATIN CALCIUM 40 MG/1
40 TABLET, FILM COATED ORAL DAILY
Qty: 90 TAB | Refills: 0 | Status: SHIPPED | OUTPATIENT
Start: 2020-07-07 | End: 2021-06-02 | Stop reason: SDUPTHER

## 2020-07-07 NOTE — TELEPHONE ENCOUNTER
Was the patient seen in the last year in this department? Yes    Does patient have an active prescription for medications requested? Yes    Received Request Via: Patient    Hospital Outpatient Visit on 2020   Component Date Value   • Magnesium 2020 2.2    • Cholesterol,Tot 2020 140    • Triglycerides 2020 135    • HDL 2020 38*   • LDL 2020 75    • Sodium 2020 142    • Potassium 2020 4.6    • Chloride 2020 107    • Co2 2020 23    • Anion Gap 2020 12.0    • Glucose 2020 101*   • Bun 2020 9    • Creatinine 2020 0.96    • Calcium 2020 9.1    • AST(SGOT) 2020 18    • ALT(SGPT) 2020 19    • Alkaline Phosphatase 2020 68    • Total Bilirubin 2020 0.9    • Albumin 2020 3.9    • Total Protein 2020 6.5    • Globulin 2020 2.6    • A-G Ratio 2020 1.5    • TSH 2020 1.770    • GFR If  2020 >60    • GFR If Non  Ameri* 2020 >60    Office Visit on 2020   Component Date Value   • Report 2020                      Value:[facility]    Test Date:  2020  Pt Name:    OBED ATKINS               Department: [facility]  MRN:        [id number]                      Room:  Gender:     Male                         Technician: AM  :        1965                   Requested By:HUSSAIN TORRE  Order #:    [id number]                    Reading MD: Wing Miller MD    Measurements  Intervals                                Axis  Rate:       89                           P:          60  NJ:         156                          QRS:        77  QRSD:       140                          T:          103  QT:         432  QTc:        526    Interpretive Statements  VENTRICULAR-PACED COMPLEXES  NO FURTHER RHYTHM ANALYSIS ATTEMPTED DUE TO PACED RHYTHM  Compared to ECG 2018 06:16:08  No significant changes  Electronically Signed On 2020  14:40:27 PDT by Wing Miller MD     Office Visit on 05/29/2020   Component Date Value   • Glycohemoglobin 05/29/2020 5.4    Office Visit on 12/18/2019   Component Date Value   • Glycohemoglobin 12/18/2019 5.3    • Internal Control Negative 12/18/2019 Negative    • Internal Control Positive 12/18/2019 Positive    Hospital Outpatient Visit on 12/12/2019   Component Date Value   • Occult Blood, IA 12/12/2019 Negative    • Ambiguous Date/Time 12/28/2019 See Below:    Ancillary Procedure on 10/16/2019   Component Date Value   • Eject.Frac. MOD BP 10/16/2019 44.57    • Eject.Frac. MOD 4C 10/16/2019 46.8    • Eject.Frac. MOD 2C 10/16/2019 48.92    • Left Ventrical Ejection * 10/16/2019 45    Hospital Outpatient Visit on 10/09/2019   Component Date Value   • Sodium 10/09/2019 138    • Potassium 10/09/2019 4.4    • Chloride 10/09/2019 105    • Co2 10/09/2019 24    • Glucose 10/09/2019 90    • Bun 10/09/2019 14    • Creatinine 10/09/2019 0.95    • Calcium 10/09/2019 9.6    • Anion Gap 10/09/2019 9.0    • GFR If  10/09/2019 >60    • GFR If Non  Ameri* 10/09/2019 >60    ]

## 2020-07-13 NOTE — PROGRESS NOTES
Subjective:   Chief Complaint:   No chief complaint on file.      Juaquin Solorzano is a 55 y.o. male who returns today for coronary artery disease, prior bypass, ischemic cardiomyopathy, chest pain, pacemaker and med indued hypotension on full entresto.     He was 40 when he started having severe SOB, was ACS, had stent put in, then had ACS a year later, only symptoms were SOB/diaphoresis, had stent in stent ([facility]).    Then had bypass x3 in September 2013 ([facility] in [place]), does not recall symptoms at that time.  In 2014 he had open sternotomy due to complications (wires broke, had non union, had titanium plate with screws).    Had a repeat left heart catheterization July 2016 which showed occluded vein grafts, diffuse distal RCA disease and 100% occluded LAD, moderate nonobstructive circumflex disease.  Fortunately, limited his LAD is patent. This was done for low EF, he recalls lowest 32%.    Had an ICD/BiV placed with Dr. Cullen 4-2019. No shocks.  Has ischemic CMO, [facility] 3 symptoms, gets DING walking to end of driveway.  No orthopnea or lower extremity swelling.   EF 40-45%.  Gets CRT 95%.    Has had PAF, no prior CVA, on apixaban for CHADS2 vasc of 4, no prior TIA/CVA.  Does not feel it, new recently.    LDL cholesterol is 50, HDL low at 34, on Lipitor 40 mg.  Has insulin-dependent type 2 diabetes.  GLP-1 Receptor antagonist.  On Jardiance.  He has lost weight w/o really trying, eating less, wife concerned but he is not.    Has sleep apnea, uses CPAP.    Was having symptomatic low blood pressure on full dose Entresto, lightheaded, syncope. Did have syncope once from this but has not recurred. BP in the 80s at the time.  Felt better on half dose.  BMP has been fine.    He is not limited by chest pain, pressure or tightness with activity.   No significant palpitations, lightheadedness, or presyncope/syncope.   No symptoms of leg claudication.   No stroke/TIA like  symptoms.    He reports being forgetful at times, cannot recall all details.    Mother's side had premature CAD, MI, DM.  Father  of CVA, had carotid artery disease.    Here with his wife, her family is here.  She is retired from [employer].  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    DATA REVIEWED by me:  ECG 2018  Sinus, ventricular paced, rate 72    Device check 2020  1 mode switch 39 min, V paced 95%, a paced 1%.    Device interrogation 19  CRT-D, rate 60, V 90%, A 1%    Echo ltd 10-16-19  Limited Study for LVEF.   TDS - Limited acoustical window, contrast not available.  Prior Echo - 18, prior EF probably 40%, currently    45%.  Mildly reduced left ventricular systolic function.  Left ventricular ejection fraction is visually estimated to be 45%.  Global hypokinesis.  Abnormal septal motion consistent with postoperative status.    Echo 2018  EF 35%, septal and apical hypokinesis, mild MR, unable to estimate RVSP    Left heart catheterization 2016  PREOPERATIVE DIAGNOSES:  1.  Dyspnea on exertion.  2.  Congestive heart failure with unclear EF due to poor preoperative imaging.  3.  Coronary artery disease status post CABG with unknown graft anatomy.  4.  Insulin-dependent diabetes mellitus.  5.  Morbid obesity.     POSTOPERATIVE DIAGNOSES:  1.  Occluded vein grafts.  2.  Patent LIMA to LAD.  3.  Diffuse distal RCA disease.  4.  A 100% occluded LAD stent.  5.  Moderate nonobstructive left circumflex disease.  6.  LVEF 50%.    PFTs 2017  ASSESSMENT:  Spirometry suggests mild restrictive lung disease and no evidence   of obstruction.  Plethysmography reveals mild hyperinflation and no evidence   of restrictive lung disease.  Gas transfer is normal as is airway resistance.    Clinical correlation is recommended.    Chest x-ray 2018  FINDINGS:  Cardiomediastinal contour is unchanged.  No focal consolidation.  No  pneumothorax identified.  Supportive tubing is unchanged.  Postoperative change from prior open heart surgery.    Most recent labs:     Lab Results   Component Value Date/Time    HEMOGLOBIN 15.0 06/18/2019 09:30 AM    HEMATOCRIT 46.9 06/18/2019 09:30 AM    MCV 98.5 (H) 06/18/2019 09:30 AM    INR 0.98 04/27/2018 06:45 AM      Lab Results   Component Value Date/Time    SODIUM 142 06/11/2020 05:14 PM    POTASSIUM 4.6 06/11/2020 05:14 PM    CHLORIDE 107 06/11/2020 05:14 PM    CO2 23 06/11/2020 05:14 PM    GLUCOSE 101 (H) 06/11/2020 05:14 PM    BUN 9 06/11/2020 05:14 PM    CREATININE 0.96 06/11/2020 05:14 PM      Lab Results   Component Value Date/Time    ASTSGOT 18 06/11/2020 05:14 PM    ALTSGPT 19 06/11/2020 05:14 PM    ALBUMIN 3.9 06/11/2020 05:14 PM      Lab Results   Component Value Date/Time    CHOLSTRLTOT 140 06/11/2020 05:14 PM    LDL 75 06/11/2020 05:14 PM    HDL 38 (A) 06/11/2020 05:14 PM    TRIGLYCERIDE 135 06/11/2020 05:14 PM           6/18/2019 hemoglobin 15, post 202, sodium 141, potassium 4.6, creatinine 1, LFTs normal, LDL 50, HDL 34, triglycerides 131, total cholesterol 110    Past Medical History:   Diagnosis Date   • Arthritis     osteo, hands/elbows   • Block, bundle branch, left    • CAD (coronary artery disease)    • Cataract 04/26/2018    early stages, not removed   • Diabetes ([facility]) 2006    insulin and oral meds   • Fibromyalgia 04/26/2018 5-6/10   • Gout    • Heart burn    • Hyperlipidemia    • Hypertension    • Ischemic cardiomyopathy 10/2019    Echocardiogram with normal LV size, LVEF 45% (Previously 35-40% in February 2018)   • Myocardial infarct ([facility]) 2007   • Myocardial infarct ([facility]) 2012   • Neuropathy ([facility])     Bilateral feet   • Psychiatric problem     Depression and anxiety   • Sleep apnea     Uses CPAP     Past Surgical History:   Procedure Laterality Date   • AICD IMPLANT Left 04/27/2018    [employer] Inogen X4 CRT-D G148 implanted by Dr. Cullen.   • RECOVERY  7/22/2016     Procedure: CATH LAB-[facility] W/POSSIBLE RADIAL APPROACH-CARIE;  Surgeon: [facility] Surgery;  Location: SURGERY PRE-POST PROC UNIT [facility];  Service:    • MULTIPLE CORONARY ARTERY BYPASS  2014    CABG x 3   • LEONILA BY LAPAROSCOPY     • KNEE ARTHROSCOPY      Arthroscopy, Knee     Family History   Problem Relation Age of Onset   • Heart Disease Mother    • Diabetes Mother    • Hyperlipidemia Father    • Heart Disease Father    • Hypertension Father    • Stroke Father    • Lung Disease Sister      Social History     Socioeconomic History   • Marital status:      Spouse name: Not on file   • Number of children: Not on file   • Years of education: Not on file   • Highest education level: Not on file   Occupational History   • Not on file   Social Needs   • Financial resource strain: Not on file   • Food insecurity     Worry: Not on file     Inability: Not on file   • Transportation needs     Medical: Not on file     Non-medical: Not on file   Tobacco Use   • Smoking status: Former Smoker     Packs/day: 0.50     Years: 12.00     Pack years: 6.00     Types: Cigarettes     Last attempt to quit: 7/15/2012     Years since quittin.0   • Smokeless tobacco: Never Used   Substance and Sexual Activity   • Alcohol use: Yes     Alcohol/week: 0.6 oz     Types: 1 Standard drinks or equivalent per week     Frequency: 2-4 times a month   • Drug use: Yes     Types: Marijuana, Oral     Comment: nightly    • Sexual activity: Not Currently   Lifestyle   • Physical activity     Days per week: Not on file     Minutes per session: Not on file   • Stress: Not on file   Relationships   • Social connections     Talks on phone: Not on file     Gets together: Not on file     Attends Gnosticist service: Not on file     Active member of club or organization: Not on file     Attends meetings of clubs or organizations: Not on file     Relationship status: Not on file   • Intimate partner violence     Fear of current or ex partner: Not on  file     Emotionally abused: Not on file     Physically abused: Not on file     Forced sexual activity: Not on file   Other Topics Concern   • Not on file   Social History Narrative   • Not on file     Allergies   Allergen Reactions   • Iodine Anaphylaxis     Other reaction(s): Unknown       Current Outpatient Medications   Medication Sig Dispense Refill   • atorvastatin (LIPITOR) 40 MG Tab Take 1 Tab by mouth every day. 90 Tab 0   • spironolactone (ALDACTONE) 25 MG Tab TAKE 1 TABLET DAILY 90 Tab 3   • ENTRESTO  MG Tab tablet TAKE 1 TABLET TWICE A  Tab 3   • mirtazapine (REMERON) 15 MG Tab Take 1 Tab by mouth every bedtime. 30 Tab 2   • sotalol (BETAPACE) 80 MG Tab Take 1 Tab by mouth 2 times a day. 60 Tab 3   • metoprolol SR (TOPROL XL) 100 MG TABLET SR 24 HR Take 0.5 Tabs by mouth 2 Times a Day. 30 Tab 0   • DULoxetine (CYMBALTA) 60 MG Cap DR Particles delayed-release capsule Take 1 Cap by mouth every morning. 90 Cap 1   • allopurinol (ZYLOPRIM) 300 MG Tab Take 1 Tab by mouth every day. 90 Tab 1   • apixaban (ELIQUIS) 5mg Tab Take 1 Tab by mouth 2 Times a Day. 180 Tab 3   • amitriptyline (ELAVIL) 100 MG Tab Take 1 Tab by mouth at bedtime as needed. 90 Tab 1   • sildenafil citrate (VIAGRA) 50 MG tablet Take 1 Tab by mouth as needed for Erectile Dysfunction. 10 Tab 10   • zonisamide (ZONEGRAN) 100 MG Cap Take 2 Caps by mouth every day. 180 Cap 1   • Semaglutide, 1 MG/DOSE, (OZEMPIC, 1 MG/DOSE,) 2 MG/1.5ML Solution Pen-injector Inject 1 mg as instructed every 7 days. 6 PEN 4   • Empagliflozin-metFORMIN HCl ER 12.5-1000 MG TABLET SR 24 HR Take 1 Tab by mouth 2 times a day. 180 Tab 4   • nitroglycerin (NITROSTAT) 0.4 MG SL Tab Place 1 Tab under tongue as needed for Chest Pain. 25 Tab 11   • glucose blood (FREESTYLE LITE) strip 1 Strip by Other route 4 times a day. 300 Strip 4   • FREESTYLE LANCETS Misc by Does not apply route.     • Insulin Pen Needle (SURE COMFORT PEN NEEDLES) by Does not apply route.        No current facility-administered medications for this visit.        ROS  All others systems reviewed and negative.     Objective:     There were no vitals taken for this visit. There is no height or weight on file to calculate BMI.    Physical Exam   General: No acute distress. Well nourished.  HEENT: EOM grossly intact, no scleral icterus, no pharyngeal erythema.   Neck:  No JVD, no bruits, trachea midline  CVS: RRR. Normal S1, S2. No M/R/G. No LE edema.  2+ radial pulses, 2+ DP pulses, ICD pocket in tact, midline incision.  Resp: CTAB. No wheezing or crackles/rhonchi. Normal respiratory effort.  Abdomen: Soft, NT, no irais hepatomegaly.  MSK/Ext: No clubbing or cyanosis.  Skin: Warm and dry, no rashes.  Neurological: CN III-XII grossly intact. No focal deficits.   Psych: A&O x 3, appropriate affect, good judgement    Physical exam performed today and unchanged, except what is noted, compared to 3-      Assessment:     1. Ischemic cardiomyopathy     2. Presence of biventricular AICD     3. Atrial arrhythmia     4. Coronary artery disease involving native coronary artery of native heart without angina pectoris     5. Hx of CABG     6. Essential hypertension     7. Mixed hyperlipidemia     8. Obstructive sleep apnea syndrome     9. Type 2 diabetes mellitus with complication, with long-term current use of insulin (HCC)     10. CLARE on CPAP     11. Syncope and collapse     12. High risk medication use     13. Moderate tetrahydrocannabinol (THC) dependence (HCC)     14. Paroxysmal atrial fibrillation (HCC)     15. LBBB (left bundle branch block)         Medical Decision Making:  Today's Assessment / Status / Plan:     -Very complex care  -PAF, new/recent, on apixaban  -CMO, Psychiatric 3, stage C, on entresto, prior syncope  -CAD, MI, Stents, CABG, ok  -Prior HTN, now recent hypotension  -ED, no nitro, I gave him refill  -PAF, on apixaban  -CRT, getting therapy, EF 40-45%  -Cont close FU  -I am not concerned about  his weight loss  -He is planning to travel for trade show, I told him not to go and to practice social distancing, he is driving, staying in air BnB.  -Short FU, at risk for [id number]  -RTC 3 months, I will order BMP at that time    No follow-ups on file.    It is my pleasure to participate in the care of Mr. Solorzano.  Please do not hesitate to contact me with questions or concerns.    Nancy Sweeney MD, [facility]  Cardiologist [facility]    Please note that this dictation was created using voice recognition software. I have made every reasonable attempt to correct obvious errors, but it is possible there are errors of grammar and possibly content that I did not discover before finalizing the note.

## 2020-07-15 ENCOUNTER — APPOINTMENT (OUTPATIENT)
Dept: CARDIOLOGY | Facility: PHYSICIAN GROUP | Age: 55
End: 2020-07-15
Payer: MEDICARE

## 2020-07-23 DIAGNOSIS — I48.0 PAROXYSMAL ATRIAL FIBRILLATION (HCC): ICD-10-CM

## 2020-07-23 RX ORDER — SOTALOL HYDROCHLORIDE 80 MG/1
80 TABLET ORAL 2 TIMES DAILY
Qty: 60 TAB | Refills: 1 | Status: SHIPPED | OUTPATIENT
Start: 2020-07-23 | End: 2020-11-12 | Stop reason: SDUPTHER

## 2020-07-27 ENCOUNTER — OFFICE VISIT (OUTPATIENT)
Dept: CARDIOLOGY | Facility: MEDICAL CENTER | Age: 55
End: 2020-07-27
Payer: MEDICARE

## 2020-07-27 VITALS
RESPIRATION RATE: 19 BRPM | DIASTOLIC BLOOD PRESSURE: 64 MMHG | HEART RATE: 92 BPM | HEIGHT: 73 IN | SYSTOLIC BLOOD PRESSURE: 130 MMHG | OXYGEN SATURATION: 98 % | WEIGHT: 243 LBS | BODY MASS INDEX: 32.2 KG/M2

## 2020-07-27 DIAGNOSIS — E78.2 MIXED HYPERLIPIDEMIA: ICD-10-CM

## 2020-07-27 DIAGNOSIS — E66.01 CLASS 2 SEVERE OBESITY DUE TO EXCESS CALORIES WITH SERIOUS COMORBIDITY AND BODY MASS INDEX (BMI) OF 36.0 TO 36.9 IN ADULT (HCC): ICD-10-CM

## 2020-07-27 DIAGNOSIS — Z95.810 PRESENCE OF BIVENTRICULAR AICD: ICD-10-CM

## 2020-07-27 DIAGNOSIS — Z95.1 S/P CABG X 3: ICD-10-CM

## 2020-07-27 DIAGNOSIS — I25.5 ISCHEMIC CARDIOMYOPATHY: ICD-10-CM

## 2020-07-27 DIAGNOSIS — I48.0 PAROXYSMAL ATRIAL FIBRILLATION (HCC): ICD-10-CM

## 2020-07-27 DIAGNOSIS — Z79.4 ENCOUNTER FOR LONG-TERM (CURRENT) USE OF INSULIN (HCC): ICD-10-CM

## 2020-07-27 DIAGNOSIS — G47.33 OBSTRUCTIVE SLEEP APNEA SYNDROME: ICD-10-CM

## 2020-07-27 PROCEDURE — 99214 OFFICE O/P EST MOD 30 MIN: CPT | Mod: 25 | Performed by: INTERNAL MEDICINE

## 2020-07-27 PROCEDURE — 93284 PRGRMG EVAL IMPLANTABLE DFB: CPT | Performed by: INTERNAL MEDICINE

## 2020-07-27 ASSESSMENT — FIBROSIS 4 INDEX: FIB4 SCORE: 1.1

## 2020-07-27 NOTE — PROGRESS NOTES
Chief Complaint   Patient presents with   • Cardiomyopathy (Ischemic)       Subjective:   Juaquin Solorzano is a 55 y.o. male who presents today with history of a CRT-D left bundle branch block and ischemic cardiomyopathy.  Recent shock for atrial arrhythmia.  Placed on sotalol.  Minimal breakthrough.  On anticoagulation.  Lower rate of ventricular pacing secondary to intact AV conduction.  No angina.  Some dyspnea on exertion.    Past Medical History:   Diagnosis Date   • Arthritis     osteo, hands/elbows   • Block, bundle branch, left    • CAD (coronary artery disease)    • Cataract 04/26/2018    early stages, not removed   • Diabetes ([facility]) 2006    insulin and oral meds   • Fibromyalgia 04/26/2018 5-6/10   • Gout    • Heart burn    • Hyperlipidemia    • Hypertension    • Ischemic cardiomyopathy 10/2019    Echocardiogram with normal LV size, LVEF 45% (Previously 35-40% in February 2018)   • Myocardial infarct ([facility]) 2007   • Myocardial infarct ([facility]) 2012   • Neuropathy ([facility])     Bilateral feet   • Psychiatric problem     Depression and anxiety   • Sleep apnea     Uses CPAP     Past Surgical History:   Procedure Laterality Date   • AICD IMPLANT Left 04/27/2018    [employer] Inogen X4 CRT-D G148 implanted by Dr. Cullen.   • RECOVERY  7/22/2016    Procedure: CATH LAB-[facility] W/POSSIBLE RADIAL APPROACH-CARIE;  Surgeon: Mireya Surgery;  Location: SURGERY PRE-POST PROC UNIT [facility];  Service:    • MULTIPLE CORONARY ARTERY BYPASS  2014    CABG x 3   • LEONILA BY LAPAROSCOPY  2010   • KNEE ARTHROSCOPY      Arthroscopy, Knee     Family History   Problem Relation Age of Onset   • Heart Disease Mother    • Diabetes Mother    • Hyperlipidemia Father    • Heart Disease Father    • Hypertension Father    • Stroke Father    • Lung Disease Sister      Social History     Socioeconomic History   • Marital status:      Spouse name: Not on file   • Number of children: Not on file   • Years of education: Not on file    • Highest education level: Not on file   Occupational History   • Not on file   Social Needs   • Financial resource strain: Not on file   • Food insecurity     Worry: Not on file     Inability: Not on file   • Transportation needs     Medical: Not on file     Non-medical: Not on file   Tobacco Use   • Smoking status: Former Smoker     Packs/day: 0.50     Years: 12.00     Pack years: 6.00     Types: Cigarettes     Last attempt to quit: 7/15/2012     Years since quittin.0   • Smokeless tobacco: Never Used   Substance and Sexual Activity   • Alcohol use: Yes     Alcohol/week: 0.6 oz     Types: 1 Standard drinks or equivalent per week     Frequency: 2-4 times a month   • Drug use: Yes     Types: Marijuana, Oral     Comment: nightly    • Sexual activity: Not Currently   Lifestyle   • Physical activity     Days per week: Not on file     Minutes per session: Not on file   • Stress: Not on file   Relationships   • Social connections     Talks on phone: Not on file     Gets together: Not on file     Attends Bahai service: Not on file     Active member of club or organization: Not on file     Attends meetings of clubs or organizations: Not on file     Relationship status: Not on file   • Intimate partner violence     Fear of current or ex partner: Not on file     Emotionally abused: Not on file     Physically abused: Not on file     Forced sexual activity: Not on file   Other Topics Concern   • Not on file   Social History Narrative   • Not on file     Allergies   Allergen Reactions   • Iodine Anaphylaxis     Other reaction(s): Unknown     Outpatient Encounter Medications as of 2020   Medication Sig Dispense Refill   • sotalol (BETAPACE) 80 MG Tab Take 1 Tab by mouth 2 times a day. 60 Tab 1   • atorvastatin (LIPITOR) 40 MG Tab Take 1 Tab by mouth every day. 90 Tab 0   • spironolactone (ALDACTONE) 25 MG Tab TAKE 1 TABLET DAILY 90 Tab 3   • ENTRESTO  MG Tab tablet TAKE 1 TABLET TWICE A  Tab 3   •  "mirtazapine (REMERON) 15 MG Tab Take 1 Tab by mouth every bedtime. 30 Tab 2   • metoprolol SR (TOPROL XL) 100 MG TABLET SR 24 HR Take 0.5 Tabs by mouth 2 Times a Day. 30 Tab 0   • DULoxetine (CYMBALTA) 60 MG Cap DR Particles delayed-release capsule Take 1 Cap by mouth every morning. 90 Cap 1   • allopurinol (ZYLOPRIM) 300 MG Tab Take 1 Tab by mouth every day. 90 Tab 1   • apixaban (ELIQUIS) 5mg Tab Take 1 Tab by mouth 2 Times a Day. 180 Tab 3   • amitriptyline (ELAVIL) 100 MG Tab Take 1 Tab by mouth at bedtime as needed. 90 Tab 1   • sildenafil citrate (VIAGRA) 50 MG tablet Take 1 Tab by mouth as needed for Erectile Dysfunction. 10 Tab 10   • zonisamide (ZONEGRAN) 100 MG Cap Take 2 Caps by mouth every day. 180 Cap 1   • Semaglutide, 1 MG/DOSE, (OZEMPIC, 1 MG/DOSE,) 2 MG/1.5ML Solution Pen-injector Inject 1 mg as instructed every 7 days. 6 PEN 4   • Empagliflozin-metFORMIN HCl ER 12.5-1000 MG TABLET SR 24 HR Take 1 Tab by mouth 2 times a day. 180 Tab 4   • nitroglycerin (NITROSTAT) 0.4 MG SL Tab Place 1 Tab under tongue as needed for Chest Pain. 25 Tab 11   • glucose blood (FREESTYLE LITE) strip 1 Strip by Other route 4 times a day. 300 Strip 4   • FREESTYLE LANCETS Misc by Does not apply route.     • Insulin Pen Needle (SURE COMFORT PEN NEEDLES) by Does not apply route.       No facility-administered encounter medications on file as of 7/27/2020.      ROS     Objective:   /64 (BP Location: Left arm, Patient Position: Sitting, BP Cuff Size: Adult)   Pulse 92   Resp 19   Ht 1.854 m (6' 1\")   Wt 110.2 kg (243 lb)   SpO2 98%   BMI 32.06 kg/m²     Physical Exam   Constitutional: He is oriented to person, place, and time. He appears well-developed and well-nourished.   HENT:   Head: Normocephalic and atraumatic.   Eyes: EOM are normal.   Neck: Normal range of motion. Neck supple.   Cardiovascular: Normal rate, regular rhythm and intact distal pulses. Exam reveals no gallop and no friction rub.   No murmur " heard.  Pulmonary/Chest: Effort normal and breath sounds normal.   Abdominal: Soft.   Musculoskeletal: Normal range of motion.         General: No edema.   Neurological: He is alert and oriented to person, place, and time.   Skin: Skin is warm and dry.   Psychiatric: He has a normal mood and affect. His behavior is normal. Judgment and thought content normal.       Assessment:     1. Encounter for long-term (current) use of insulin ([facility])     2. Class 2 severe obesity due to excess calories with serious comorbidity and body mass index (BMI) of 36.0 to 36.9 in adult ([facility])     3. Presence of biventricular AICD     4. Paroxysmal atrial fibrillation ([facility])     5. S/P CABG x 3     6. Obstructive sleep apnea syndrome     7. Ischemic cardiomyopathy     8. Mixed hyperlipidemia         Medical Decision Making:  Today's Assessment / Status / Plan:   1.  CRT-D.  Shortened AV delay to allow higher percentage of biventricular pacing.  2.  Dyspnea on exertion.  See #1.  3.  Atrial arrhythmias continue sotalol.  4.  Anticoagulation continue.  5.  Ischemic cardiomyopathy stable.  6.  Follow-up with me in 6 months.

## 2020-08-05 ENCOUNTER — NON-PROVIDER VISIT (OUTPATIENT)
Dept: CARDIOLOGY | Facility: PHYSICIAN GROUP | Age: 55
End: 2020-08-05
Payer: MEDICARE

## 2020-08-05 ENCOUNTER — OFFICE VISIT (OUTPATIENT)
Dept: CARDIOLOGY | Facility: PHYSICIAN GROUP | Age: 55
End: 2020-08-05

## 2020-08-05 VITALS
SYSTOLIC BLOOD PRESSURE: 90 MMHG | DIASTOLIC BLOOD PRESSURE: 64 MMHG | BODY MASS INDEX: 31.06 KG/M2 | OXYGEN SATURATION: 93 % | HEIGHT: 74 IN | WEIGHT: 242 LBS | HEART RATE: 82 BPM

## 2020-08-05 DIAGNOSIS — E78.2 MIXED HYPERLIPIDEMIA: ICD-10-CM

## 2020-08-05 DIAGNOSIS — E11.8 TYPE 2 DIABETES MELLITUS WITH COMPLICATION, WITH LONG-TERM CURRENT USE OF INSULIN (HCC): ICD-10-CM

## 2020-08-05 DIAGNOSIS — Z79.4 TYPE 2 DIABETES MELLITUS WITH COMPLICATION, WITH LONG-TERM CURRENT USE OF INSULIN (HCC): ICD-10-CM

## 2020-08-05 DIAGNOSIS — I48.0 PAROXYSMAL ATRIAL FIBRILLATION (HCC): ICD-10-CM

## 2020-08-05 DIAGNOSIS — I50.20 ACC/AHA STAGE C SYSTOLIC HEART FAILURE (HCC): ICD-10-CM

## 2020-08-05 DIAGNOSIS — I25.5 ISCHEMIC CARDIOMYOPATHY: ICD-10-CM

## 2020-08-05 DIAGNOSIS — I25.10 CORONARY ARTERY DISEASE INVOLVING NATIVE CORONARY ARTERY OF NATIVE HEART WITHOUT ANGINA PECTORIS: ICD-10-CM

## 2020-08-05 DIAGNOSIS — Z95.810 PRESENCE OF BIVENTRICULAR AICD: ICD-10-CM

## 2020-08-05 DIAGNOSIS — E66.01 CLASS 2 SEVERE OBESITY DUE TO EXCESS CALORIES WITH SERIOUS COMORBIDITY AND BODY MASS INDEX (BMI) OF 36.0 TO 36.9 IN ADULT (HCC): ICD-10-CM

## 2020-08-05 DIAGNOSIS — Z95.1 S/P CABG X 3: ICD-10-CM

## 2020-08-05 DIAGNOSIS — I10 ESSENTIAL HYPERTENSION: ICD-10-CM

## 2020-08-05 DIAGNOSIS — E08.42 DIABETIC POLYNEUROPATHY ASSOCIATED WITH DIABETES MELLITUS DUE TO UNDERLYING CONDITION (HCC): ICD-10-CM

## 2020-08-05 PROCEDURE — 93284 PRGRMG EVAL IMPLANTABLE DFB: CPT | Performed by: NURSE PRACTITIONER

## 2020-08-05 PROCEDURE — 99214 OFFICE O/P EST MOD 30 MIN: CPT | Performed by: NURSE PRACTITIONER

## 2020-08-05 RX ORDER — SPIRONOLACTONE 25 MG/1
12.5 TABLET ORAL DAILY
Qty: 45 TAB | Refills: 3 | Status: CANCELLED
Start: 2020-08-05

## 2020-08-05 ASSESSMENT — ENCOUNTER SYMPTOMS
MYALGIAS: 0
PND: 0
HEADACHES: 0
BRUISES/BLEEDS EASILY: 0
CHILLS: 0
ORTHOPNEA: 0
NAUSEA: 0
SHORTNESS OF BREATH: 0
INSOMNIA: 0
COUGH: 0
DIZZINESS: 0
LOSS OF CONSCIOUSNESS: 0
ABDOMINAL PAIN: 0
PALPITATIONS: 0
FEVER: 0

## 2020-08-05 ASSESSMENT — FIBROSIS 4 INDEX: FIB4 SCORE: 1.1

## 2020-08-05 NOTE — PROGRESS NOTES
Chief Complaint   Patient presents with   • Follow-Up   • Biventricular AICD   • Coronary Artery Disease   • Cardiomyopathy (Ischemic)   • Coronary Artery Bypass Graft (CABG)   • HTN (Controlled)   • Hyperlipidemia   • Diabetes (Controlled)       Subjective:   Juaquin Solorzano is a 55 y.o. male who presents today for one week follow-up of low BP.    Juaquin is a 55 year old male with history of CAD/ischemic cardiomyopathy with LVEF 45% and AICD implanted in April 2018, hypertension, hyperlipidemia, sleep apnea and diabetes mellitus, normally followed by Dr. Sweeney. Last week, he was seen by Dr. Cullen for shock on 6/10/2020. No medication changes were made, but slight programming changes were made to increase BiV pacing percentage.     He is here today for follow-up. BP has been quite low, but he denies any dizziness, lightheadedness or syncope/presyncope. He admits he has not been eating or drinking as much as usual. Glucose is under much better control with Ozempic. No more device therapy. No chest pain, pressure or discomfort; no palpitations; breathing is stable with no orthopnea or PND; no LE edema.    Past Medical History:   Diagnosis Date   • Arthritis     osteo, hands/elbows   • Block, bundle branch, left    • CAD (coronary artery disease)    • Cataract 04/26/2018    early stages, not removed   • Diabetes (HCC) 2006    insulin and oral meds   • Fibromyalgia 04/26/2018 5-6/10   • Gout    • Heart burn    • Hyperlipidemia    • Hypertension    • Ischemic cardiomyopathy 10/2019    Echocardiogram with normal LV size, LVEF 45% (Previously 35-40% in February 2018)   • Myocardial infarct ([facility]) 2007   • Myocardial infarct ([facility]) 2012   • Neuropathy ([facility])     Bilateral feet   • Psychiatric problem     Depression and anxiety   • Sleep apnea     Uses CPAP     Past Surgical History:   Procedure Laterality Date   • AICD IMPLANT Left 04/27/2018    [employer] Inogen X4 CRT-D G148 implanted by Dr. Cullen.   •  RECOVERY  2016    Procedure: CATH LAB-[facility] W/POSSIBLE RADIAL APPROACH-CARIE;  Surgeon: Recoveryonly Surgery;  Location: SURGERY PRE-POST PROC UNIT [facility];  Service:    • MULTIPLE CORONARY ARTERY BYPASS  2014    CABG x 3   • LEONILA BY LAPAROSCOPY     • KNEE ARTHROSCOPY      Arthroscopy, Knee     Family History   Problem Relation Age of Onset   • Heart Disease Mother    • Diabetes Mother    • Hyperlipidemia Father    • Heart Disease Father    • Hypertension Father    • Stroke Father    • Lung Disease Sister      Social History     Socioeconomic History   • Marital status:      Spouse name: Not on file   • Number of children: Not on file   • Years of education: Not on file   • Highest education level: Not on file   Occupational History   • Not on file   Social Needs   • Financial resource strain: Not on file   • Food insecurity     Worry: Not on file     Inability: Not on file   • Transportation needs     Medical: Not on file     Non-medical: Not on file   Tobacco Use   • Smoking status: Former Smoker     Packs/day: 0.50     Years: 12.00     Pack years: 6.00     Types: Cigarettes     Quit date: 7/15/2012     Years since quittin.0   • Smokeless tobacco: Never Used   Substance and Sexual Activity   • Alcohol use: Yes     Alcohol/week: 0.6 oz     Types: 1 Standard drinks or equivalent per week     Frequency: 2-4 times a month   • Drug use: Yes     Types: Marijuana, Oral     Comment: nightly    • Sexual activity: Not Currently   Lifestyle   • Physical activity     Days per week: Not on file     Minutes per session: Not on file   • Stress: Not on file   Relationships   • Social connections     Talks on phone: Not on file     Gets together: Not on file     Attends Faith service: Not on file     Active member of club or organization: Not on file     Attends meetings of clubs or organizations: Not on file     Relationship status: Not on file   • Intimate partner violence     Fear of current or ex  partner: Not on file     Emotionally abused: Not on file     Physically abused: Not on file     Forced sexual activity: Not on file   Other Topics Concern   • Not on file   Social History Narrative   • Not on file     Allergies   Allergen Reactions   • Iodine Anaphylaxis     Other reaction(s): Unknown     Outpatient Encounter Medications as of 8/5/2020   Medication Sig Dispense Refill   • sotalol (BETAPACE) 80 MG Tab Take 1 Tab by mouth 2 times a day. 60 Tab 1   • atorvastatin (LIPITOR) 40 MG Tab Take 1 Tab by mouth every day. 90 Tab 0   • spironolactone (ALDACTONE) 25 MG Tab TAKE 1 TABLET DAILY 90 Tab 3   • ENTRESTO  MG Tab tablet TAKE 1 TABLET TWICE A  Tab 3   • mirtazapine (REMERON) 15 MG Tab Take 1 Tab by mouth every bedtime. 30 Tab 2   • metoprolol SR (TOPROL XL) 100 MG TABLET SR 24 HR Take 0.5 Tabs by mouth 2 Times a Day. 30 Tab 0   • DULoxetine (CYMBALTA) 60 MG Cap DR Particles delayed-release capsule Take 1 Cap by mouth every morning. 90 Cap 1   • allopurinol (ZYLOPRIM) 300 MG Tab Take 1 Tab by mouth every day. 90 Tab 1   • apixaban (ELIQUIS) 5mg Tab Take 1 Tab by mouth 2 Times a Day. 180 Tab 3   • amitriptyline (ELAVIL) 100 MG Tab Take 1 Tab by mouth at bedtime as needed. 90 Tab 1   • sildenafil citrate (VIAGRA) 50 MG tablet Take 1 Tab by mouth as needed for Erectile Dysfunction. 10 Tab 10   • zonisamide (ZONEGRAN) 100 MG Cap Take 2 Caps by mouth every day. 180 Cap 1   • Semaglutide, 1 MG/DOSE, (OZEMPIC, 1 MG/DOSE,) 2 MG/1.5ML Solution Pen-injector Inject 1 mg as instructed every 7 days. 6 PEN 4   • Empagliflozin-metFORMIN HCl ER 12.5-1000 MG TABLET SR 24 HR Take 1 Tab by mouth 2 times a day. 180 Tab 4   • nitroglycerin (NITROSTAT) 0.4 MG SL Tab Place 1 Tab under tongue as needed for Chest Pain. 25 Tab 11   • glucose blood (FREESTYLE LITE) strip 1 Strip by Other route 4 times a day. 300 Strip 4   • FREESTYLE LANCETS Misc by Does not apply route.     • Insulin Pen Needle (SURE COMFORT PEN  NEEDLES) by Does not apply route.       No facility-administered encounter medications on file as of 8/5/2020.      Review of Systems   Constitutional: Negative for chills and fever.   HENT: Negative for congestion.    Respiratory: Negative for cough and shortness of breath.    Cardiovascular: Negative for chest pain, palpitations, orthopnea, leg swelling and PND.   Gastrointestinal: Negative for abdominal pain and nausea.   Musculoskeletal: Negative for myalgias.   Skin: Negative for rash.   Neurological: Negative for dizziness, loss of consciousness and headaches.   Endo/Heme/Allergies: Does not bruise/bleed easily.   Psychiatric/Behavioral: The patient does not have insomnia.         Objective:   There were no vitals taken for this visit.    Physical Exam   Constitutional: He is oriented to person, place, and time. He appears well-developed and well-nourished.   Weight is down.   HENT:   Head: Normocephalic.   Eyes: EOM are normal.   Neck: Normal range of motion. Neck supple. No JVD present.   Cardiovascular: Normal rate, regular rhythm and normal heart sounds.   Pulmonary/Chest: Effort normal and breath sounds normal. No respiratory distress. He has no wheezes. He has no rales.   AICD in left chest wall.   Abdominal: Soft. Bowel sounds are normal. He exhibits no distension. There is no abdominal tenderness.   Musculoskeletal: Normal range of motion.         General: No edema.   Neurological: He is alert and oriented to person, place, and time.   Skin: Skin is warm and dry. No rash noted.   Psychiatric: He has a normal mood and affect.     AICD interrogation shows 41J shock on 6/10/2020, none since.    Lab Results   Component Value Date/Time    CHOLSTRLTOT 140 06/11/2020 05:14 PM    LDL 75 06/11/2020 05:14 PM    HDL 38 (A) 06/11/2020 05:14 PM    TRIGLYCERIDE 135 06/11/2020 05:14 PM       Lab Results   Component Value Date/Time    SODIUM 142 06/11/2020 05:14 PM    POTASSIUM 4.6 06/11/2020 05:14 PM    CHLORIDE 107  06/11/2020 05:14 PM    CO2 23 06/11/2020 05:14 PM    GLUCOSE 101 (H) 06/11/2020 05:14 PM    BUN 9 06/11/2020 05:14 PM    CREATININE 0.96 06/11/2020 05:14 PM     Lab Results   Component Value Date/Time    ALKPHOSPHAT 68 06/11/2020 05:14 PM    ASTSGOT 18 06/11/2020 05:14 PM    ALTSGPT 19 06/11/2020 05:14 PM    TBILIRUBIN 0.9 06/11/2020 05:14 PM      CONCLUSIONS OF ECHOCARDIOGRAM OF 10/16/2019:  Limited Study for LVEF.   TDS - Limited acoustical window, contrast not available.  Prior Echo - 2/12/18, prior EF probably 40%, currently    45%.  Mildly reduced left ventricular systolic function.  Left ventricular ejection fraction is visually estimated to be 45%.  Global hypokinesis.  Abnormal septal motion consistent with postoperative status.    Assessment:     1. Presence of biventricular AICD     2. Coronary artery disease involving native coronary artery of native heart without angina pectoris     3. Ischemic cardiomyopathy     4. ACC/AHA stage C systolic heart failure (HCC)     5. S/P CABG x 3     6. Essential hypertension     7. Mixed hyperlipidemia     8. Type 2 diabetes mellitus with complication, with long-term current use of insulin ([facility])     9. Diabetic polyneuropathy associated with diabetes mellitus due to underlying condition ([facility])     10. Class 2 severe obesity due to excess calories with serious comorbidity and body mass index (BMI) of 36.0 to 36.9 in adult ([facility])         Medical Decision Making:  Today's Assessment / Status / Plan:     1. CAD/ischemic cardiomyopathy with CRT-D, with 41J shock on 6/10/2020. No therapy since. He is on Sotalol and Toprol XL.    2. CHF, stage C with LVEF 45%, on BB, Entresto, Aldactone and statin.    3. Hypertension, treated, but BP is on the lower side. He is asymptomatic. To make sure he stays well hydrated.    4. Hyperlipidemia, treated with Lipitor.    5. Diabetes mellitus, treated and improved.    6. Obesity, working on weight loss.    Same medications for now. FU in 6  months for next CRT-D interrogation. FU sooner if clinical condition changes.    Collaborating MD: Patel

## 2020-09-14 ENCOUNTER — OFFICE VISIT (OUTPATIENT)
Dept: NEUROLOGY | Facility: MEDICAL CENTER | Age: 55
End: 2020-09-14
Payer: MEDICARE

## 2020-09-14 VITALS
OXYGEN SATURATION: 95 % | WEIGHT: 242.51 LBS | HEIGHT: 74 IN | DIASTOLIC BLOOD PRESSURE: 72 MMHG | BODY MASS INDEX: 31.12 KG/M2 | RESPIRATION RATE: 20 BRPM | HEART RATE: 86 BPM | SYSTOLIC BLOOD PRESSURE: 118 MMHG

## 2020-09-14 DIAGNOSIS — R56.9 FOCAL SEIZURES (HCC): ICD-10-CM

## 2020-09-14 DIAGNOSIS — Z76.0 ENCOUNTER FOR MEDICATION REFILL: ICD-10-CM

## 2020-09-14 PROCEDURE — 99215 OFFICE O/P EST HI 40 MIN: CPT | Performed by: PSYCHIATRY & NEUROLOGY

## 2020-09-14 RX ORDER — ZONISAMIDE 100 MG/1
300 CAPSULE ORAL DAILY
Qty: 270 CAP | Refills: 3 | Status: SHIPPED | OUTPATIENT
Start: 2020-09-14 | End: 2021-09-07

## 2020-09-14 ASSESSMENT — ENCOUNTER SYMPTOMS
TREMORS: 0
TINGLING: 1
WEAKNESS: 0
LOSS OF CONSCIOUSNESS: 0
SEIZURES: 1
MEMORY LOSS: 0

## 2020-09-14 ASSESSMENT — FIBROSIS 4 INDEX: FIB4 SCORE: 1.1

## 2020-09-14 ASSESSMENT — PATIENT HEALTH QUESTIONNAIRE - PHQ9: CLINICAL INTERPRETATION OF PHQ2 SCORE: 0

## 2020-09-14 NOTE — PROGRESS NOTES
Subjective:      Juaquin Solorzano is a 55 y.o. male who presents with his wife Cherise, who provides additional history and color, for follow-up, with a history of possible focal onset seizures with altered sensorium.     HPI    History is gotten from review of the records, a former patient of Dr. Rob, the case was also discussed with Cherise.  Briefly he has had events suggestive of focal onset seizures for as long as he can remember, he believes they may have been present when he was a child.  He certainly cannot be very specific.  What happens now is that there is a sudden change in his personality, he has no warning sign or sense of familiarity, gustatory or olfactory hallucinations, depersonalization, derealization, focal motor or sensory distortions, etc.  He simply becomes less responsive, can stare, etc.  There have never been generalized tonic-clonic movements, he has never been incontinent.  He has never lost consciousness with these events.    The events will last for a couple of minutes, he will stop mid sentence, he will stop walking mid stride on indication.  They resolve as quickly as they start, without sequelae.  He denies any post ictal malaise or fatigue.    Recurrent, he can have multiple events in a single day, but he is very nonspecific, Cherise is not much help in this regard.  Since he has been placed on Zonegran 200 mg every evening, they think things have improved.    His last office visit with Dr. Rob was in July, 2018.  By that time he had had an EEG that revealed bifrontal, left more than right, rhythmic slowing suggestive of underlying epileptic potential.  With an AICD, MRI scan of the brain could not be done.  CT scan of the brain was not done through this office, reports were of no significant intracranial pathology, he does have a history of contusion.    On Zonegran 200 mg every evening, he is tolerating the drug without issue.  Its effect has been questionable on these  "events.    He does describe about 4 episodes of actual syncope, different than his present events.  These have occurred while he was walking or after standing up.  There is a simple and sudden loss of consciousness, the episodes last for a matter of seconds and he recovers quickly, stands back up and feels fine.  This last occurred a couple of years ago, this may have been after an AICD had been placed.    As an aside, the neuropathy from his diabetes involves the feet, he describes a numbness and burning sensations along the balls of the feet and the toes bilaterally.  There is no weakness, he feels a little unsteady, looks at the ground at times when he is walking, especially on uneven surfaces.  There is no actual loss of sensation, he has not been injuring his feet.  He uses a topical CBD product which helps the burning.  He also complains of persistent memory problems, mostly short-term and immediate.  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    He has a history of CAD with PAF, CHF, hypertension, gout, diabetes with associated neuropathy, CLARE, and DJD, no history of CVA, MS, migraine, neurodegenerative disease, autoimmune disease, liver or kidney disease, or blood dyscrasia.    He is not aware of anyone in the family with a history of seizures.  He rarely drinks alcohol, quit tobacco use in 2012.    He is on quite a list of medications which includes Zonegran 200 mg nightly, Ozempic, Entresto, Betapace, Remeron, Zyloprim, Eliquis, Lipitor, Aldactone, Toprol-XL, and stopped Elavil.    Review of Systems   Neurological: Positive for tingling and seizures. Negative for tremors, loss of consciousness and weakness.   Psychiatric/Behavioral: Negative for memory loss.   All other systems reviewed and are negative.       Objective:     /72 (BP Location: Left arm, Patient Position: Sitting, BP Cuff Size: Adult)   Pulse 86   Resp 20   Ht 1.88 m (6' 2\")   Wt 110 kg (242 lb 8.1 oz)  "  SpO2 95%   BMI 31.14 kg/m²      Physical Exam    He appears in no acute distress.  His vital signs are stable.  There is no malar rash or temporal tenderness.  The neck is supple.  Cardiac evaluation reveals a regular rhythm.  There is no lower extremity edema.    He is fully oriented, there is no aphasia or inattention.    PERRLA/EOMI, visual fields are full to finger counting bilaterally, facial movements are symmetric, sensory exam is intact to temperature and light touch bilaterally.  The tongue and uvula are midline, shoulder shrug and head rotation are intact and symmetric.  Funduscopic exam reveals sharp disc margins, there is no evidence of proliferative retinopathy.    Musculoskeletal exam reveals normal tone bilaterally, there is no tremor or drift.  Strength is 5/5 in all 4 extremities.  Reflexes are diminished throughout, biceps and knee jerks are present without asymmetry.  The ankle jerks are absent.  Both toes are downgoing.    He stands easily though he looks at the ground when he walks, but stride length and station are normal.  Heel, toe, and tandem walking are all normal.  There is no appendicular dystaxia throughout.  Repetitive movements and fine motor control are also symmetric with normal amplitude and frequencies in all 4 extremities.    Sensory exam is intact to vibration and temperature, Romberg is absent.     Assessment/Plan:     1. Focal seizures (HCC)  His history is a little unusual in the duration of events that suggest focal onset seizures with altered sensorium.  Zonegran dosing may be inadequate, thus the limited change in his events.  I will increase the dose to 300 mg empirically, but an EEG study does need to be repeated.  They will also need to keep track of these events on a more consistent basis if we are to determine what is and is not working.  We also talked about side effects of the Zonegran as we go up.  A drug level will be checked as a baseline.    The rationale for  all the above was reviewed.  We talked about circumstances at lower threshold for seizures, the nature of the diagnosis itself, the need for compliance in terms of activity and driving restrictions following recurrences, risk of SUDEP, etc.  We will follow-up after the diagnostics are complete, we will call him earlier if the studies warrant a more aggressive and acute change in treatment recommendations.    - REFERRAL TO NEURODIAGNOSTICS (EEG,EP,EMG/NCS/DBS) Modality Requested: EEG-Video  - ZONISAMIDE (ZONEGRAN); Future    Time: 40-minute spent face-to-face for exam, review, discussion, and education, of this over 50% of the time spent counseling and coordinating care.

## 2020-10-20 ENCOUNTER — HOSPITAL ENCOUNTER (OUTPATIENT)
Dept: LAB | Facility: MEDICAL CENTER | Age: 55
End: 2020-10-20
Attending: PSYCHIATRY & NEUROLOGY
Payer: MEDICARE

## 2020-10-20 DIAGNOSIS — I48.91 ATRIAL FIBRILLATION, UNSPECIFIED TYPE (HCC): ICD-10-CM

## 2020-10-20 DIAGNOSIS — R56.9 FOCAL SEIZURES (HCC): ICD-10-CM

## 2020-10-20 PROCEDURE — 80203 DRUG SCREEN QUANT ZONISAMIDE: CPT

## 2020-10-20 PROCEDURE — 36415 COLL VENOUS BLD VENIPUNCTURE: CPT

## 2020-10-20 NOTE — TELEPHONE ENCOUNTER
Was the patient seen in the last year in this department? Yes    Does patient have an active prescription for medications requested? Yes    Received Request Via: Pharmacy    Hospital Outpatient Visit on 2020   Component Date Value   • Magnesium 2020 2.2    • Cholesterol,Tot 2020 140    • Triglycerides 2020 135    • HDL 2020 38*   • LDL 2020 75    • Sodium 2020 142    • Potassium 2020 4.6    • Chloride 2020 107    • Co2 2020 23    • Anion Gap 2020 12.0    • Glucose 2020 101*   • Bun 2020 9    • Creatinine 2020 0.96    • Calcium 2020 9.1    • AST(SGOT) 2020 18    • ALT(SGPT) 2020 19    • Alkaline Phosphatase 2020 68    • Total Bilirubin 2020 0.9    • Albumin 2020 3.9    • Total Protein 2020 6.5    • Globulin 2020 2.6    • A-G Ratio 2020 1.5    • TSH 2020 1.770    • GFR If  2020 >60    • GFR If Non  Ameri* 2020 >60    Office Visit on 2020   Component Date Value   • Report 2020                      Value:[facility]    Test Date:  2020  Pt Name:    OBED ATKINS               Department: [facility]  MRN:        [id number]                      Room:  Gender:     Male                         Technician: AM  :        1965                   Requested By:HUSSAIN TORRE  Order #:    [id number]                    Reading MD: Wing Miller MD    Measurements  Intervals                                Axis  Rate:       89                           P:          60  DE:         156                          QRS:        77  QRSD:       140                          T:          103  QT:         432  QTc:        526    Interpretive Statements  VENTRICULAR-PACED COMPLEXES  NO FURTHER RHYTHM ANALYSIS ATTEMPTED DUE TO PACED RHYTHM  Compared to ECG 2018 06:16:08  No significant changes  Electronically Signed On 2020  14:40:27 PDT by Wing Miller MD     Office Visit on 05/29/2020   Component Date Value   • Glycohemoglobin 05/29/2020 5.4    Office Visit on 12/18/2019   Component Date Value   • Glycohemoglobin 12/18/2019 5.3    • Internal Control Negative 12/18/2019 Negative    • Internal Control Positive 12/18/2019 Positive    Hospital Outpatient Visit on 12/12/2019   Component Date Value   • Occult Blood, IA 12/12/2019 Negative    • Ambiguous Date/Time 12/28/2019 See Below:    ]

## 2020-10-21 ENCOUNTER — TELEPHONE (OUTPATIENT)
Dept: MEDICAL GROUP | Facility: CLINIC | Age: 55
End: 2020-10-21

## 2020-10-22 LAB — ZONISAMIDE SERPL-MCNC: 17 UG/ML (ref 10–40)

## 2020-10-23 ENCOUNTER — NON-PROVIDER VISIT (OUTPATIENT)
Dept: NEUROLOGY | Facility: MEDICAL CENTER | Age: 55
End: 2020-10-23
Payer: MEDICARE

## 2020-10-23 DIAGNOSIS — R56.9 FOCAL SEIZURES (HCC): ICD-10-CM

## 2020-10-23 PROCEDURE — 95816 EEG AWAKE AND DROWSY: CPT | Performed by: PSYCHIATRY & NEUROLOGY

## 2020-10-23 NOTE — PROCEDURES
VIDEO ELECTROENCEPHALOGRAM REPORT      Referring provider: Dr. Juaquin Martin MD    DOS: 7320, 3585 of 30-minute duration.    INDICATION:  Juaquin Solorzano 55 y.o. male presenting with a history of events suggestive of possible focal onset seizures with altered sensorium.  EEG is requested to rule out seizure activity.  No previous tracings are available for direct comparison, though by report, prior tracings evidently revealed bifrontal rhythmic activity consistent with possible epileptogenic city.    CURRENT ANTIEPILEPTIC REGIMEN: Zonegran 300 mg, nightly.    TECHNIQUE: 30 channel video electroencephalogram (EEG) was performed in accordance with the international 10-20 system. The study was reviewed in bipolar and referential montages. The recording examined the patient during wakeful and drowsy/sleep state(s).     DESCRIPTION OF THE RECORD:  During the wakefulness, the background showed a symmetrical 10 Hz alpha activity posteriorly with amplitude of 70 mV.  There was reactivity to eye closure/opening.  A normal anterior-posterior gradient was noted with faster beta frequencies seen anteriorly.  During drowsiness, theta/delta frequencies were seen.  Minimal irregularity was seen bitemporally, consisting of episodic, theta activity over the temporal lobes, left greater than right. The patient did not achieve sleep for the tracings duration.    ACTIVATION PROCEDURES:   Hyperventilation was performed by the patient for a total of 3 minutes. The technician performing the test noted good effort. This technique produced electroencephalographic changes in keeping with the expected bilaterally synchronous, frontally predominant, high amplitude slow waves build up.     Intermittent Photic stimulation was performed in a stepwise fashion from 1 to 30 Hz and elicited a normal response (photic driving), most noticeable in the posterior leads.    ICTAL AND/OR INTERICTAL FINDINGS:   No focal or generalized epileptiform  activity noted. No regional slowing was seen during this routine study.  No clinical events or seizures were reported or recorded during the study.     EKG: sampling of the EKG recording demonstrated sinus rhythm.     EVENTS: None    INTERPRETATION:  This is a normal video EEG recording in the awake and drowsy state(s).  Clinical correlation is recommended.  Some occasional temporal slowing was seen bilaterally, no clear paroxysmal activity or nonconvulsive seizure activity was documented.  A cause for the patient's episodes of altered sensorium could not be determined.    Note: A normal EEG does not rule out epilepsy.  If the clinical suspicion remains high for seizures, a prolonged recording to capture clinical or subclinical events may be helpful.        Rudy Hunt M.D.

## 2020-11-11 DIAGNOSIS — I48.0 PAROXYSMAL ATRIAL FIBRILLATION (HCC): ICD-10-CM

## 2020-11-11 DIAGNOSIS — I25.10 CORONARY ARTERY DISEASE INVOLVING NATIVE CORONARY ARTERY OF NATIVE HEART WITHOUT ANGINA PECTORIS: ICD-10-CM

## 2020-11-12 RX ORDER — SOTALOL HYDROCHLORIDE 80 MG/1
80 TABLET ORAL 2 TIMES DAILY
Qty: 180 TAB | Refills: 1 | Status: SHIPPED | OUTPATIENT
Start: 2020-11-12 | End: 2021-03-31

## 2020-11-12 RX ORDER — NITROGLYCERIN 0.4 MG/1
TABLET SUBLINGUAL
Qty: 25 TAB | Refills: 3 | Status: SHIPPED | OUTPATIENT
Start: 2020-11-12 | End: 2021-12-20

## 2020-11-30 ENCOUNTER — OFFICE VISIT (OUTPATIENT)
Dept: MEDICAL GROUP | Facility: CLINIC | Age: 55
End: 2020-11-30
Payer: MEDICARE

## 2020-11-30 VITALS
HEIGHT: 74 IN | TEMPERATURE: 96.7 F | WEIGHT: 221 LBS | BODY MASS INDEX: 28.36 KG/M2 | RESPIRATION RATE: 18 BRPM | DIASTOLIC BLOOD PRESSURE: 70 MMHG | HEART RATE: 71 BPM | SYSTOLIC BLOOD PRESSURE: 92 MMHG | OXYGEN SATURATION: 98 %

## 2020-11-30 DIAGNOSIS — Z79.4 TYPE 2 DIABETES MELLITUS WITH COMPLICATION, WITH LONG-TERM CURRENT USE OF INSULIN (HCC): ICD-10-CM

## 2020-11-30 DIAGNOSIS — Z76.0 ENCOUNTER FOR MEDICATION REFILL: ICD-10-CM

## 2020-11-30 DIAGNOSIS — E08.42 DIABETIC POLYNEUROPATHY ASSOCIATED WITH DIABETES MELLITUS DUE TO UNDERLYING CONDITION (HCC): ICD-10-CM

## 2020-11-30 DIAGNOSIS — I50.20 ACC/AHA STAGE C SYSTOLIC HEART FAILURE (HCC): ICD-10-CM

## 2020-11-30 DIAGNOSIS — Z23 NEED FOR VACCINATION: ICD-10-CM

## 2020-11-30 DIAGNOSIS — I25.5 ISCHEMIC CARDIOMYOPATHY: ICD-10-CM

## 2020-11-30 DIAGNOSIS — Z95.1 S/P CABG X 3: ICD-10-CM

## 2020-11-30 DIAGNOSIS — Z79.4 ENCOUNTER FOR LONG-TERM (CURRENT) USE OF INSULIN (HCC): ICD-10-CM

## 2020-11-30 DIAGNOSIS — E78.2 MIXED HYPERLIPIDEMIA: ICD-10-CM

## 2020-11-30 DIAGNOSIS — Z12.5 PROSTATE CANCER SCREENING: ICD-10-CM

## 2020-11-30 DIAGNOSIS — M10.9 GOUT OF RIGHT FOOT, UNSPECIFIED CAUSE, UNSPECIFIED CHRONICITY: ICD-10-CM

## 2020-11-30 DIAGNOSIS — Z95.810 PRESENCE OF BIVENTRICULAR AICD: ICD-10-CM

## 2020-11-30 DIAGNOSIS — E11.8 TYPE 2 DIABETES MELLITUS WITH COMPLICATION, WITH LONG-TERM CURRENT USE OF INSULIN (HCC): ICD-10-CM

## 2020-11-30 DIAGNOSIS — N52.9 ERECTILE DYSFUNCTION, UNSPECIFIED ERECTILE DYSFUNCTION TYPE: ICD-10-CM

## 2020-11-30 DIAGNOSIS — I10 ESSENTIAL HYPERTENSION: ICD-10-CM

## 2020-11-30 DIAGNOSIS — I25.10 CORONARY ARTERY DISEASE INVOLVING NATIVE CORONARY ARTERY OF NATIVE HEART WITHOUT ANGINA PECTORIS: ICD-10-CM

## 2020-11-30 DIAGNOSIS — G47.00 INSOMNIA, UNSPECIFIED TYPE: ICD-10-CM

## 2020-11-30 DIAGNOSIS — I48.0 PAROXYSMAL ATRIAL FIBRILLATION (HCC): ICD-10-CM

## 2020-11-30 PROCEDURE — 99214 OFFICE O/P EST MOD 30 MIN: CPT | Mod: 25 | Performed by: PHYSICIAN ASSISTANT

## 2020-11-30 PROCEDURE — 90686 IIV4 VACC NO PRSV 0.5 ML IM: CPT | Performed by: PHYSICIAN ASSISTANT

## 2020-11-30 PROCEDURE — G0008 ADMIN INFLUENZA VIRUS VAC: HCPCS | Performed by: PHYSICIAN ASSISTANT

## 2020-11-30 PROCEDURE — 90715 TDAP VACCINE 7 YRS/> IM: CPT | Performed by: PHYSICIAN ASSISTANT

## 2020-11-30 PROCEDURE — 90472 IMMUNIZATION ADMIN EACH ADD: CPT | Performed by: PHYSICIAN ASSISTANT

## 2020-11-30 RX ORDER — MIRTAZAPINE 15 MG/1
TABLET, FILM COATED ORAL
Qty: 90 TAB | Refills: 1 | Status: SHIPPED | OUTPATIENT
Start: 2020-11-30 | End: 2021-04-19

## 2020-11-30 RX ORDER — ALLOPURINOL 300 MG/1
300 TABLET ORAL DAILY
Qty: 90 TAB | Refills: 1 | Status: SHIPPED | OUTPATIENT
Start: 2020-11-30 | End: 2021-04-19

## 2020-11-30 RX ORDER — ZOSTER VACCINE RECOMBINANT, ADJUVANTED 50 MCG/0.5
0.5 KIT INTRAMUSCULAR ONCE
Qty: 1 EACH | Refills: 0 | Status: SHIPPED | OUTPATIENT
Start: 2020-11-30 | End: 2020-11-30

## 2020-11-30 RX ORDER — SILDENAFIL 50 MG/1
50 TABLET, FILM COATED ORAL PRN
Qty: 10 TAB | Refills: 10 | Status: SHIPPED | OUTPATIENT
Start: 2020-11-30 | End: 2021-05-10 | Stop reason: SDUPTHER

## 2020-11-30 RX ORDER — DULOXETIN HYDROCHLORIDE 60 MG/1
60 CAPSULE, DELAYED RELEASE ORAL EVERY MORNING
Qty: 90 CAP | Refills: 1 | Status: SHIPPED | OUTPATIENT
Start: 2020-11-30 | End: 2021-05-06

## 2020-11-30 SDOH — HEALTH STABILITY: MENTAL HEALTH: HOW OFTEN DO YOU HAVE A DRINK CONTAINING ALCOHOL?: MONTHLY OR LESS

## 2020-11-30 ASSESSMENT — FIBROSIS 4 INDEX: FIB4 SCORE: 1.1

## 2020-11-30 NOTE — PROGRESS NOTES
cc:  Medication refills    Subjective:     Juaquin Solorzano is a 55 y.o. male presenting for medication refills      Patient presents to the office for medication refills.  Patient has been working to lose weight.  He states that switching to ozempic really did help.  He states that with his diabetes, he had a difficult time keeping sugars level and with the ozempic he has seen this change significantly.   Currently he denies fever, chills and nausea.  He denies all other symptoms.  He does have a history of type 2 diabetes with insulin dependence, and diabetic polyneuropathy.    Patient does have a history of paroxysmal atrial fibrillation, coronary artery disease, the history of an AICD, stage C systolic heart failure, CABG x3, ischemic cardiomyopathy and hypertension.  He also has a history of mixed hyperlipidemia.  He does follow-up with his cardiologist for these issues.    Patient is needing blood work to check his uric acid levels as he has a history of gout.    Patient is in need of a PSA screening for prostate cancer.    Patient does need medication refills for his insomnia and ADD.  He indicates that he is doing well with these medications.    He is due for tdap, flu and his second shingrix vaccine.  He has had his Tdap and flu vaccine in the past with no issues except for becoming ill after the flu vaccine.    Patient admits to a history of memory issues.  He is seeing neurology.  He states that he is needing medications refilled.          Review of systems:  See above.   Denies any symptoms unless previously indicated.        Current Outpatient Medications:   •  Zoster Vac Recomb Adjuvanted (SHINGRIX) 50 MCG/0.5ML Recon Susp, Inject 0.5 mL into the shoulder, thigh, or buttocks Once for 1 dose., Disp: 1 Each, Rfl: 0  •  apixaban (ELIQUIS) 5mg Tab, Take 1 Tab by mouth 2 Times a Day., Disp: 180 Tab, Rfl: 1  •  allopurinol (ZYLOPRIM) 300 MG Tab, Take 1 Tab by mouth every day., Disp: 90 Tab, Rfl: 1  •   "DULoxetine (CYMBALTA) 60 MG Cap DR Particles delayed-release capsule, Take 1 Cap by mouth every morning., Disp: 90 Cap, Rfl: 1  •  mirtazapine (REMERON) 15 MG Tab, TAKE ONE TABLET BY MOUTH EACH EVENING AT BEDTIME, Disp: 90 Tab, Rfl: 1  •  sildenafil citrate (VIAGRA) 50 MG tablet, Take 1 Tab by mouth as needed for Erectile Dysfunction., Disp: 10 Tab, Rfl: 10  •  nitroglycerin (NITROSTAT) 0.4 MG SL Tab, DISSOLVE 1 TABLET UNDER THE TONGUE AS NEEDED FOR CHEST PAIN, Disp: 25 Tab, Rfl: 3  •  sotalol (BETAPACE) 80 MG Tab, Take 1 Tab by mouth 2 times a day., Disp: 180 Tab, Rfl: 1  •  zonisamide (ZONEGRAN) 100 MG Cap, Take 3 Caps by mouth every day., Disp: 270 Cap, Rfl: 3  •  atorvastatin (LIPITOR) 40 MG Tab, Take 1 Tab by mouth every day., Disp: 90 Tab, Rfl: 0  •  spironolactone (ALDACTONE) 25 MG Tab, TAKE 1 TABLET DAILY, Disp: 90 Tab, Rfl: 3  •  ENTRESTO  MG Tab tablet, TAKE 1 TABLET TWICE A DAY, Disp: 180 Tab, Rfl: 3  •  metoprolol SR (TOPROL XL) 100 MG TABLET SR 24 HR, Take 0.5 Tabs by mouth 2 Times a Day., Disp: 30 Tab, Rfl: 0  •  Semaglutide, 1 MG/DOSE, (OZEMPIC, 1 MG/DOSE,) 2 MG/1.5ML Solution Pen-injector, Inject 1 mg as instructed every 7 days., Disp: 6 PEN, Rfl: 4  •  Empagliflozin-metFORMIN HCl ER 12.5-1000 MG TABLET SR 24 HR, Take 1 Tab by mouth 2 times a day., Disp: 180 Tab, Rfl: 4  •  glucose blood (FREESTYLE LITE) strip, 1 Strip by Other route 4 times a day., Disp: 300 Strip, Rfl: 4  •  Insulin Pen Needle (SURE COMFORT PEN NEEDLES), by Does not apply route., Disp: , Rfl:   •  FREESTYLE LANCETS Misc, by Does not apply route., Disp: , Rfl:     Allergies, past medical history, past surgical history, family history, social history reviewed and updated    Objective:     Vitals: BP (!) 92/70 (BP Location: Left arm, Patient Position: Sitting, BP Cuff Size: Adult)   Pulse 71   Temp 35.9 °C (96.7 °F) (Temporal)   Resp 18   Ht 1.88 m (6' 2\")   Wt 100.2 kg (221 lb)   SpO2 98%   BMI 28.37 kg/m²   General: " Alert, pleasant, NAD  EYES:   PERRL, EOMI, no icterus or pallor.  Conjunctivae and lids normal.   HENT:  Normocephalic.  External ears normal.  Neck supple.    Heart: Regular rate and rhythm.  S1 and S2 normal.  No murmurs appreciated.  Distant heart sounds  Respiratory: Normal respiratory effort.  Clear to auscultation bilaterally.  Distant breath sounds.  Abdomen: Not obese  Skin: Warm, dry, no rashes.  Musculoskeletal: Gait is normal.  Moves all extremities well.    Neurological: No tremors, sensation grossly intact,  CN2-12 intact.  Psych:  Affect/mood is normal, judgement is good, memory is intact, grooming is appropriate.    Assessment/Plan:     Juaquin was seen today for establish care and diabetes.    Diagnoses and all orders for this visit:    Type 2 diabetes mellitus with complication, with long-term current use of insulin (HCC)  -     HEMOGLOBIN A1C; Future  Diabetic polyneuropathy associated with diabetes mellitus due to underlying condition (HCC)  -     HEMOGLOBIN A1C; Future  Encounter for long-term (current) use of insulin (HCC)  -     Lipid Profile; Future  -     Comp Metabolic Panel; Future  -     TSH; Future  -     FREE THYROXINE; Future  -     HEMOGLOBIN A1C; Future  Paroxysmal atrial fibrillation (HCC)  -     Lipid Profile; Future  -     Comp Metabolic Panel; Future  -     TSH; Future  -     FREE THYROXINE; Future        -     apixaban (ELIQUIS) 5mg Tab; Take 1 Tab by mouth 2 Times a Day.  Coronary artery disease involving native coronary artery of native heart without angina pectoris  -     Lipid Profile; Future  -     Comp Metabolic Panel; Future  -     TSH; Future  -     FREE THYROXINE; Future  Presence of biventricular AICD  -     Lipid Profile; Future  -     Comp Metabolic Panel; Future  -     TSH; Future  -     FREE THYROXINE; Future  Essential hypertension  -     Lipid Profile; Future  -     Comp Metabolic Panel; Future  -     TSH; Future  -     FREE THYROXINE; Future  ACC/AHA stage C  systolic heart failure (HCC)  -     Lipid Profile; Future  -     Comp Metabolic Panel; Future  -     TSH; Future  -     FREE THYROXINE; Future  S/P CABG x 3  -     Lipid Profile; Future  -     Comp Metabolic Panel; Future  -     TSH; Future  -     FREE THYROXINE; Future  Ischemic cardiomyopathy  -     Lipid Profile; Future  -     Comp Metabolic Panel; Future  -     TSH; Future  -     FREE THYROXINE; Future  Mixed hyperlipidemia  -     Lipid Profile; Future  -     Comp Metabolic Panel; Future    Labs have been ordered.  We will notify patient of results when received.  The plan will be to follow-up in 6 months, sooner if needed.  Medications have been refilled as needed.  And he will follow-up with his cardiologist as well.    Gout of right foot, unspecified cause, unspecified chronicity  -     URIC ACID; Future        -     allopurinol (ZYLOPRIM) 300 MG Tab; Take 1 Tab by mouth every day.    Lab work is been ordered to evaluate further.  Occasions have been refilled at this time.    Prostate cancer screening  -     PROSTATE SPECIFIC AG SCREENING; Future  Need for vaccination  -     Zoster Vac Recomb Adjuvanted (SHINGRIX) 50 MCG/0.5ML Recon Susp; Inject 0.5 mL into the shoulder, thigh, or buttocks Once for 1 dose.  -     Tdap Vaccine =>6YO IM  -     Influenza Vaccine Quad Injection (PF)    Labs have been ordered to evaluate further.  Vaccines have been given as indicated.  Rx Rx provided for patient at this time.    Encounter for medication refill    -     DULoxetine (CYMBALTA) 60 MG Cap DR Particles delayed-release capsule; Take 1 Cap by mouth every morning.    Medications refilled at this time.    Insomnia, unspecified type  -     mirtazapine (REMERON) 15 MG Tab; TAKE ONE TABLET BY MOUTH EACH EVENING AT BEDTIME    Medication refilled at this time.    Erectile dysfunction, unspecified erectile dysfunction type  -     sildenafil citrate (VIAGRA) 50 MG tablet; Take 1 Tab by mouth as needed for Erectile  Dysfunction.    Discussed with patient that he is not to use this with nitroglycerin.  Patient is well aware of this morning.  He has used medication in the past.        Return in about 6 months (around 5/30/2021), or if symptoms worsen or fail to improve.    Please note that this dictation was created using voice recognition software. I have made every reasonable attempt to correct obvious errors, but expect that there are errors of grammar and possible content that I did not discover before finalizing note.

## 2020-12-08 DIAGNOSIS — I50.20 ACC/AHA STAGE C SYSTOLIC HEART FAILURE (HCC): ICD-10-CM

## 2020-12-08 RX ORDER — METOPROLOL SUCCINATE 100 MG/1
50 TABLET, EXTENDED RELEASE ORAL 2 TIMES DAILY
Qty: 30 TAB | Refills: 3 | Status: SHIPPED | OUTPATIENT
Start: 2020-12-08 | End: 2021-04-13

## 2020-12-08 NOTE — TELEPHONE ENCOUNTER
Was the patient seen in the last year in this department? 20    Does patient have an active prescription for medications requested? Yes    Received Request Via: Pharmacy    Hospital Outpatient Visit on 10/20/2020   Component Date Value   • Zonisamide -Zonegran 10/20/2020 17    Hospital Outpatient Visit on 2020   Component Date Value   • Magnesium 2020 2.2    • Cholesterol,Tot 2020 140    • Triglycerides 2020 135    • HDL 2020 38*   • LDL 2020 75    • Sodium 2020 142    • Potassium 2020 4.6    • Chloride 2020 107    • Co2 2020 23    • Anion Gap 2020 12.0    • Glucose 2020 101*   • Bun 2020 9    • Creatinine 2020 0.96    • Calcium 2020 9.1    • AST(SGOT) 2020 18    • ALT(SGPT) 2020 19    • Alkaline Phosphatase 2020 68    • Total Bilirubin 2020 0.9    • Albumin 2020 3.9    • Total Protein 2020 6.5    • Globulin 2020 2.6    • A-G Ratio 2020 1.5    • TSH 2020 1.770    • GFR If  2020 >60    • GFR If Non  Ameri* 2020 >60    Office Visit on 2020   Component Date Value   • Report 2020                      Value:[facility]    Test Date:  2020  Pt Name:    OBED ATKINS               Department: [facility]  MRN:        [id number]                      Room:  Gender:     Male                         Technician: AM  :        1965                   Requested By:HUSSAIN TORRE  Order #:    [id number]                    Reading MD: Wing Miller MD    Measurements  Intervals                                Axis  Rate:       89                           P:          60  HI:         156                          QRS:        77  QRSD:       140                          T:          103  QT:         432  QTc:        526    Interpretive Statements  VENTRICULAR-PACED COMPLEXES  NO FURTHER RHYTHM ANALYSIS ATTEMPTED DUE TO  PACED RHYTHM  Compared to ECG 04/28/2018 06:16:08  No significant changes  Electronically Signed On 6- 14:40:27 PDT by Wing Miller MD     Office Visit on 05/29/2020   Component Date Value   • Glycohemoglobin 05/29/2020 5.4    Office Visit on 12/18/2019   Component Date Value   • Glycohemoglobin 12/18/2019 5.3    • Internal Control Negative 12/18/2019 Negative    • Internal Control Positive 12/18/2019 Positive    Hospital Outpatient Visit on 12/12/2019   Component Date Value   • Occult Blood, IA 12/12/2019 Negative    • Ambiguous Date/Time 12/28/2019 See Below:

## 2020-12-28 DIAGNOSIS — Z76.0 ENCOUNTER FOR MEDICATION REFILL: ICD-10-CM

## 2020-12-28 NOTE — TELEPHONE ENCOUNTER
Was the patient seen in the last year in this department? Yes    Does patient have an active prescription for medications requested? Yes    Received Request Via: Pharmacy    Hospital Outpatient Visit on 10/20/2020   Component Date Value   • Zonisamide -Zonegran 10/20/2020 17    Hospital Outpatient Visit on 2020   Component Date Value   • Magnesium 2020 2.2    • Cholesterol,Tot 2020 140    • Triglycerides 2020 135    • HDL 2020 38*   • LDL 2020 75    • Sodium 2020 142    • Potassium 2020 4.6    • Chloride 2020 107    • Co2 2020 23    • Anion Gap 2020 12.0    • Glucose 2020 101*   • Bun 2020 9    • Creatinine 2020 0.96    • Calcium 2020 9.1    • AST(SGOT) 2020 18    • ALT(SGPT) 2020 19    • Alkaline Phosphatase 2020 68    • Total Bilirubin 2020 0.9    • Albumin 2020 3.9    • Total Protein 2020 6.5    • Globulin 2020 2.6    • A-G Ratio 2020 1.5    • TSH 2020 1.770    • GFR If  2020 >60    • GFR If Non  Ameri* 2020 >60    Office Visit on 2020   Component Date Value   • Report 2020                      Value:[facility]    Test Date:  2020  Pt Name:    OBED ATKINS               Department: [facility]  MRN:        [id number]                      Room:  Gender:     Male                         Technician: AM  :        1965                   Requested By:HUSSAIN TORRE  Order #:    [id number]                    Reading MD: Wing Miller MD    Measurements  Intervals                                Axis  Rate:       89                           P:          60  WI:         156                          QRS:        77  QRSD:       140                          T:          103  QT:         432  QTc:        526    Interpretive Statements  VENTRICULAR-PACED COMPLEXES  NO FURTHER RHYTHM ANALYSIS ATTEMPTED DUE TO PACED  RHYTHM  Compared to ECG 04/28/2018 06:16:08  No significant changes  Electronically Signed On 6- 14:40:27 PDT by Wing Miller MD     Office Visit on 05/29/2020   Component Date Value   • Glycohemoglobin 05/29/2020 5.4    ]

## 2020-12-29 RX ORDER — AMITRIPTYLINE HYDROCHLORIDE 100 MG/1
100 TABLET ORAL NIGHTLY PRN
Qty: 90 TAB | Refills: 0 | OUTPATIENT
Start: 2020-12-29

## 2020-12-29 NOTE — TELEPHONE ENCOUNTER
Medication was discontinued by Dr. Hunt.  Patient will need to be seen, or he will need to discuss with Dr. Hunt.

## 2021-02-10 ENCOUNTER — NON-PROVIDER VISIT (OUTPATIENT)
Dept: CARDIOLOGY | Facility: PHYSICIAN GROUP | Age: 56
End: 2021-02-10
Payer: MEDICARE

## 2021-02-10 ENCOUNTER — OFFICE VISIT (OUTPATIENT)
Dept: CARDIOLOGY | Facility: PHYSICIAN GROUP | Age: 56
End: 2021-02-10
Payer: MEDICARE

## 2021-02-10 VITALS
HEIGHT: 74 IN | OXYGEN SATURATION: 94 % | DIASTOLIC BLOOD PRESSURE: 60 MMHG | WEIGHT: 224 LBS | SYSTOLIC BLOOD PRESSURE: 72 MMHG | OXYGEN SATURATION: 94 % | HEART RATE: 80 BPM | HEART RATE: 80 BPM | SYSTOLIC BLOOD PRESSURE: 72 MMHG | HEIGHT: 74 IN | DIASTOLIC BLOOD PRESSURE: 60 MMHG | BODY MASS INDEX: 28.75 KG/M2 | WEIGHT: 224 LBS | BODY MASS INDEX: 28.75 KG/M2

## 2021-02-10 DIAGNOSIS — E78.2 MIXED HYPERLIPIDEMIA: ICD-10-CM

## 2021-02-10 DIAGNOSIS — I25.5 ISCHEMIC CARDIOMYOPATHY: ICD-10-CM

## 2021-02-10 DIAGNOSIS — I50.20 ACC/AHA STAGE C SYSTOLIC HEART FAILURE (HCC): ICD-10-CM

## 2021-02-10 DIAGNOSIS — I10 ESSENTIAL HYPERTENSION: ICD-10-CM

## 2021-02-10 DIAGNOSIS — Z79.4 TYPE 2 DIABETES MELLITUS WITH COMPLICATION, WITH LONG-TERM CURRENT USE OF INSULIN (HCC): ICD-10-CM

## 2021-02-10 DIAGNOSIS — I25.10 CORONARY ARTERY DISEASE INVOLVING NATIVE CORONARY ARTERY OF NATIVE HEART WITHOUT ANGINA PECTORIS: ICD-10-CM

## 2021-02-10 DIAGNOSIS — I48.0 PAROXYSMAL ATRIAL FIBRILLATION (HCC): ICD-10-CM

## 2021-02-10 DIAGNOSIS — Z95.810 PRESENCE OF BIVENTRICULAR AICD: ICD-10-CM

## 2021-02-10 DIAGNOSIS — E11.8 TYPE 2 DIABETES MELLITUS WITH COMPLICATION, WITH LONG-TERM CURRENT USE OF INSULIN (HCC): ICD-10-CM

## 2021-02-10 DIAGNOSIS — Z95.1 S/P CABG X 3: ICD-10-CM

## 2021-02-10 PROCEDURE — 99214 OFFICE O/P EST MOD 30 MIN: CPT | Mod: 25 | Performed by: NURSE PRACTITIONER

## 2021-02-10 PROCEDURE — 93284 PRGRMG EVAL IMPLANTABLE DFB: CPT | Performed by: NURSE PRACTITIONER

## 2021-02-10 ASSESSMENT — ENCOUNTER SYMPTOMS
BRUISES/BLEEDS EASILY: 0
SHORTNESS OF BREATH: 0
LOSS OF CONSCIOUSNESS: 0
PND: 0
FEVER: 0
ORTHOPNEA: 0
DIZZINESS: 0
NAUSEA: 0
INSOMNIA: 0
ABDOMINAL PAIN: 0
COUGH: 0
CHILLS: 0
PALPITATIONS: 0
HEADACHES: 0
MYALGIAS: 0

## 2021-02-10 ASSESSMENT — FIBROSIS 4 INDEX
FIB4 SCORE: 1.1
FIB4 SCORE: 1.1

## 2021-02-10 NOTE — PROGRESS NOTES
Chief Complaint   Patient presents with   • Follow-Up   • Biventricular AICD   • Coronary Artery Disease   • Cardiomyopathy (Ischemic)   • Atrial Fibrillation   • Anticoagulation   • HTN (Controlled)   • Hyperlipidemia   • Diabetes (Controlled)       Subjective:   Juaquin Solorznao is a 55 y.o. male who presents today for six month follow-up for AICD interrogation, CAD/ischemic cardiomyopathy, hypertension, hyperlipidemia, and diabetes mellitus.    Juaquin is a 55 year old male with history of CAD/ischemic cardiomyopathy with LVEF 45% and AICD implanted in April 2018, hypertension, hyperlipidemia, sleep apnea and diabetes mellitus, normally followed by Dr. Sweeney, and last seen by me in August 2020.     He is here today for six month follow-up. BP continues to be quite low, due to Entresto, Sotalol, Aldactone and Metoprolol, but he denies any dizziness, lightheadedness or syncope/presyncope. Glucose remains well controlled with Ozempic and Jardiance/Metformin. No device therapy. No chest pain, pressure or discomfort; no palpitations; breathing is stable with no orthopnea or PND; no LE edema. He does have should some arthritis shoulder and arm pain.    Past Medical History:   Diagnosis Date   • Arthritis     Hands/elbows   • Block, bundle branch, left    • CAD (coronary artery disease)    • Cataract 04/26/2018    early stages, not removed   • Diabetes (HCC) 2006    On oral medications, now well controlled   • Fibromyalgia 04/26/2018 5-6/10   • GERD (gastroesophageal reflux disease)    • Gout    • Hyperlipidemia    • Hypertension    • Ischemic cardiomyopathy 10/2019    Echocardiogram with normal LV size, LVEF 45% (Previously 35-40% in February 2018)   • Myocardial infarct ([facility]) 2007   • Myocardial infarct ([facility]) 2012   • Neuropathy ([facility])     Bilateral feet   • Psychiatric problem     Depression and anxiety   • S/P colonoscopy     repeat 6 years at age of 61.   • Sleep apnea     Uses CPAP     Past Surgical  History:   Procedure Laterality Date   • AICD IMPLANT Left 2018    [employer] Inogen X4 CRT-D G148 implanted by Dr. Cullen.   • RECOVERY  2016    Procedure: CATH LAB[facility] W/POSSIBLE RADIAL APPROACH-CARIE;  Surgeon: Mireya Surgery;  Location: SURGERY PRE-POST PROC UNIT [facility];  Service:    • MULTIPLE CORONARY ARTERY BYPASS  2014    CABG x 3   • LEONILA BY LAPAROSCOPY     • KNEE ARTHROSCOPY      Arthroscopy, Knee     Family History   Problem Relation Age of Onset   • Heart Disease Mother    • Diabetes Mother    • Hyperlipidemia Father    • Heart Disease Father    • Hypertension Father    • Stroke Father    • Lung Disease Sister      Social History     Socioeconomic History   • Marital status:      Spouse name: Not on file   • Number of children: Not on file   • Years of education: Not on file   • Highest education level: Not on file   Occupational History   • Not on file   Tobacco Use   • Smoking status: Former Smoker     Packs/day: 0.50     Years: 12.00     Pack years: 6.00     Types: Cigarettes     Quit date: 7/15/2012     Years since quittin.5   • Smokeless tobacco: Never Used   Substance and Sexual Activity   • Alcohol use: Yes     Alcohol/week: 0.6 oz     Types: 1 Standard drinks or equivalent per week   • Drug use: Yes     Types: Marijuana, Oral, Inhaled     Comment: nightly    • Sexual activity: Yes     Partners: Female   Other Topics Concern   • Not on file   Social History Narrative   • Not on file     Social Determinants of Health     Financial Resource Strain:    • Difficulty of Paying Living Expenses:    Food Insecurity:    • Worried About Running Out of Food in the Last Year:    • Ran Out of Food in the Last Year:    Transportation Needs:    • Lack of Transportation (Medical):    • Lack of Transportation (Non-Medical):    Physical Activity:    • Days of Exercise per Week:    • Minutes of Exercise per Session:    Stress:    • Feeling of Stress :    Social Connections:     • Frequency of Communication with Friends and Family:    • Frequency of Social Gatherings with Friends and Family:    • Attends [employer] Services:    • Active Member of Clubs or Organizations:    • Attends Club or Organization Meetings:    • Marital Status:    Intimate Partner Violence:    • Fear of Current or Ex-Partner:    • Emotionally Abused:    • Physically Abused:    • Sexually Abused:      Allergies   Allergen Reactions   • Iodine Anaphylaxis     Other reaction(s): Unknown     Outpatient Encounter Medications as of 2/10/2021   Medication Sig Dispense Refill   • Empagliflozin-metFORMIN HCl ER 12.5-1000 MG TABLET SR 24 HR Take 1 tablet by mouth 2 times a day. 180 tablet 1   • metoprolol SR (TOPROL XL) 100 MG TABLET SR 24 HR Take 0.5 Tabs by mouth 2 Times a Day. 30 Tab 3   • apixaban (ELIQUIS) 5mg Tab Take 1 Tab by mouth 2 Times a Day. 180 Tab 1   • allopurinol (ZYLOPRIM) 300 MG Tab Take 1 Tab by mouth every day. 90 Tab 1   • DULoxetine (CYMBALTA) 60 MG Cap DR Particles delayed-release capsule Take 1 Cap by mouth every morning. 90 Cap 1   • mirtazapine (REMERON) 15 MG Tab TAKE ONE TABLET BY MOUTH EACH EVENING AT BEDTIME 90 Tab 1   • sildenafil citrate (VIAGRA) 50 MG tablet Take 1 Tab by mouth as needed for Erectile Dysfunction. 10 Tab 10   • nitroglycerin (NITROSTAT) 0.4 MG SL Tab DISSOLVE 1 TABLET UNDER THE TONGUE AS NEEDED FOR CHEST PAIN 25 Tab 3   • sotalol (BETAPACE) 80 MG Tab Take 1 Tab by mouth 2 times a day. 180 Tab 1   • zonisamide (ZONEGRAN) 100 MG Cap Take 3 Caps by mouth every day. 270 Cap 3   • atorvastatin (LIPITOR) 40 MG Tab Take 1 Tab by mouth every day. 90 Tab 0   • spironolactone (ALDACTONE) 25 MG Tab TAKE 1 TABLET DAILY 90 Tab 3   • ENTRESTO  MG Tab tablet TAKE 1 TABLET TWICE A  Tab 3   • Semaglutide, 1 MG/DOSE, (OZEMPIC, 1 MG/DOSE,) 2 MG/1.5ML Solution Pen-injector Inject 1 mg as instructed every 7 days. 6 PEN 4   • glucose blood (FREESTYLE LITE) strip 1 Strip by Other route 4  "times a day. 300 Strip 4   • FREESTYLE LANCETS Misc by Does not apply route.     • Insulin Pen Needle (SURE COMFORT PEN NEEDLES) by Does not apply route.     • [DISCONTINUED] Empagliflozin-metFORMIN HCl ER 12.5-1000 MG TABLET SR 24 HR Take 1 Tab by mouth 2 times a day. 180 Tab 4     No facility-administered encounter medications on file as of 2/10/2021.     Review of Systems   Constitutional: Negative for chills and fever.   HENT: Negative for congestion.    Respiratory: Negative for cough and shortness of breath.    Cardiovascular: Negative for chest pain, palpitations, orthopnea, leg swelling and PND.   Gastrointestinal: Negative for abdominal pain and nausea.   Musculoskeletal: Positive for joint pain. Negative for myalgias.   Skin: Negative for rash.   Neurological: Negative for dizziness, loss of consciousness and headaches.   Endo/Heme/Allergies: Does not bruise/bleed easily.   Psychiatric/Behavioral: The patient does not have insomnia.         Objective:   BP (!) 72/60 (BP Location: Left arm, Patient Position: Sitting, BP Cuff Size: Adult)   Pulse 80   Ht 1.88 m (6' 2\")   Wt 102 kg (224 lb)   SpO2 94%   BMI 28.76 kg/m²     Physical Exam   Constitutional: He is oriented to person, place, and time. He appears well-developed and well-nourished.   Weight is stable.   HENT:   Head: Normocephalic.   Eyes: EOM are normal.   Neck: No JVD present.   Cardiovascular: Normal rate, regular rhythm and normal heart sounds.   Pulmonary/Chest: Effort normal and breath sounds normal. No respiratory distress. He has no wheezes. He has no rales.   AICD in left chest wall.   Abdominal: Soft. Bowel sounds are normal. He exhibits no distension. There is no abdominal tenderness.   Musculoskeletal:         General: No edema. Normal range of motion.      Cervical back: Normal range of motion and neck supple.   Neurological: He is alert and oriented to person, place, and time.   Skin: Skin is warm and dry. No rash noted. "   Psychiatric: He has a normal mood and affect.     AICD interrogation shows normal function. 3 brief mode switching episodes (<0.1 minute total time). No device therapy. No changes are made today.    CONCLUSIONS OF ECHOCARDIOGRAM OF 10/16/2019:  Limited Study for LVEF.   TDS - Limited acoustical window, contrast not available.  Prior Echo - 2/12/18, prior EF probably 40%, currently    45%.  Mildly reduced left ventricular systolic function.  Left ventricular ejection fraction is visually estimated to be 45%.  Global hypokinesis.  Abnormal septal motion consistent with postoperative status.    Lab Results   Component Value Date/Time    CHOLSTRLTOT 140 06/11/2020 05:14 PM    LDL 75 06/11/2020 05:14 PM    HDL 38 (A) 06/11/2020 05:14 PM    TRIGLYCERIDE 135 06/11/2020 05:14 PM       Lab Results   Component Value Date/Time    SODIUM 142 06/11/2020 05:14 PM    POTASSIUM 4.6 06/11/2020 05:14 PM    CHLORIDE 107 06/11/2020 05:14 PM    CO2 23 06/11/2020 05:14 PM    GLUCOSE 101 (H) 06/11/2020 05:14 PM    BUN 9 06/11/2020 05:14 PM    CREATININE 0.96 06/11/2020 05:14 PM     Lab Results   Component Value Date/Time    ALKPHOSPHAT 68 06/11/2020 05:14 PM    ASTSGOT 18 06/11/2020 05:14 PM    ALTSGPT 19 06/11/2020 05:14 PM    TBILIRUBIN 0.9 06/11/2020 05:14 PM          Assessment:     1. Presence of biventricular AICD     2. Coronary artery disease involving native coronary artery of native heart without angina pectoris     3. Ischemic cardiomyopathy     4. ACC/AHA stage C systolic heart failure (HCC)     5. Essential hypertension     6. Mixed hyperlipidemia     7. Type 2 diabetes mellitus with complication, with long-term current use of insulin (HCC)  Empagliflozin-metFORMIN HCl ER 12.5-1000 MG TABLET SR 24 HR       Medical Decision Making:  Today's Assessment / Status / Plan:     1. CAD/ischemic cardiomyopathy, status post CABG, with biventricular AICD. He remains on Entresto, Aldactone, Lipitor and Eliquis.    2. Paroxysmal atrial  fibrillation, on Sotalol. Creatinine is normal.    3. Chronic anticoagulation with Eliquis. No bleeding problems.    4. History of CHF, on Entresto, BB, Aldactone and statin. No HF symptoms or exacerbations. Will repeat echocardiogram at next follow-up.    5. Hypertension, treated, and on the lower side. He is asymptomatic. To watch BP at home.    6. Hyperlipidemia, treated with Lipitor 40mg. Last LDL was 75.    7. Diabetes mellitus, treated with Ozempic and Jardiance/Metformin, well controlled. He has also lost weight.    Same medications for now. Labs per PCP. FU in 6 months for next device interrogation; will repeat echo then too. FU sooner if clinical condition changes.

## 2021-03-31 DIAGNOSIS — I48.0 PAROXYSMAL ATRIAL FIBRILLATION (HCC): ICD-10-CM

## 2021-04-02 RX ORDER — SOTALOL HYDROCHLORIDE 80 MG/1
TABLET ORAL
Qty: 180 TABLET | Refills: 3 | Status: SHIPPED | OUTPATIENT
Start: 2021-04-02 | End: 2022-01-03

## 2021-04-08 ENCOUNTER — TELEPHONE (OUTPATIENT)
Dept: CARDIOLOGY | Facility: MEDICAL CENTER | Age: 56
End: 2021-04-08

## 2021-04-09 NOTE — TELEPHONE ENCOUNTER
FYI-- patients device transmitted via home monitor.  Patient had VT episode with ATP delivered-- appropriate and successful. 3/30 @  5:45 pm.

## 2021-04-09 NOTE — TELEPHONE ENCOUNTER
Attempted to contact pt. On cell but connection was poor.   Notified pt. Via My Chart. Instructed pt. To advise nurse which pharmacy he would like to use.

## 2021-04-09 NOTE — TELEPHONE ENCOUNTER
Please have him increase Toprol XL from 50mg to 75mg BID.  Is it possible for him to see me in [facility] (for FU and Device check) sooner than August 2021?  Thanks, AB

## 2021-04-13 DIAGNOSIS — I50.20 ACC/AHA STAGE C SYSTOLIC HEART FAILURE (HCC): ICD-10-CM

## 2021-04-13 RX ORDER — METOPROLOL SUCCINATE 50 MG/1
75 TABLET, EXTENDED RELEASE ORAL 2 TIMES DAILY
Qty: 270 TABLET | Refills: 3 | Status: SHIPPED | OUTPATIENT
Start: 2021-04-13 | End: 2021-09-01

## 2021-04-28 ENCOUNTER — TELEPHONE (OUTPATIENT)
Dept: CARDIOLOGY | Facility: MEDICAL CENTER | Age: 56
End: 2021-04-28

## 2021-04-28 NOTE — TELEPHONE ENCOUNTER
Patients device transmitted via home monitor--patient received ATP for VT episode 4/24 @ 1:20 pm-- appears appropriate and successful.  Patient is scheduled with Alisa 5/5.

## 2021-05-03 ENCOUNTER — NON-PROVIDER VISIT (OUTPATIENT)
Dept: MEDICAL GROUP | Facility: CLINIC | Age: 56
End: 2021-05-03
Payer: MEDICARE

## 2021-05-03 ENCOUNTER — HOSPITAL ENCOUNTER (OUTPATIENT)
Facility: MEDICAL CENTER | Age: 56
End: 2021-05-03
Attending: PHYSICIAN ASSISTANT
Payer: MEDICARE

## 2021-05-03 DIAGNOSIS — Z01.89 ENCOUNTER FOR LABORATORY TEST: ICD-10-CM

## 2021-05-03 PROCEDURE — 84439 ASSAY OF FREE THYROXINE: CPT

## 2021-05-03 PROCEDURE — 83036 HEMOGLOBIN GLYCOSYLATED A1C: CPT

## 2021-05-03 PROCEDURE — 84443 ASSAY THYROID STIM HORMONE: CPT

## 2021-05-03 PROCEDURE — 36415 COLL VENOUS BLD VENIPUNCTURE: CPT | Performed by: PHYSICIAN ASSISTANT

## 2021-05-03 PROCEDURE — 84153 ASSAY OF PSA TOTAL: CPT

## 2021-05-03 PROCEDURE — 80061 LIPID PANEL: CPT

## 2021-05-03 PROCEDURE — 80053 COMPREHEN METABOLIC PANEL: CPT

## 2021-05-03 PROCEDURE — 84550 ASSAY OF BLOOD/URIC ACID: CPT

## 2021-05-03 RX ORDER — SEMAGLUTIDE 1.34 MG/ML
1 INJECTION, SOLUTION SUBCUTANEOUS
Qty: 6 ML | Refills: 0 | Status: SHIPPED | OUTPATIENT
Start: 2021-05-03 | End: 2021-05-10 | Stop reason: SDUPTHER

## 2021-05-03 NOTE — TELEPHONE ENCOUNTER
Was the patient seen in the last year in this department? Yes    Does patient have an active prescription for medications requested? Yes    Received Request Via: Patient    Hospital Outpatient Visit on 10/20/2020   Component Date Value   • Zonisamide -Zonegran 10/20/2020 17    Hospital Outpatient Visit on 2020   Component Date Value   • Magnesium 2020 2.2    • Cholesterol,Tot 2020 140    • Triglycerides 2020 135    • HDL 2020 38*   • LDL 2020 75    • Sodium 2020 142    • Potassium 2020 4.6    • Chloride 2020 107    • Co2 2020 23    • Anion Gap 2020 12.0    • Glucose 2020 101*   • Bun 2020 9    • Creatinine 2020 0.96    • Calcium 2020 9.1    • AST(SGOT) 2020 18    • ALT(SGPT) 2020 19    • Alkaline Phosphatase 2020 68    • Total Bilirubin 2020 0.9    • Albumin 2020 3.9    • Total Protein 2020 6.5    • Globulin 2020 2.6    • A-G Ratio 2020 1.5    • TSH 2020 1.770    • GFR If  2020 >60    • GFR If Non  Ameri* 2020 >60    Office Visit on 2020   Component Date Value   • Report 2020                      Value:[facility]    Test Date:  2020  Pt Name:    OBED ATKINS               Department: [facility]  MRN:        [id number]                      Room:  Gender:     Male                         Technician: AM  :        1965                   Requested By:HUSSAIN TORRE  Order #:    [id number]                    Reading MD: Wing Miller MD    Measurements  Intervals                                Axis  Rate:       89                           P:          60  WA:         156                          QRS:        77  QRSD:       140                          T:          103  QT:         432  QTc:        526    Interpretive Statements  VENTRICULAR-PACED COMPLEXES  NO FURTHER RHYTHM ANALYSIS ATTEMPTED DUE TO PACED  RHYTHM  Compared to ECG 04/28/2018 06:16:08  No significant changes  Electronically Signed On 6- 14:40:27 PDT by Wing Miller MD     Office Visit on 05/29/2020   Component Date Value   • Glycohemoglobin 05/29/2020 5.4    ]

## 2021-05-04 DIAGNOSIS — I50.20 ACC/AHA STAGE C SYSTOLIC HEART FAILURE (HCC): ICD-10-CM

## 2021-05-04 DIAGNOSIS — I10 ESSENTIAL HYPERTENSION: ICD-10-CM

## 2021-05-04 DIAGNOSIS — I25.5 ISCHEMIC CARDIOMYOPATHY: ICD-10-CM

## 2021-05-04 DIAGNOSIS — E08.42 DIABETIC POLYNEUROPATHY ASSOCIATED WITH DIABETES MELLITUS DUE TO UNDERLYING CONDITION (HCC): ICD-10-CM

## 2021-05-04 DIAGNOSIS — Z79.4 TYPE 2 DIABETES MELLITUS WITH COMPLICATION, WITH LONG-TERM CURRENT USE OF INSULIN (HCC): ICD-10-CM

## 2021-05-04 DIAGNOSIS — E78.2 MIXED HYPERLIPIDEMIA: ICD-10-CM

## 2021-05-04 DIAGNOSIS — Z95.810 PRESENCE OF BIVENTRICULAR AICD: ICD-10-CM

## 2021-05-04 DIAGNOSIS — Z12.5 PROSTATE CANCER SCREENING: ICD-10-CM

## 2021-05-04 DIAGNOSIS — I25.10 CORONARY ARTERY DISEASE INVOLVING NATIVE CORONARY ARTERY OF NATIVE HEART WITHOUT ANGINA PECTORIS: ICD-10-CM

## 2021-05-04 DIAGNOSIS — Z79.4 ENCOUNTER FOR LONG-TERM (CURRENT) USE OF INSULIN (HCC): ICD-10-CM

## 2021-05-04 DIAGNOSIS — Z95.1 S/P CABG X 3: ICD-10-CM

## 2021-05-04 DIAGNOSIS — I48.0 PAROXYSMAL ATRIAL FIBRILLATION (HCC): ICD-10-CM

## 2021-05-04 DIAGNOSIS — E11.8 TYPE 2 DIABETES MELLITUS WITH COMPLICATION, WITH LONG-TERM CURRENT USE OF INSULIN (HCC): ICD-10-CM

## 2021-05-04 DIAGNOSIS — M10.9 GOUT OF RIGHT FOOT, UNSPECIFIED CAUSE, UNSPECIFIED CHRONICITY: ICD-10-CM

## 2021-05-04 LAB
ALBUMIN SERPL BCP-MCNC: 4.3 G/DL (ref 3.2–4.9)
ALBUMIN/GLOB SERPL: 1.7 G/DL
ALP SERPL-CCNC: 101 U/L (ref 30–99)
ALT SERPL-CCNC: 31 U/L (ref 2–50)
ANION GAP SERPL CALC-SCNC: 17 MMOL/L (ref 7–16)
AST SERPL-CCNC: 28 U/L (ref 12–45)
BILIRUB SERPL-MCNC: 0.7 MG/DL (ref 0.1–1.5)
BUN SERPL-MCNC: 14 MG/DL (ref 8–22)
CALCIUM SERPL-MCNC: 9.3 MG/DL (ref 8.5–10.5)
CHLORIDE SERPL-SCNC: 110 MMOL/L (ref 96–112)
CHOLEST SERPL-MCNC: 124 MG/DL (ref 100–199)
CO2 SERPL-SCNC: 22 MMOL/L (ref 20–33)
CREAT SERPL-MCNC: 1 MG/DL (ref 0.5–1.4)
EST. AVERAGE GLUCOSE BLD GHB EST-MCNC: 100 MG/DL
GLOBULIN SER CALC-MCNC: 2.6 G/DL (ref 1.9–3.5)
GLUCOSE SERPL-MCNC: 80 MG/DL (ref 65–99)
HBA1C MFR BLD: 5.1 % (ref 4–5.6)
HDLC SERPL-MCNC: 36 MG/DL
LDLC SERPL CALC-MCNC: 62 MG/DL
POTASSIUM SERPL-SCNC: 4.1 MMOL/L (ref 3.6–5.5)
PROT SERPL-MCNC: 6.9 G/DL (ref 6–8.2)
PSA SERPL-MCNC: 0.15 NG/ML (ref 0–4)
SODIUM SERPL-SCNC: 149 MMOL/L (ref 135–145)
T4 FREE SERPL-MCNC: 1.01 NG/DL (ref 0.93–1.7)
TRIGL SERPL-MCNC: 131 MG/DL (ref 0–149)
TSH SERPL DL<=0.005 MIU/L-ACNC: 2.55 UIU/ML (ref 0.38–5.33)
URATE SERPL-MCNC: 3.7 MG/DL (ref 2.5–8.3)

## 2021-05-05 ENCOUNTER — NON-PROVIDER VISIT (OUTPATIENT)
Dept: CARDIOLOGY | Facility: PHYSICIAN GROUP | Age: 56
End: 2021-05-05
Payer: MEDICARE

## 2021-05-05 ENCOUNTER — OFFICE VISIT (OUTPATIENT)
Dept: CARDIOLOGY | Facility: PHYSICIAN GROUP | Age: 56
End: 2021-05-05
Payer: MEDICARE

## 2021-05-05 VITALS
SYSTOLIC BLOOD PRESSURE: 92 MMHG | HEART RATE: 86 BPM | OXYGEN SATURATION: 96 % | HEIGHT: 74 IN | WEIGHT: 234 LBS | DIASTOLIC BLOOD PRESSURE: 60 MMHG | BODY MASS INDEX: 30.03 KG/M2

## 2021-05-05 VITALS
WEIGHT: 234 LBS | BODY MASS INDEX: 30.03 KG/M2 | HEIGHT: 74 IN | DIASTOLIC BLOOD PRESSURE: 60 MMHG | OXYGEN SATURATION: 96 % | SYSTOLIC BLOOD PRESSURE: 92 MMHG | HEART RATE: 86 BPM

## 2021-05-05 DIAGNOSIS — G47.33 OBSTRUCTIVE SLEEP APNEA SYNDROME: ICD-10-CM

## 2021-05-05 DIAGNOSIS — I25.10 CORONARY ARTERY DISEASE INVOLVING NATIVE CORONARY ARTERY OF NATIVE HEART WITHOUT ANGINA PECTORIS: ICD-10-CM

## 2021-05-05 DIAGNOSIS — I47.20 VENTRICULAR TACHYCARDIA (HCC): ICD-10-CM

## 2021-05-05 DIAGNOSIS — Z79.01 CHRONIC ANTICOAGULATION: ICD-10-CM

## 2021-05-05 DIAGNOSIS — I10 ESSENTIAL HYPERTENSION: ICD-10-CM

## 2021-05-05 DIAGNOSIS — Z95.810 PRESENCE OF BIVENTRICULAR AICD: ICD-10-CM

## 2021-05-05 DIAGNOSIS — Z79.4 TYPE 2 DIABETES MELLITUS WITH COMPLICATION, WITH LONG-TERM CURRENT USE OF INSULIN (HCC): ICD-10-CM

## 2021-05-05 DIAGNOSIS — I48.0 PAROXYSMAL ATRIAL FIBRILLATION (HCC): ICD-10-CM

## 2021-05-05 DIAGNOSIS — I25.5 ISCHEMIC CARDIOMYOPATHY: ICD-10-CM

## 2021-05-05 DIAGNOSIS — E11.8 TYPE 2 DIABETES MELLITUS WITH COMPLICATION, WITH LONG-TERM CURRENT USE OF INSULIN (HCC): ICD-10-CM

## 2021-05-05 DIAGNOSIS — E78.2 MIXED HYPERLIPIDEMIA: ICD-10-CM

## 2021-05-05 DIAGNOSIS — I50.20 ACC/AHA STAGE C SYSTOLIC HEART FAILURE (HCC): ICD-10-CM

## 2021-05-05 DIAGNOSIS — Z95.1 S/P CABG X 3: ICD-10-CM

## 2021-05-05 PROCEDURE — 99214 OFFICE O/P EST MOD 30 MIN: CPT | Mod: 25 | Performed by: NURSE PRACTITIONER

## 2021-05-05 PROCEDURE — 93284 PRGRMG EVAL IMPLANTABLE DFB: CPT | Performed by: NURSE PRACTITIONER

## 2021-05-05 ASSESSMENT — ENCOUNTER SYMPTOMS
DIZZINESS: 0
FEVER: 0
COUGH: 0
NAUSEA: 0
CHILLS: 0
MYALGIAS: 0
LOSS OF CONSCIOUSNESS: 0
HEADACHES: 0
PND: 0
INSOMNIA: 0
ABDOMINAL PAIN: 0
ORTHOPNEA: 0
BRUISES/BLEEDS EASILY: 0
PALPITATIONS: 0
SHORTNESS OF BREATH: 0

## 2021-05-05 ASSESSMENT — FIBROSIS 4 INDEX
FIB4 SCORE: 1.34
FIB4 SCORE: 1.34

## 2021-05-05 NOTE — PROGRESS NOTES
"Chief Complaint   Patient presents with   • Follow-Up   • Biventricular AICD   • Coronary Artery Disease   • Cardiomyopathy (Ischemic)   • Ventricular Tachycardia   • Atrial Fibrillation   • Anticoagulation   • HTN (Controlled)   • Hyperlipidemia   • Diabetes Mellitus       Subjective:   Juaquin Solorzano is a 55 y.o. male who presents today     Juaquin is a 55 year old male with history of CAD/ischemic cardiomyopathy with LVEF 45%, with history of stents in 2005 and 2006, followed by CABG x 3 in 2013 (anatomical details unknown); coronary angiogram in July 2016 showed patent LIMA to LAD, but occluded SVG-RCA, nonobstructive disease of LCx, and diffuse distal disease of the distal RCA, no other SVG was identified.     He had CRT-D implanted in April 2018, and also had hypertension, hyperlipidemia, sleep apnea and diabetes mellitus, normally followed by Dr. Sweeney, and last seen by me in February 2021.    His Latitude monitor recently showed an episode of VT in March and April 2021, both treated successfully with ATP     He is here today for follow-up to discuss. He denies any recent illness or change in medications. BP continues to be quite low, due to Entresto, but he takes 1/2 tablet twice daily, and this helps. He denies any dizziness, lightheadedness or syncope/presyncope. Glucose remains well controlled on current medications. No chest pain, pressure or discomfort, but he does have \"clicking\" along his sternum (he did have sternotomy complications in 2014); no palpitations; breathing is stable with no orthopnea or PND; no LE edema. He does have should some arthritis shoulder and arm pain.       Past Medical History:   Diagnosis Date   • Arthritis     Hands/elbows   • Block, bundle branch, left    • CAD (coronary artery disease)    • Cataract 04/26/2018    early stages, not removed   • Diabetes (HCC) 2006    On oral medications, now well controlled   • Fibromyalgia 04/26/2018    5-6/10   • GERD " (gastroesophageal reflux disease)    • Gout    • Hyperlipidemia    • Hypertension    • Ischemic cardiomyopathy 10/2019    Echocardiogram with normal LV size, LVEF 45% (Previously 35-40% in 2018)   • Myocardial infarct (HCC)    • Myocardial infarct (HCC)    • Neuropathy (HCC)     Bilateral feet   • Psychiatric problem     Depression and anxiety   • S/P colonoscopy     repeat 6 years at age of 61.   • Sleep apnea     Uses CPAP     Past Surgical History:   Procedure Laterality Date   • AICD IMPLANT Left 2018    [employer]ogen X4 CRT-D G148 implanted by Dr. Cullen.   • RECOVERY  2016    Procedure: CATH LAB-[facility] W/POSSIBLE RADIAL APPROACH-CARIE;  Surgeon: Mireya Surgery;  Location: SURGERY PRE-POST PROC UNIT [facility];  Service:    • MULTIPLE CORONARY ARTERY BYPASS  2014    CABG x 3   • LEONILA BY LAPAROSCOPY     • KNEE ARTHROSCOPY      Arthroscopy, Knee     Family History   Problem Relation Age of Onset   • Heart Disease Mother    • Diabetes Mother    • Hyperlipidemia Father    • Heart Disease Father    • Hypertension Father    • Stroke Father    • Lung Disease Sister      Social History     Socioeconomic History   • Marital status:      Spouse name: Not on file   • Number of children: Not on file   • Years of education: Not on file   • Highest education level: Not on file   Occupational History   • Not on file   Tobacco Use   • Smoking status: Former Smoker     Packs/day: 0.50     Years: 12.00     Pack years: 6.00     Types: Cigarettes     Quit date: 7/15/2012     Years since quittin.8   • Smokeless tobacco: Never Used   Substance and Sexual Activity   • Alcohol use: Yes     Alcohol/week: 0.6 oz     Types: 1 Standard drinks or equivalent per week   • Drug use: Yes     Types: Marijuana, Oral, Inhaled     Comment: nightly    • Sexual activity: Yes     Partners: Female   Other Topics Concern   • Not on file   Social History Narrative   • Not on file     Social  Determinants of Health     Financial Resource Strain:    • Difficulty of Paying Living Expenses:    Food Insecurity:    • Worried About Running Out of Food in the Last Year:    • Ran Out of Food in the Last Year:    Transportation Needs:    • Lack of Transportation (Medical):    • Lack of Transportation (Non-Medical):    Physical Activity:    • Days of Exercise per Week:    • Minutes of Exercise per Session:    Stress:    • Feeling of Stress :    Social Connections:    • Frequency of Communication with Friends and Family:    • Frequency of Social Gatherings with Friends and Family:    • Attends Taoist Services:    • Active Member of Clubs or Organizations:    • Attends Club or Organization Meetings:    • Marital Status:    Intimate Partner Violence:    • Fear of Current or Ex-Partner:    • Emotionally Abused:    • Physically Abused:    • Sexually Abused:      Allergies   Allergen Reactions   • Iodine Anaphylaxis     Other reaction(s): Unknown     Outpatient Encounter Medications as of 5/5/2021   Medication Sig Dispense Refill   • Semaglutide, 1 MG/DOSE, (OZEMPIC, 1 MG/DOSE,) 2 MG/1.5ML Solution Pen-injector Inject 1 mg under the skin every 7 days. 6 mL 0   • mirtazapine (REMERON) 15 MG Tab TAKE 1 TABLET EVERY EVENING AT BEDTIME 90 tablet 0   • allopurinol (ZYLOPRIM) 300 MG Tab TAKE 1 TABLET DAILY 90 tablet 0   • ELIQUIS 5 MG Tab TAKE 1 TABLET TWICE A  tablet 0   • metoprolol SR (TOPROL XL) 50 MG TABLET SR 24 HR Take 1.5 Tablets by mouth 2 times a day. 270 tablet 3   • sotalol (BETAPACE) 80 MG Tab TAKE 1 TABLET TWICE A  tablet 3   • Empagliflozin-metFORMIN HCl ER 12.5-1000 MG TABLET SR 24 HR Take 1 tablet by mouth 2 times a day. 180 tablet 1   • DULoxetine (CYMBALTA) 60 MG Cap DR Particles delayed-release capsule Take 1 Cap by mouth every morning. 90 Cap 1   • sildenafil citrate (VIAGRA) 50 MG tablet Take 1 Tab by mouth as needed for Erectile Dysfunction. 10 Tab 10   • nitroglycerin (NITROSTAT) 0.4  "MG SL Tab DISSOLVE 1 TABLET UNDER THE TONGUE AS NEEDED FOR CHEST PAIN 25 Tab 3   • zonisamide (ZONEGRAN) 100 MG Cap Take 3 Caps by mouth every day. 270 Cap 3   • atorvastatin (LIPITOR) 40 MG Tab Take 1 Tab by mouth every day. 90 Tab 0   • spironolactone (ALDACTONE) 25 MG Tab TAKE 1 TABLET DAILY 90 Tab 3   • ENTRESTO  MG Tab tablet TAKE 1 TABLET TWICE A DAY (Patient taking differently: Pt states taking 1/2 twice daily) 180 Tab 3   • glucose blood (FREESTYLE LITE) strip 1 Strip by Other route 4 times a day. 300 Strip 4   • FREESTYLE LANCETS Misc by Does not apply route.     • Insulin Pen Needle (SURE COMFORT PEN NEEDLES) by Does not apply route.       No facility-administered encounter medications on file as of 5/5/2021.     Review of Systems   Constitutional: Negative for chills and fever.   HENT: Negative for congestion.    Respiratory: Negative for cough and shortness of breath.    Cardiovascular: Negative for chest pain, palpitations, orthopnea, leg swelling and PND.        Clicking in sternotomy incision.   Gastrointestinal: Negative for abdominal pain and nausea.   Musculoskeletal: Positive for joint pain. Negative for myalgias.   Skin: Negative for rash.   Neurological: Negative for dizziness, loss of consciousness and headaches.   Endo/Heme/Allergies: Does not bruise/bleed easily.   Psychiatric/Behavioral: The patient does not have insomnia.         Objective:   BP (!) 92/60 (BP Location: Left arm, Patient Position: Sitting, BP Cuff Size: Adult)   Pulse 86   Ht 1.88 m (6' 2\")   Wt 106 kg (234 lb)   SpO2 96%   BMI 30.04 kg/m²     Physical Exam   Constitutional: He is oriented to person, place, and time. He appears well-developed and well-nourished.   Weight is stable.   HENT:   Head: Normocephalic.   Eyes: EOM are normal.   Neck: No JVD present.   Cardiovascular: Normal rate, regular rhythm and normal heart sounds.   Pulmonary/Chest: Effort normal and breath sounds normal. No respiratory distress. He " has no wheezes. He has no rales.   AICD in left chest wall.  Sternotomy scar present.   Abdominal: Soft. Bowel sounds are normal. He exhibits no distension. There is no abdominal tenderness.   Musculoskeletal:         General: No edema. Normal range of motion.      Cervical back: Normal range of motion and neck supple.   Neurological: He is alert and oriented to person, place, and time.   Skin: Skin is warm and dry. No rash noted.   Psychiatric: He has a normal mood and affect.     CRT-D interrogation reveals normal function. Previous episodes of VT in March and April 2021 treated with ATP, none since. 2 brief AFib episodes (0.3 minutes total). No changes are made today.    CONCLUSIONS OF ECHOCARDIOGRAM OF 10/16/2019:  Limited Study for LVEF.   TDS - Limited acoustical window, contrast not available.  Prior Echo - 2/12/18, prior EF probably 40%, currently    45%.  Mildly reduced left ventricular systolic function.  Left ventricular ejection fraction is visually estimated to be 45%.  Global hypokinesis.  Abnormal septal motion consistent with postoperative status.    CONCLUSIONS OF ECHOCARDIOGRAM OF 2/12/2018:  Compared to the images of the prior study done 6/19/17, there has been no significant change.   Contrast was used to enhance visualization of the endocardial border.  Moderately reduced left ventricular systolic function.  Left ventricular ejection fraction is visually estimated to be 35%.  Septal and apical hypokinesis.  Grade I diastolic dysfunction.  Normal right ventricular systolic function.  Mild mitral regurgitation.    CORONARY ANGIOGRAM OF 7/22/2016:  1.  Occluded vein grafts.  2.  Patent LIMA to LAD.  3.  Diffuse distal RCA disease.  4.  A 100% occluded LAD stent.  5.  Moderate nonobstructive left circumflex disease.  6.  LVEF 50%.    Component      Latest Ref Rng & Units 5/3/2021 5/3/2021           8:33 AM  8:33 AM   Glycohemoglobin      4.0 - 5.6 % 5.1    Estim. Avg Glu      mg/dL 100    TSH       0.380 - 5.330 uIU/mL  2.550     Lab Results   Component Value Date/Time    CHOLSTRLTOT 124 05/03/2021 08:33 AM    LDL 62 05/03/2021 08:33 AM    HDL 36 (A) 05/03/2021 08:33 AM    TRIGLYCERIDE 131 05/03/2021 08:33 AM       Lab Results   Component Value Date/Time    SODIUM 149 (H) 05/03/2021 08:33 AM    POTASSIUM 4.1 05/03/2021 08:33 AM    CHLORIDE 110 05/03/2021 08:33 AM    CO2 22 05/03/2021 08:33 AM    GLUCOSE 80 05/03/2021 08:33 AM    BUN 14 05/03/2021 08:33 AM    CREATININE 1.00 05/03/2021 08:33 AM     Lab Results   Component Value Date/Time    ALKPHOSPHAT 101 (H) 05/03/2021 08:33 AM    ASTSGOT 28 05/03/2021 08:33 AM    ALTSGPT 31 05/03/2021 08:33 AM    TBILIRUBIN 0.7 05/03/2021 08:33 AM      Lab Results   Component Value Date/Time    WBC 9.4 06/18/2019 09:30 AM    RBC 4.76 06/18/2019 09:30 AM    HEMOGLOBIN 15.0 06/18/2019 09:30 AM    HEMATOCRIT 46.9 06/18/2019 09:30 AM    MCV 98.5 (H) 06/18/2019 09:30 AM    MCH 31.5 06/18/2019 09:30 AM    MCHC 32.0 (L) 06/18/2019 09:30 AM    MPV 10.6 06/18/2019 09:30 AM        Assessment:     1. Presence of biventricular AICD     2. Coronary artery disease involving native coronary artery of native heart without angina pectoris  EC-ECHOCARDIOGRAM COMPLETE W/O CONT   3. Ischemic cardiomyopathy  EC-ECHOCARDIOGRAM COMPLETE W/O CONT   4. S/P CABG x 3  DX-CHEST-2 VIEWS   5. ACC/AHA stage C systolic heart failure (HCC)     6. Ventricular tachycardia (HCC)     7. Paroxysmal atrial fibrillation (HCC)     8. Chronic anticoagulation     9. Essential hypertension     10. Mixed hyperlipidemia     11. Type 2 diabetes mellitus with complication, with long-term current use of insulin (HCC)     12. Obstructive sleep apnea syndrome         Medical Decision Making:  Today's Assessment / Status / Plan:     1. CAD/ischemic cardiomyopathy with CRT-D, status post remote stents in 2005/2006 and CABG x 3 in 2013 (with only LIMA-LAD patent on angiogram in 2016).  He remains on Entresto, BB, statin and  Aldactone. No overt episodes of HF. To obtain echocardiogram. Will also get CXR to assess sternum, given clicking and pain.    2. Two episodes of VT, treated with ATP, on Sotalol 80mg BID and Toprol XL 75mg BID. No further episodes.    3. Paroxysmal atrial fibrillation, with 2 brief episodes (0.3 minutes total time).    4. Chronic anticoagulation with Eliquis, no bleeding problems.    5. Hypertension, treated, with BP on the lower side, asymptomatic.    6. Hyperlipidemia, treated with statin.    7. Diabetes mellitus, treated with Ozempic and Jardiance/Metformin, excellent control. HgbA1c was 5.1.    8. CLARE, treated, stable.    To assess echocardiogram; if decreased LVEF or wall motion changes, consider referral to EP or for coronary angiogram. Continue same doses of medications for now. FU to be determined based on above results.

## 2021-05-10 ENCOUNTER — OFFICE VISIT (OUTPATIENT)
Dept: MEDICAL GROUP | Facility: CLINIC | Age: 56
End: 2021-05-10
Payer: MEDICARE

## 2021-05-10 VITALS
RESPIRATION RATE: 16 BRPM | OXYGEN SATURATION: 96 % | DIASTOLIC BLOOD PRESSURE: 58 MMHG | SYSTOLIC BLOOD PRESSURE: 90 MMHG | TEMPERATURE: 97.6 F | HEIGHT: 74 IN | HEART RATE: 92 BPM | BODY MASS INDEX: 30.21 KG/M2 | WEIGHT: 235.4 LBS

## 2021-05-10 DIAGNOSIS — Z12.11 COLON CANCER SCREENING: ICD-10-CM

## 2021-05-10 DIAGNOSIS — I25.5 ISCHEMIC CARDIOMYOPATHY: ICD-10-CM

## 2021-05-10 DIAGNOSIS — E78.2 MIXED HYPERLIPIDEMIA: ICD-10-CM

## 2021-05-10 DIAGNOSIS — N52.9 ERECTILE DYSFUNCTION, UNSPECIFIED ERECTILE DYSFUNCTION TYPE: ICD-10-CM

## 2021-05-10 DIAGNOSIS — N52.2 DRUG-INDUCED ERECTILE DYSFUNCTION: ICD-10-CM

## 2021-05-10 DIAGNOSIS — Z95.1 S/P CABG X 3: ICD-10-CM

## 2021-05-10 DIAGNOSIS — R74.8 ELEVATED ALKALINE PHOSPHATASE LEVEL: ICD-10-CM

## 2021-05-10 DIAGNOSIS — I10 ESSENTIAL HYPERTENSION: ICD-10-CM

## 2021-05-10 DIAGNOSIS — Z79.4 TYPE 2 DIABETES MELLITUS WITH COMPLICATION, WITH LONG-TERM CURRENT USE OF INSULIN (HCC): ICD-10-CM

## 2021-05-10 DIAGNOSIS — E11.8 TYPE 2 DIABETES MELLITUS WITH COMPLICATION, WITH LONG-TERM CURRENT USE OF INSULIN (HCC): ICD-10-CM

## 2021-05-10 PROCEDURE — 99214 OFFICE O/P EST MOD 30 MIN: CPT | Performed by: PHYSICIAN ASSISTANT

## 2021-05-10 RX ORDER — SILDENAFIL 50 MG/1
50 TABLET, FILM COATED ORAL PRN
Qty: 10 TABLET | Refills: 10 | Status: SHIPPED | OUTPATIENT
Start: 2021-05-10 | End: 2022-04-13 | Stop reason: SDUPTHER

## 2021-05-10 RX ORDER — AMITRIPTYLINE HYDROCHLORIDE 100 MG/1
100 TABLET ORAL NIGHTLY PRN
COMMUNITY
End: 2022-04-13

## 2021-05-10 RX ORDER — SPIRONOLACTONE 25 MG/5ML
SUSPENSION ORAL
COMMUNITY
End: 2021-09-01

## 2021-05-10 RX ORDER — SEMAGLUTIDE 1.34 MG/ML
1 INJECTION, SOLUTION SUBCUTANEOUS
Qty: 6 ML | Refills: 2 | Status: SHIPPED | OUTPATIENT
Start: 2021-05-10 | End: 2021-07-12

## 2021-05-10 ASSESSMENT — FIBROSIS 4 INDEX: FIB4 SCORE: 1.34

## 2021-05-10 NOTE — PROGRESS NOTES
cc:  diabetes    Subjective:     Juaquin Solorzano is a 55 y.o. male presenting for diabetes      Patient presents to the office for diabetes.  He did have lab work and is here to discuss it.  Patient states that he was sent country ham from his sister.  He states that there is quite a bit of sodium and feels that this is why his sodium is high. His alkaline phosphatase is elevated.     He is going to [place] in the next coming week and will follow up with his chest as he is having flank pain and cracking.  He states that he was not completely closed with his cardiac surgery.       Patient is requesting a refill of viagra    Review of systems:  See above.   Denies any symptoms unless previously indicated.        Current Outpatient Medications:   •  sildenafil citrate (VIAGRA) 50 MG tablet, Take 1 tablet by mouth as needed for Erectile Dysfunction., Disp: 10 tablet, Rfl: 10  •  Semaglutide, 1 MG/DOSE, (OZEMPIC, 1 MG/DOSE,) 2 MG/1.5ML Solution Pen-injector, Inject 1 mg under the skin every 7 days., Disp: 6 mL, Rfl: 2  •  DULoxetine (CYMBALTA) 60 MG Cap DR Particles delayed-release capsule, TAKE 1 CAPSULE EVERY MORNING, Disp: 90 capsule, Rfl: 0  •  mirtazapine (REMERON) 15 MG Tab, TAKE 1 TABLET EVERY EVENING AT BEDTIME, Disp: 90 tablet, Rfl: 0  •  allopurinol (ZYLOPRIM) 300 MG Tab, TAKE 1 TABLET DAILY, Disp: 90 tablet, Rfl: 0  •  ELIQUIS 5 MG Tab, TAKE 1 TABLET TWICE A DAY, Disp: 180 tablet, Rfl: 0  •  metoprolol SR (TOPROL XL) 50 MG TABLET SR 24 HR, Take 1.5 Tablets by mouth 2 times a day., Disp: 270 tablet, Rfl: 3  •  sotalol (BETAPACE) 80 MG Tab, TAKE 1 TABLET TWICE A DAY, Disp: 180 tablet, Rfl: 3  •  Empagliflozin-metFORMIN HCl ER 12.5-1000 MG TABLET SR 24 HR, Take 1 tablet by mouth 2 times a day., Disp: 180 tablet, Rfl: 1  •  nitroglycerin (NITROSTAT) 0.4 MG SL Tab, DISSOLVE 1 TABLET UNDER THE TONGUE AS NEEDED FOR CHEST PAIN, Disp: 25 Tab, Rfl: 3  •  zonisamide (ZONEGRAN) 100 MG Cap, Take 3 Caps by  "mouth every day., Disp: 270 Cap, Rfl: 3  •  atorvastatin (LIPITOR) 40 MG Tab, Take 1 Tab by mouth every day., Disp: 90 Tab, Rfl: 0  •  spironolactone (ALDACTONE) 25 MG Tab, TAKE 1 TABLET DAILY, Disp: 90 Tab, Rfl: 3  •  ENTRESTO  MG Tab tablet, TAKE 1 TABLET TWICE A DAY (Patient taking differently: Pt states taking 1/2 twice daily), Disp: 180 Tab, Rfl: 3  •  glucose blood (FREESTYLE LITE) strip, 1 Strip by Other route 4 times a day., Disp: 300 Strip, Rfl: 4  •  FREESTYLE LANCETS Misc, by Does not apply route., Disp: , Rfl:   •  Insulin Pen Needle (SURE COMFORT PEN NEEDLES), by Does not apply route., Disp: , Rfl:   •  amitriptyline (ELAVIL) 100 MG Tab, , Disp: , Rfl:   •  Spironolactone 25 MG/5ML Suspension, , Disp: , Rfl:     Allergies, past medical history, past surgical history, family history, social history reviewed and updated    Objective:     Vitals: BP (!) 90/58   Pulse 92   Temp 36.4 °C (97.6 °F) (Temporal)   Resp 16   Ht 1.88 m (6' 2\")   Wt 107 kg (235 lb 6.4 oz)   SpO2 96%   BMI 30.22 kg/m²   General: Alert, pleasant, NAD  EYES:   PERRL, EOMI, no icterus or pallor.  Conjunctivae and lids normal.   HENT:  Normocephalic.  External ears normal.  Neck supple.     Respiratory: Normal respiratory effort.    Abdomen: Not obese  Skin: Warm, dry, no rashes.  Musculoskeletal: Gait is normal.  Moves all extremities well.    Extremities: Negative monofilament test with multiple point discrimination.  2+ DP pulses palpated bilaterally..   Neurological: No tremors, sensation grossly intact,  gait is normal, CN2-12 intact.  Psych:  Affect/mood is normal, judgement is good, memory is intact, grooming is appropriate.    Assessment/Plan:     Juaquin was seen today for lab results.    Diagnoses and all orders for this visit:    Type 2 diabetes mellitus with complication, with long-term current use of insulin (HCC)  -     Semaglutide, 1 MG/DOSE, (OZEMPIC, 1 MG/DOSE,) 2 MG/1.5ML Solution Pen-injector; Inject 1 mg " under the skin every 7 days.  -     Lipid Profile; Future  -     Comp Metabolic Panel; Future  -     HEMOGLOBIN A1C; Future  -     MICROALBUMIN CREAT RATIO URINE; Future  S/P CABG x 3  -     Lipid Profile; Future  -     Comp Metabolic Panel; Future  Essential hypertension  -     Lipid Profile; Future  -     Comp Metabolic Panel; Future  Elevated alkaline phosphatase level  -     Comp Metabolic Panel; Future  Ischemic cardiomyopathy  -     Lipid Profile; Future  -     Comp Metabolic Panel; Future  -     HEMOGLOBIN A1C; Future  Mixed hyperlipidemia  -     Lipid Profile; Future  -     Comp Metabolic Panel; Future    Stable.  Sodium level was elevated with the most recent labs.  It sounds as though this may be diet related.  We will recheck labs in 4 months to see if this improves.  LDL is below 70.  HDL is low.  Patient does need a refill of Ozempic which is been sent to his mail order pharmacy.  He will contact us sooner with any issues.    Colon cancer screening  -     REFERRAL TO GI FOR COLONOSCOPY referral to gastroenterology submitted for colonoscopy.        Drug-induced erectile dysfunction  Erectile dysfunction, unspecified erectile dysfunction type  -     sildenafil citrate (VIAGRA) 50 MG tablet; Take 1 tablet by mouth as needed for Erectile Dysfunction.    Medication refilled at this time.        Return in about 4 months (around 9/10/2021), or if symptoms worsen or fail to improve, for labs in 4-6 months.    Please note that this dictation was created using voice recognition software. I have made every reasonable attempt to correct obvious errors, but expect that there are errors of grammar and possible content that I did not discover before finalizing note.

## 2021-05-12 ENCOUNTER — TELEPHONE (OUTPATIENT)
Dept: MEDICAL GROUP | Facility: CLINIC | Age: 56
End: 2021-05-12

## 2021-05-12 NOTE — TELEPHONE ENCOUNTER
DOCUMENTATION OF PAR STATUS:    1. Name of Medication & Dose: ozempic     2. Name of Prescription Coverage Company & phone #: medicare    3. Date Prior Auth Submitted: 5/12/21    4. What information was given to obtain insurance decision? E11.8    5. Prior Auth Status? Pending    6. Patient Notified: no

## 2021-06-02 DIAGNOSIS — E11.8 TYPE 2 DIABETES MELLITUS WITH COMPLICATION, WITH LONG-TERM CURRENT USE OF INSULIN (HCC): ICD-10-CM

## 2021-06-02 DIAGNOSIS — I50.20 ACC/AHA STAGE C SYSTOLIC HEART FAILURE (HCC): ICD-10-CM

## 2021-06-02 DIAGNOSIS — Z79.4 TYPE 2 DIABETES MELLITUS WITH COMPLICATION, WITH LONG-TERM CURRENT USE OF INSULIN (HCC): ICD-10-CM

## 2021-06-02 RX ORDER — ATORVASTATIN CALCIUM 40 MG/1
40 TABLET, FILM COATED ORAL DAILY
Qty: 30 TABLET | Refills: 0 | Status: SHIPPED | OUTPATIENT
Start: 2021-06-02 | End: 2021-09-01 | Stop reason: SDUPTHER

## 2021-07-08 NOTE — TELEPHONE ENCOUNTER
Can you send it to the [facility] in [place]? Calcipotriene Pregnancy And Lactation Text: This medication has not been proven safe during pregnancy. It is unknown if this medication is excreted in breast milk.

## 2021-07-12 ENCOUNTER — TELEPHONE (OUTPATIENT)
Dept: CARDIOLOGY | Facility: MEDICAL CENTER | Age: 56
End: 2021-07-12

## 2021-07-12 NOTE — TELEPHONE ENCOUNTER
Yes, please have him increase Metoprolol to 100mg BID.  Monitor BP and HR at home.  Call us if any problems (dizziness, lightheadedness or low BP) and keep August 2021 FU with me.  Thanks, AB

## 2021-07-12 NOTE — TELEPHONE ENCOUNTER
Remote Transmission 7/11/2021:    1-SVT/AT w/ATP episode 7/11/2021@12:25am lasting 15 secs.    x6-SVT/AT w/ATP episodes 7/10/2021 between 11:01pm and 11:45pm lasting 13-23 seconds.    Full report scanned into media.

## 2021-07-12 NOTE — TELEPHONE ENCOUNTER
He has a history of NSVT, treated with ATP.    Can you please confirm he is taking Sotalol 80mg BID and Toprol XL 75mg BID?    IF he can, try to increase Toprol XL to 100mg BID (watch BP - he does have device, so not so concerned about low HR).    Keep FU with me in August 2021.  Thanks, AB

## 2021-07-12 NOTE — TELEPHONE ENCOUNTER
"S/w pt and explained transmission results and recommendations to increase metoprolol. He did not report any recent symptoms related to SVT/AT.     Reports BP usually right around 98/70, max 110/70 (these readings are normal for him). Mentioned that metoprolol was decreased last year after he \"passed out\".     Confirms that he is taking Sotalol 80mg BID and Toprol XL 75mg BID.    No fatigue, lightheadedness, or dizziness at this time. Reports drinking up to a few gallons of water a day, spends a lot time outside.     He feels comfortable proceeding with increase metoprolol and knows how to monitor his readings closely and can keep us updated on increased dose.    FYI to AB to confirm instructions      "

## 2021-07-27 DIAGNOSIS — Z79.4 TYPE 2 DIABETES MELLITUS WITH COMPLICATION, WITH LONG-TERM CURRENT USE OF INSULIN (HCC): ICD-10-CM

## 2021-07-27 DIAGNOSIS — E11.8 TYPE 2 DIABETES MELLITUS WITH COMPLICATION, WITH LONG-TERM CURRENT USE OF INSULIN (HCC): ICD-10-CM

## 2021-07-27 NOTE — TELEPHONE ENCOUNTER
Was the patient seen in the last year in this department? Yes    Does patient have an active prescription for medications requested? Yes    Received Request Via: Pharmacy    Hospital Outpatient Visit on 05/03/2021   Component Date Value   • Uric Acid 05/03/2021 3.7    • Sodium 05/03/2021 149*   • Potassium 05/03/2021 4.1    • Chloride 05/03/2021 110    • Co2 05/03/2021 22    • Anion Gap 05/03/2021 17.0*   • Glucose 05/03/2021 80    • Bun 05/03/2021 14    • Creatinine 05/03/2021 1.00    • Calcium 05/03/2021 9.3    • AST(SGOT) 05/03/2021 28    • ALT(SGPT) 05/03/2021 31    • Alkaline Phosphatase 05/03/2021 101*   • Total Bilirubin 05/03/2021 0.7    • Albumin 05/03/2021 4.3    • Total Protein 05/03/2021 6.9    • Globulin 05/03/2021 2.6    • A-G Ratio 05/03/2021 1.7    • Cholesterol,Tot 05/03/2021 124    • Triglycerides 05/03/2021 131    • HDL 05/03/2021 36*   • LDL 05/03/2021 62    • Prostatic Specific Antig* 05/03/2021 0.15    • TSH 05/03/2021 2.550    • Free T-4 05/03/2021 1.01    • Glycohemoglobin 05/03/2021 5.1    • Est Avg Glucose 05/03/2021 100    • GFR If  05/03/2021 >60    • GFR If Non  Ameri* 05/03/2021 >60    Hospital Outpatient Visit on 10/20/2020   Component Date Value   • Zonisamide -Zonegran 10/20/2020 17    ]

## 2021-08-04 ENCOUNTER — TELEPHONE (OUTPATIENT)
Dept: MEDICAL GROUP | Facility: CLINIC | Age: 56
End: 2021-08-04

## 2021-08-04 DIAGNOSIS — Z76.0 ENCOUNTER FOR MEDICATION REFILL: ICD-10-CM

## 2021-08-04 RX ORDER — DULOXETIN HYDROCHLORIDE 60 MG/1
CAPSULE, DELAYED RELEASE ORAL
Qty: 90 CAPSULE | Refills: 1 | Status: SHIPPED | OUTPATIENT
Start: 2021-08-04 | End: 2022-01-31

## 2021-08-04 NOTE — TELEPHONE ENCOUNTER
Was the patient seen in the last year in this department? 5/10/21     Does patient have an active prescription for medications requested? Yes    Received Request Via: Pharmacy    Hospital Outpatient Visit on 05/03/2021   Component Date Value   • Uric Acid 05/03/2021 3.7    • Sodium 05/03/2021 149*   • Potassium 05/03/2021 4.1    • Chloride 05/03/2021 110    • Co2 05/03/2021 22    • Anion Gap 05/03/2021 17.0*   • Glucose 05/03/2021 80    • Bun 05/03/2021 14    • Creatinine 05/03/2021 1.00    • Calcium 05/03/2021 9.3    • AST(SGOT) 05/03/2021 28    • ALT(SGPT) 05/03/2021 31    • Alkaline Phosphatase 05/03/2021 101*   • Total Bilirubin 05/03/2021 0.7    • Albumin 05/03/2021 4.3    • Total Protein 05/03/2021 6.9    • Globulin 05/03/2021 2.6    • A-G Ratio 05/03/2021 1.7    • Cholesterol,Tot 05/03/2021 124    • Triglycerides 05/03/2021 131    • HDL 05/03/2021 36*   • LDL 05/03/2021 62    • Prostatic Specific Antig* 05/03/2021 0.15    • TSH 05/03/2021 2.550    • Free T-4 05/03/2021 1.01    • Glycohemoglobin 05/03/2021 5.1    • Est Avg Glucose 05/03/2021 100    • GFR If  05/03/2021 >60    • GFR If Non  Ameri* 05/03/2021 >60    Hospital Outpatient Visit on 10/20/2020   Component Date Value   • Zonisamide -Zonegran 10/20/2020 17

## 2021-08-04 NOTE — TELEPHONE ENCOUNTER
Spoke to patient and advised him of the tylenol. Patient stated that he has been taking Tylenol PM and has had no relief from the pain. Please advise.

## 2021-08-04 NOTE — TELEPHONE ENCOUNTER
Informed Pt. Verbal understanding   There is no other non narcotic pain medication.  Anti-inflammatories can make covid worse and they are not recommended.

## 2021-08-04 NOTE — TELEPHONE ENCOUNTER
Called patient and he stated he went to the emergency room last night and was diagnosed COVID and is still in pain. Patient would like to know if there is anything that is non-narcotic that he could take for the pain. Please advise.

## 2021-08-05 NOTE — TELEPHONE ENCOUNTER
Called patient and advised the patient there is no other non narcotic pain medication and anti-inflammatories are not recommened. Patient states he is still in pain and does understand doctors recommendations to take just tylenol. Please advise

## 2021-08-11 ENCOUNTER — APPOINTMENT (OUTPATIENT)
Dept: CARDIOLOGY | Facility: PHYSICIAN GROUP | Age: 56
End: 2021-08-11
Payer: MEDICARE

## 2021-09-01 ENCOUNTER — OFFICE VISIT (OUTPATIENT)
Dept: CARDIOLOGY | Facility: PHYSICIAN GROUP | Age: 56
End: 2021-09-01
Payer: MEDICARE

## 2021-09-01 ENCOUNTER — NON-PROVIDER VISIT (OUTPATIENT)
Dept: CARDIOLOGY | Facility: PHYSICIAN GROUP | Age: 56
End: 2021-09-01
Payer: MEDICARE

## 2021-09-01 ENCOUNTER — HOSPITAL ENCOUNTER (OUTPATIENT)
Dept: LAB | Facility: MEDICAL CENTER | Age: 56
End: 2021-09-01
Attending: NURSE PRACTITIONER
Payer: MEDICARE

## 2021-09-01 VITALS
SYSTOLIC BLOOD PRESSURE: 92 MMHG | HEART RATE: 80 BPM | BODY MASS INDEX: 29 KG/M2 | HEIGHT: 74 IN | OXYGEN SATURATION: 95 % | WEIGHT: 226 LBS | DIASTOLIC BLOOD PRESSURE: 70 MMHG

## 2021-09-01 VITALS
HEART RATE: 80 BPM | DIASTOLIC BLOOD PRESSURE: 70 MMHG | SYSTOLIC BLOOD PRESSURE: 92 MMHG | OXYGEN SATURATION: 95 % | BODY MASS INDEX: 29 KG/M2 | HEIGHT: 74 IN | WEIGHT: 226 LBS

## 2021-09-01 DIAGNOSIS — I25.10 CORONARY ARTERY DISEASE INVOLVING NATIVE CORONARY ARTERY OF NATIVE HEART WITHOUT ANGINA PECTORIS: ICD-10-CM

## 2021-09-01 DIAGNOSIS — I10 ESSENTIAL HYPERTENSION: ICD-10-CM

## 2021-09-01 DIAGNOSIS — E08.42 DIABETIC POLYNEUROPATHY ASSOCIATED WITH DIABETES MELLITUS DUE TO UNDERLYING CONDITION (HCC): ICD-10-CM

## 2021-09-01 DIAGNOSIS — Z79.01 CHRONIC ANTICOAGULATION: ICD-10-CM

## 2021-09-01 DIAGNOSIS — Z76.0 ENCOUNTER FOR MEDICATION REFILL: ICD-10-CM

## 2021-09-01 DIAGNOSIS — I50.20 ACC/AHA STAGE C SYSTOLIC HEART FAILURE (HCC): ICD-10-CM

## 2021-09-01 DIAGNOSIS — E11.8 TYPE 2 DIABETES MELLITUS WITH COMPLICATION, WITH LONG-TERM CURRENT USE OF INSULIN (HCC): ICD-10-CM

## 2021-09-01 DIAGNOSIS — Z95.810 PRESENCE OF BIVENTRICULAR AICD: ICD-10-CM

## 2021-09-01 DIAGNOSIS — Z79.4 TYPE 2 DIABETES MELLITUS WITH COMPLICATION, WITH LONG-TERM CURRENT USE OF INSULIN (HCC): ICD-10-CM

## 2021-09-01 DIAGNOSIS — I48.0 PAROXYSMAL ATRIAL FIBRILLATION (HCC): ICD-10-CM

## 2021-09-01 DIAGNOSIS — I25.5 ISCHEMIC CARDIOMYOPATHY: ICD-10-CM

## 2021-09-01 DIAGNOSIS — I47.20 VENTRICULAR TACHYCARDIA (HCC): ICD-10-CM

## 2021-09-01 DIAGNOSIS — E78.2 MIXED HYPERLIPIDEMIA: ICD-10-CM

## 2021-09-01 DIAGNOSIS — G47.33 OBSTRUCTIVE SLEEP APNEA SYNDROME: ICD-10-CM

## 2021-09-01 PROBLEM — F12.980: Status: ACTIVE | Noted: 2017-12-14

## 2021-09-01 PROBLEM — F41.9 ANXIETY DISORDER, UNSPECIFIED: Status: ACTIVE | Noted: 2017-12-14

## 2021-09-01 LAB
ANION GAP SERPL CALC-SCNC: 11 MMOL/L (ref 7–16)
BASOPHILS # BLD AUTO: 0.9 % (ref 0–1.8)
BASOPHILS # BLD: 0.09 K/UL (ref 0–0.12)
BUN SERPL-MCNC: 11 MG/DL (ref 8–22)
CALCIUM SERPL-MCNC: 9.7 MG/DL (ref 8.5–10.5)
CHLORIDE SERPL-SCNC: 103 MMOL/L (ref 96–112)
CO2 SERPL-SCNC: 25 MMOL/L (ref 20–33)
CREAT SERPL-MCNC: 0.88 MG/DL (ref 0.5–1.4)
EOSINOPHIL # BLD AUTO: 0.38 K/UL (ref 0–0.51)
EOSINOPHIL NFR BLD: 3.7 % (ref 0–6.9)
ERYTHROCYTE [DISTWIDTH] IN BLOOD BY AUTOMATED COUNT: 45.8 FL (ref 35.9–50)
GLUCOSE SERPL-MCNC: 125 MG/DL (ref 65–99)
HCT VFR BLD AUTO: 52 % (ref 42–52)
HGB BLD-MCNC: 17.9 G/DL (ref 14–18)
IMM GRANULOCYTES # BLD AUTO: 0.1 K/UL (ref 0–0.11)
IMM GRANULOCYTES NFR BLD AUTO: 1 % (ref 0–0.9)
LYMPHOCYTES # BLD AUTO: 3.34 K/UL (ref 1–4.8)
LYMPHOCYTES NFR BLD: 32.7 % (ref 22–41)
MCH RBC QN AUTO: 33 PG (ref 27–33)
MCHC RBC AUTO-ENTMCNC: 34.4 G/DL (ref 33.7–35.3)
MCV RBC AUTO: 95.8 FL (ref 81.4–97.8)
MONOCYTES # BLD AUTO: 0.93 K/UL (ref 0–0.85)
MONOCYTES NFR BLD AUTO: 9.1 % (ref 0–13.4)
NEUTROPHILS # BLD AUTO: 5.38 K/UL (ref 1.82–7.42)
NEUTROPHILS NFR BLD: 52.6 % (ref 44–72)
NRBC # BLD AUTO: 0 K/UL
NRBC BLD-RTO: 0 /100 WBC
PLATELET # BLD AUTO: 186 K/UL (ref 164–446)
PMV BLD AUTO: 11.1 FL (ref 9–12.9)
POTASSIUM SERPL-SCNC: 4.5 MMOL/L (ref 3.6–5.5)
RBC # BLD AUTO: 5.43 M/UL (ref 4.7–6.1)
SODIUM SERPL-SCNC: 139 MMOL/L (ref 135–145)
WBC # BLD AUTO: 10.2 K/UL (ref 4.8–10.8)

## 2021-09-01 PROCEDURE — 99214 OFFICE O/P EST MOD 30 MIN: CPT | Mod: 25 | Performed by: NURSE PRACTITIONER

## 2021-09-01 PROCEDURE — 36415 COLL VENOUS BLD VENIPUNCTURE: CPT

## 2021-09-01 PROCEDURE — 80048 BASIC METABOLIC PNL TOTAL CA: CPT

## 2021-09-01 PROCEDURE — 93284 PRGRMG EVAL IMPLANTABLE DFB: CPT | Performed by: NURSE PRACTITIONER

## 2021-09-01 PROCEDURE — 85025 COMPLETE CBC W/AUTO DIFF WBC: CPT

## 2021-09-01 RX ORDER — BENZONATATE 100 MG/1
100 CAPSULE ORAL
COMMUNITY
Start: 2021-08-13 | End: 2022-04-13

## 2021-09-01 RX ORDER — DEXAMETHASONE 4 MG/1
4 TABLET ORAL
COMMUNITY
Start: 2021-08-13 | End: 2021-09-01

## 2021-09-01 RX ORDER — ATORVASTATIN CALCIUM 40 MG/1
40 TABLET, FILM COATED ORAL DAILY
Qty: 90 TABLET | Refills: 3 | Status: SHIPPED | OUTPATIENT
Start: 2021-09-01 | End: 2022-04-13 | Stop reason: SDUPTHER

## 2021-09-01 RX ORDER — SPIRONOLACTONE 25 MG/1
25 TABLET ORAL DAILY
Qty: 90 TABLET | Refills: 3 | Status: SHIPPED | OUTPATIENT
Start: 2021-09-01 | End: 2022-04-13 | Stop reason: SDUPTHER

## 2021-09-01 ASSESSMENT — ENCOUNTER SYMPTOMS
COUGH: 0
ABDOMINAL PAIN: 0
PND: 0
DIZZINESS: 0
MYALGIAS: 0
NAUSEA: 0
BRUISES/BLEEDS EASILY: 0
CHILLS: 0
INSOMNIA: 0
FEVER: 0
HEADACHES: 0
LOSS OF CONSCIOUSNESS: 0
ORTHOPNEA: 0
SHORTNESS OF BREATH: 0
PALPITATIONS: 0

## 2021-09-01 NOTE — PROGRESS NOTES
Chief Complaint   Patient presents with   • Follow-Up   • Biventricular AICD   • Coronary Artery Disease   • Cardiomyopathy (Ischemic)   • CHF (Compensated)   • Ventricular Tachycardia   • Atrial Fibrillation   • Anticoagulation   • HTN (Controlled)   • Hyperlipidemia       Subjective     Juaquin Solorzano is a 56 y.o. male who presents today     Juaquin is a 55 year old male with history of CAD/ischemic cardiomyopathy with LVEF 45%, with history of stents in 2005 and 2006, followed by CABG x 3 in 2013 (anatomical details unknown); coronary angiogram in July 2016 showed patent LIMA to LAD, but occluded SVG-RCA, nonobstructive disease of LCx, and diffuse distal disease of the distal RCA, no other SVG was identified.      He had CRT-D implanted in April 2018, and also has hypertension, hyperlipidemia, sleep apnea and diabetes mellitus, normally followed by Dr. Sweeney, and last seen by me in February 2021.     On 8/3/2021, he presented to [facility] with cough, fever and congestion, and he tested positive for Covid-19. (He was vaccinated in April 2021 with [employer]) He was admitted for acute respiratory failure with hypoxia, and was treated with Remdesivir, steroids and oxygen. He did gradually improve, and was discharged home in stable condition.     He is here today for hospital follow-up. He is still quite tired, and a little short of breath, but is making some improvements each day. He does not have a pulse oximeter at home. BP continues to be quite low, due to Entresto, but he continues to take 1/2 tablet twice daily. He denies any dizziness, lightheadedness or syncope/presyncope. Glucose remains well controlled on current medications. No chest pain, pressure or discomfort; no palpitations; breathing is stable with no orthopnea or PND; no LE edema.     Past Medical History:   Diagnosis Date   • Arthritis     Hands/elbows   • Block, bundle branch, left    • CAD (coronary artery disease)    • Cataract 04/26/2018     early stages, not removed   • Diabetes (HCC) 2006    On oral medications, now well controlled   • Fibromyalgia 2018    5-6/10   • GERD (gastroesophageal reflux disease)    • Gout    • Hyperlipidemia    • Hypertension    • Ischemic cardiomyopathy 10/2019    Echocardiogram with normal LV size, LVEF 45% (Previously 35-40% in 2018)   • Myocardial infarct (HCC)    • Myocardial infarct (HCC)    • Neuropathy (HCC)     Bilateral feet   • Psychiatric problem     Depression and anxiety   • S/P colonoscopy     repeat 6 years at age of 61.   • Sleep apnea     Uses CPAP     Past Surgical History:   Procedure Laterality Date   • AICD IMPLANT Left 2018    [employer] Inogen X4 CRT-D G148 implanted by Dr. Cullen.   • RECOVERY  2016    Procedure: CATH LAB-[facility] W/POSSIBLE RADIAL APPROACH-CARIE;  Surgeon: Mireya Surgery;  Location: SURGERY PRE-POST PROC UNIT [facility];  Service:    • MULTIPLE CORONARY ARTERY BYPASS  2014    CABG x 3   • LEONILA BY LAPAROSCOPY     • KNEE ARTHROSCOPY      Arthroscopy, Knee     Family History   Problem Relation Age of Onset   • Heart Disease Mother    • Diabetes Mother    • Hyperlipidemia Father    • Heart Disease Father    • Hypertension Father    • Stroke Father    • Lung Disease Sister      Social History     Socioeconomic History   • Marital status:      Spouse name: Not on file   • Number of children: Not on file   • Years of education: Not on file   • Highest education level: Not on file   Occupational History   • Not on file   Tobacco Use   • Smoking status: Former Smoker     Packs/day: 0.50     Years: 12.00     Pack years: 6.00     Types: Cigarettes     Quit date: 7/15/2012     Years since quittin.1   • Smokeless tobacco: Never Used   Vaping Use   • Vaping Use: Every day   • Substances: THC   • Devices: Disposable   Substance and Sexual Activity   • Alcohol use: Yes     Alcohol/week: 0.6 oz     Types: 1 Standard drinks or equivalent per week    • Drug use: Yes     Types: Marijuana, Oral, Inhaled     Comment: nightly    • Sexual activity: Yes     Partners: Female   Other Topics Concern   • Not on file   Social History Narrative   • Not on file     Social Determinants of Health     Financial Resource Strain:    • Difficulty of Paying Living Expenses:    Food Insecurity:    • Worried About Running Out of Food in the Last Year:    • Ran Out of Food in the Last Year:    Transportation Needs:    • Lack of Transportation (Medical):    • Lack of Transportation (Non-Medical):    Physical Activity:    • Days of Exercise per Week:    • Minutes of Exercise per Session:    Stress:    • Feeling of Stress :    Social Connections:    • Frequency of Communication with Friends and Family:    • Frequency of Social Gatherings with Friends and Family:    • Attends [employer] Services:    • Active Member of Clubs or Organizations:    • Attends Club or Organization Meetings:    • Marital Status:    Intimate Partner Violence:    • Fear of Current or Ex-Partner:    • Emotionally Abused:    • Physically Abused:    • Sexually Abused:      Allergies   Allergen Reactions   • Iodine Anaphylaxis     Other reaction(s): Unknown     Outpatient Encounter Medications as of 9/1/2021   Medication Sig Dispense Refill   • benzonatate (TESSALON) 100 MG Cap Take 100 mg by mouth.     • DULoxetine (CYMBALTA) 60 MG Cap DR Particles delayed-release capsule TAKE 1 CAPSULE EVERY MORNING 90 capsule 1   • Empagliflozin-metFORMIN HCl ER 12.5-1000 MG TABLET SR 24 HR Take 1 tablet by mouth 2 times a day. 180 tablet 0   • allopurinol (ZYLOPRIM) 300 MG Tab TAKE 1 TABLET DAILY (NEED A FOLLOW UP FOR FURTHER REFILLS) 90 tablet 1   • mirtazapine (REMERON) 15 MG Tab TAKE 1 TABLET EVERY EVENING AT BEDTIME (NEED A FOLLOW UP FOR FURTHER REFILLS) 90 tablet 1   • [DISCONTINUED] ELIQUIS 5 MG Tab TAKE 1 TABLET TWICE A DAY (NEEDS A FOLLOW UP FOR FURTHER REFILLS) 180 tablet 1   • OZEMPIC, 1 MG/DOSE, 2 MG/1.5ML Solution  Pen-injector INJECT 1 MG UNDER THE SKIN EVERY 7 DAYS (NEED TO BE SEEN FOR FURTHER REFILLS) 6 mL 5   • metoprolol SR (TOPROL XL) 100 MG TABLET SR 24 HR TAKE ONE-HALF (1/2) TABLET TWICE A DAY 60 tablet 5   • [DISCONTINUED] atorvastatin (LIPITOR) 40 MG Tab Take 1 tablet by mouth every day. 30 tablet 0   • amitriptyline (ELAVIL) 100 MG Tab Take 100 mg by mouth at bedtime as needed.     • sildenafil citrate (VIAGRA) 50 MG tablet Take 1 tablet by mouth as needed for Erectile Dysfunction. 10 tablet 10   • sotalol (BETAPACE) 80 MG Tab TAKE 1 TABLET TWICE A  tablet 3   • nitroglycerin (NITROSTAT) 0.4 MG SL Tab DISSOLVE 1 TABLET UNDER THE TONGUE AS NEEDED FOR CHEST PAIN 25 Tab 3   • zonisamide (ZONEGRAN) 100 MG Cap Take 3 Caps by mouth every day. 270 Cap 3   • ENTRESTO  MG Tab tablet TAKE 1 TABLET TWICE A DAY (Patient taking differently: Pt states taking 1/2 twice daily) 180 Tab 3   • [DISCONTINUED] spironolactone (ALDACTONE) 25 MG Tab TAKE 1 TABLET DAILY 90 Tab 3   • glucose blood (FREESTYLE LITE) strip 1 Strip by Other route 4 times a day. 300 Strip 4   • FREESTYLE LANCETS Misc by Does not apply route.     • Insulin Pen Needle (SURE COMFORT PEN NEEDLES) by Does not apply route.     • [DISCONTINUED] dexamethasone (DECADRON) 4 MG Tab Take 4 mg by mouth. (Patient not taking: Reported on 9/1/2021)     • [DISCONTINUED] Spironolactone 25 MG/5ML Suspension  (Patient not taking: Reported on 9/1/2021)     • [DISCONTINUED] metoprolol SR (TOPROL XL) 50 MG TABLET SR 24 HR Take 1.5 Tablets by mouth 2 times a day. (Patient not taking: Reported on 9/1/2021) 270 tablet 3     No facility-administered encounter medications on file as of 9/1/2021.     Review of Systems   Constitutional: Negative for chills and fever.   HENT: Negative for congestion.    Respiratory: Negative for cough and shortness of breath.    Cardiovascular: Negative for chest pain, palpitations, orthopnea, leg swelling and PND.        Clicking in sternotomy  "incision still present.   Gastrointestinal: Negative for abdominal pain and nausea.   Musculoskeletal: Positive for joint pain. Negative for myalgias.   Skin: Negative for rash.   Neurological: Negative for dizziness, loss of consciousness and headaches.   Endo/Heme/Allergies: Does not bruise/bleed easily.   Psychiatric/Behavioral: The patient does not have insomnia.               Objective     BP (!) 92/70 (BP Location: Left arm, Patient Position: Sitting, BP Cuff Size: Adult)   Pulse 80   Ht 1.88 m (6' 2\")   Wt 103 kg (226 lb)   SpO2 95%   BMI 29.02 kg/m²     Physical Exam   Constitutional: He is oriented to person, place, and time. He appears well-developed.   Weight is stable.   HENT:   Head: Normocephalic.   Neck: No JVD present.   Cardiovascular: Normal rate, regular rhythm and normal heart sounds.   AICD in left chest wall.  Sternotomy scar present.   Pulmonary/Chest: Effort normal and breath sounds normal. No respiratory distress. He has no wheezes. He has no rales.   Abdominal: Soft. Bowel sounds are normal. He exhibits no distension. There is no abdominal tenderness.   Musculoskeletal:         General: Normal range of motion.      Cervical back: Normal range of motion and neck supple.   Neurological: He is alert and oriented to person, place, and time.   Skin: Skin is warm and dry. No rash noted.     AICD interrogation today reveals normal function. 6 episodes of ATP on 7/10/2021, successful. No device therapy. Mode switching 7+ minutes.    FINDINGS OF CXR OF 8/8/2021:  Left-sided multilead AICD.  Prior sternotomy.  Heart size normal.  No    acute infiltrates or edema.  Decreased lung expansion with subtle atelectasis at    the lung bases..  Pleural spaces clear.  No aggressive bony abnormality.  Metal    plate and screw device noted projected on the upper thoracic spine.      CONCLUSIONS OF ECHOCARDIOGRAM OF 10/16/2019:  Limited Study for LVEF.   TDS - Limited acoustical window, contrast not " available.  Prior Echo - 2/12/18, prior EF probably 40%, currently    45%.  Mildly reduced left ventricular systolic function.  Left ventricular ejection fraction is visually estimated to be 45%.  Global hypokinesis.  Abnormal septal motion consistent with postoperative status.     WBC Count 3.7 - 10.6 x10^3/ul 11.6 High         RBC Count 4.50 - 5.70 x10^6/ul 4.55        HGB 13.0 - 16.7 g/dl 14.9        Hct 38.8 - 49.7 % 42.5        MCV 83.0 - 99.0 fl 93.4        MCH 28.0 - 33.8 pg 32.7        MCHC 33.1 - 36.5 g/dL 35.0        RDW 11.8 - 14.0 % 12.7        Platelet Count 146 - 390 x10^3/uL 284        MPV 6.4 - 10.2 fl 8.1      Sodium 136 - 144 mmol/L 137        POTASSIUM 3.6 - 5.1 mmol/L 3.7        Chloride 101 - 111 mmol/L 106        CARBON DIOXIDE 22 - 32 mmol/L 22        Anion Gap 2 - 11 mmol/L 9        Calcium, S/P 8.7 - 10.3 mg/dl 8.4 Low         Glucose, Fasting 60 - 99 mg/dl 105 High         BUN 8 - 20 mg/dl 20        Creatinine 0.80 - 1.40 mg/dl 0.75 Low         Protein, Total 6.4 - 8.2 g/dl 5.4 Low         Albumin 3.5 - 4.8 g/dl 2.5 Low         ALKALINE PHOSPHATASE 38 - 126 IU/L 52        ALT 17 - 63 IU/L 53        AST 15 - 41 IU/L 29        Bilirubin, Total 0.4 - 2.0 mg/dl 0.5        BILIRUBIN, DIRECT 0.0 - 0.3 mg/dl 0.1        Indirect Bilirubin 0.0 - 1.5 mg/dl 0.4        Globulin 2.6 - 3.1 g/dl 2.9        Albumin/Globulin Ratio 1.00 - 2.20 Ratio 0.90 Low         EGFRE Calc >60 ml/min/1.7 108          Assessment & Plan     1. Presence of biventricular AICD     2. Coronary artery disease involving native coronary artery of native heart without angina pectoris     3. Ischemic cardiomyopathy     4. Ventricular tachycardia (HCC)     5. Paroxysmal atrial fibrillation (HCC)     6. Chronic anticoagulation     7. ACC/AHA stage C systolic heart failure (HCC)     8. Essential hypertension     9. Mixed hyperlipidemia     10. Type 2 diabetes mellitus with complication, with long-term current use of insulin (HCC)      11. Obstructive sleep apnea syndrome         Medical Decision Making: Today's Assessment/Status/Plan:      1. CAD/ischemic cardiomyopathy with LVEF 45% with CRT-D. Repeat echocardiogram was ordered in May 2021, we will get that scheduled. Continue:  - Eliquis 5mg BID  - Lipitor 40mg daily  - Aldactone 25mg daily  - Entresto 97-103mg 1/2 tablet twice daily  - Metoprolol 100mg 1/2 tablet twice daily    2. History of VT, with 6 episodes of ATP in July 2021, on Sotalol 80mg BID. Recent creatinine was normal. EKG of 8/8/2021 shows QTc 515ms    3. Paroxysmal atrial fibrillation, with 7 minutes on device interrogation.    4. Chronic anticoagulation with Eliquis 5mg BID. No bleeding problems.    5. History of systolic HF, as above, to repeat echocardiogram.    6. Hypertension, treated and stable.    7. Hyperlipidemia, treated with Lipitor.    8. Diabetes mellitus, treated with Empagliflozin-Metformin and insulin.    As above, labs today, and reschedule echocardiogram. FU in 4-6 months for next device interrogation.

## 2021-09-03 ENCOUNTER — TELEPHONE (OUTPATIENT)
Dept: CARDIOLOGY | Facility: MEDICAL CENTER | Age: 56
End: 2021-09-03

## 2021-09-03 NOTE — TELEPHONE ENCOUNTER
----- Message from VIKAS Dos Santos sent at 9/3/2021 10:37 AM PDT -----  Labs are all stable/good.  Same meds, and keep FU as scheduled.  Thanks, AB

## 2021-09-04 DIAGNOSIS — Z76.0 ENCOUNTER FOR MEDICATION REFILL: ICD-10-CM

## 2021-09-07 RX ORDER — ZONISAMIDE 100 MG/1
CAPSULE ORAL
Qty: 270 CAPSULE | Refills: 3 | Status: SHIPPED | OUTPATIENT
Start: 2021-09-07 | End: 2022-09-26

## 2021-12-17 DIAGNOSIS — I25.10 CORONARY ARTERY DISEASE INVOLVING NATIVE CORONARY ARTERY OF NATIVE HEART WITHOUT ANGINA PECTORIS: ICD-10-CM

## 2021-12-21 RX ORDER — NITROGLYCERIN 0.4 MG/1
0.4 TABLET SUBLINGUAL PRN
Qty: 25 TABLET | Refills: 3 | Status: SHIPPED | OUTPATIENT
Start: 2021-12-21 | End: 2023-05-24

## 2022-01-01 ENCOUNTER — PATIENT MESSAGE (OUTPATIENT)
Dept: CARDIOLOGY | Facility: MEDICAL CENTER | Age: 57
End: 2022-01-01

## 2022-01-01 DIAGNOSIS — I48.0 PAROXYSMAL ATRIAL FIBRILLATION (HCC): ICD-10-CM

## 2022-01-03 ENCOUNTER — TELEPHONE (OUTPATIENT)
Dept: CARDIOLOGY | Facility: MEDICAL CENTER | Age: 57
End: 2022-01-03

## 2022-01-03 RX ORDER — SOTALOL HYDROCHLORIDE 80 MG/1
80 TABLET ORAL 2 TIMES DAILY
Qty: 180 TABLET | Refills: 1 | Status: SHIPPED | OUTPATIENT
Start: 2022-01-03 | End: 2022-08-17 | Stop reason: SDUPTHER

## 2022-01-03 NOTE — TELEPHONE ENCOUNTER
----- Message from Vanessa Lopez, Med Ass't sent at 1/3/2022  8:47 AM PST -----  Regarding: FW: Refill    ----- Message -----  From: Juaquin Solorzano  Sent: 1/1/2022   8:50 AM PST  To: Sav Breen/Dharmesh  Subject: Refill                                           For some reason [facility] Will not allow me to order my Solomon lol/betapace. Could you please help me out? Thanks

## 2022-01-03 NOTE — TELEPHONE ENCOUNTER
Called [facility] [phone] s/w BECCA. BECCA could not explain why medication was not refilled. He transferred me to [facility]. [facility] also could not explain why medication could not be refilled.   Total time: 30 min      Called pt [phone]  Advised pt of call above. Advised pt that I will refill Rx with [facility]. Pt verified that he has enough medication until [facility] can mail medication.        Refill sent to [facility] per pt request.

## 2022-01-30 DIAGNOSIS — Z76.0 ENCOUNTER FOR MEDICATION REFILL: ICD-10-CM

## 2022-01-31 RX ORDER — DULOXETIN HYDROCHLORIDE 60 MG/1
CAPSULE, DELAYED RELEASE ORAL
Qty: 90 CAPSULE | Refills: 0 | Status: SHIPPED | OUTPATIENT
Start: 2022-01-31 | End: 2022-04-13 | Stop reason: SDUPTHER

## 2022-01-31 NOTE — TELEPHONE ENCOUNTER
Was the patient seen in the last year in this department? Yes    Does patient have an active prescription for medications requested? Yes    Received Request Via: Pharmacy    Hospital Outpatient Visit on 09/01/2021   Component Date Value   • Sodium 09/01/2021 139    • Potassium 09/01/2021 4.5    • Chloride 09/01/2021 103    • Co2 09/01/2021 25    • Glucose 09/01/2021 125*   • Bun 09/01/2021 11    • Creatinine 09/01/2021 0.88    • Calcium 09/01/2021 9.7    • Anion Gap 09/01/2021 11.0    • WBC 09/01/2021 10.2    • RBC 09/01/2021 5.43    • Hemoglobin 09/01/2021 17.9    • Hematocrit 09/01/2021 52.0    • MCV 09/01/2021 95.8    • MCH 09/01/2021 33.0    • MCHC 09/01/2021 34.4    • RDW 09/01/2021 45.8    • Platelet Count 09/01/2021 186    • MPV 09/01/2021 11.1    • Neutrophils-Polys 09/01/2021 52.60    • Lymphocytes 09/01/2021 32.70    • Monocytes 09/01/2021 9.10    • Eosinophils 09/01/2021 3.70    • Basophils 09/01/2021 0.90    • Immature Granulocytes 09/01/2021 1.00*   • Nucleated RBC 09/01/2021 0.00    • Neutrophils (Absolute) 09/01/2021 5.38    • Lymphs (Absolute) 09/01/2021 3.34    • Monos (Absolute) 09/01/2021 0.93*   • Eos (Absolute) 09/01/2021 0.38    • Baso (Absolute) 09/01/2021 0.09    • Immature Granulocytes (a* 09/01/2021 0.10    • NRBC (Absolute) 09/01/2021 0.00    • GFR If  09/01/2021 >60    • GFR If Non  Ameri* 09/01/2021 >60    Hospital Outpatient Visit on 05/03/2021   Component Date Value   • Uric Acid 05/03/2021 3.7    • Sodium 05/03/2021 149*   • Potassium 05/03/2021 4.1    • Chloride 05/03/2021 110    • Co2 05/03/2021 22    • Anion Gap 05/03/2021 17.0*   • Glucose 05/03/2021 80    • Bun 05/03/2021 14    • Creatinine 05/03/2021 1.00    • Calcium 05/03/2021 9.3    • AST(SGOT) 05/03/2021 28    • ALT(SGPT) 05/03/2021 31    • Alkaline Phosphatase 05/03/2021 101*   • Total Bilirubin 05/03/2021 0.7    • Albumin 05/03/2021 4.3    • Total Protein 05/03/2021 6.9    • Globulin 05/03/2021 2.6     • A-G Ratio 05/03/2021 1.7    • Cholesterol,Tot 05/03/2021 124    • Triglycerides 05/03/2021 131    • HDL 05/03/2021 36*   • LDL 05/03/2021 62    • Prostatic Specific Antig* 05/03/2021 0.15    • TSH 05/03/2021 2.550    • Free T-4 05/03/2021 1.01    • Glycohemoglobin 05/03/2021 5.1    • Est Avg Glucose 05/03/2021 100    • GFR If  05/03/2021 >60    • GFR If Non  Ameri* 05/03/2021 >60    ]

## 2022-02-14 DIAGNOSIS — Z76.0 ENCOUNTER FOR MEDICATION REFILL: ICD-10-CM

## 2022-02-14 DIAGNOSIS — G47.00 INSOMNIA, UNSPECIFIED TYPE: ICD-10-CM

## 2022-02-14 RX ORDER — ALLOPURINOL 300 MG/1
TABLET ORAL
Qty: 30 TABLET | Refills: 0 | Status: SHIPPED | OUTPATIENT
Start: 2022-02-14 | End: 2022-04-13 | Stop reason: SDUPTHER

## 2022-02-14 RX ORDER — MIRTAZAPINE 15 MG/1
TABLET, FILM COATED ORAL
Qty: 30 TABLET | Refills: 0 | Status: SHIPPED | OUTPATIENT
Start: 2022-02-14 | End: 2022-04-13 | Stop reason: SDUPTHER

## 2022-02-14 NOTE — TELEPHONE ENCOUNTER
Was the patient seen in the last year in this department? 5/10/21    Does patient have an active prescription for medications requested? Yes    Received Request Via: Pharmacy    Hospital Outpatient Visit on 09/01/2021   Component Date Value   • Sodium 09/01/2021 139    • Potassium 09/01/2021 4.5    • Chloride 09/01/2021 103    • Co2 09/01/2021 25    • Glucose 09/01/2021 125*   • Bun 09/01/2021 11    • Creatinine 09/01/2021 0.88    • Calcium 09/01/2021 9.7    • Anion Gap 09/01/2021 11.0    • WBC 09/01/2021 10.2    • RBC 09/01/2021 5.43    • Hemoglobin 09/01/2021 17.9    • Hematocrit 09/01/2021 52.0    • MCV 09/01/2021 95.8    • MCH 09/01/2021 33.0    • MCHC 09/01/2021 34.4    • RDW 09/01/2021 45.8    • Platelet Count 09/01/2021 186    • MPV 09/01/2021 11.1    • Neutrophils-Polys 09/01/2021 52.60    • Lymphocytes 09/01/2021 32.70    • Monocytes 09/01/2021 9.10    • Eosinophils 09/01/2021 3.70    • Basophils 09/01/2021 0.90    • Immature Granulocytes 09/01/2021 1.00*   • Nucleated RBC 09/01/2021 0.00    • Neutrophils (Absolute) 09/01/2021 5.38    • Lymphs (Absolute) 09/01/2021 3.34    • Monos (Absolute) 09/01/2021 0.93*   • Eos (Absolute) 09/01/2021 0.38    • Baso (Absolute) 09/01/2021 0.09    • Immature Granulocytes (a* 09/01/2021 0.10    • NRBC (Absolute) 09/01/2021 0.00    • GFR If  09/01/2021 >60    • GFR If Non  Ameri* 09/01/2021 >60    Hospital Outpatient Visit on 05/03/2021   Component Date Value   • Uric Acid 05/03/2021 3.7    • Sodium 05/03/2021 149*   • Potassium 05/03/2021 4.1    • Chloride 05/03/2021 110    • Co2 05/03/2021 22    • Anion Gap 05/03/2021 17.0*   • Glucose 05/03/2021 80    • Bun 05/03/2021 14    • Creatinine 05/03/2021 1.00    • Calcium 05/03/2021 9.3    • AST(SGOT) 05/03/2021 28    • ALT(SGPT) 05/03/2021 31    • Alkaline Phosphatase 05/03/2021 101*   • Total Bilirubin 05/03/2021 0.7    • Albumin 05/03/2021 4.3    • Total Protein 05/03/2021 6.9    • Globulin 05/03/2021  2.6    • A-G Ratio 05/03/2021 1.7    • Cholesterol,Tot 05/03/2021 124    • Triglycerides 05/03/2021 131    • HDL 05/03/2021 36*   • LDL 05/03/2021 62    • Prostatic Specific Antig* 05/03/2021 0.15    • TSH 05/03/2021 2.550    • Free T-4 05/03/2021 1.01    • Glycohemoglobin 05/03/2021 5.1    • Est Avg Glucose 05/03/2021 100    • GFR If  05/03/2021 >60    • GFR If Non  Ameri* 05/03/2021 >60

## 2022-02-25 ENCOUNTER — TELEPHONE (OUTPATIENT)
Dept: CARDIOLOGY | Facility: MEDICAL CENTER | Age: 57
End: 2022-02-25
Payer: MEDICARE

## 2022-02-25 NOTE — TELEPHONE ENCOUNTER
Called patient regarding echo previously ordered by AB. No answer, LVM with office number for pt to call back so records can be requested if patient had echo done outside of [facility].

## 2022-02-28 DIAGNOSIS — I48.0 PAROXYSMAL ATRIAL FIBRILLATION (HCC): ICD-10-CM

## 2022-02-28 RX ORDER — APIXABAN 5 MG/1
TABLET, FILM COATED ORAL
Qty: 180 TABLET | Refills: 0 | Status: SHIPPED | OUTPATIENT
Start: 2022-02-28 | End: 2022-04-13 | Stop reason: SDUPTHER

## 2022-02-28 NOTE — TELEPHONE ENCOUNTER
Was the patient seen in the last year in this department? Yes    Does patient have an active prescription for medications requested? Yes    Received Request Via: Pharmacy    Hospital Outpatient Visit on 09/01/2021   Component Date Value   • Sodium 09/01/2021 139    • Potassium 09/01/2021 4.5    • Chloride 09/01/2021 103    • Co2 09/01/2021 25    • Glucose 09/01/2021 125 (A)   • Bun 09/01/2021 11    • Creatinine 09/01/2021 0.88    • Calcium 09/01/2021 9.7    • Anion Gap 09/01/2021 11.0    • WBC 09/01/2021 10.2    • RBC 09/01/2021 5.43    • Hemoglobin 09/01/2021 17.9    • Hematocrit 09/01/2021 52.0    • MCV 09/01/2021 95.8    • MCH 09/01/2021 33.0    • MCHC 09/01/2021 34.4    • RDW 09/01/2021 45.8    • Platelet Count 09/01/2021 186    • MPV 09/01/2021 11.1    • Neutrophils-Polys 09/01/2021 52.60    • Lymphocytes 09/01/2021 32.70    • Monocytes 09/01/2021 9.10    • Eosinophils 09/01/2021 3.70    • Basophils 09/01/2021 0.90    • Immature Granulocytes 09/01/2021 1.00 (A)   • Nucleated RBC 09/01/2021 0.00    • Neutrophils (Absolute) 09/01/2021 5.38    • Lymphs (Absolute) 09/01/2021 3.34    • Monos (Absolute) 09/01/2021 0.93 (A)   • Eos (Absolute) 09/01/2021 0.38    • Baso (Absolute) 09/01/2021 0.09    • Immature Granulocytes (a* 09/01/2021 0.10    • NRBC (Absolute) 09/01/2021 0.00    • GFR If  09/01/2021 >60    • GFR If Non  Ameri* 09/01/2021 >60    Hospital Outpatient Visit on 05/03/2021   Component Date Value   • Uric Acid 05/03/2021 3.7    • Sodium 05/03/2021 149 (A)   • Potassium 05/03/2021 4.1    • Chloride 05/03/2021 110    • Co2 05/03/2021 22    • Anion Gap 05/03/2021 17.0 (A)   • Glucose 05/03/2021 80    • Bun 05/03/2021 14    • Creatinine 05/03/2021 1.00    • Calcium 05/03/2021 9.3    • AST(SGOT) 05/03/2021 28    • ALT(SGPT) 05/03/2021 31    • Alkaline Phosphatase 05/03/2021 101 (A)   • Total Bilirubin 05/03/2021 0.7    • Albumin 05/03/2021 4.3    • Total Protein 05/03/2021 6.9    •  Globulin 05/03/2021 2.6    • A-G Ratio 05/03/2021 1.7    • Cholesterol,Tot 05/03/2021 124    • Triglycerides 05/03/2021 131    • HDL 05/03/2021 36 (A)   • LDL 05/03/2021 62    • Prostatic Specific Antig* 05/03/2021 0.15    • TSH 05/03/2021 2.550    • Free T-4 05/03/2021 1.01    • Glycohemoglobin 05/03/2021 5.1    • Est Avg Glucose 05/03/2021 100    • GFR If  05/03/2021 >60    • GFR If Non  Ameri* 05/03/2021 >60    ]

## 2022-03-02 NOTE — TELEPHONE ENCOUNTER
DEVICE CLINIC    Patient called to advise that for some reason [facility] keep calling the Pt saying they are still receiving their device notifications. Please call the Pt back at [phone].    Thank you,  Alisa GUERIN    02-Mar-2022 14:05

## 2022-04-13 ENCOUNTER — HOSPITAL ENCOUNTER (OUTPATIENT)
Dept: RADIOLOGY | Facility: MEDICAL CENTER | Age: 57
End: 2022-04-13
Attending: PHYSICIAN ASSISTANT

## 2022-04-13 ENCOUNTER — OFFICE VISIT (OUTPATIENT)
Dept: MEDICAL GROUP | Facility: CLINIC | Age: 57
End: 2022-04-13
Payer: MEDICARE

## 2022-04-13 VITALS
HEIGHT: 74 IN | SYSTOLIC BLOOD PRESSURE: 98 MMHG | TEMPERATURE: 97.8 F | HEART RATE: 89 BPM | DIASTOLIC BLOOD PRESSURE: 60 MMHG | OXYGEN SATURATION: 97 % | WEIGHT: 228.4 LBS | BODY MASS INDEX: 29.31 KG/M2 | RESPIRATION RATE: 16 BRPM

## 2022-04-13 DIAGNOSIS — R07.89 OTHER CHEST PAIN: ICD-10-CM

## 2022-04-13 DIAGNOSIS — R74.8 ELEVATED ALKALINE PHOSPHATASE LEVEL: ICD-10-CM

## 2022-04-13 DIAGNOSIS — Z13.21 ENCOUNTER FOR VITAMIN DEFICIENCY SCREENING: ICD-10-CM

## 2022-04-13 DIAGNOSIS — G89.29 CHRONIC LEFT SHOULDER PAIN: ICD-10-CM

## 2022-04-13 DIAGNOSIS — R56.9 FOCAL SEIZURES (HCC): ICD-10-CM

## 2022-04-13 DIAGNOSIS — E78.2 MIXED HYPERLIPIDEMIA: ICD-10-CM

## 2022-04-13 DIAGNOSIS — Z79.4 TYPE 2 DIABETES MELLITUS WITH COMPLICATION, WITH LONG-TERM CURRENT USE OF INSULIN (HCC): ICD-10-CM

## 2022-04-13 DIAGNOSIS — M10.9 GOUT OF RIGHT FOOT, UNSPECIFIED CAUSE, UNSPECIFIED CHRONICITY: ICD-10-CM

## 2022-04-13 DIAGNOSIS — F12.980: ICD-10-CM

## 2022-04-13 DIAGNOSIS — F32.9 MAJOR DEPRESSION, CHRONIC: ICD-10-CM

## 2022-04-13 DIAGNOSIS — E11.8 TYPE 2 DIABETES MELLITUS WITH COMPLICATION, WITH LONG-TERM CURRENT USE OF INSULIN (HCC): ICD-10-CM

## 2022-04-13 DIAGNOSIS — M25.512 CHRONIC LEFT SHOULDER PAIN: ICD-10-CM

## 2022-04-13 DIAGNOSIS — I10 ESSENTIAL HYPERTENSION: ICD-10-CM

## 2022-04-13 DIAGNOSIS — I48.0 PAROXYSMAL ATRIAL FIBRILLATION (HCC): ICD-10-CM

## 2022-04-13 DIAGNOSIS — G47.00 INSOMNIA, UNSPECIFIED TYPE: ICD-10-CM

## 2022-04-13 DIAGNOSIS — N52.9 ERECTILE DYSFUNCTION, UNSPECIFIED ERECTILE DYSFUNCTION TYPE: ICD-10-CM

## 2022-04-13 DIAGNOSIS — E08.42 DIABETIC POLYNEUROPATHY ASSOCIATED WITH DIABETES MELLITUS DUE TO UNDERLYING CONDITION (HCC): ICD-10-CM

## 2022-04-13 DIAGNOSIS — I50.20 ACC/AHA STAGE C SYSTOLIC HEART FAILURE (HCC): ICD-10-CM

## 2022-04-13 DIAGNOSIS — E66.01 CLASS 2 SEVERE OBESITY DUE TO EXCESS CALORIES WITH SERIOUS COMORBIDITY AND BODY MASS INDEX (BMI) OF 36.0 TO 36.9 IN ADULT (HCC): ICD-10-CM

## 2022-04-13 PROCEDURE — 99214 OFFICE O/P EST MOD 30 MIN: CPT | Performed by: PHYSICIAN ASSISTANT

## 2022-04-13 RX ORDER — MIRTAZAPINE 15 MG/1
15 TABLET, FILM COATED ORAL NIGHTLY
Qty: 90 TABLET | Refills: 1 | Status: SHIPPED | OUTPATIENT
Start: 2022-04-13 | End: 2022-09-26

## 2022-04-13 RX ORDER — SPIRONOLACTONE 25 MG/1
25 TABLET ORAL DAILY
Qty: 90 TABLET | Refills: 1 | Status: SHIPPED | OUTPATIENT
Start: 2022-04-13 | End: 2023-05-04 | Stop reason: SDUPTHER

## 2022-04-13 RX ORDER — ATORVASTATIN CALCIUM 40 MG/1
40 TABLET, FILM COATED ORAL DAILY
Qty: 90 TABLET | Refills: 1 | Status: SHIPPED | OUTPATIENT
Start: 2022-04-13 | End: 2022-09-26

## 2022-04-13 RX ORDER — AMITRIPTYLINE HYDROCHLORIDE 50 MG/1
50 TABLET, FILM COATED ORAL NIGHTLY
Qty: 90 TABLET | Refills: 0 | Status: SHIPPED | OUTPATIENT
Start: 2022-04-13 | End: 2022-07-14

## 2022-04-13 RX ORDER — DULOXETIN HYDROCHLORIDE 60 MG/1
120 CAPSULE, DELAYED RELEASE ORAL EVERY MORNING
Qty: 180 CAPSULE | Refills: 1 | Status: SHIPPED | OUTPATIENT
Start: 2022-04-13 | End: 2022-09-26

## 2022-04-13 RX ORDER — SILDENAFIL 50 MG/1
50 TABLET, FILM COATED ORAL PRN
Qty: 10 TABLET | Refills: 10 | Status: SHIPPED | OUTPATIENT
Start: 2022-04-13 | End: 2023-05-24

## 2022-04-13 RX ORDER — EMPAGLIFLOZIN, METFORMIN HYDROCHLORIDE 12.5; 1 MG/1; MG/1
1 TABLET, EXTENDED RELEASE ORAL 2 TIMES DAILY
Qty: 180 TABLET | Refills: 1 | Status: SHIPPED | OUTPATIENT
Start: 2022-04-13 | End: 2022-09-26

## 2022-04-13 RX ORDER — ALLOPURINOL 300 MG/1
300 TABLET ORAL DAILY
Qty: 90 TABLET | Refills: 1 | Status: SHIPPED | OUTPATIENT
Start: 2022-04-13 | End: 2022-09-26

## 2022-04-13 RX ORDER — SACUBITRIL AND VALSARTAN 97; 103 MG/1; MG/1
1 TABLET, FILM COATED ORAL 2 TIMES DAILY
Qty: 180 TABLET | Refills: 1 | Status: SHIPPED | OUTPATIENT
Start: 2022-04-13 | End: 2022-09-26

## 2022-04-13 RX ORDER — SEMAGLUTIDE 1.34 MG/ML
INJECTION, SOLUTION SUBCUTANEOUS
Qty: 12 ML | Refills: 1 | Status: SHIPPED | OUTPATIENT
Start: 2022-04-13 | End: 2022-09-26

## 2022-04-13 RX ORDER — METOPROLOL SUCCINATE 50 MG/1
50 TABLET, EXTENDED RELEASE ORAL DAILY
Qty: 90 TABLET | Refills: 1 | Status: SHIPPED | OUTPATIENT
Start: 2022-04-13 | End: 2022-09-26

## 2022-04-13 ASSESSMENT — PATIENT HEALTH QUESTIONNAIRE - PHQ9
CLINICAL INTERPRETATION OF PHQ2 SCORE: 2
5. POOR APPETITE OR OVEREATING: 2 - MORE THAN HALF THE DAYS
SUM OF ALL RESPONSES TO PHQ QUESTIONS 1-9: 14

## 2022-04-13 ASSESSMENT — FIBROSIS 4 INDEX: FIB4 SCORE: 1.49

## 2022-04-13 NOTE — PROGRESS NOTES
cc:  diabetes    Subjective:     Juaquin Solorzano is a 56 y.o. male presenting for diabetes      Patient presents to the office for diabetes.  Patient states that he developed covid in June.  He was doing a show in [place] and became ill.  He came home and was not feeling better and went into the ER and was admitted for covid and acute respiratory failure.  He still has no taste.      Patient states that 4 years ago, a cardiologist told him he would have 5 years to live.  He is depressed as he is concerned this is his last year. He states that he does have pain from the chest down his arms that is routine.  He takes nitro to help the symptoms.  He has a follow up with cardiology for an echo. He will try to schedule with  for life.     Patient does have multiple comorbidities and is here to obtain medication refills for these comorbidities.  Along with this diabetes with polyneuropathy and obesity he is also needing refills for his hyperlipidemia, atrial fibrillation and hypertension.  He does continue to see cardiology for his stage C systolic heart failure.  He also has a chronic case of gout, chronic major depression, insomnia and erectile dysfunction.    Patient indicates that since he has had heart surgery, he has had chronic pain of the left shoulder and his chest.  He feels that the pain is worsening for him and would like to know what can be done for his symptoms.  He also has an echo scheduled for April 25 and an appointment with cardiology scheduled May 18.    Patient does have anxiety and uses marijuana on a nightly basis.  He also sees neurology for his seizure disorder and takes Zonegran daily.    Review of systems:  See above.   Denies any symptoms unless previously indicated.        Current Outpatient Medications:   •  mirtazapine (REMERON) 15 MG Tab, Take 1 Tablet by mouth every evening., Disp: 90 Tablet, Rfl: 1  •  allopurinol (ZYLOPRIM) 300 MG Tab, Take 1 Tablet by mouth every day., Disp:  90 Tablet, Rfl: 1  •  DULoxetine (CYMBALTA) 60 MG Cap DR Particles delayed-release capsule, Take 2 Capsules by mouth every morning., Disp: 180 Capsule, Rfl: 1  •  apixaban (ELIQUIS) 5mg Tab, Take 1 Tablet by mouth 2 times a day., Disp: 180 Tablet, Rfl: 1  •  Semaglutide, 1 MG/DOSE, (OZEMPIC, 1 MG/DOSE,) 2 MG/1.5ML Solution Pen-injector, INJECT 1 MG UNDER THE SKIN EVERY 7 DAYS, Disp: 12 mL, Rfl: 1  •  metoprolol SR (TOPROL XL) 50 MG TABLET SR 24 HR, Take 1 Tablet by mouth every day., Disp: 90 Tablet, Rfl: 1  •  Empagliflozin-metFORMIN HCl ER (SYNJARDY XR) 12.5-1000 MG TABLET SR 24 HR, Take 1 Tablet by mouth 2 times a day., Disp: 180 Tablet, Rfl: 1  •  sacubitril-valsartan (ENTRESTO)  MG Tab tablet, Take 1 Tablet by mouth 2 times a day., Disp: 180 Tablet, Rfl: 1  •  amitriptyline (ELAVIL) 50 MG Tab, Take 1 Tablet by mouth every evening., Disp: 90 Tablet, Rfl: 0  •  spironolactone (ALDACTONE) 25 MG Tab, Take 1 Tablet by mouth every day., Disp: 90 Tablet, Rfl: 1  •  atorvastatin (LIPITOR) 40 MG Tab, Take 1 Tablet by mouth every day., Disp: 90 Tablet, Rfl: 1  •  sildenafil citrate (VIAGRA) 50 MG tablet, Take 1 Tablet by mouth as needed for Erectile Dysfunction., Disp: 10 Tablet, Rfl: 10  •  sotalol (BETAPACE) 80 MG Tab, Take 1 Tablet by mouth 2 times a day., Disp: 180 Tablet, Rfl: 1  •  nitroglycerin (NITROSTAT) 0.4 MG SL Tab, Place 1 Tablet under the tongue as needed for Chest Pain. DISSOLVE 1 TABLET UNDER THE TONGUE AS NEEDED FOR CHEST PAIN, Disp: 25 Tablet, Rfl: 3  •  zonisamide (ZONEGRAN) 100 MG Cap, TAKE 3 CAPSULES DAILY, Disp: 270 Capsule, Rfl: 3  •  glucose blood (FREESTYLE LITE) strip, 1 Strip by Other route 4 times a day., Disp: 300 Strip, Rfl: 4  •  FREESTYLE LANCETS Misc, by Does not apply route., Disp: , Rfl:   •  Insulin Pen Needle, by Does not apply route., Disp: , Rfl:     Allergies, past medical history, past surgical history, family history, social history reviewed and updated    Objective:  "    Vitals: BP (!) 98/60 (BP Location: Left arm, Patient Position: Sitting, BP Cuff Size: Large adult)   Pulse 89   Temp 36.6 °C (97.8 °F) (Temporal)   Resp 16   Ht 1.88 m (6' 2\")   Wt 104 kg (228 lb 6.4 oz)   SpO2 97%   BMI 29.32 kg/m²   General: Alert, pleasant, NAD  EYES:   PERRL, EOMI, no icterus or pallor.  Conjunctivae and lids normal.   HENT:  Normocephalic.  External ears normal.  Neck supple.     Respiratory: Normal respiratory effort.    Abdomen: Not obese  Skin: Warm, dry, no rashes.  Musculoskeletal: Gait is normal.  Moves all extremities well.    Extremities: Normal range of motion all extremities.   Neurological: No tremors, sensation grossly intact, CN2-12 intact.  Psych:  Affect/mood is normal, judgement is good, memory is intact, grooming is appropriate.    Assessment/Plan:     Juaquin was seen today for diabetes.    Diagnoses and all orders for this visit:    Type 2 diabetes mellitus with complication, with long-term current use of insulin ([facility])  -     Lipid Profile; Future  -     Comp Metabolic Panel; Future  -     TSH; Future  -     FREE THYROXINE; Future  -     HEMOGLOBIN A1C; Future  -     Semaglutide, 1 MG/DOSE, (OZEMPIC, 1 MG/DOSE,) 2 MG/1.5ML Solution Pen-injector; INJECT 1 MG UNDER THE SKIN EVERY 7 DAYS  -     Empagliflozin-metFORMIN HCl ER (SYNJARDY XR) 12.5-1000 MG TABLET SR 24 HR; Take 1 Tablet by mouth 2 times a day.  -     atorvastatin (LIPITOR) 40 MG Tab; Take 1 Tablet by mouth every day.  Diabetic polyneuropathy associated with diabetes mellitus due to underlying condition ([facility])  -     amitriptyline (ELAVIL) 50 MG Tab; Take 1 Tablet by mouth every evening.  Class 2 severe obesity due to excess calories with serious comorbidity and body mass index (BMI) of 36.0 to 36.9 in adult ([facility])  -     Lipid Profile; Future  -     Comp Metabolic Panel; Future  -     TSH; Future  -     FREE THYROXINE; Future  Mixed hyperlipidemia  -     Lipid Profile; Future  -     Comp Metabolic Panel; " Future  Paroxysmal atrial fibrillation (HCC)  -     Lipid Profile; Future  -     Comp Metabolic Panel; Future  -     TSH; Future  -     FREE THYROXINE; Future  -     EC-ECHOCARDIOGRAM COMPLETE W/O CONT; Future  -     apixaban (ELIQUIS) 5mg Tab; Take 1 Tablet by mouth 2 times a day.  Essential hypertension  -     Lipid Profile; Future  -     Comp Metabolic Panel; Future  -     TSH; Future  -     FREE THYROXINE; Future  -     metoprolol SR (TOPROL XL) 50 MG TABLET SR 24 HR; Take 1 Tablet by mouth every day.  -     sacubitril-valsartan (ENTRESTO)  MG Tab tablet; Take 1 Tablet by mouth 2 times a day.  ACC/AHA stage C systolic heart failure (HCC)  -     Lipid Profile; Future  -     Comp Metabolic Panel; Future  -     TSH; Future  -     FREE THYROXINE; Future  -     EC-ECHOCARDIOGRAM COMPLETE W/O CONT; Future  -     spironolactone (ALDACTONE) 25 MG Tab; Take 1 Tablet by mouth every day.  -     atorvastatin (LIPITOR) 40 MG Tab; Take 1 Tablet by mouth every day.  Elevated alkaline phosphatase level  -     Comp Metabolic Panel; Future  Gout of right foot, unspecified cause, unspecified chronicity  -     URIC ACID; Future        -     allopurinol (ZYLOPRIM) 300 MG Tab; Take 1 Tablet by mouth every day.    Labs have been ordered to evaluate further and medications have been refilled.  We will follow-up in 2 weeks.      Insomnia, unspecified type  -     mirtazapine (REMERON) 15 MG Tab; Take 1 Tablet by mouth every evening.  Major depression, chronic  -     DULoxetine (CYMBALTA) 60 MG Cap DR Particles delayed-release capsule; Take 2 Capsules by mouth every morning.  Erectile dysfunction, unspecified erectile dysfunction type  -     sildenafil citrate (VIAGRA) 50 MG tablet; Take 1 Tablet by mouth as needed for Erectile Dysfunction.    Medications been refilled at this time.  We did increase the Cymbalta from 60 mg to 120 as he has been having increased depression.  We will follow-up in 2 weeks.    Chronic left shoulder  pain  -     Referral to Pain Management  -     OUTSIDE IMAGES-CT CERVICAL SPINE; Future  Other chest pain  -     Referral to Pain Management  -     OUTSIDE IMAGES-CT CERVICAL SPINE; Future    Patient indicates that this pain is a constant  Ongoing chronic pain that he has had for years.  He has an echo scheduled for next week.  We will refer to pain management and obtain cervical spine imaging through CT as pain seems to be musculoskeletal.    Encounter for vitamin deficiency screening  -     VITAMIN D,25 HYDROXY; Future    Cannabis use with anxiety disorder.      No changes at this time.    Focal seizures    Follow-up with neurology as needed.    Labs ordered as indicated.  This chart was reviewed by myself, Esvin Caraballo M.D., MSc. No face to face patient contact was done. I do not have any additions to the note by this mid level at this time.          Return in about 4 weeks (around 5/11/2022), or if symptoms worsen or fail to improve, for medicare wellness exam.    Please note that this dictation was created using voice recognition software. I have made every reasonable attempt to correct obvious errors, but expect that there are errors of grammar and possible content that I did not discover before finalizing note.

## 2022-04-14 ENCOUNTER — HOSPITAL ENCOUNTER (OUTPATIENT)
Facility: MEDICAL CENTER | Age: 57
End: 2022-04-14
Attending: PHYSICIAN ASSISTANT
Payer: MEDICARE

## 2022-04-14 ENCOUNTER — NON-PROVIDER VISIT (OUTPATIENT)
Dept: MEDICAL GROUP | Facility: CLINIC | Age: 57
End: 2022-04-14
Payer: MEDICARE

## 2022-04-14 DIAGNOSIS — E78.2 MIXED HYPERLIPIDEMIA: ICD-10-CM

## 2022-04-14 DIAGNOSIS — R74.8 ELEVATED ALKALINE PHOSPHATASE LEVEL: ICD-10-CM

## 2022-04-14 DIAGNOSIS — E66.01 CLASS 2 SEVERE OBESITY DUE TO EXCESS CALORIES WITH SERIOUS COMORBIDITY AND BODY MASS INDEX (BMI) OF 36.0 TO 36.9 IN ADULT (HCC): ICD-10-CM

## 2022-04-14 DIAGNOSIS — Z79.4 TYPE 2 DIABETES MELLITUS WITH COMPLICATION, WITH LONG-TERM CURRENT USE OF INSULIN (HCC): ICD-10-CM

## 2022-04-14 DIAGNOSIS — E11.8 TYPE 2 DIABETES MELLITUS WITH COMPLICATION, WITH LONG-TERM CURRENT USE OF INSULIN (HCC): ICD-10-CM

## 2022-04-14 DIAGNOSIS — M10.9 GOUT OF RIGHT FOOT, UNSPECIFIED CAUSE, UNSPECIFIED CHRONICITY: ICD-10-CM

## 2022-04-14 DIAGNOSIS — Z13.21 ENCOUNTER FOR VITAMIN DEFICIENCY SCREENING: ICD-10-CM

## 2022-04-14 DIAGNOSIS — I50.20 ACC/AHA STAGE C SYSTOLIC HEART FAILURE (HCC): ICD-10-CM

## 2022-04-14 DIAGNOSIS — I48.0 PAROXYSMAL ATRIAL FIBRILLATION (HCC): ICD-10-CM

## 2022-04-14 DIAGNOSIS — I10 ESSENTIAL HYPERTENSION: ICD-10-CM

## 2022-04-14 PROCEDURE — 84439 ASSAY OF FREE THYROXINE: CPT

## 2022-04-14 PROCEDURE — 80053 COMPREHEN METABOLIC PANEL: CPT

## 2022-04-14 PROCEDURE — 82306 VITAMIN D 25 HYDROXY: CPT

## 2022-04-14 PROCEDURE — 36415 COLL VENOUS BLD VENIPUNCTURE: CPT | Performed by: PHYSICIAN ASSISTANT

## 2022-04-14 PROCEDURE — 84443 ASSAY THYROID STIM HORMONE: CPT

## 2022-04-14 PROCEDURE — 80061 LIPID PANEL: CPT

## 2022-04-14 PROCEDURE — 83036 HEMOGLOBIN GLYCOSYLATED A1C: CPT

## 2022-04-14 PROCEDURE — 84550 ASSAY OF BLOOD/URIC ACID: CPT

## 2022-04-15 LAB
25(OH)D3 SERPL-MCNC: 24 NG/ML (ref 30–100)
ALBUMIN SERPL BCP-MCNC: 4.6 G/DL (ref 3.2–4.9)
ALBUMIN/GLOB SERPL: 1.8 G/DL
ALP SERPL-CCNC: 104 U/L (ref 30–99)
ALT SERPL-CCNC: 24 U/L (ref 2–50)
ANION GAP SERPL CALC-SCNC: 12 MMOL/L (ref 7–16)
AST SERPL-CCNC: 26 U/L (ref 12–45)
BILIRUB SERPL-MCNC: 0.6 MG/DL (ref 0.1–1.5)
BUN SERPL-MCNC: 15 MG/DL (ref 8–22)
CALCIUM SERPL-MCNC: 9.3 MG/DL (ref 8.5–10.5)
CHLORIDE SERPL-SCNC: 104 MMOL/L (ref 96–112)
CHOLEST SERPL-MCNC: 123 MG/DL (ref 100–199)
CO2 SERPL-SCNC: 23 MMOL/L (ref 20–33)
CREAT SERPL-MCNC: 1.12 MG/DL (ref 0.5–1.4)
EST. AVERAGE GLUCOSE BLD GHB EST-MCNC: 103 MG/DL
GFR SERPLBLD CREATININE-BSD FMLA CKD-EPI: 77 ML/MIN/1.73 M 2
GLOBULIN SER CALC-MCNC: 2.6 G/DL (ref 1.9–3.5)
GLUCOSE SERPL-MCNC: 111 MG/DL (ref 65–99)
HBA1C MFR BLD: 5.2 % (ref 4–5.6)
HDLC SERPL-MCNC: 36 MG/DL
LDLC SERPL CALC-MCNC: 56 MG/DL
POTASSIUM SERPL-SCNC: 4.1 MMOL/L (ref 3.6–5.5)
PROT SERPL-MCNC: 7.2 G/DL (ref 6–8.2)
SODIUM SERPL-SCNC: 139 MMOL/L (ref 135–145)
T4 FREE SERPL-MCNC: 1.45 NG/DL (ref 0.93–1.7)
TRIGL SERPL-MCNC: 156 MG/DL (ref 0–149)
TSH SERPL DL<=0.005 MIU/L-ACNC: 1.94 UIU/ML (ref 0.38–5.33)
URATE SERPL-MCNC: 4 MG/DL (ref 2.5–8.3)

## 2022-04-25 ENCOUNTER — APPOINTMENT (OUTPATIENT)
Dept: CARDIOLOGY | Facility: MEDICAL CENTER | Age: 57
End: 2022-04-25
Attending: NURSE PRACTITIONER
Payer: MEDICARE

## 2022-05-12 ENCOUNTER — OFFICE VISIT (OUTPATIENT)
Dept: MEDICAL GROUP | Facility: CLINIC | Age: 57
End: 2022-05-12
Payer: MEDICARE

## 2022-05-12 ENCOUNTER — HOSPITAL ENCOUNTER (OUTPATIENT)
Dept: RADIOLOGY | Facility: MEDICAL CENTER | Age: 57
End: 2022-05-12
Attending: PHYSICIAN ASSISTANT

## 2022-05-12 VITALS
HEART RATE: 92 BPM | TEMPERATURE: 97.2 F | OXYGEN SATURATION: 98 % | HEIGHT: 74 IN | BODY MASS INDEX: 28.49 KG/M2 | DIASTOLIC BLOOD PRESSURE: 62 MMHG | SYSTOLIC BLOOD PRESSURE: 98 MMHG | WEIGHT: 222 LBS

## 2022-05-12 DIAGNOSIS — I50.20 ACC/AHA STAGE C SYSTOLIC HEART FAILURE (HCC): ICD-10-CM

## 2022-05-12 DIAGNOSIS — Z95.1 S/P CABG X 3: ICD-10-CM

## 2022-05-12 DIAGNOSIS — R20.0 NUMBNESS OF ARM: ICD-10-CM

## 2022-05-12 DIAGNOSIS — I25.5 ISCHEMIC CARDIOMYOPATHY: ICD-10-CM

## 2022-05-12 DIAGNOSIS — Z12.11 COLON CANCER SCREENING: ICD-10-CM

## 2022-05-12 DIAGNOSIS — I25.10 CORONARY ARTERY DISEASE INVOLVING NATIVE CORONARY ARTERY OF NATIVE HEART WITHOUT ANGINA PECTORIS: ICD-10-CM

## 2022-05-12 DIAGNOSIS — E11.8 TYPE 2 DIABETES MELLITUS WITH COMPLICATION, WITH LONG-TERM CURRENT USE OF INSULIN (HCC): ICD-10-CM

## 2022-05-12 DIAGNOSIS — M54.9 MID BACK PAIN: ICD-10-CM

## 2022-05-12 DIAGNOSIS — R07.82 INTERCOSTAL PAIN: ICD-10-CM

## 2022-05-12 DIAGNOSIS — E78.2 MIXED HYPERLIPIDEMIA: ICD-10-CM

## 2022-05-12 DIAGNOSIS — Z95.810 PRESENCE OF BIVENTRICULAR AICD: ICD-10-CM

## 2022-05-12 DIAGNOSIS — Z23 NEED FOR VACCINATION: ICD-10-CM

## 2022-05-12 DIAGNOSIS — Z79.4 TYPE 2 DIABETES MELLITUS WITH COMPLICATION, WITH LONG-TERM CURRENT USE OF INSULIN (HCC): ICD-10-CM

## 2022-05-12 DIAGNOSIS — R74.8 ELEVATED ALKALINE PHOSPHATASE LEVEL: ICD-10-CM

## 2022-05-12 DIAGNOSIS — I48.0 PAROXYSMAL ATRIAL FIBRILLATION (HCC): ICD-10-CM

## 2022-05-12 PROCEDURE — 99214 OFFICE O/P EST MOD 30 MIN: CPT | Mod: 25 | Performed by: PHYSICIAN ASSISTANT

## 2022-05-12 PROCEDURE — 90732 PPSV23 VACC 2 YRS+ SUBQ/IM: CPT | Performed by: PHYSICIAN ASSISTANT

## 2022-05-12 PROCEDURE — G0009 ADMIN PNEUMOCOCCAL VACCINE: HCPCS | Performed by: PHYSICIAN ASSISTANT

## 2022-05-12 PROCEDURE — 90746 HEPB VACCINE 3 DOSE ADULT IM: CPT | Performed by: PHYSICIAN ASSISTANT

## 2022-05-12 PROCEDURE — G0010 ADMIN HEPATITIS B VACCINE: HCPCS | Performed by: PHYSICIAN ASSISTANT

## 2022-05-12 RX ORDER — ZOSTER VACCINE RECOMBINANT, ADJUVANTED 50 MCG/0.5
0.5 KIT INTRAMUSCULAR ONCE
Qty: 1 EACH | Refills: 0 | Status: SHIPPED | OUTPATIENT
Start: 2022-05-12 | End: 2022-05-12

## 2022-05-12 ASSESSMENT — FIBROSIS 4 INDEX: FIB4 SCORE: 1.57

## 2022-05-12 NOTE — PROGRESS NOTES
cc:  Muscle pain    Subjective:     Juaquin Solorzano is a 56 y.o. male presenting for muscle pain      Patient presents to the office for muscle pain.  Patient states that he has not had CT of his neck performed as he would like to do with his mid back. He is wanting a CT of back and chest.  He is wanting a full 360 view.     Patient states that he has been having numbness in his upper arm for 2 weeks.  He has had popping of the shoulder for years.  He states that he put out his arm to balance himself and has felt numb since.     Patient is also here to follow-up with his most recent echo results.  He is scheduled to see cardiology May 18.  He is seeing cardiology for ischemic cardiomyopathy with status post CABG x3 and presence of biventricular AICD.  He has paroxysmal atrial fibrillation, systolic heart failure coronary artery disease and mixed hyperlipidemia.  He was concerned about his echo as he was placed in several positions that were new to him.  He was concerned that they saw something on the test.  He has been anxious about these results.    Patient is also a type II diabetic and is here following up with routine lab work.  He also has a known elevated alkaline phosphatase level that is being monitored.    Patient at this time is due for routine screening including a colon cancer screening and and need for vaccines including his Shingrix vaccine, pneumococcal, and hepatitis B series.  He would like to start these vaccines today.    Review of systems:  See above.   Denies any symptoms unless previously indicated.        Current Outpatient Medications:   •  Zoster Vac Recomb Adjuvanted (SHINGRIX) 50 MCG/0.5ML Recon Susp, Inject 0.5 mL into the shoulder, thigh, or buttocks one time for 1 dose., Disp: 1 Each, Rfl: 0  •  mirtazapine (REMERON) 15 MG Tab, Take 1 Tablet by mouth every evening., Disp: 90 Tablet, Rfl: 1  •  allopurinol (ZYLOPRIM) 300 MG Tab, Take 1 Tablet by mouth every day., Disp: 90 Tablet,  Rfl: 1  •  DULoxetine (CYMBALTA) 60 MG Cap DR Particles delayed-release capsule, Take 2 Capsules by mouth every morning., Disp: 180 Capsule, Rfl: 1  •  apixaban (ELIQUIS) 5mg Tab, Take 1 Tablet by mouth 2 times a day., Disp: 180 Tablet, Rfl: 1  •  Semaglutide, 1 MG/DOSE, (OZEMPIC, 1 MG/DOSE,) 2 MG/1.5ML Solution Pen-injector, INJECT 1 MG UNDER THE SKIN EVERY 7 DAYS, Disp: 12 mL, Rfl: 1  •  metoprolol SR (TOPROL XL) 50 MG TABLET SR 24 HR, Take 1 Tablet by mouth every day., Disp: 90 Tablet, Rfl: 1  •  Empagliflozin-metFORMIN HCl ER (SYNJARDY XR) 12.5-1000 MG TABLET SR 24 HR, Take 1 Tablet by mouth 2 times a day., Disp: 180 Tablet, Rfl: 1  •  sacubitril-valsartan (ENTRESTO)  MG Tab tablet, Take 1 Tablet by mouth 2 times a day., Disp: 180 Tablet, Rfl: 1  •  amitriptyline (ELAVIL) 50 MG Tab, Take 1 Tablet by mouth every evening., Disp: 90 Tablet, Rfl: 0  •  spironolactone (ALDACTONE) 25 MG Tab, Take 1 Tablet by mouth every day., Disp: 90 Tablet, Rfl: 1  •  atorvastatin (LIPITOR) 40 MG Tab, Take 1 Tablet by mouth every day., Disp: 90 Tablet, Rfl: 1  •  sildenafil citrate (VIAGRA) 50 MG tablet, Take 1 Tablet by mouth as needed for Erectile Dysfunction., Disp: 10 Tablet, Rfl: 10  •  sotalol (BETAPACE) 80 MG Tab, Take 1 Tablet by mouth 2 times a day., Disp: 180 Tablet, Rfl: 1  •  nitroglycerin (NITROSTAT) 0.4 MG SL Tab, Place 1 Tablet under the tongue as needed for Chest Pain. DISSOLVE 1 TABLET UNDER THE TONGUE AS NEEDED FOR CHEST PAIN, Disp: 25 Tablet, Rfl: 3  •  zonisamide (ZONEGRAN) 100 MG Cap, TAKE 3 CAPSULES DAILY, Disp: 270 Capsule, Rfl: 3  •  glucose blood (FREESTYLE LITE) strip, 1 Strip by Other route 4 times a day., Disp: 300 Strip, Rfl: 4  •  FREESTYLE LANCETS Misc, by Does not apply route., Disp: , Rfl:   •  Insulin Pen Needle, by Does not apply route., Disp: , Rfl:     Allergies, past medical history, past surgical history, family history, social history reviewed and updated    Objective:     Vitals: BP (!)  "98/62 (BP Location: Left arm, Patient Position: Sitting, BP Cuff Size: Adult)   Pulse 92   Temp 36.2 °C (97.2 °F) (Temporal)   Ht 1.88 m (6' 2\")   Wt 101 kg (222 lb)   SpO2 98%   BMI 28.50 kg/m²   General: Alert, pleasant, NAD  EYES:   PERRL, EOMI, no icterus or pallor.  Conjunctivae and lids normal.   HENT:  Normocephalic.  External ears normal. Neck supple.     Heart: Regular rate and rhythm.  S1 and S2 normal.  No murmurs appreciated.  Respiratory: Normal respiratory effort.  Clear to auscultation bilaterally.  Abdomen: obese  Skin: Warm, dry, no rashes.  Musculoskeletal: Gait is normal.  Moves all extremities well.    Extremities: Monofilament testing with a 10 gram force: sensation intact: intact bilaterally  Visual Inspection: Feet with small healing fissure 2nd left toe.  Pedal pulses: intact bilaterally.   Neurological: No tremors, sensation grossly intact,  CN2-12 intact.  Psych:  Affect/mood is normal, judgement is good, memory is intact, grooming is appropriate.    Assessment/Plan:     Juaquin was seen today for back pain.    Diagnoses and all orders for this visit:    Intercostal pain  -     OUTSIDE IMAGES-CT THORACIC SPINE; Future  -     CT-CHEST (THORAX) W/O; Future  Mid back pain  -     OUTSIDE IMAGES-CT THORACIC SPINE; Future  Numbness of arm  -     DX-SHOULDER 2+ RIGHT; Future    CT imaging has been ordered to evaluate further as patient does have an AICD in place.  Plan will be to follow-up in 4 to 8 weeks to follow-up with test results.    Ischemic cardiomyopathy  S/P CABG x 3  Presence of biventricular AICD  -     Lipid Profile; Future  -     Comp Metabolic Panel; Future  Paroxysmal atrial fibrillation (HCC)  -     Lipid Profile; Future  -     Comp Metabolic Panel; Future  ACC/AHA stage C systolic heart failure (HCC)  Coronary artery disease involving native coronary artery of native heart without angina pectoris  Mixed hyperlipidemia    I have reviewed echo.  Discussed results with " patient.  I do not see any changes from 2019.  However I have advised patient to follow-up with cardiology and any recommendations they may have.  In the meantime we will repeat labs in approximately 6 to 9 months.    Type 2 diabetes mellitus with complication, with long-term current use of insulin (HCC)  -     Lipid Profile; Future  -     Comp Metabolic Panel; Future  -     HEMOGLOBIN A1C; Future  -     MICROALBUMIN CREAT RATIO URINE; Future  Elevated alkaline phosphatase level    We will repeat labs in 6 to 9 months.  Levels are stable.  Patient is doing well with medication.  Patient will obtain retinal screen when he sees his optometrist next.    Colon cancer screening  -     Referral to GI for Colonoscopy    Referral for colonoscopy submitted at this time.    Need for vaccination  -     Zoster Vac Recomb Adjuvanted (SHINGRIX) 50 MCG/0.5ML Recon Susp; Inject 0.5 mL into the shoulder, thigh, or buttocks one time for 1 dose.  -     Hep B Adult 20+  -     Pneumococal Polysaccharide Vaccine 23-Valent =>3YO SQ/IM      Hep B and pneumococcal given today.  Rx given for Shingrix vaccine second dose.      Return if symptoms worsen or fail to improve, for 4-8 weeks.    Please note that this dictation was created using voice recognition software. I have made every reasonable attempt to correct obvious errors, but expect that there are errors of grammar and possible content that I did not discover before finalizing note.

## 2022-05-18 ENCOUNTER — OFFICE VISIT (OUTPATIENT)
Dept: CARDIOLOGY | Facility: PHYSICIAN GROUP | Age: 57
End: 2022-05-18
Payer: MEDICARE

## 2022-05-18 ENCOUNTER — NON-PROVIDER VISIT (OUTPATIENT)
Dept: CARDIOLOGY | Facility: PHYSICIAN GROUP | Age: 57
End: 2022-05-18

## 2022-05-18 VITALS
DIASTOLIC BLOOD PRESSURE: 64 MMHG | OXYGEN SATURATION: 96 % | HEIGHT: 74 IN | BODY MASS INDEX: 29.13 KG/M2 | WEIGHT: 227 LBS | RESPIRATION RATE: 16 BRPM | HEART RATE: 88 BPM | SYSTOLIC BLOOD PRESSURE: 102 MMHG

## 2022-05-18 VITALS
HEART RATE: 88 BPM | SYSTOLIC BLOOD PRESSURE: 102 MMHG | BODY MASS INDEX: 29.17 KG/M2 | WEIGHT: 227.29 LBS | RESPIRATION RATE: 16 BRPM | DIASTOLIC BLOOD PRESSURE: 64 MMHG | HEIGHT: 74 IN | OXYGEN SATURATION: 96 %

## 2022-05-18 DIAGNOSIS — Z79.01 CHRONIC ANTICOAGULATION: ICD-10-CM

## 2022-05-18 DIAGNOSIS — Z95.1 S/P CABG X 3: ICD-10-CM

## 2022-05-18 DIAGNOSIS — I25.5 ISCHEMIC CARDIOMYOPATHY: ICD-10-CM

## 2022-05-18 DIAGNOSIS — I50.20 ACC/AHA STAGE C SYSTOLIC HEART FAILURE (HCC): ICD-10-CM

## 2022-05-18 DIAGNOSIS — I10 ESSENTIAL HYPERTENSION: ICD-10-CM

## 2022-05-18 DIAGNOSIS — Z95.810 PRESENCE OF BIVENTRICULAR AICD: ICD-10-CM

## 2022-05-18 DIAGNOSIS — Z79.4 TYPE 2 DIABETES MELLITUS WITH COMPLICATION, WITH LONG-TERM CURRENT USE OF INSULIN (HCC): ICD-10-CM

## 2022-05-18 DIAGNOSIS — I48.0 PAROXYSMAL ATRIAL FIBRILLATION (HCC): ICD-10-CM

## 2022-05-18 DIAGNOSIS — I47.20 VENTRICULAR TACHYCARDIA (HCC): ICD-10-CM

## 2022-05-18 DIAGNOSIS — I25.10 CORONARY ARTERY DISEASE INVOLVING NATIVE CORONARY ARTERY OF NATIVE HEART WITHOUT ANGINA PECTORIS: ICD-10-CM

## 2022-05-18 DIAGNOSIS — E11.8 TYPE 2 DIABETES MELLITUS WITH COMPLICATION, WITH LONG-TERM CURRENT USE OF INSULIN (HCC): ICD-10-CM

## 2022-05-18 DIAGNOSIS — E78.2 MIXED HYPERLIPIDEMIA: ICD-10-CM

## 2022-05-18 DIAGNOSIS — G47.33 OBSTRUCTIVE SLEEP APNEA SYNDROME: ICD-10-CM

## 2022-05-18 PROCEDURE — 93284 PRGRMG EVAL IMPLANTABLE DFB: CPT | Performed by: NURSE PRACTITIONER

## 2022-05-18 PROCEDURE — 99214 OFFICE O/P EST MOD 30 MIN: CPT | Performed by: NURSE PRACTITIONER

## 2022-05-18 ASSESSMENT — ENCOUNTER SYMPTOMS
LOSS OF CONSCIOUSNESS: 0
PND: 0
FEVER: 0
INSOMNIA: 0
PALPITATIONS: 0
COUGH: 0
DIZZINESS: 0
CHILLS: 0
ABDOMINAL PAIN: 0
ORTHOPNEA: 0
HEADACHES: 0
BRUISES/BLEEDS EASILY: 0
NAUSEA: 0
MYALGIAS: 0
SHORTNESS OF BREATH: 0

## 2022-05-18 ASSESSMENT — FIBROSIS 4 INDEX
FIB4 SCORE: 1.57
FIB4 SCORE: 1.57

## 2022-05-18 NOTE — PROGRESS NOTES
Chief Complaint   Patient presents with   • Follow-Up   • Biventricular AICD   • Coronary Artery Disease   • Cardiomyopathy (Ischemic)   • CHF (Compensated)   • Ventricular Tachycardia   • Atrial Fibrillation   • Anticoagulation   • HTN (Controlled)   • Hyperlipidemia   • Diabetes (Controlled)       Subjective     Juaquin Solorzano is a 56 y.o. male who presents today for six month follow-up of CAD/ischemic cardiomyopathy, HFrEF with CRT-D, VT, PAFib, anticoagulation, HTN, hyperlipidemia and DM.    Juaquin is a 55 year old male with history of CAD/ischemic cardiomyopathy with LVEF 35-40%, with history of stents in 2005 and 2006, followed by CABG x 3 in 2013 (anatomical details unknown); coronary angiogram in July 2016 showed patent LIMA to LAD, but occluded SVG-RCA, nonobstructive disease of LCx, and diffuse distal disease of the distal RCA, no other SVG was identified.      He had CRT-D implanted in April 2018, and also has HFrEF, hypertension, hyperlipidemia, CLARE and diabetes mellitus, last seen by me in September 2021.    In August 2021, he was hospitalized for Covid-19, with acute respiratory failure with hypoxia, and was treated with Remdesivir, steroids and oxygen.     At follow-up in September 2021, CRT-D interrogation showed 6 episodes of VT in July 2021, treated with ATP.    He is here today for six month follow-up. He did have an echo done on 4/27/2022 at [facility], which showed stable LVEF 35-40%.    Generally, he is doing well: no chest pain, pressure, tightness or discomfort; no palpitations; breathing is stable with no dyspnea, orthopnea or PND; no LE edema. No dizziness, lightheadedness or syncope; BP is stable. Glucose is very well controlled.  He does have neuropathy down his back and arms, and will be having a CT scan done soon.    Past Medical History:   Diagnosis Date   • Arthritis     Hands/elbows   • Block, bundle branch, left    • CAD (coronary artery disease)    • Cataract     Early stages,  not removed   • Diabetes (HCC)     On oral medications, now well controlled   • Fibromyalgia 2018    5-6/10   • GERD (gastroesophageal reflux disease)    • Gout    • Hyperlipidemia    • Hypertension    • Ischemic cardiomyopathy 2022    Echocardiogram with normal LV size, LVEF 35-40%. Septal wall hypokinesis, apical hypokinesis. Normal RA, LA and RV. RVSP 25-30mMHg   • Myocardial infarct ([facility])    • Myocardial infarct ([facility])    • Neuropathy ([facility])     Bilateral feet   • Psychiatric problem     Depression and anxiety   • S/P colonoscopy     repeat 6 years at age of 61.   • Sleep apnea     Uses CPAP     Past Surgical History:   Procedure Laterality Date   • AICD IMPLANT Left 2018    [employer] Inogen X4 CRT-D G148 implanted by Dr. Cullen.   • RECOVERY  2016    Procedure: CATH LAB-[facility] W/POSSIBLE RADIAL APPROACH-CARIE;  Surgeon: Mireya Surgery;  [place] [facility] [place]  Service:    • MULTIPLE CORONARY ARTERY BYPASS  2014    CABG x 3   • LEONILA BY LAPAROSCOPY     • KNEE ARTHROSCOPY      Arthroscopy, Knee     Family History   Problem Relation Age of Onset   • Heart Disease Mother    • Diabetes Mother    • Hyperlipidemia Father    • Heart Disease Father    • Hypertension Father    • Stroke Father    • Lung Disease Sister      Social History     Socioeconomic History   • Marital status:      Spouse name: Not on file   • Number of children: Not on file   • Years of education: Not on file   • Highest education level: Not on file   Occupational History   • Not on file   Tobacco Use   • Smoking status: Former Smoker     Packs/day: 0.50     Years: 12.00     Pack years: 6.00     Types: Cigarettes     Quit date: 7/15/2012     Years since quittin.8   • Smokeless tobacco: Never Used   Vaping Use   • Vaping Use: Some days   • Substances: THC   • Devices: Disposable   Substance and Sexual Activity   • Alcohol use: Yes     Alcohol/week: 0.6 oz     Types: 1  Standard drinks or equivalent per week     Comment: very little   • Drug use: Yes     Types: Marijuana, Oral   • Sexual activity: Yes     Partners: Female   Other Topics Concern   • Not on file   Social History Narrative   • Not on file     Social Determinants of Health     Financial Resource Strain: Not on file   Food Insecurity: Not on file   Transportation Needs: Not on file   Physical Activity: Not on file   Stress: Not on file   Social Connections: Not on file   Intimate Partner Violence: Not on file   Housing Stability: Not on file     Allergies   Allergen Reactions   • Iodine Anaphylaxis     Other reaction(s): Unknown     Outpatient Encounter Medications as of 5/18/2022   Medication Sig Dispense Refill   • mirtazapine (REMERON) 15 MG Tab Take 1 Tablet by mouth every evening. 90 Tablet 1   • allopurinol (ZYLOPRIM) 300 MG Tab Take 1 Tablet by mouth every day. 90 Tablet 1   • DULoxetine (CYMBALTA) 60 MG Cap DR Particles delayed-release capsule Take 2 Capsules by mouth every morning. 180 Capsule 1   • apixaban (ELIQUIS) 5mg Tab Take 1 Tablet by mouth 2 times a day. 180 Tablet 1   • Semaglutide, 1 MG/DOSE, (OZEMPIC, 1 MG/DOSE,) 2 MG/1.5ML Solution Pen-injector INJECT 1 MG UNDER THE SKIN EVERY 7 DAYS 12 mL 1   • metoprolol SR (TOPROL XL) 50 MG TABLET SR 24 HR Take 1 Tablet by mouth every day. 90 Tablet 1   • Empagliflozin-metFORMIN HCl ER (SYNJARDY XR) 12.5-1000 MG TABLET SR 24 HR Take 1 Tablet by mouth 2 times a day. 180 Tablet 1   • sacubitril-valsartan (ENTRESTO)  MG Tab tablet Take 1 Tablet by mouth 2 times a day. 180 Tablet 1   • amitriptyline (ELAVIL) 50 MG Tab Take 1 Tablet by mouth every evening. 90 Tablet 0   • spironolactone (ALDACTONE) 25 MG Tab Take 1 Tablet by mouth every day. 90 Tablet 1   • atorvastatin (LIPITOR) 40 MG Tab Take 1 Tablet by mouth every day. 90 Tablet 1   • sildenafil citrate (VIAGRA) 50 MG tablet Take 1 Tablet by mouth as needed for Erectile Dysfunction. 10 Tablet 10   • sotalol  "(BETAPACE) 80 MG Tab Take 1 Tablet by mouth 2 times a day. 180 Tablet 1   • nitroglycerin (NITROSTAT) 0.4 MG SL Tab Place 1 Tablet under the tongue as needed for Chest Pain. DISSOLVE 1 TABLET UNDER THE TONGUE AS NEEDED FOR CHEST PAIN 25 Tablet 3   • zonisamide (ZONEGRAN) 100 MG Cap TAKE 3 CAPSULES DAILY 270 Capsule 3   • glucose blood (FREESTYLE LITE) strip 1 Strip by Other route 4 times a day. 300 Strip 4   • Insulin Pen Needle by Does not apply route.       No facility-administered encounter medications on file as of 5/18/2022.     Review of Systems   Constitutional: Negative for chills and fever.   HENT: Negative for congestion.    Respiratory: Negative for cough and shortness of breath.    Cardiovascular: Negative for chest pain, palpitations, orthopnea, leg swelling and PND.        Clicking in sternotomy incision still present.   Gastrointestinal: Negative for abdominal pain and nausea.   Musculoskeletal: Positive for joint pain. Negative for myalgias.        Neuropathy in back and arms.   Skin: Negative for rash.   Neurological: Negative for dizziness, loss of consciousness and headaches.   Endo/Heme/Allergies: Does not bruise/bleed easily.   Psychiatric/Behavioral: The patient does not have insomnia.               Objective     /64 (BP Location: Left arm, Patient Position: Sitting, BP Cuff Size: Adult)   Pulse 88   Resp 16   Ht 1.88 m (6' 2\")   Wt 103 kg (227 lb 4.7 oz)   SpO2 96%   BMI 29.18 kg/m²     Physical Exam  Constitutional:       Appearance: He is well-developed.      Comments: Weight is stable.   HENT:      Head: Normocephalic.   Neck:      Vascular: No JVD.   Cardiovascular:      Rate and Rhythm: Normal rate and regular rhythm.      Heart sounds: Normal heart sounds.      Comments: AICD in left chest wall.  Sternotomy scar present.  Pulmonary:      Effort: Pulmonary effort is normal. No respiratory distress.      Breath sounds: Normal breath sounds. No wheezing or rales.   Abdominal:    "   General: Bowel sounds are normal. There is no distension.      Palpations: Abdomen is soft.      Tenderness: There is no abdominal tenderness.   Musculoskeletal:         General: Normal range of motion.      Cervical back: Normal range of motion and neck supple.   Skin:     General: Skin is warm and dry.      Findings: No rash.   Neurological:      Mental Status: He is alert and oriented to person, place, and time.     CRT-D interrogation today reveals normal function. No device therapy (including ATP), and no mode switching episodes.    Interpretation Summary of Echocardiogram of 4/27/2022:  The study was technically difficult with some images being suboptimal in quality   There is apical hypokinesis   There is mild inferior wall hypokinesis.   Visually the ejection fraction appears in the 35-40% range.   There is no thrombus.   A variety of Doppler measurements indicate grade I diastolic dysfunction associated with normal left   atrial pressures.   No significant valvular abnormality noted     CONCLUSIONS OF ECHOCARDIOGRAM OF 10/16/2019:  Limited Study for LVEF.   TDS - Limited acoustical window, contrast not available.  Prior Echo - 2/12/18, prior EF probably 40%, currently    45%.  Mildly reduced left ventricular systolic function.  Left ventricular ejection fraction is visually estimated to be 45%.  Global hypokinesis.  Abnormal septal motion consistent with postoperative status.    POSTOPERATIVE DIAGNOSES OF [facility] OF 7/22/2016:  1.  Occluded vein grafts.  2.  Patent LIMA to LAD.  3.  Diffuse distal RCA disease.  4.  A 100% occluded LAD stent.  5.  Moderate nonobstructive left circumflex disease.  6.  LVEF 50%.    Lab Results   Component Value Date/Time    CHOLSTRLTOT 123 04/14/2022 08:00 AM    LDL 56 04/14/2022 08:00 AM    HDL 36 (A) 04/14/2022 08:00 AM    TRIGLYCERIDE 156 (H) 04/14/2022 08:00 AM       Lab Results   Component Value Date/Time    SODIUM 139 04/14/2022 08:00 AM    POTASSIUM 4.1 04/14/2022 08:00  AM    CHLORIDE 104 04/14/2022 08:00 AM    CO2 23 04/14/2022 08:00 AM    GLUCOSE 111 (H) 04/14/2022 08:00 AM    BUN 15 04/14/2022 08:00 AM    CREATININE 1.12 04/14/2022 08:00 AM     Lab Results   Component Value Date/Time    ALKPHOSPHAT 104 (H) 04/14/2022 08:00 AM    ASTSGOT 26 04/14/2022 08:00 AM    ALTSGPT 24 04/14/2022 08:00 AM    TBILIRUBIN 0.6 04/14/2022 08:00 AM               Assessment & Plan     1. Presence of biventricular AICD     2. Coronary artery disease involving native coronary artery of native heart without angina pectoris     3. Ischemic cardiomyopathy     4. S/P CABG x 3     5. ACC/AHA stage C systolic heart failure (HCC)     6. Ventricular tachycardia (HCC)     7. Paroxysmal atrial fibrillation (HCC)     8. Chronic anticoagulation     9. Essential hypertension     10. Mixed hyperlipidemia     11. Type 2 diabetes mellitus with complication, with long-term current use of insulin (HCC)     12. Obstructive sleep apnea syndrome         Medical Decision Making: Today's Assessment/Status/Plan:      1. CAD/ischemic cardiomyopathy, status post CABG x 3, with previous interventions, with LVEF 35-40%, with CRT-D, which is working normally. No angina or shortness of breath. Continue:  Eliquis 5mg twice daily  Lipitor 40mg once daily  Entresto 97-103mg twice daily  Toprol XL 50mg once daily  Aldactone 25mg once daily  NTG 0.4mg PRN    2. HFrEF, stage C, Class II, currently euvolemic. No recent HF exacerbations. Continue:  Eliquis 5mg twice daily  Lipitor 40mg once daily  Entresto 97-103mg twice daily  Toprol XL 50mg once daily  Aldactone 25mg once daily    3. History of VT, on Sotalol 80mg twice daily. Recent creatinine was normal.    4. PAFib, on Sotalol, no mode switching episodes.    5. Chronic anticoagulation with Eliquis, no bleeding problems.    6. Hypertension, treated with BB, Entresto and Aldactone. BP is well controlled.    7. Hyperlipidemia, treated with Lipitor 40mg. Recent LDL was 56 (at  goal).    8. Diabetes mellitus, treated with Synjardy and Ozempic. Recent HgbA1c was normal/non-diabetic level.    9. CLARE, treated, stable.    He is doing well. Reviewed echocardiogram results, which are stable from previous. Continue same cardiac medications. Labs per PCP. Keep follow-up with other provider regarding neuropathy. Follow-up with me in 6 months, for next device check.

## 2022-06-14 ENCOUNTER — NON-PROVIDER VISIT (OUTPATIENT)
Dept: MEDICAL GROUP | Facility: CLINIC | Age: 57
End: 2022-06-14
Payer: MEDICARE

## 2022-06-14 ENCOUNTER — HOSPITAL ENCOUNTER (OUTPATIENT)
Facility: MEDICAL CENTER | Age: 57
End: 2022-06-14
Attending: PHYSICIAN ASSISTANT
Payer: MEDICARE

## 2022-06-14 DIAGNOSIS — Z95.810 PRESENCE OF BIVENTRICULAR AICD: ICD-10-CM

## 2022-06-14 DIAGNOSIS — I48.0 PAROXYSMAL ATRIAL FIBRILLATION (HCC): ICD-10-CM

## 2022-06-14 DIAGNOSIS — E11.8 TYPE 2 DIABETES MELLITUS WITH COMPLICATION, WITH LONG-TERM CURRENT USE OF INSULIN (HCC): ICD-10-CM

## 2022-06-14 DIAGNOSIS — Z79.4 TYPE 2 DIABETES MELLITUS WITH COMPLICATION, WITH LONG-TERM CURRENT USE OF INSULIN (HCC): ICD-10-CM

## 2022-06-14 PROCEDURE — 99000 SPECIMEN HANDLING OFFICE-LAB: CPT | Performed by: PHYSICIAN ASSISTANT

## 2022-06-14 PROCEDURE — 83036 HEMOGLOBIN GLYCOSYLATED A1C: CPT

## 2022-06-14 PROCEDURE — 82570 ASSAY OF URINE CREATININE: CPT

## 2022-06-14 PROCEDURE — 80053 COMPREHEN METABOLIC PANEL: CPT

## 2022-06-14 PROCEDURE — 80061 LIPID PANEL: CPT

## 2022-06-14 PROCEDURE — 82043 UR ALBUMIN QUANTITATIVE: CPT

## 2022-06-14 PROCEDURE — 36415 COLL VENOUS BLD VENIPUNCTURE: CPT | Performed by: PHYSICIAN ASSISTANT

## 2022-06-15 LAB
ALBUMIN SERPL BCP-MCNC: 3.9 G/DL (ref 3.2–4.9)
ALBUMIN/GLOB SERPL: 1.5 G/DL
ALP SERPL-CCNC: 107 U/L (ref 30–99)
ALT SERPL-CCNC: 24 U/L (ref 2–50)
ANION GAP SERPL CALC-SCNC: 15 MMOL/L (ref 7–16)
AST SERPL-CCNC: 24 U/L (ref 12–45)
BILIRUB SERPL-MCNC: 0.4 MG/DL (ref 0.1–1.5)
BUN SERPL-MCNC: 11 MG/DL (ref 8–22)
CALCIUM SERPL-MCNC: 9.5 MG/DL (ref 8.5–10.5)
CHLORIDE SERPL-SCNC: 106 MMOL/L (ref 96–112)
CHOLEST SERPL-MCNC: 122 MG/DL (ref 100–199)
CO2 SERPL-SCNC: 21 MMOL/L (ref 20–33)
CREAT SERPL-MCNC: 0.97 MG/DL (ref 0.5–1.4)
CREAT UR-MCNC: 160.61 MG/DL
EST. AVERAGE GLUCOSE BLD GHB EST-MCNC: 103 MG/DL
GFR SERPLBLD CREATININE-BSD FMLA CKD-EPI: 92 ML/MIN/1.73 M 2
GLOBULIN SER CALC-MCNC: 2.6 G/DL (ref 1.9–3.5)
GLUCOSE SERPL-MCNC: 107 MG/DL (ref 65–99)
HBA1C MFR BLD: 5.2 % (ref 4–5.6)
HDLC SERPL-MCNC: 38 MG/DL
LDLC SERPL CALC-MCNC: 53 MG/DL
MICROALBUMIN UR-MCNC: 4 MG/DL
MICROALBUMIN/CREAT UR: 25 MG/G (ref 0–30)
POTASSIUM SERPL-SCNC: 4.2 MMOL/L (ref 3.6–5.5)
PROT SERPL-MCNC: 6.5 G/DL (ref 6–8.2)
SODIUM SERPL-SCNC: 142 MMOL/L (ref 135–145)
TRIGL SERPL-MCNC: 155 MG/DL (ref 0–149)

## 2022-06-30 ENCOUNTER — OFFICE VISIT (OUTPATIENT)
Dept: MEDICAL GROUP | Facility: CLINIC | Age: 57
End: 2022-06-30
Payer: MEDICARE

## 2022-06-30 VITALS
SYSTOLIC BLOOD PRESSURE: 104 MMHG | OXYGEN SATURATION: 97 % | HEART RATE: 81 BPM | DIASTOLIC BLOOD PRESSURE: 68 MMHG | BODY MASS INDEX: 28.89 KG/M2 | HEIGHT: 73 IN | WEIGHT: 218 LBS | TEMPERATURE: 97.2 F | RESPIRATION RATE: 16 BRPM

## 2022-06-30 DIAGNOSIS — Z95.810 PRESENCE OF BIVENTRICULAR AICD: ICD-10-CM

## 2022-06-30 DIAGNOSIS — I10 ESSENTIAL HYPERTENSION: ICD-10-CM

## 2022-06-30 DIAGNOSIS — E08.42 DIABETIC POLYNEUROPATHY ASSOCIATED WITH DIABETES MELLITUS DUE TO UNDERLYING CONDITION (HCC): ICD-10-CM

## 2022-06-30 DIAGNOSIS — I25.10 CORONARY ARTERY DISEASE INVOLVING NATIVE CORONARY ARTERY OF NATIVE HEART WITHOUT ANGINA PECTORIS: ICD-10-CM

## 2022-06-30 DIAGNOSIS — Z79.4 TYPE 2 DIABETES MELLITUS WITH COMPLICATION, WITH LONG-TERM CURRENT USE OF INSULIN (HCC): ICD-10-CM

## 2022-06-30 DIAGNOSIS — Z95.1 S/P CABG X 3: ICD-10-CM

## 2022-06-30 DIAGNOSIS — I48.0 PAROXYSMAL ATRIAL FIBRILLATION (HCC): ICD-10-CM

## 2022-06-30 DIAGNOSIS — R74.8 ELEVATED ALKALINE PHOSPHATASE LEVEL: ICD-10-CM

## 2022-06-30 DIAGNOSIS — I50.20 ACC/AHA STAGE C SYSTOLIC HEART FAILURE (HCC): ICD-10-CM

## 2022-06-30 DIAGNOSIS — E11.8 TYPE 2 DIABETES MELLITUS WITH COMPLICATION, WITH LONG-TERM CURRENT USE OF INSULIN (HCC): ICD-10-CM

## 2022-06-30 PROBLEM — M10.9 GOUT OF FOOT: Status: RESOLVED | Noted: 2019-04-05 | Resolved: 2022-06-30

## 2022-06-30 PROBLEM — E66.09 CLASS 2 OBESITY DUE TO EXCESS CALORIES IN ADULT: Status: RESOLVED | Noted: 2019-07-10 | Resolved: 2022-06-30

## 2022-06-30 PROBLEM — Z12.11 COLON CANCER SCREENING: Status: RESOLVED | Noted: 2021-05-10 | Resolved: 2022-06-30

## 2022-06-30 PROBLEM — E66.812 CLASS 2 OBESITY DUE TO EXCESS CALORIES IN ADULT: Status: RESOLVED | Noted: 2019-07-10 | Resolved: 2022-06-30

## 2022-06-30 PROCEDURE — 99214 OFFICE O/P EST MOD 30 MIN: CPT | Performed by: PHYSICIAN ASSISTANT

## 2022-06-30 ASSESSMENT — FIBROSIS 4 INDEX: FIB4 SCORE: 1.45

## 2022-06-30 NOTE — PROGRESS NOTES
cc:  diabetes    Subjective:     Juaquin Solorzano is a 56 y.o. male presenting for diabetes      Patient presents to the office for diabetes patient is a type II diabetic with polyneuropathy who is here to follow-up with his most recent test results.  Previous labs and indicate an elevated alkaline phosphatase level.  He does have hypertension and is seeing cardiology as he is status post CABG x3, and has paroxysmal atrial fibrillation, systolic heart failure, coronary artery disease and presence of a biventricular AICD.  He denies having any symptoms at this time.  He is waiting to hear from Ryan Alfonso to schedule his CT scans.    Review of systems:  See above.   Denies any symptoms unless previously indicated.        Current Outpatient Medications:   •  mirtazapine (REMERON) 15 MG Tab, Take 1 Tablet by mouth every evening., Disp: 90 Tablet, Rfl: 1  •  allopurinol (ZYLOPRIM) 300 MG Tab, Take 1 Tablet by mouth every day., Disp: 90 Tablet, Rfl: 1  •  DULoxetine (CYMBALTA) 60 MG Cap DR Particles delayed-release capsule, Take 2 Capsules by mouth every morning., Disp: 180 Capsule, Rfl: 1  •  apixaban (ELIQUIS) 5mg Tab, Take 1 Tablet by mouth 2 times a day., Disp: 180 Tablet, Rfl: 1  •  Semaglutide, 1 MG/DOSE, (OZEMPIC, 1 MG/DOSE,) 2 MG/1.5ML Solution Pen-injector, INJECT 1 MG UNDER THE SKIN EVERY 7 DAYS, Disp: 12 mL, Rfl: 1  •  metoprolol SR (TOPROL XL) 50 MG TABLET SR 24 HR, Take 1 Tablet by mouth every day., Disp: 90 Tablet, Rfl: 1  •  Empagliflozin-metFORMIN HCl ER (SYNJARDY XR) 12.5-1000 MG TABLET SR 24 HR, Take 1 Tablet by mouth 2 times a day., Disp: 180 Tablet, Rfl: 1  •  sacubitril-valsartan (ENTRESTO)  MG Tab tablet, Take 1 Tablet by mouth 2 times a day., Disp: 180 Tablet, Rfl: 1  •  amitriptyline (ELAVIL) 50 MG Tab, Take 1 Tablet by mouth every evening., Disp: 90 Tablet, Rfl: 0  •  spironolactone (ALDACTONE) 25 MG Tab, Take 1 Tablet by mouth every day., Disp: 90 Tablet, Rfl: 1  •  atorvastatin  "(LIPITOR) 40 MG Tab, Take 1 Tablet by mouth every day., Disp: 90 Tablet, Rfl: 1  •  sildenafil citrate (VIAGRA) 50 MG tablet, Take 1 Tablet by mouth as needed for Erectile Dysfunction., Disp: 10 Tablet, Rfl: 10  •  sotalol (BETAPACE) 80 MG Tab, Take 1 Tablet by mouth 2 times a day., Disp: 180 Tablet, Rfl: 1  •  nitroglycerin (NITROSTAT) 0.4 MG SL Tab, Place 1 Tablet under the tongue as needed for Chest Pain. DISSOLVE 1 TABLET UNDER THE TONGUE AS NEEDED FOR CHEST PAIN, Disp: 25 Tablet, Rfl: 3  •  zonisamide (ZONEGRAN) 100 MG Cap, TAKE 3 CAPSULES DAILY, Disp: 270 Capsule, Rfl: 3  •  glucose blood (FREESTYLE LITE) strip, 1 Strip by Other route 4 times a day., Disp: 300 Strip, Rfl: 4  •  Insulin Pen Needle, by Does not apply route., Disp: , Rfl:     Allergies, past medical history, past surgical history, family history, social history reviewed and updated    Objective:     Vitals: /68 (BP Location: Left arm, Patient Position: Sitting, BP Cuff Size: Adult)   Pulse 81   Temp 36.2 °C (97.2 °F) (Temporal)   Resp 16   Ht 1.854 m (6' 1\")   Wt 98.9 kg (218 lb)   SpO2 97%   BMI 28.76 kg/m²   General: Alert, pleasant, NAD  EYES:   PERRL, EOMI, no icterus or pallor.  Conjunctivae and lids normal.   HENT:  Normocephalic.  External ears normal.  Neck supple.    Respiratory: Normal respiratory effort.    Abdomen: Not obese  Skin: Warm, dry, no rashes.  Musculoskeletal: Gait is normal.  Moves all extremities well.    Extremities: normal range of motion all extremities.   Neurological: No tremors, sensation grossly intact, CN2-12 intact.  Psych:  Affect/mood is normal, judgement is good, memory is intact, grooming is appropriate.     Latest Reference Range & Units 06/14/22 08:30   Sodium 135 - 145 mmol/L 142   Potassium 3.6 - 5.5 mmol/L 4.2   Chloride 96 - 112 mmol/L 106   Co2 20 - 33 mmol/L 21   Anion Gap 7.0 - 16.0  15.0   Glucose 65 - 99 mg/dL 107 (H)   Bun 8 - 22 mg/dL 11   Creatinine 0.50 - 1.40 mg/dL 0.97   GFR " (CKD-EPI) >60 mL/min/1.73 m 2 92   Calcium 8.5 - 10.5 mg/dL 9.5   AST(SGOT) 12 - 45 U/L 24   ALT(SGPT) 2 - 50 U/L 24   Alkaline Phosphatase 30 - 99 U/L 107 (H)   Total Bilirubin 0.1 - 1.5 mg/dL 0.4   Albumin 3.2 - 4.9 g/dL 3.9   Total Protein 6.0 - 8.2 g/dL 6.5   Globulin 1.9 - 3.5 g/dL 2.6   A-G Ratio g/dL 1.5   Glycohemoglobin 4.0 - 5.6 % 5.2   Estim. Avg Glu mg/dL 103   Cholesterol,Tot 100 - 199 mg/dL 122   Triglycerides 0 - 149 mg/dL 155 (H)   HDL >=40 mg/dL 38 !   LDL <100 mg/dL 53   Micro Alb Creat Ratio 0 - 30 mg/g 25   Creatinine, Urine mg/dL 160.61   Microalbumin, Urine Random mg/dL 4.0   (H): Data is abnormally high  !: Data is abnormal    Assessment/Plan:     Juaquin was seen today for lab results.    Diagnoses and all orders for this visit:    Type 2 diabetes mellitus with complication, with long-term current use of insulin ([facility])  -     Lipid Profile; Future  -     Comp Metabolic Panel; Future  -     HEMOGLOBIN A1C; Future  Diabetic polyneuropathy associated with diabetes mellitus due to underlying condition (HCC)    Sugars are well controlled.  We will continue to monitor by repeating labs in 6 months and continue with medication.     Elevated alkaline phosphatase level    Stable.  Continue to monitor.    Essential hypertension  -     Lipid Profile; Future  -     Comp Metabolic Panel; Future  -     HEMOGLOBIN A1C; Future  S/P CABG x 3  -     Lipid Profile; Future  -     Comp Metabolic Panel; Future  -     HEMOGLOBIN A1C; Future  Paroxysmal atrial fibrillation (HCC)  -     Lipid Profile; Future  -     Comp Metabolic Panel; Future  -     HEMOGLOBIN A1C; Future  ACC/AHA stage C systolic heart failure (HCC)  Coronary artery disease involving native coronary artery of native heart without angina pectoris  Presence of biventricular AICD      Patient will continue to follow with cardiology.  We will repeat labs in 6 months.    No follow-ups on file.    Please note that this dictation was created using voice  recognition software. I have made every reasonable attempt to correct obvious errors, but expect that there are errors of grammar and possible content that I did not discover before finalizing note.

## 2022-06-30 NOTE — NON-PROVIDER
cc:  Lab follow up    Subjective:     Juaquin Solorzano is a 56 y.o. male presenting for follow up on lab work. All labs are wnl except for slightly elevated glucose and triglycerides. A1C was 5.2, he says he has been drinking mtn dew frequently. He denies any concerning symptoms and has no concerns today.     Review of systems:  See above.   Denies any symptoms unless previously indicated.        Current Outpatient Medications:   •  mirtazapine (REMERON) 15 MG Tab, Take 1 Tablet by mouth every evening., Disp: 90 Tablet, Rfl: 1  •  allopurinol (ZYLOPRIM) 300 MG Tab, Take 1 Tablet by mouth every day., Disp: 90 Tablet, Rfl: 1  •  DULoxetine (CYMBALTA) 60 MG Cap DR Particles delayed-release capsule, Take 2 Capsules by mouth every morning., Disp: 180 Capsule, Rfl: 1  •  apixaban (ELIQUIS) 5mg Tab, Take 1 Tablet by mouth 2 times a day., Disp: 180 Tablet, Rfl: 1  •  Semaglutide, 1 MG/DOSE, (OZEMPIC, 1 MG/DOSE,) 2 MG/1.5ML Solution Pen-injector, INJECT 1 MG UNDER THE SKIN EVERY 7 DAYS, Disp: 12 mL, Rfl: 1  •  metoprolol SR (TOPROL XL) 50 MG TABLET SR 24 HR, Take 1 Tablet by mouth every day., Disp: 90 Tablet, Rfl: 1  •  Empagliflozin-metFORMIN HCl ER (SYNJARDY XR) 12.5-1000 MG TABLET SR 24 HR, Take 1 Tablet by mouth 2 times a day., Disp: 180 Tablet, Rfl: 1  •  sacubitril-valsartan (ENTRESTO)  MG Tab tablet, Take 1 Tablet by mouth 2 times a day., Disp: 180 Tablet, Rfl: 1  •  amitriptyline (ELAVIL) 50 MG Tab, Take 1 Tablet by mouth every evening., Disp: 90 Tablet, Rfl: 0  •  spironolactone (ALDACTONE) 25 MG Tab, Take 1 Tablet by mouth every day., Disp: 90 Tablet, Rfl: 1  •  atorvastatin (LIPITOR) 40 MG Tab, Take 1 Tablet by mouth every day., Disp: 90 Tablet, Rfl: 1  •  sildenafil citrate (VIAGRA) 50 MG tablet, Take 1 Tablet by mouth as needed for Erectile Dysfunction., Disp: 10 Tablet, Rfl: 10  •  sotalol (BETAPACE) 80 MG Tab, Take 1 Tablet by mouth 2 times a day., Disp: 180 Tablet, Rfl: 1  •  nitroglycerin  "(NITROSTAT) 0.4 MG SL Tab, Place 1 Tablet under the tongue as needed for Chest Pain. DISSOLVE 1 TABLET UNDER THE TONGUE AS NEEDED FOR CHEST PAIN, Disp: 25 Tablet, Rfl: 3  •  zonisamide (ZONEGRAN) 100 MG Cap, TAKE 3 CAPSULES DAILY, Disp: 270 Capsule, Rfl: 3  •  glucose blood (FREESTYLE LITE) strip, 1 Strip by Other route 4 times a day., Disp: 300 Strip, Rfl: 4  •  Insulin Pen Needle, by Does not apply route., Disp: , Rfl:     Allergies, past medical history, past surgical history, family history, social history reviewed and updated    Objective:     Vitals: /68 (BP Location: Left arm, Patient Position: Sitting, BP Cuff Size: Adult)   Pulse 81   Temp 36.2 °C (97.2 °F) (Temporal)   Resp 16   Ht 1.854 m (6' 1\")   Wt 98.9 kg (218 lb)   SpO2 97%   BMI 28.76 kg/m²   General: Alert, pleasant, NAD  EYES:   PERRL, EOMI, no icterus or pallor.  Conjunctivae and lids normal.   Heart: Regular rate and rhythm.  S1 and S2 normal.  No murmurs appreciated.  Respiratory: Normal respiratory effort.  Clear to auscultation bilaterally.  Skin: Warm, dry, no rashes.  Musculoskeletal: Gait is normal.  Moves all extremities well.    Psych:  Affect/mood is normal, judgement is good, memory is intact, grooming is appropriate.    Assessment/Plan:     Juaquin was seen today for lab results.    Diagnoses and all orders for this visit:    Essential hypertension  -     Lipid Profile; Future  -     Comp Metabolic Panel; Future  -     HEMOGLOBIN A1C; Future    Type 2 diabetes mellitus with complication, with long-term current use of insulin (HCC)  -     Lipid Profile; Future  -     Comp Metabolic Panel; Future  -     HEMOGLOBIN A1C; Future    S/P CABG x 3  -     Lipid Profile; Future  -     Comp Metabolic Panel; Future  -     HEMOGLOBIN A1C; Future    Paroxysmal atrial fibrillation (HCC)  -     Lipid Profile; Future  -     Comp Metabolic Panel; Future  -     HEMOGLOBIN A1C; Future            No follow-ups on file.    "

## 2022-07-08 ENCOUNTER — NON-PROVIDER VISIT (OUTPATIENT)
Dept: CARDIOLOGY | Facility: MEDICAL CENTER | Age: 57
End: 2022-07-08
Payer: MEDICARE

## 2022-07-08 PROCEDURE — 93295 DEV INTERROG REMOTE 1/2/MLT: CPT | Performed by: INTERNAL MEDICINE

## 2022-07-11 NOTE — CARDIAC REMOTE MONITOR - SCAN
Device transmission reviewed. Device demonstrated appropriate function.       Electronically Signed by: Esequiel Cullen M.D.    7/25/2022  6:00 PM

## 2022-08-17 DIAGNOSIS — I48.0 PAROXYSMAL ATRIAL FIBRILLATION (HCC): ICD-10-CM

## 2022-08-17 DIAGNOSIS — I47.20 VENTRICULAR TACHYCARDIA (HCC): ICD-10-CM

## 2022-08-18 RX ORDER — SOTALOL HYDROCHLORIDE 80 MG/1
80 TABLET ORAL 2 TIMES DAILY
Qty: 180 TABLET | Refills: 2 | Status: SHIPPED | OUTPATIENT
Start: 2022-08-18 | End: 2022-11-23 | Stop reason: SDUPTHER

## 2022-08-18 NOTE — TELEPHONE ENCOUNTER
Is the patient due for a refill? Yes    Was the patient seen the past year? Yes    Date of last office visit: 5/18/22    Does the patient have an upcoming appointment?  Yes   If yes, When? 11/23/22    Provider to refill: AB    Does the patients insurance require a 100 day supply?  No

## 2022-09-26 ENCOUNTER — HOSPITAL ENCOUNTER (OUTPATIENT)
Dept: RADIOLOGY | Facility: MEDICAL CENTER | Age: 57
End: 2022-09-26
Attending: PHYSICIAN ASSISTANT

## 2022-09-26 ENCOUNTER — OFFICE VISIT (OUTPATIENT)
Dept: MEDICAL GROUP | Facility: CLINIC | Age: 57
End: 2022-09-26
Payer: MEDICARE

## 2022-09-26 VITALS
HEART RATE: 86 BPM | HEIGHT: 74 IN | OXYGEN SATURATION: 97 % | TEMPERATURE: 97.7 F | RESPIRATION RATE: 16 BRPM | BODY MASS INDEX: 27.87 KG/M2 | SYSTOLIC BLOOD PRESSURE: 104 MMHG | DIASTOLIC BLOOD PRESSURE: 70 MMHG | WEIGHT: 217.2 LBS

## 2022-09-26 DIAGNOSIS — M54.9 MID BACK PAIN: ICD-10-CM

## 2022-09-26 DIAGNOSIS — E08.42 DIABETIC POLYNEUROPATHY ASSOCIATED WITH DIABETES MELLITUS DUE TO UNDERLYING CONDITION (HCC): ICD-10-CM

## 2022-09-26 DIAGNOSIS — G89.29 CHRONIC MIDLINE LOW BACK PAIN WITHOUT SCIATICA: ICD-10-CM

## 2022-09-26 DIAGNOSIS — R56.9 FOCAL SEIZURES (HCC): ICD-10-CM

## 2022-09-26 DIAGNOSIS — I50.20 ACC/AHA STAGE C SYSTOLIC HEART FAILURE (HCC): ICD-10-CM

## 2022-09-26 DIAGNOSIS — M54.50 CHRONIC MIDLINE LOW BACK PAIN WITHOUT SCIATICA: ICD-10-CM

## 2022-09-26 DIAGNOSIS — Z23 NEED FOR VACCINATION: ICD-10-CM

## 2022-09-26 DIAGNOSIS — M77.9 BONE SPUR: ICD-10-CM

## 2022-09-26 DIAGNOSIS — E78.2 MIXED HYPERLIPIDEMIA: ICD-10-CM

## 2022-09-26 DIAGNOSIS — M54.2 NECK PAIN, CHRONIC: ICD-10-CM

## 2022-09-26 DIAGNOSIS — G89.29 NECK PAIN, CHRONIC: ICD-10-CM

## 2022-09-26 DIAGNOSIS — E11.8 TYPE 2 DIABETES MELLITUS WITH COMPLICATION, WITH LONG-TERM CURRENT USE OF INSULIN (HCC): ICD-10-CM

## 2022-09-26 DIAGNOSIS — Z79.4 TYPE 2 DIABETES MELLITUS WITH COMPLICATION, WITH LONG-TERM CURRENT USE OF INSULIN (HCC): ICD-10-CM

## 2022-09-26 DIAGNOSIS — Z95.1 S/P CABG X 3: ICD-10-CM

## 2022-09-26 PROBLEM — M75.80: Status: ACTIVE | Noted: 2022-09-26

## 2022-09-26 PROBLEM — M75.80: Status: RESOLVED | Noted: 2022-09-26 | Resolved: 2022-09-26

## 2022-09-26 PROCEDURE — 90686 IIV4 VACC NO PRSV 0.5 ML IM: CPT | Performed by: PHYSICIAN ASSISTANT

## 2022-09-26 PROCEDURE — G0010 ADMIN HEPATITIS B VACCINE: HCPCS | Performed by: PHYSICIAN ASSISTANT

## 2022-09-26 PROCEDURE — 90746 HEPB VACCINE 3 DOSE ADULT IM: CPT | Performed by: PHYSICIAN ASSISTANT

## 2022-09-26 PROCEDURE — G0008 ADMIN INFLUENZA VIRUS VAC: HCPCS | Performed by: PHYSICIAN ASSISTANT

## 2022-09-26 PROCEDURE — 99214 OFFICE O/P EST MOD 30 MIN: CPT | Mod: 25 | Performed by: PHYSICIAN ASSISTANT

## 2022-09-26 RX ORDER — SEMAGLUTIDE 1.34 MG/ML
INJECTION, SOLUTION SUBCUTANEOUS
COMMUNITY
Start: 2022-07-14 | End: 2022-12-22

## 2022-09-26 RX ORDER — ZOSTER VACCINE RECOMBINANT, ADJUVANTED 50 MCG/0.5
0.5 KIT INTRAMUSCULAR ONCE
Qty: 1 EACH | Refills: 0 | Status: SHIPPED | OUTPATIENT
Start: 2022-09-26 | End: 2022-09-26

## 2022-09-26 ASSESSMENT — FIBROSIS 4 INDEX: FIB4 SCORE: 1.47

## 2022-09-26 NOTE — PROGRESS NOTES
cc: CT results    Subjective:     Juaquin Solorzano is a 57 y.o. male presenting for CT results    Patient presents to the office for CT results.  He states that he is frustrated as he had the CT of the chest done but patient did not have the CT of thoracic spine scheduled and so it was not done.  He does have neck pain along with his mid back pain and low back pain.  He would like to get further imaging of his spine.  He also reports having a history of bone spurs    Patient is a type II diabetic with a long-term history of insulin use and polyneuropathy.  He also has mixed hyperlipidemia and is status post CABG x3 with systolic heart failure.  He will be due for lab work in December.    Patient is due for hepatitis B second dose vaccine and flu vaccine today which will be given in office.  He is also due for his second dose of Shingrix which she will have at the pharmacy.  He is here to go over his most recent CT results.    Review of systems:  See above.   Denies any symptoms unless previously indicated.        Current Outpatient Medications:     OZEMPIC, 1 MG/DOSE, 4 MG/3ML Solution Pen-injector, , Disp: , Rfl:     Zoster Vac Recomb Adjuvanted (SHINGRIX) 50 MCG/0.5ML Recon Susp, Inject 0.5 mL into the shoulder, thigh, or buttocks one time for 1 dose., Disp: 1 Each, Rfl: 0    sotalol (BETAPACE) 80 MG Tab, Take 1 Tablet by mouth 2 times a day., Disp: 180 Tablet, Rfl: 2    amitriptyline (ELAVIL) 50 MG Tab, TAKE 1 TABLET EVERY EVENING, Disp: 90 Tablet, Rfl: 1    mirtazapine (REMERON) 15 MG Tab, Take 1 Tablet by mouth every evening., Disp: 90 Tablet, Rfl: 1    allopurinol (ZYLOPRIM) 300 MG Tab, Take 1 Tablet by mouth every day., Disp: 90 Tablet, Rfl: 1    DULoxetine (CYMBALTA) 60 MG Cap DR Particles delayed-release capsule, Take 2 Capsules by mouth every morning., Disp: 180 Capsule, Rfl: 1    apixaban (ELIQUIS) 5mg Tab, Take 1 Tablet by mouth 2 times a day., Disp: 180 Tablet, Rfl: 1    Semaglutide, 1 MG/DOSE,  "(OZEMPIC, 1 MG/DOSE,) 2 MG/1.5ML Solution Pen-injector, INJECT 1 MG UNDER THE SKIN EVERY 7 DAYS, Disp: 12 mL, Rfl: 1    metoprolol SR (TOPROL XL) 50 MG TABLET SR 24 HR, Take 1 Tablet by mouth every day., Disp: 90 Tablet, Rfl: 1    Empagliflozin-metFORMIN HCl ER (SYNJARDY XR) 12.5-1000 MG TABLET SR 24 HR, Take 1 Tablet by mouth 2 times a day., Disp: 180 Tablet, Rfl: 1    sacubitril-valsartan (ENTRESTO)  MG Tab tablet, Take 1 Tablet by mouth 2 times a day., Disp: 180 Tablet, Rfl: 1    spironolactone (ALDACTONE) 25 MG Tab, Take 1 Tablet by mouth every day., Disp: 90 Tablet, Rfl: 1    atorvastatin (LIPITOR) 40 MG Tab, Take 1 Tablet by mouth every day., Disp: 90 Tablet, Rfl: 1    sildenafil citrate (VIAGRA) 50 MG tablet, Take 1 Tablet by mouth as needed for Erectile Dysfunction., Disp: 10 Tablet, Rfl: 10    nitroglycerin (NITROSTAT) 0.4 MG SL Tab, Place 1 Tablet under the tongue as needed for Chest Pain. DISSOLVE 1 TABLET UNDER THE TONGUE AS NEEDED FOR CHEST PAIN, Disp: 25 Tablet, Rfl: 3    zonisamide (ZONEGRAN) 100 MG Cap, TAKE 3 CAPSULES DAILY, Disp: 270 Capsule, Rfl: 3    glucose blood (FREESTYLE LITE) strip, 1 Strip by Other route 4 times a day., Disp: 300 Strip, Rfl: 4    Insulin Pen Needle, by Does not apply route., Disp: , Rfl:     Allergies, past medical history, past surgical history, family history, social history reviewed and updated    Objective:     Vitals: /70 (BP Location: Left arm, Patient Position: Sitting, BP Cuff Size: Adult)   Pulse 86   Temp 36.5 °C (97.7 °F) (Temporal)   Resp 16   Ht 1.88 m (6' 2\")   Wt 98.5 kg (217 lb 3.2 oz)   SpO2 97%   BMI 27.89 kg/m²   General: Alert, pleasant, NAD  EYES:   PERRL, EOMI, no icterus or pallor.  Conjunctivae and lids normal.   HENT:  Normocephalic.  External ears normal.   Neck supple.   Respiratory: Normal respiratory effort.   Abdomen: Not obese  Skin: Warm, dry, no rashes.  Musculoskeletal: Gait is normal.  Moves all extremities well.  "   Extremities: Normal range of motion all extremities.   Neurological: No tremors, sensation grossly intact, CN2-12 intact.  Psych:  Affect/mood is normal, judgement is good, memory is intact, grooming is appropriate.    Assessment/Plan:     Juaquin was seen today for results.    Diagnoses and all orders for this visit:    Mid back pain  -     OUTSIDE IMAGES-CT THORACIC SPINE; Future  -     OUTSIDE IMAGES-CT LUMBAR SPINE; Future  Chronic midline low back pain without sciatica  -     OUTSIDE IMAGES-CT THORACIC SPINE; Future  -     OUTSIDE IMAGES-CT LUMBAR SPINE; Future  Neck pain, chronic  -     OUTSIDE IMAGES-CT CERVICAL SPINE; Future  Bone spur  -     OUTSIDE IMAGES-CT CERVICAL SPINE; Future  -     OUTSIDE IMAGES-CT THORACIC SPINE; Future  -     OUTSIDE IMAGES-CT LUMBAR SPINE; Future    CTs have been ordered to evaluate further.  We will follow-up with test results when received.    Type 2 diabetes mellitus with complication, with long-term current use of insulin (HCC)  Diabetic polyneuropathy associated with diabetes mellitus due to underlying condition (HCC)  Mixed hyperlipidemia  S/P CABG x 3  ACC/AHA stage C systolic heart failure (HCC)    CT results discussed with patient.  He will be due for labs in December.      Need for vaccination  -     Cancel: Hepatitis B Vaccine Adult 20+  -     Zoster Vac Recomb Adjuvanted (SHINGRIX) 50 MCG/0.5ML Recon Susp; Inject 0.5 mL into the shoulder, thigh, or buttocks one time for 1 dose.  -     Hepatitis B Vaccine Adult IM  -     INFLUENZA VACCINE QUAD INJ (PF)    Vaccines given today.  Side effects discussed.  He will have his Shingrix vaccine done as soon as he is able.      No follow-ups on file.    Please note that this dictation was created using voice recognition software. I have made every reasonable attempt to correct obvious errors, but expect that there are errors of grammar and possible content that I did not discover before finalizing note.

## 2022-09-27 NOTE — PROGRESS NOTES
Please notify patient that I am placing a new referral to neurology so he can continue to receive his medication.  He does need to make a follow up with Dr. Hunt within the next 3 months.

## 2022-10-10 ENCOUNTER — NON-PROVIDER VISIT (OUTPATIENT)
Dept: CARDIOLOGY | Facility: MEDICAL CENTER | Age: 57
End: 2022-10-10
Payer: MEDICARE

## 2022-10-10 PROCEDURE — 93295 DEV INTERROG REMOTE 1/2/MLT: CPT | Performed by: INTERNAL MEDICINE

## 2022-10-10 NOTE — CARDIAC REMOTE MONITOR - SCAN
Device transmission reviewed. Device demonstrated appropriate function.       Electronically Signed by: Esequiel Cullen M.D.    10/11/2022  2:54 PM

## 2022-11-02 ENCOUNTER — PATIENT MESSAGE (OUTPATIENT)
Dept: HEALTH INFORMATION MANAGEMENT | Facility: OTHER | Age: 57
End: 2022-11-02

## 2022-11-23 ENCOUNTER — OFFICE VISIT (OUTPATIENT)
Dept: CARDIOLOGY | Facility: PHYSICIAN GROUP | Age: 57
End: 2022-11-23
Payer: MEDICARE

## 2022-11-23 ENCOUNTER — NON-PROVIDER VISIT (OUTPATIENT)
Dept: CARDIOLOGY | Facility: PHYSICIAN GROUP | Age: 57
End: 2022-11-23
Payer: MEDICARE

## 2022-11-23 VITALS
BODY MASS INDEX: 27.59 KG/M2 | DIASTOLIC BLOOD PRESSURE: 40 MMHG | RESPIRATION RATE: 14 BRPM | HEIGHT: 74 IN | HEART RATE: 14 BPM | OXYGEN SATURATION: 99 % | SYSTOLIC BLOOD PRESSURE: 80 MMHG | WEIGHT: 215 LBS

## 2022-11-23 VITALS
HEIGHT: 74 IN | DIASTOLIC BLOOD PRESSURE: 40 MMHG | RESPIRATION RATE: 14 BRPM | BODY MASS INDEX: 27.61 KG/M2 | SYSTOLIC BLOOD PRESSURE: 80 MMHG | OXYGEN SATURATION: 99 % | HEART RATE: 87 BPM | WEIGHT: 215.17 LBS

## 2022-11-23 DIAGNOSIS — Z95.810 PRESENCE OF BIVENTRICULAR AICD: ICD-10-CM

## 2022-11-23 DIAGNOSIS — E78.2 MIXED HYPERLIPIDEMIA: ICD-10-CM

## 2022-11-23 DIAGNOSIS — Z79.4 TYPE 2 DIABETES MELLITUS WITH COMPLICATION, WITH LONG-TERM CURRENT USE OF INSULIN (HCC): ICD-10-CM

## 2022-11-23 DIAGNOSIS — I50.20 ACC/AHA STAGE C SYSTOLIC HEART FAILURE (HCC): ICD-10-CM

## 2022-11-23 DIAGNOSIS — Z79.01 CHRONIC ANTICOAGULATION: ICD-10-CM

## 2022-11-23 DIAGNOSIS — I48.0 PAROXYSMAL ATRIAL FIBRILLATION (HCC): ICD-10-CM

## 2022-11-23 DIAGNOSIS — M54.9 MID BACK PAIN: ICD-10-CM

## 2022-11-23 DIAGNOSIS — I47.20 VENTRICULAR TACHYCARDIA (HCC): ICD-10-CM

## 2022-11-23 DIAGNOSIS — E11.8 TYPE 2 DIABETES MELLITUS WITH COMPLICATION, WITH LONG-TERM CURRENT USE OF INSULIN (HCC): ICD-10-CM

## 2022-11-23 DIAGNOSIS — E08.42 DIABETIC POLYNEUROPATHY ASSOCIATED WITH DIABETES MELLITUS DUE TO UNDERLYING CONDITION (HCC): ICD-10-CM

## 2022-11-23 DIAGNOSIS — I25.5 ISCHEMIC CARDIOMYOPATHY: ICD-10-CM

## 2022-11-23 DIAGNOSIS — I25.10 CORONARY ARTERY DISEASE INVOLVING NATIVE CORONARY ARTERY OF NATIVE HEART WITHOUT ANGINA PECTORIS: ICD-10-CM

## 2022-11-23 DIAGNOSIS — G47.33 OBSTRUCTIVE SLEEP APNEA SYNDROME: ICD-10-CM

## 2022-11-23 DIAGNOSIS — I10 ESSENTIAL HYPERTENSION: ICD-10-CM

## 2022-11-23 DIAGNOSIS — Z95.1 S/P CABG X 3: ICD-10-CM

## 2022-11-23 PROCEDURE — 99214 OFFICE O/P EST MOD 30 MIN: CPT | Performed by: NURSE PRACTITIONER

## 2022-11-23 PROCEDURE — 93284 PRGRMG EVAL IMPLANTABLE DFB: CPT | Performed by: NURSE PRACTITIONER

## 2022-11-23 RX ORDER — SOTALOL HYDROCHLORIDE 80 MG/1
80 TABLET ORAL 2 TIMES DAILY
Qty: 200 TABLET | Refills: 3 | Status: SHIPPED | OUTPATIENT
Start: 2022-11-23 | End: 2022-12-22 | Stop reason: SDUPTHER

## 2022-11-23 RX ORDER — AMITRIPTYLINE HYDROCHLORIDE 50 MG/1
50 TABLET, FILM COATED ORAL EVERY EVENING
Qty: 100 TABLET | Refills: 1 | Status: SHIPPED | OUTPATIENT
Start: 2022-11-23 | End: 2023-01-12

## 2022-11-23 ASSESSMENT — ENCOUNTER SYMPTOMS
SHORTNESS OF BREATH: 0
FEVER: 0
ABDOMINAL PAIN: 0
INSOMNIA: 0
DIZZINESS: 0
CHILLS: 0
ORTHOPNEA: 0
BACK PAIN: 1
MYALGIAS: 0
PND: 0
HEADACHES: 0
PALPITATIONS: 0
NAUSEA: 0
BRUISES/BLEEDS EASILY: 0
LOSS OF CONSCIOUSNESS: 0
COUGH: 0

## 2022-11-23 ASSESSMENT — FIBROSIS 4 INDEX
FIB4 SCORE: 1.47
FIB4 SCORE: 1.47

## 2022-11-23 NOTE — PROGRESS NOTES
Chief Complaint   Patient presents with    Follow-Up    Biventricular AICD    Coronary Artery Disease    Cardiomyopathy (Ischemic)    CHF (Compensated)    Ventricular Tachycardia    Atrial Fibrillation    Anticoagulation    HTN (Controlled)    Hyperlipidemia    Diabetes (Controlled)       Subjective     Juaquin Solorzano is a 57 y.o. male who presents today for six month follow-up of CAD/ischemic cardiomyopathy, HFrEF, CRT-D, VT, PAFib, anticoagulation, HTN, hyperlipidemia, and DM.    Juaquin is a 57 year old male with history of CAD/ischemic cardiomyopathy with LVEF 35-40% (on echo in April 2022), with history of stents in 2005 and 2006, followed by CABG x 3 in 2013 (anatomical details unknown); coronary angiogram in July 2016 showed patent LIMA to LAD, but occluded SVG-RCA, nonobstructive disease of LCx, and diffuse distal disease of the distal RCA, no other SVG was identified.      He had CRT-D implanted in April 2018, and also has HFrEF, hypertension, hyperlipidemia, CLARE and diabetes mellitus, last seen by me in May 2022.     In August 2021, he was hospitalized for Covid-19, with acute respiratory failure with hypoxia, and was treated with Remdesivir, steroids and oxygen.      He is here today for six month follow-up. He is still working on losing weight, and labs are showing his DM pretty much under control/normal. No device therapy. He denies any chest pain, pressure, tightness or discomfort; no palpitations; breathing is stable with no dyspnea, orthopnea or PND; no LE edema. No dizziness, lightheadedness or syncope; BP is stable.     He has been having a lot of back pain, and had a CT scan done recently.    Past Medical History:   Diagnosis Date    Arthritis     Hands/elbows    Block, bundle branch, left     CAD (coronary artery disease)     Cataract     Early stages, not removed    Class 2 obesity due to excess calories in adult 7/10/2019    Diabetes (HCC) 2006    On oral medications, now well controlled     Fibromyalgia 04/26/2018    5-6/10    GERD (gastroesophageal reflux disease)     Gout     Hyperlipidemia     Hypertension     Ischemic cardiomyopathy 04/2022    Echocardiogram with normal LV size, LVEF 35-40%. Septal wall hypokinesis, apical hypokinesis. Normal RA, LA and RV. RVSP 25-30mMHg    Myocardial infarct (HCC) 2007    Myocardial infarct (HCC) 2012    Neuropathy (HCC)     Bilateral feet    Psychiatric problem     Depression and anxiety    S/P colonoscopy     repeat 6 years at age of 61.    Sleep apnea     Uses CPAP     Past Surgical History:   Procedure Laterality Date    AICD IMPLANT Left 04/27/2018    [employer]ogen X4 CRT-D G148 implanted by Dr. Cullen.    RECOVERY  7/22/2016    Procedure: CATH LAB-[facility] W/POSSIBLE RADIAL APPROACH-CARIE;  Surgeon: Mireya Surgery;  [place] [facility] [place]:     MULTIPLE CORONARY ARTERY BYPASS  2014    CABG x 3    LEONILA BY LAPAROSCOPY  2010    KNEE ARTHROSCOPY      Arthroscopy, Knee     Family History   Problem Relation Age of Onset    Heart Disease Mother     Diabetes Mother     Hyperlipidemia Father     Heart Disease Father     Hypertension Father     Stroke Father     Lung Disease Sister      Social History     Socioeconomic History    Marital status:      Spouse name: Not on file    Number of children: Not on file    Years of education: Not on file    Highest education level: Not on file   Occupational History    Not on file   Tobacco Use    Smoking status: Former     Packs/day: 0.50     Years: 12.00     Pack years: 6.00     Types: Cigarettes     Quit date: 7/15/2012     Years since quitting: 10.3    Smokeless tobacco: Never   Vaping Use    Vaping Use: Some days    Substances: THC    Devices: Disposable   Substance and Sexual Activity    Alcohol use: Not Currently    Drug use: Yes     Types: Marijuana, Oral     Comment: bedtime    Sexual activity: Yes     Partners: Female   Other Topics Concern    Not on file   Social  History Narrative    Not on file     Social Determinants of Health     Financial Resource Strain: Not on file   Food Insecurity: Not on file   Transportation Needs: Not on file   Physical Activity: Not on file   Stress: Not on file   Social Connections: Not on file   Intimate Partner Violence: Not on file   Housing Stability: Not on file     Allergies   Allergen Reactions    Iodine Anaphylaxis     Other reaction(s): Unknown     Outpatient Encounter Medications as of 11/23/2022   Medication Sig Dispense Refill    sotalol (BETAPACE) 80 MG Tab Take 1 Tablet by mouth 2 times a day. 200 Tablet 3    amitriptyline (ELAVIL) 50 MG Tab Take 1 Tablet by mouth every evening. 100 Tablet 1    zonisamide (ZONEGRAN) 100 MG Cap TAKE 3 CAPSULES DAILY 270 Capsule 0    Semaglutide, 1 MG/DOSE, (OZEMPIC, 1 MG/DOSE,) 4 MG/3ML Solution Pen-injector INJECT 1 MG UNDER THE SKIN EVERY 7 DAYS 12 mL 2    mirtazapine (REMERON) 15 MG Tab TAKE 1 TABLET EVERY EVENING 90 Tablet 2    allopurinol (ZYLOPRIM) 300 MG Tab TAKE 1 TABLET DAILY 90 Tablet 2    DULoxetine (CYMBALTA) 60 MG Cap DR Particles delayed-release capsule TAKE 2 CAPSULES EVERY MORNING 180 Capsule 2    SYNJARDY XR 12.5-1000 MG TABLET SR 24 HR TAKE 1 TABLET TWICE A DAY (NEED A FOLLOW UP FOR FURTHER REFILLS) 180 Tablet 2    ENTRESTO  MG Tab TAKE 1 TABLET TWICE A  Tablet 2    metoprolol SR (TOPROL XL) 50 MG TABLET SR 24 HR TAKE 1 TABLET DAILY 90 Tablet 2    atorvastatin (LIPITOR) 40 MG Tab TAKE 1 TABLET DAILY 90 Tablet 2    ELIQUIS 5 MG Tab TAKE 1 TABLET TWICE A  Tablet 2    OZEMPIC, 1 MG/DOSE, 4 MG/3ML Solution Pen-injector       spironolactone (ALDACTONE) 25 MG Tab Take 1 Tablet by mouth every day. 90 Tablet 1    sildenafil citrate (VIAGRA) 50 MG tablet Take 1 Tablet by mouth as needed for Erectile Dysfunction. 10 Tablet 10    nitroglycerin (NITROSTAT) 0.4 MG SL Tab Place 1 Tablet under the tongue as needed for Chest Pain. DISSOLVE 1 TABLET UNDER THE TONGUE AS NEEDED  "FOR CHEST PAIN 25 Tablet 3    glucose blood (FREESTYLE LITE) strip 1 Strip by Other route 4 times a day. 300 Strip 4    Insulin Pen Needle by Does not apply route.      [DISCONTINUED] sotalol (BETAPACE) 80 MG Tab Take 1 Tablet by mouth 2 times a day. 180 Tablet 2    [DISCONTINUED] amitriptyline (ELAVIL) 50 MG Tab TAKE 1 TABLET EVERY EVENING 90 Tablet 1     No facility-administered encounter medications on file as of 11/23/2022.     Review of Systems   Constitutional:  Negative for chills and fever.   HENT:  Negative for congestion.    Respiratory:  Negative for cough and shortness of breath.    Cardiovascular:  Negative for chest pain, palpitations, orthopnea, leg swelling and PND.        Clicking in sternotomy incision still present.   Gastrointestinal:  Negative for abdominal pain and nausea.   Musculoskeletal:  Positive for back pain and joint pain. Negative for myalgias.        Neuropathy in back and arms.   Skin:  Negative for rash.   Neurological:  Negative for dizziness, loss of consciousness and headaches.   Endo/Heme/Allergies:  Does not bruise/bleed easily.   Psychiatric/Behavioral:  The patient does not have insomnia.             Objective     BP (!) 80/40 (BP Location: Left arm, Patient Position: Sitting, BP Cuff Size: Adult)   Pulse (!) 14   Resp 14   Ht 1.88 m (6' 2\")   Wt 97.5 kg (215 lb)   SpO2 99%   BMI 27.60 kg/m²     Physical Exam  Constitutional:       Appearance: He is well-developed.      Comments: Weight is stable.   HENT:      Head: Normocephalic.   Neck:      Vascular: No JVD.   Cardiovascular:      Rate and Rhythm: Normal rate and regular rhythm.      Heart sounds: Normal heart sounds.      Comments: AICD in left chest wall.  Sternotomy scar present.  Pulmonary:      Effort: Pulmonary effort is normal. No respiratory distress.      Breath sounds: Normal breath sounds. No wheezing or rales.   Abdominal:      General: Bowel sounds are normal. There is no distension.      Palpations: " Abdomen is soft.      Tenderness: There is no abdominal tenderness.   Musculoskeletal:         General: Normal range of motion.      Cervical back: Normal range of motion and neck supple.   Skin:     General: Skin is warm and dry.      Findings: No rash.   Neurological:      Mental Status: He is alert and oriented to person, place, and time.     CRT-D interrogation reveals normal function. 5 NSVT episodes, no therapy. 12 mode switching episodes, 5.5 minutes total time.    EKG ordered and interpreted by me today reveals atrial sensed ventricularly paced rhythm, QTc 499ms.    IMPRESSION OF CT SCAN OF CHEST OF 9/6/2022:  No acute cardiopulmonary abnormalities.  No rib fractures.   Post CABG changes. Left ventricle apical wall thickening with fat deposition possibly a sequela of prior myocardial infarction.      Interpretation Summary of Echocardiogram of 4/27/2022:  The study was technically difficult with some images being suboptimal in quality   There is apical hypokinesis   There is mild inferior wall hypokinesis.   Visually the ejection fraction appears in the 35-40% range.   There is no thrombus.   A variety of Doppler measurements indicate grade I diastolic dysfunction associated with normal left   atrial pressures.   No significant valvular abnormality noted      CONCLUSIONS OF ECHOCARDIOGRAM OF 10/16/2019:  Limited Study for LVEF.   TDS - Limited acoustical window, contrast not available.  Prior Echo - 2/12/18, prior EF probably 40%, currently    45%.  Mildly reduced left ventricular systolic function.  Left ventricular ejection fraction is visually estimated to be 45%.  Global hypokinesis.  Abnormal septal motion consistent with postoperative status.     POSTOPERATIVE DIAGNOSES OF [facility] OF 7/22/2016:  1.  Occluded vein grafts.  2.  Patent LIMA to LAD.  3.  Diffuse distal RCA disease.  4.  A 100% occluded LAD stent.  5.  Moderate nonobstructive left circumflex disease.  6.  LVEF 50%.    Component      Latest Ref  Rng 6/14/2022  8:30 AM   Glycohemoglobin      4.0 - 5.6 % 5.2    Estim. Avg Glu      mg/dL 103      Lab Results   Component Value Date/Time    CHOLSTRLTOT 122 06/14/2022 08:30 AM    LDL 53 06/14/2022 08:30 AM    HDL 38 (A) 06/14/2022 08:30 AM    TRIGLYCERIDE 155 (H) 06/14/2022 08:30 AM       Lab Results   Component Value Date/Time    SODIUM 142 06/14/2022 08:30 AM    POTASSIUM 4.2 06/14/2022 08:30 AM    CHLORIDE 106 06/14/2022 08:30 AM    CO2 21 06/14/2022 08:30 AM    GLUCOSE 107 (H) 06/14/2022 08:30 AM    BUN 11 06/14/2022 08:30 AM    CREATININE 0.97 06/14/2022 08:30 AM     Lab Results   Component Value Date/Time    ALKPHOSPHAT 107 (H) 06/14/2022 08:30 AM    ASTSGOT 24 06/14/2022 08:30 AM    ALTSGPT 24 06/14/2022 08:30 AM    TBILIRUBIN 0.4 06/14/2022 08:30 AM         Assessment & Plan     1. Presence of biventricular AICD        2. Coronary artery disease involving native coronary artery of native heart without angina pectoris        3. Ischemic cardiomyopathy        4. S/P CABG x 3        5. ACC/AHA stage C systolic heart failure (HCC)        6. Ventricular tachycardia  sotalol (BETAPACE) 80 MG Tab      7. Paroxysmal atrial fibrillation (HCC)  sotalol (BETAPACE) 80 MG Tab      8. Chronic anticoagulation        9. Essential hypertension        10. Mixed hyperlipidemia        11. Diabetic polyneuropathy associated with diabetes mellitus due to underlying condition (HCC)  amitriptyline (ELAVIL) 50 MG Tab      12. Obstructive sleep apnea syndrome        13. Type 2 diabetes mellitus with complication, with long-term current use of insulin (HCC)        14. Mid back pain            Medical Decision Making: Today's Assessment/Status/Plan:      1. CAD/ischemic cardiomyopathy status post CABG x 3 and previous intervention, with LVEF 35-40% on echo in April 2022. CRT-D is working normally. Continue:  Eliquis 5mg twice daily  Lipitor 40mg once daily  Entresto 97-103mg twice daily  Toprol XL 50mg once daily  Aldactone 25mg  once daily  NTG 0.4mg PRN (rarely uses)    2. HFrEF, stage C, class II, LVEF 35-40%, currently stable and euvolemic. No HF symptoms. Continue Eliquis, statin, Entresto, BB and Aldactone.    3. History of VT on Sotalol 80mg twice daily. EKG and creatinine are stable.    4. PAFib, on Sotalol 80mg twice daily, minimal mode switching.    5. Chronic anticoagulation with Eliquis, no bleeding issues.    6. Hypertension, treated, with low BP today. He claims he is asymptomatic; make sure to stay hydrated and get up slowly.    7. Hyperlipidemia, treated with Lipitor 40mg. Last LDL was 53 (at goal).    8. Diabetes mellitus, treated with combination therapy. Recent HgbA1c was normal.    9. Back pain, with CT scan of back pending.    He remains stable at this time. BP is a little low today, but he assures me he feels fine.  Keep follow-up with PCP regarding back pain. Follow-up with us in 6 months for next device check.

## 2022-12-22 ENCOUNTER — TELEPHONE (OUTPATIENT)
Dept: MEDICAL GROUP | Facility: CLINIC | Age: 57
End: 2022-12-22

## 2022-12-22 ENCOUNTER — OFFICE VISIT (OUTPATIENT)
Dept: MEDICAL GROUP | Facility: CLINIC | Age: 57
End: 2022-12-22
Payer: MEDICARE

## 2022-12-22 VITALS
TEMPERATURE: 97 F | RESPIRATION RATE: 14 BRPM | BODY MASS INDEX: 28.02 KG/M2 | SYSTOLIC BLOOD PRESSURE: 98 MMHG | WEIGHT: 211.4 LBS | OXYGEN SATURATION: 96 % | DIASTOLIC BLOOD PRESSURE: 70 MMHG | HEART RATE: 78 BPM | HEIGHT: 73 IN

## 2022-12-22 DIAGNOSIS — N17.9 AKI (ACUTE KIDNEY INJURY) (HCC): ICD-10-CM

## 2022-12-22 DIAGNOSIS — G44.89 OTHER HEADACHE SYNDROME: ICD-10-CM

## 2022-12-22 DIAGNOSIS — I50.20 ACC/AHA STAGE C SYSTOLIC HEART FAILURE (HCC): ICD-10-CM

## 2022-12-22 DIAGNOSIS — E11.8 TYPE 2 DIABETES MELLITUS WITH COMPLICATION, WITH LONG-TERM CURRENT USE OF INSULIN (HCC): ICD-10-CM

## 2022-12-22 DIAGNOSIS — M54.9 MID BACK PAIN: ICD-10-CM

## 2022-12-22 DIAGNOSIS — I10 ESSENTIAL HYPERTENSION: ICD-10-CM

## 2022-12-22 DIAGNOSIS — R05.3 CHRONIC COUGH: ICD-10-CM

## 2022-12-22 DIAGNOSIS — R56.9 FOCAL SEIZURES (HCC): ICD-10-CM

## 2022-12-22 DIAGNOSIS — I47.20 VENTRICULAR TACHYCARDIA (HCC): ICD-10-CM

## 2022-12-22 DIAGNOSIS — F32.9 MAJOR DEPRESSION, CHRONIC: ICD-10-CM

## 2022-12-22 DIAGNOSIS — I48.0 PAROXYSMAL ATRIAL FIBRILLATION (HCC): ICD-10-CM

## 2022-12-22 DIAGNOSIS — Z79.4 TYPE 2 DIABETES MELLITUS WITH COMPLICATION, WITH LONG-TERM CURRENT USE OF INSULIN (HCC): ICD-10-CM

## 2022-12-22 DIAGNOSIS — G93.40 ACUTE ENCEPHALOPATHY: ICD-10-CM

## 2022-12-22 PROBLEM — R83.8 ELEVATED CSF PROTEIN: Status: ACTIVE | Noted: 2022-11-26

## 2022-12-22 PROBLEM — R25.1 TREMOR OF RIGHT HAND: Status: ACTIVE | Noted: 2022-11-26

## 2022-12-22 PROBLEM — I95.89 HYPOTENSION, IATROGENIC: Status: ACTIVE | Noted: 2022-11-26

## 2022-12-22 PROBLEM — R29.898 WEAKNESS OF RIGHT LEG: Status: ACTIVE | Noted: 2022-11-26

## 2022-12-22 PROCEDURE — 99215 OFFICE O/P EST HI 40 MIN: CPT | Performed by: PHYSICIAN ASSISTANT

## 2022-12-22 RX ORDER — EMPAGLIFLOZIN, METFORMIN HYDROCHLORIDE 12.5; 1 MG/1; MG/1
1 TABLET, EXTENDED RELEASE ORAL
Qty: 180 TABLET | Refills: 2 | Status: SHIPPED | OUTPATIENT
Start: 2022-12-22 | End: 2023-04-26 | Stop reason: SDUPTHER

## 2022-12-22 RX ORDER — BACLOFEN 5 MG/1
TABLET ORAL
COMMUNITY
Start: 2022-12-15 | End: 2022-12-22

## 2022-12-22 RX ORDER — BACLOFEN 5 MG/1
5 TABLET ORAL
COMMUNITY
Start: 2022-11-26 | End: 2022-12-22 | Stop reason: SDUPTHER

## 2022-12-22 RX ORDER — SEMAGLUTIDE 1.34 MG/ML
1 INJECTION, SOLUTION SUBCUTANEOUS
Qty: 9 ML | Refills: 3 | Status: SHIPPED | OUTPATIENT
Start: 2022-12-22 | End: 2023-04-26 | Stop reason: SDUPTHER

## 2022-12-22 RX ORDER — BENZONATATE 100 MG/1
100 CAPSULE ORAL 3 TIMES DAILY PRN
Qty: 90 CAPSULE | Refills: 1 | Status: SHIPPED | OUTPATIENT
Start: 2022-12-22 | End: 2024-02-09 | Stop reason: SDUPTHER

## 2022-12-22 RX ORDER — BACLOFEN 5 MG/1
5 TABLET ORAL DAILY
Qty: 90 TABLET | Refills: 2 | Status: SHIPPED | OUTPATIENT
Start: 2022-12-22 | End: 2023-05-04 | Stop reason: SDUPTHER

## 2022-12-22 RX ORDER — ZONISAMIDE 100 MG/1
300 CAPSULE ORAL DAILY
COMMUNITY
End: 2022-12-22

## 2022-12-22 RX ORDER — ZONISAMIDE 100 MG/1
300 CAPSULE ORAL DAILY
Qty: 270 CAPSULE | Refills: 2 | Status: SHIPPED | OUTPATIENT
Start: 2022-12-22 | End: 2023-02-01 | Stop reason: SDUPTHER

## 2022-12-22 RX ORDER — BUTALBITAL, ACETAMINOPHEN, CAFFEINE AND CODEINE PHOSPHATE 300; 50; 40; 30 MG/1; MG/1; MG/1; MG/1
1 CAPSULE ORAL 3 TIMES DAILY PRN
Qty: 30 CAPSULE | Refills: 0 | Status: SHIPPED | OUTPATIENT
Start: 2022-12-22 | End: 2023-01-01

## 2022-12-22 RX ORDER — BENZONATATE 100 MG/1
CAPSULE ORAL
COMMUNITY
Start: 2022-12-15 | End: 2022-12-22 | Stop reason: SDUPTHER

## 2022-12-22 RX ORDER — SOTALOL HYDROCHLORIDE 80 MG/1
80 TABLET ORAL 2 TIMES DAILY
Qty: 180 TABLET | Refills: 2 | Status: SHIPPED | OUTPATIENT
Start: 2022-12-22 | End: 2023-05-04 | Stop reason: SDUPTHER

## 2022-12-22 RX ORDER — ACETAMINOPHEN 500 MG
1000 TABLET ORAL
COMMUNITY
Start: 2022-11-26 | End: 2023-05-24

## 2022-12-22 RX ORDER — DULOXETIN HYDROCHLORIDE 60 MG/1
60 CAPSULE, DELAYED RELEASE ORAL DAILY
Qty: 90 CAPSULE | Refills: 2 | Status: SHIPPED | OUTPATIENT
Start: 2022-12-22 | End: 2023-05-04 | Stop reason: SDUPTHER

## 2022-12-22 RX ORDER — ATORVASTATIN CALCIUM 40 MG/1
40 TABLET, FILM COATED ORAL DAILY
Qty: 90 TABLET | Refills: 2 | Status: SHIPPED | OUTPATIENT
Start: 2022-12-22 | End: 2023-05-04 | Stop reason: SDUPTHER

## 2022-12-22 RX ORDER — METOPROLOL SUCCINATE 50 MG/1
50 TABLET, EXTENDED RELEASE ORAL DAILY
Qty: 90 TABLET | Refills: 2 | Status: SHIPPED | OUTPATIENT
Start: 2022-12-22 | End: 2023-05-04 | Stop reason: SDUPTHER

## 2022-12-22 RX ORDER — SEMAGLUTIDE 1.34 MG/ML
1 INJECTION, SOLUTION SUBCUTANEOUS
COMMUNITY
End: 2022-12-22 | Stop reason: SDUPTHER

## 2022-12-22 ASSESSMENT — FIBROSIS 4 INDEX: FIB4 SCORE: 1.11

## 2022-12-22 NOTE — TELEPHONE ENCOUNTER
Patient has not been able to get his CT of his spine at [facility] as they say they do not have the orders.  Can we please fax orders and make sure that they have the orders.

## 2022-12-22 NOTE — PROGRESS NOTES
cc:  hospital follow up    Subjective:     Juaquin Solorzano is a 57 y.o. male presenting for hospital follow up      Patient presents to the office for hospital follow up. He has been having headaches.  He states that tramadol, hydrocodone have not helped.  Gabapentin does not help.  Baclofen does not help.  He did try hydromorphone which provided 3 hours of relief.  Edibles did not help.  Patient indicates having a spinal tap on 11-24.  Headaches persisted after the spinal tap.  He went in for a blood patch on 12- and was told by radiology that this was not needed.  Headaches persist.  In the hospital he was diagnosed with encephalopathy and transient neurologic pain.  He has been doing better with the encephalopathy and the transient neurologic pain but the headaches persist for him.  He has an appointment with his neurologist in February.  Patient indicates that light and sound makes his headache worse.  The headache seems to wrap around his entire head and be behind both eyes.    Patient indicates needing refills on all of his medications.  He is taking medication for type 2 diabetes, hypertension, paroxysmal atrial fibrillation, ventricular tachycardia, stage C systolic heart failure.  He is also taking medication for chronic major depression, chronic cough and mid back pain.    Patient also indicates that he has not been able to get the CT done for his back.  Previous orders have been placed but [facility] K[facility] indicates that they have not yet received them per patient.    Review of systems:  See above.   Denies any symptoms unless previously indicated.        Current Outpatient Medications:     Empagliflozin-metFORMIN HCl ER (SYNJARDY XR) 12.5-1000 MG TABLET SR 24 HR, Take 1 Tablet by mouth 2 times a day., Disp: 180 Tablet, Rfl: 2    sacubitril-valsartan (ENTRESTO) 24-26 MG Tab, Take 1 Tablet by mouth 2 times a day., Disp: 180 Tablet, Rfl: 1    DULoxetine (CYMBALTA) 60 MG Cap DR Particles  delayed-release capsule, Take 1 Capsule by mouth every day., Disp: 90 Capsule, Rfl: 2    Semaglutide, 1 MG/DOSE, (OZEMPIC, 1 MG/DOSE,) 4 MG/3ML Solution Pen-injector, Inject 1 mg under the skin every 7 days., Disp: 9 mL, Rfl: 3    zonisamide (ZONEGRAN) 100 MG Cap, Take 3 Capsules by mouth every day., Disp: 270 Capsule, Rfl: 2    baclofen (LIORESAL) 5 MG Tab, Take 1 Tablet by mouth every day., Disp: 90 Tablet, Rfl: 2    sotalol (BETAPACE) 80 MG Tab, Take 1 Tablet by mouth 2 times a day., Disp: 180 Tablet, Rfl: 2    benzonatate (TESSALON) 100 MG Cap, Take 1 Capsule by mouth 3 times a day as needed for Cough., Disp: 90 Capsule, Rfl: 1    apixaban (ELIQUIS) 5mg Tab, Take 1 Tablet by mouth 2 times a day., Disp: 180 Tablet, Rfl: 2    atorvastatin (LIPITOR) 40 MG Tab, Take 1 Tablet by mouth every day., Disp: 90 Tablet, Rfl: 2    metoprolol SR (TOPROL XL) 50 MG TABLET SR 24 HR, Take 1 Tablet by mouth every day., Disp: 90 Tablet, Rfl: 2    Butalbital-APAP-Caff-Cod -87-30 MG Cap, Take 1 Tablet by mouth 3 times a day as needed (pain) for up to 10 days., Disp: 30 Capsule, Rfl: 0    glucose blood (FREESTYLE LITE) strip, 1 Strip by Other route 4 times a day., Disp: 300 Strip, Rfl: 4    Insulin Pen Needle, by Does not apply route., Disp: , Rfl:     acetaminophen (TYLENOL) 500 MG Tab, Take 1,000 mg by mouth. (Patient not taking: Reported on 12/22/2022), Disp: , Rfl:     amitriptyline (ELAVIL) 50 MG Tab, Take 1 Tablet by mouth every evening., Disp: 100 Tablet, Rfl: 1    mirtazapine (REMERON) 15 MG Tab, TAKE 1 TABLET EVERY EVENING (Patient not taking: Reported on 12/22/2022), Disp: 90 Tablet, Rfl: 2    allopurinol (ZYLOPRIM) 300 MG Tab, TAKE 1 TABLET DAILY, Disp: 90 Tablet, Rfl: 2    spironolactone (ALDACTONE) 25 MG Tab, Take 1 Tablet by mouth every day. (Patient not taking: Reported on 12/22/2022), Disp: 90 Tablet, Rfl: 1    sildenafil citrate (VIAGRA) 50 MG tablet, Take 1 Tablet by mouth as needed for Erectile Dysfunction.  "(Patient not taking: Reported on 12/22/2022), Disp: 10 Tablet, Rfl: 10    nitroglycerin (NITROSTAT) 0.4 MG SL Tab, Place 1 Tablet under the tongue as needed for Chest Pain. DISSOLVE 1 TABLET UNDER THE TONGUE AS NEEDED FOR CHEST PAIN (Patient not taking: Reported on 12/22/2022), Disp: 25 Tablet, Rfl: 3    Allergies, past medical history, past surgical history, family history, social history reviewed and updated    Objective:     Vitals: BP 98/70 (BP Location: Left arm, Patient Position: Sitting, BP Cuff Size: Adult)   Pulse 78   Temp 36.1 °C (97 °F) (Temporal)   Resp 14   Ht 1.854 m (6' 1\")   Wt 95.9 kg (211 lb 6.4 oz)   SpO2 96%   BMI 27.89 kg/m²   General: Alert, pleasant, NAD  EYES:   PERRL, EOMI, no icterus or pallor.  Conjunctivae and lids normal.   HENT:  Normocephalic.  External ears normal.  Neck supple.     Respiratory: Normal respiratory effort.  Clear to auscultation bilaterally.  Abdomen: Not distended.  Not obese  Skin: Warm, dry, no rashes.  Musculoskeletal: Gait is normal.  Moves all extremities well.    Extremities: Normal range of motion all extremities.   Neurological: No tremors, sensation grossly intact,  CN2-12 intact.  Psych:  Affect/mood is normal, judgement is good, memory is intact, grooming is appropriate.    Assessment/Plan:     Juaquin was seen today for headache and follow-up.    Diagnoses and all orders for this visit:    Other headache syndrome  -     Controlled Substance Treatment Agreement  -     Butalbital-APAP-Caff-Cod -76-30 MG Cap; Take 1 Tablet by mouth 3 times a day as needed (pain) for up to 10 days.  Focal seizures (HCC)  -     zonisamide (ZONEGRAN) 100 MG Cap; Take 3 Capsules by mouth every day.  Acute encephalopathy    I have sent a message to patient's neurologist asking for additional assistance.  I am concerned about starting a migraine medication when I am not sure the exact cause of his headache.  He has had scans while in the hospital as well.  At this " time I believe our safest option would be butalbital which would be simply a temporary medication and is not for long-term use by any means.  I am happy to follow any recommendations neurology may have when they respond.  Advised patient of potential risks of medication and he is to use with caution.  Otherwise medications refilled.  Patient has signed a pain contract.    A Risk of Abuse assessment for opioid abuse was previously preformed and documented in the past medical history. The treatment plan was reviewed and discussed with the patient. The pharmacy monitoring report was requested and reviewed. The treatment plan was adjusted based on efficacy as described.     Opioid Risk Score: 0    Interpretation of Opioid Risk Score   Score 0-3 = Low risk of abuse. Do UDS at least once per year.  Score 4-7 = Moderate risk of abuse. Do UDS 1-4 times per year.  Score 8+ = High risk of abuse. Refer to specialist.       JUDY (acute kidney injury) (HCC)    Will need to order at next visit in 2 weeks.      Type 2 diabetes mellitus with complication, with long-term current use of insulin (HCC)  -     Empagliflozin-metFORMIN HCl ER (SYNJARDY XR) 12.5-1000 MG TABLET SR 24 HR; Take 1 Tablet by mouth 2 times a day.  -     Semaglutide, 1 MG/DOSE, (OZEMPIC, 1 MG/DOSE,) 4 MG/3ML Solution Pen-injector; Inject 1 mg under the skin every 7 days.  -     atorvastatin (LIPITOR) 40 MG Tab; Take 1 Tablet by mouth every day.  Essential hypertension  -     sacubitril-valsartan (ENTRESTO) 24-26 MG Tab; Take 1 Tablet by mouth 2 times a day.  -     metoprolol SR (TOPROL XL) 50 MG TABLET SR 24 HR; Take 1 Tablet by mouth every day.  Paroxysmal atrial fibrillation (HCC)  -     sotalol (BETAPACE) 80 MG Tab; Take 1 Tablet by mouth 2 times a day.  -     apixaban (ELIQUIS) 5mg Tab; Take 1 Tablet by mouth 2 times a day.  Ventricular tachycardia  -     sotalol (BETAPACE) 80 MG Tab; Take 1 Tablet by mouth 2 times a day.  ACC/AHA stage C systolic heart  failure (HCC)  -     atorvastatin (LIPITOR) 40 MG Tab; Take 1 Tablet by mouth every day.  Major depression, chronic  -     DULoxetine (CYMBALTA) 60 MG Cap DR Particles delayed-release capsule; Take 1 Capsule by mouth every day.  Chronic cough  -     benzonatate (TESSALON) 100 MG Cap; Take 1 Capsule by mouth 3 times a day as needed for Cough.  Mid back pain  -     baclofen (LIORESAL) 5 MG Tab; Take 1 Tablet by mouth every day.    Medication refills given at this time.  Follow up in 2 weeks.    Approximately 50 minutes was spent with patient with greater than 50% spent in education counseling and coordination of care of patient's seizures, headaches, hospital follow-up including encephalopathy and current heart issues.    Return in about 2 weeks (around 1/5/2023), or if symptoms worsen or fail to improve.    Please note that this dictation was created using voice recognition software. I have made every reasonable attempt to correct obvious errors, but expect that there are errors of grammar and possible content that I did not discover before finalizing note.

## 2022-12-23 NOTE — TELEPHONE ENCOUNTER
The orders are listed but then it says exam ended.  Can we contact [facility] and see if they have reports or if they cancelled the testing?

## 2022-12-27 ENCOUNTER — HOSPITAL ENCOUNTER (OUTPATIENT)
Dept: RADIOLOGY | Facility: MEDICAL CENTER | Age: 57
End: 2022-12-27
Attending: PHYSICIAN ASSISTANT
Payer: MEDICARE

## 2022-12-27 DIAGNOSIS — G89.29 NECK PAIN, CHRONIC: ICD-10-CM

## 2022-12-27 DIAGNOSIS — G44.89 OTHER HEADACHE SYNDROME: ICD-10-CM

## 2022-12-27 DIAGNOSIS — M54.9 MID BACK PAIN: ICD-10-CM

## 2022-12-27 DIAGNOSIS — M54.2 NECK PAIN, CHRONIC: ICD-10-CM

## 2022-12-27 DIAGNOSIS — M54.50 CHRONIC MIDLINE LOW BACK PAIN WITHOUT SCIATICA: ICD-10-CM

## 2022-12-27 DIAGNOSIS — R20.0 NUMBNESS OF ARM: ICD-10-CM

## 2022-12-27 DIAGNOSIS — M25.551 RIGHT HIP PAIN: ICD-10-CM

## 2022-12-27 DIAGNOSIS — R29.898 WEAKNESS OF RIGHT LEG: ICD-10-CM

## 2022-12-27 DIAGNOSIS — G89.29 CHRONIC MIDLINE LOW BACK PAIN WITHOUT SCIATICA: ICD-10-CM

## 2023-01-09 ENCOUNTER — NON-PROVIDER VISIT (OUTPATIENT)
Dept: CARDIOLOGY | Facility: MEDICAL CENTER | Age: 58
End: 2023-01-09

## 2023-01-09 NOTE — CARDIAC REMOTE MONITOR - SCAN
Device transmission reviewed. Device demonstrated appropriate function.       Electronically Signed by: Luis Enrique France M.D.    1/13/2023  10:31 AM

## 2023-01-12 ENCOUNTER — HOSPITAL ENCOUNTER (OUTPATIENT)
Dept: RADIOLOGY | Facility: MEDICAL CENTER | Age: 58
End: 2023-01-12
Attending: PHYSICIAN ASSISTANT

## 2023-01-12 ENCOUNTER — OFFICE VISIT (OUTPATIENT)
Dept: MEDICAL GROUP | Facility: CLINIC | Age: 58
End: 2023-01-12
Payer: MEDICARE

## 2023-01-12 VITALS
BODY MASS INDEX: 26.44 KG/M2 | SYSTOLIC BLOOD PRESSURE: 92 MMHG | HEART RATE: 73 BPM | WEIGHT: 206 LBS | OXYGEN SATURATION: 97 % | TEMPERATURE: 97.8 F | RESPIRATION RATE: 14 BRPM | HEIGHT: 74 IN | DIASTOLIC BLOOD PRESSURE: 68 MMHG

## 2023-01-12 DIAGNOSIS — G44.89 OTHER HEADACHE SYNDROME: ICD-10-CM

## 2023-01-12 DIAGNOSIS — G89.29 NECK PAIN, CHRONIC: ICD-10-CM

## 2023-01-12 DIAGNOSIS — M54.50 CHRONIC MIDLINE LOW BACK PAIN WITHOUT SCIATICA: ICD-10-CM

## 2023-01-12 DIAGNOSIS — G89.29 CHRONIC MIDLINE LOW BACK PAIN WITHOUT SCIATICA: ICD-10-CM

## 2023-01-12 DIAGNOSIS — M54.9 MID BACK PAIN: ICD-10-CM

## 2023-01-12 DIAGNOSIS — M54.2 NECK PAIN, CHRONIC: ICD-10-CM

## 2023-01-12 PROCEDURE — 99214 OFFICE O/P EST MOD 30 MIN: CPT | Performed by: PHYSICIAN ASSISTANT

## 2023-01-12 RX ORDER — BUTALBITAL, ACETAMINOPHEN, CAFFEINE AND CODEINE PHOSPHATE 50; 325; 40; 30 MG/1; MG/1; MG/1; MG/1
CAPSULE ORAL
COMMUNITY
Start: 2022-12-22 | End: 2023-01-12

## 2023-01-12 RX ORDER — AMITRIPTYLINE HYDROCHLORIDE 25 MG/1
TABLET, FILM COATED ORAL
Qty: 180 TABLET | Refills: 0 | Status: SHIPPED | OUTPATIENT
Start: 2023-01-12 | End: 2023-05-04

## 2023-01-12 ASSESSMENT — FIBROSIS 4 INDEX: FIB4 SCORE: 1.11

## 2023-01-12 ASSESSMENT — PATIENT HEALTH QUESTIONNAIRE - PHQ9: CLINICAL INTERPRETATION OF PHQ2 SCORE: 0

## 2023-01-12 NOTE — PROGRESS NOTES
cc:  headache    Subjective:     Juaquin Solorzano is a 57 y.o. male presenting for headache      Patient presents to the office for headache.  He states that nothing has helped the headache including the fioricet.  He indicates that he has not seen any significant change in his headaches since the last saw him.  Nothing is made this better or worse including the medication that he was given at last visit.  He has been on amitriptyline in the past and is not sure why he stopped the medication except he was told to by a different provider.    Patient is also trying to get CTs of his spine.  He indicates that he is still having a difficult time scheduling these appointments as he does not see orders in his MyChart.    Review of systems:  See above.   Denies any symptoms unless previously indicated.        Current Outpatient Medications:     amitriptyline (ELAVIL) 25 MG Tab, Take 2 tabs nightly, Disp: 180 Tablet, Rfl: 0    Empagliflozin-metFORMIN HCl ER (SYNJARDY XR) 12.5-1000 MG TABLET SR 24 HR, Take 1 Tablet by mouth 2 times a day., Disp: 180 Tablet, Rfl: 2    sacubitril-valsartan (ENTRESTO) 24-26 MG Tab, Take 1 Tablet by mouth 2 times a day., Disp: 180 Tablet, Rfl: 1    DULoxetine (CYMBALTA) 60 MG Cap DR Particles delayed-release capsule, Take 1 Capsule by mouth every day., Disp: 90 Capsule, Rfl: 2    Semaglutide, 1 MG/DOSE, (OZEMPIC, 1 MG/DOSE,) 4 MG/3ML Solution Pen-injector, Inject 1 mg under the skin every 7 days., Disp: 9 mL, Rfl: 3    zonisamide (ZONEGRAN) 100 MG Cap, Take 3 Capsules by mouth every day., Disp: 270 Capsule, Rfl: 2    baclofen (LIORESAL) 5 MG Tab, Take 1 Tablet by mouth every day., Disp: 90 Tablet, Rfl: 2    sotalol (BETAPACE) 80 MG Tab, Take 1 Tablet by mouth 2 times a day., Disp: 180 Tablet, Rfl: 2    benzonatate (TESSALON) 100 MG Cap, Take 1 Capsule by mouth 3 times a day as needed for Cough., Disp: 90 Capsule, Rfl: 1    apixaban (ELIQUIS) 5mg Tab, Take 1 Tablet by mouth 2 times a day.,  "Disp: 180 Tablet, Rfl: 2    atorvastatin (LIPITOR) 40 MG Tab, Take 1 Tablet by mouth every day., Disp: 90 Tablet, Rfl: 2    metoprolol SR (TOPROL XL) 50 MG TABLET SR 24 HR, Take 1 Tablet by mouth every day., Disp: 90 Tablet, Rfl: 2    allopurinol (ZYLOPRIM) 300 MG Tab, TAKE 1 TABLET DAILY, Disp: 90 Tablet, Rfl: 2    sildenafil citrate (VIAGRA) 50 MG tablet, Take 1 Tablet by mouth as needed for Erectile Dysfunction., Disp: 10 Tablet, Rfl: 10    nitroglycerin (NITROSTAT) 0.4 MG SL Tab, Place 1 Tablet under the tongue as needed for Chest Pain. DISSOLVE 1 TABLET UNDER THE TONGUE AS NEEDED FOR CHEST PAIN, Disp: 25 Tablet, Rfl: 3    glucose blood (FREESTYLE LITE) strip, 1 Strip by Other route 4 times a day., Disp: 300 Strip, Rfl: 4    Insulin Pen Needle, by Does not apply route., Disp: , Rfl:     acetaminophen (TYLENOL) 500 MG Tab, Take 1,000 mg by mouth. (Patient not taking: Reported on 12/22/2022), Disp: , Rfl:     mirtazapine (REMERON) 15 MG Tab, TAKE 1 TABLET EVERY EVENING (Patient not taking: Reported on 12/22/2022), Disp: 90 Tablet, Rfl: 2    spironolactone (ALDACTONE) 25 MG Tab, Take 1 Tablet by mouth every day. (Patient not taking: Reported on 12/22/2022), Disp: 90 Tablet, Rfl: 1    Allergies, past medical history, past surgical history, family history, social history reviewed and updated    Objective:     Vitals: BP 92/68 (BP Location: Left arm, Patient Position: Sitting, BP Cuff Size: Adult)   Pulse 73   Temp 36.6 °C (97.8 °F) (Temporal)   Resp 14   Ht 1.88 m (6' 2\")   Wt 93.4 kg (206 lb)   SpO2 97%   BMI 26.45 kg/m²   General: Alert, pleasant, NAD  EYES:   PERRL, EOMI, no icterus or pallor.  Conjunctivae and lids normal.   HENT:  Normocephalic.  External ears normal.  Neck supple.  Respiratory: Normal respiratory effort.    Abdomen: Not obese  Skin: Warm, dry, no rashes.  Musculoskeletal: Gait is normal.  Moves all extremities well.    Extremities: Normal range of motion all extremities.   Neurological: " No tremors, sensation grossly intact, CN2-12 intact.  Psych:  Affect/mood is normal, judgement is good, memory is intact, grooming is appropriate.    Assessment/Plan:     Juaquin was seen today for headache.    Diagnoses and all orders for this visit:    Other headache syndrome  -     amitriptyline (ELAVIL) 25 MG Tab; Take 2 tabs nightly    Previously on amitriptyline 50 mg.  We will restart medication starting at 25 mg with an increase in the next 7 to 10 days.  Patient understands that this may change depending on neurology's recommendations.  We will follow any recommendations they may have.    Chronic midline low back pain without sciatica  -     OUTSIDE IMAGES-CT LUMBAR SPINE; Future  Neck pain, chronic  -     OUTSIDE IMAGES-CT CERVICAL SPINE; Future  Mid back pain  -     OUTSIDE IMAGES-CT THORACIC SPINE; Future    CTs reordered.  Will send to [facility].        Return for after feb 1  headaches.    Please note that this dictation was created using voice recognition software. I have made every reasonable attempt to correct obvious errors, but expect that there are errors of grammar and possible content that I did not discover before finalizing note.

## 2023-02-01 ENCOUNTER — OFFICE VISIT (OUTPATIENT)
Dept: NEUROLOGY | Facility: MEDICAL CENTER | Age: 58
End: 2023-02-01
Attending: PSYCHIATRY & NEUROLOGY
Payer: MEDICARE

## 2023-02-01 VITALS
BODY MASS INDEX: 26.99 KG/M2 | HEART RATE: 85 BPM | OXYGEN SATURATION: 99 % | WEIGHT: 210.32 LBS | DIASTOLIC BLOOD PRESSURE: 66 MMHG | TEMPERATURE: 97.5 F | HEIGHT: 74 IN | SYSTOLIC BLOOD PRESSURE: 120 MMHG

## 2023-02-01 DIAGNOSIS — G97.1 SPINAL PUNCTURE HEADACHE: ICD-10-CM

## 2023-02-01 DIAGNOSIS — R56.9 FOCAL SEIZURES (HCC): ICD-10-CM

## 2023-02-01 DIAGNOSIS — R41.0 TRANSIENT CONFUSION: ICD-10-CM

## 2023-02-01 PROCEDURE — 99212 OFFICE O/P EST SF 10 MIN: CPT | Performed by: PSYCHIATRY & NEUROLOGY

## 2023-02-01 PROCEDURE — 99215 OFFICE O/P EST HI 40 MIN: CPT | Performed by: PSYCHIATRY & NEUROLOGY

## 2023-02-01 RX ORDER — ZONISAMIDE 100 MG/1
300 CAPSULE ORAL DAILY
Qty: 270 CAPSULE | Refills: 2 | Status: SHIPPED | OUTPATIENT
Start: 2023-02-01 | End: 2023-05-04 | Stop reason: SDUPTHER

## 2023-02-01 ASSESSMENT — ENCOUNTER SYMPTOMS
SENSORY CHANGE: 0
FALLS: 0
HEADACHES: 1
MEMORY LOSS: 0
SEIZURES: 0
NAUSEA: 1
VOMITING: 0
LOSS OF CONSCIOUSNESS: 0

## 2023-02-01 ASSESSMENT — FIBROSIS 4 INDEX: FIB4 SCORE: 1.11

## 2023-02-02 ENCOUNTER — OFFICE VISIT (OUTPATIENT)
Dept: MEDICAL GROUP | Facility: CLINIC | Age: 58
End: 2023-02-02
Payer: MEDICARE

## 2023-02-02 VITALS
DIASTOLIC BLOOD PRESSURE: 68 MMHG | RESPIRATION RATE: 18 BRPM | OXYGEN SATURATION: 98 % | HEIGHT: 73 IN | HEART RATE: 83 BPM | SYSTOLIC BLOOD PRESSURE: 118 MMHG | WEIGHT: 208.4 LBS | TEMPERATURE: 98.2 F | BODY MASS INDEX: 27.62 KG/M2

## 2023-02-02 DIAGNOSIS — M54.50 CHRONIC MIDLINE LOW BACK PAIN WITHOUT SCIATICA: ICD-10-CM

## 2023-02-02 DIAGNOSIS — G89.29 CHRONIC MIDLINE LOW BACK PAIN WITHOUT SCIATICA: ICD-10-CM

## 2023-02-02 DIAGNOSIS — G97.1 SPINAL PUNCTURE HEADACHE: ICD-10-CM

## 2023-02-02 DIAGNOSIS — M54.9 MID BACK PAIN: ICD-10-CM

## 2023-02-02 DIAGNOSIS — R07.81 RIB PAIN: ICD-10-CM

## 2023-02-02 DIAGNOSIS — R56.9 FOCAL SEIZURES (HCC): ICD-10-CM

## 2023-02-02 PROBLEM — Z23 NEED FOR VACCINATION: Status: RESOLVED | Noted: 2020-11-30 | Resolved: 2023-02-02

## 2023-02-02 PROCEDURE — 99214 OFFICE O/P EST MOD 30 MIN: CPT | Performed by: PHYSICIAN ASSISTANT

## 2023-02-02 RX ORDER — AMITRIPTYLINE HYDROCHLORIDE 75 MG/1
75 TABLET ORAL NIGHTLY
Qty: 90 TABLET | Refills: 0 | Status: SHIPPED | OUTPATIENT
Start: 2023-02-02 | End: 2023-05-04 | Stop reason: SDUPTHER

## 2023-02-02 ASSESSMENT — FIBROSIS 4 INDEX: FIB4 SCORE: 1.11

## 2023-02-02 NOTE — PROGRESS NOTES
"Subjective     Juaquin Edwar Solorzano is a 57 y.o. male who presents for follow-up, last seen in September, 2020, with a history of focal onset seizures, but who suffered from event of transient confusion and altered sensorium on November 23, 2022, now with improvement though he has had a post LP headache since then.     HPI    The event in question occurred spontaneously while he was at home, sitting on the sofa watching movie with his wife.  He had been in his usual state of health.  He had a sudden feeling of being \"weird\", his wife did not tell him he had any of the typical focal seizures or symptoms of altered sensorium, staring, etc.  He remembers trying to stand and then simply falling to the ground because his legs collapsed.  He was able to crawl himself into the living room with his wife's help.  He was awake and alert otherwise, denied headache, dizziness, etc.  On Ozempic, he had a baseline nausea from the drug itself, this did not increase.    In the ambulance he then noted that his speech became suddenly slurred and almost impossible to understand.  He was having difficulties finding words to express himself.  He remembers being told that his blood pressure was 50/30 during the ride to the hospital.  In the emergency room, he has very little recollection of events other than the pain he had when lumbar puncture was performed.  Once he reached the hospital floor, memory began to improve, as did his speech.  Teleneurology consultation was obtained, it was felt that this was most likely toxic/metabolic in nature.  MRI scan of the brain was done, his AICD was turned off briefly, while he was still symptomatic.  He does not remember being told that there was any new stroke.  CT of the brain, CTA of the brain and neck all were unremarkable.  His EEG showed generalized slowing but nothing irritative.  CSF studies were remarkable for protein of 63 but otherwise results were negative.  He was transferred to " "rehabilitation for about 9 days, still on Eliquis and Zonegran.  His blood pressure medications have been adjusted.    A clear cause for the episode was never determined though it was felt was more likely a toxic/metabolic process, nothing focal or ictal related.  At this time he feels that he is back to baseline.    He has been suffering from a post LP headache since the spinal tap itself was done.  It clearly worsens when he is standing and walking, improved when he is seated or supine.  Overall the intensity has diminished, but it still occurs.    His seizures have been well controlled, on Zonegran 300 mg nightly, he has been compliant throughout this time.    Medical, surgical and family history is reviewed, there are no new drug allergies.  He is still on Ozempic, Synjardy, Betapace, Eliquis 5 mg twice daily, Elavil, baclofen, Cymbalta, Remeron, Zyloprim, Zonegran 3 mg nightly, Entresto, Toprol-XL, Viagra as needed, Lipitor and Aldactone.    Review of Systems   Constitutional:  Negative for malaise/fatigue.   Gastrointestinal:  Positive for nausea. Negative for vomiting.   Musculoskeletal:  Negative for falls.   Neurological:  Positive for headaches. Negative for sensory change, seizures and loss of consciousness.   Psychiatric/Behavioral:  Negative for memory loss.    All other systems reviewed and are negative.    Objective     /66 (BP Location: Right arm, Patient Position: Sitting, BP Cuff Size: Adult)   Pulse 85   Temp 36.4 °C (97.5 °F) (Temporal)   Ht 1.88 m (6' 2\")   Wt 95.4 kg (210 lb 5.1 oz)   SpO2 99%   BMI 27.00 kg/m²      Physical Exam    He appears in no acute distress.  Vital signs are stable.  There is no malar rash, jaw or temporal tenderness, or jaw claudication.  The neck is supple, range of motion is full.  Carotid pulses are present bilaterally without asymmetry or bruits.  Cardiac evaluation reveals the irregular rhythm.     Neurological Exam    He is fully oriented, there is no " aphasia, apraxia, or inattention.    PERRLA/EOMI, visual fields are full, facial movements are symmetric, sensory exam is intact to temperature and light touch.  The tongue and uvula are midline, there is no bulbar dysfunction.  Shoulder shrug and head rotation are normal.    Musculoskeletal exam reveals normal tone without tremor, asterixis, or drift. Strength is intact in all 4 extremities.  Reflexes are diminished throughout, ankle jerks absent, knee jerks only trace present.  Both toes are downgoing.    He stands easily, gait is normal and station and stride length.  Armswing is symmetric.  There is no appendicular dystaxia.  Repetitive movements are normal with symmetric amplitude and frequencies throughout.    Sensory exam is intact to vibration and temperature.  Romberg is absent.    Assessment & Plan     1. Transient confusion  I doubt that this was related to ischemia or seizure with a sustained postictal confusional state.  I can only assume the MRI scan of his brain showed nothing acute, otherwise his outcome and treatments probably would have been different. Metabolically most of his parameters were unremarkable.  Still, his blood pressure was very low, so this could have induced a symptomatic seizure that was nonconvulsive in nature.  In any case, things have resolved and he is now back at baseline. I agree with the consulting neurologist.  I would recommend repeating his EEG just to see if things have normalized.  Previous tracings revealed bifrontal rhythmicity with potential epileptogenicity.  We will continue his Zonegran unchanged.    - Referral to Neurodiagnostics (EEG,EP,EMG/NCS/DBS)    2. Focal seizures (HCC)  I suspect this is stable, I certainly have no reason to adjust his sonogram.  As long as his blood pressure does not drop too precipitously, the likelihood of seizure recurrence is very low.    - Referral to Neurodiagnostics (EEG,EP,EMG/NCS/DBS)  - zonisamide (ZONEGRAN) 100 MG Cap; Take 3  Capsules by mouth every day.  Dispense: 270 Capsule; Refill: 2    3. Spinal puncture headache  I recommended a blood patch be arranged, we can try to do this while he is here getting his EEG study done.  He and I can follow-up otherwise in about 6 months, and if stable, on an annual basis.    - IR-SPINAL BLOOD PATCH INJECTION; Future  - CBC WITH DIFFERENTIAL; Future  - PT AND PTT    Time: 40 minutes in total spent on patient care including precharting, record review, discussion with healthcare staff and documentation.  This includes face-to-face time for exam, review, discussion, as well as counseling and coordinating care.

## 2023-02-02 NOTE — PROGRESS NOTES
cc: Headache    Subjective:     Juaquin Solorzano is a 57 y.o. male presenting for headache    Patient presents to the office for headache.  Patient was seen yesterday by Dr. Hunt for headaches.  He has ordered further testing and a blood patch.  Patient was also seen by neurology for seizures.  Notes from neurology indicate that patient will remain on Zonegran.  An EEG has also been ordered.  Patient is here to update me on his neurology visit.    Patient states that he was playing fetch with a 100 lb puppy.  He launched off his lower rib 2 nights ago.  He has been having pain and swelling since.  He has to lay on his back.  It is worse if he lays on the left side.  He has had a hard time breathing.      Patient also indicates that he is still having difficulty getting his CTs scheduled with Ryan Alfonso.  He continues to have ongoing chronic neck mid and low back pain.  At this time I am not able to find an orderable CT of the cervical spine but we can make adjustments to the thoracic and lumbar orders.    Review of systems:  See above.   Denies any symptoms unless previously indicated.        Current Outpatient Medications:     amitriptyline (ELAVIL) 75 MG Tab, Take 1 Tablet by mouth every evening., Disp: 90 Tablet, Rfl: 0    zonisamide (ZONEGRAN) 100 MG Cap, Take 3 Capsules by mouth every day., Disp: 270 Capsule, Rfl: 2    amitriptyline (ELAVIL) 25 MG Tab, Take 2 tabs nightly, Disp: 180 Tablet, Rfl: 0    acetaminophen (TYLENOL) 500 MG Tab, Take 1,000 mg by mouth., Disp: , Rfl:     Empagliflozin-metFORMIN HCl ER (SYNJARDY XR) 12.5-1000 MG TABLET SR 24 HR, Take 1 Tablet by mouth 2 times a day., Disp: 180 Tablet, Rfl: 2    sacubitril-valsartan (ENTRESTO) 24-26 MG Tab, Take 1 Tablet by mouth 2 times a day., Disp: 180 Tablet, Rfl: 1    DULoxetine (CYMBALTA) 60 MG Cap DR Particles delayed-release capsule, Take 1 Capsule by mouth every day., Disp: 90 Capsule, Rfl: 2    Semaglutide, 1 MG/DOSE, (OZEMPIC, 1  "MG/DOSE,) 4 MG/3ML Solution Pen-injector, Inject 1 mg under the skin every 7 days., Disp: 9 mL, Rfl: 3    baclofen (LIORESAL) 5 MG Tab, Take 1 Tablet by mouth every day., Disp: 90 Tablet, Rfl: 2    sotalol (BETAPACE) 80 MG Tab, Take 1 Tablet by mouth 2 times a day., Disp: 180 Tablet, Rfl: 2    benzonatate (TESSALON) 100 MG Cap, Take 1 Capsule by mouth 3 times a day as needed for Cough., Disp: 90 Capsule, Rfl: 1    apixaban (ELIQUIS) 5mg Tab, Take 1 Tablet by mouth 2 times a day., Disp: 180 Tablet, Rfl: 2    atorvastatin (LIPITOR) 40 MG Tab, Take 1 Tablet by mouth every day., Disp: 90 Tablet, Rfl: 2    metoprolol SR (TOPROL XL) 50 MG TABLET SR 24 HR, Take 1 Tablet by mouth every day., Disp: 90 Tablet, Rfl: 2    mirtazapine (REMERON) 15 MG Tab, TAKE 1 TABLET EVERY EVENING, Disp: 90 Tablet, Rfl: 2    allopurinol (ZYLOPRIM) 300 MG Tab, TAKE 1 TABLET DAILY, Disp: 90 Tablet, Rfl: 2    spironolactone (ALDACTONE) 25 MG Tab, Take 1 Tablet by mouth every day., Disp: 90 Tablet, Rfl: 1    sildenafil citrate (VIAGRA) 50 MG tablet, Take 1 Tablet by mouth as needed for Erectile Dysfunction., Disp: 10 Tablet, Rfl: 10    nitroglycerin (NITROSTAT) 0.4 MG SL Tab, Place 1 Tablet under the tongue as needed for Chest Pain. DISSOLVE 1 TABLET UNDER THE TONGUE AS NEEDED FOR CHEST PAIN, Disp: 25 Tablet, Rfl: 3    glucose blood (FREESTYLE LITE) strip, 1 Strip by Other route 4 times a day., Disp: 300 Strip, Rfl: 4    Insulin Pen Needle, by Does not apply route., Disp: , Rfl:     Allergies, past medical history, past surgical history, family history, social history reviewed and updated    Objective:     Vitals: /68 (BP Location: Left arm, Patient Position: Sitting, BP Cuff Size: Large adult)   Pulse 83   Temp 36.8 °C (98.2 °F) (Temporal)   Resp 18   Ht 1.854 m (6' 1\")   Wt 94.5 kg (208 lb 6.4 oz)   SpO2 98%   BMI 27.50 kg/m²   General: Alert, pleasant, NAD  EYES:   PERRL, EOMI, no icterus or pallor.  Conjunctivae and lids normal. "   HENT:  Normocephalic.  External ears normal. Neck supple.     Respiratory: Normal respiratory effort.    Abdomen: Not obese.  Swelling and pain upper left flank.  Pain at base of 12th rib.    Skin: Warm, dry, no rashes.  Musculoskeletal: Gait is normal.  Moves all extremities well.    Extremities: normal range of motion all extremities.   Neurological: No tremors, sensation grossly intact,  CN2-12 intact.  Psych:  Affect/mood is normal, judgement is good, memory is intact, grooming is appropriate.    Assessment/Plan:     Juaquin was seen today for headache.    Diagnoses and all orders for this visit:    Spinal puncture headache  -     amitriptyline (ELAVIL) 75 MG Tab; Take 1 Tablet by mouth every evening.  Focal seizures (HCC)    We will follow neurology recommendations.  Patient will schedule the blood patch as soon as he is able.  He has had some improvement on the Elavil.  He would like to try higher dose.  We will try him on 75 mg.  Side effects discussed.  He will schedule his EEG as soon as he is able.    Rib pain  -     [id number] (WITH 1-VIEW CXR) LEFT; Future    Cannot exclude a rib fracture from the flank pain.  Will order x-rays at this time.  Patient indicates that if there is no improvement in his pain within the week, he will contact this office for further evaluation.  Did offer muscle relaxers.  Also discussed anti-inflammatories but patient is on a blood thinner.  He is not wanting anything for pain at this time.    Mid back pain  -     CT THORACIC SPINE W CONTRAST; Future  Chronic midline low back pain without sciatica  -     CT LUMBAR SPINE W CONTRAST; Future    CTs have been ordered to evaluate further.        Return in about 3 months (around 5/2/2023), or if symptoms worsen or fail to improve, for 3-4 months..    Please note that this dictation was created using voice recognition software. I have made every reasonable attempt to correct obvious errors, but expect that there are errors  of grammar and possible content that I did not discover before finalizing note.

## 2023-02-03 ENCOUNTER — HOSPITAL ENCOUNTER (OUTPATIENT)
Dept: RADIOLOGY | Facility: MEDICAL CENTER | Age: 58
End: 2023-02-03
Payer: MEDICARE

## 2023-02-08 ENCOUNTER — TELEPHONE (OUTPATIENT)
Dept: RADIOLOGY | Facility: MEDICAL CENTER | Age: 58
End: 2023-02-08

## 2023-02-08 RX ORDER — DIPHENHYDRAMINE HCL 25 MG
50 CAPSULE ORAL ONCE
Qty: 2 CAPSULE | Refills: 0 | Status: SHIPPED | OUTPATIENT
Start: 2023-02-08 | End: 2023-02-08

## 2023-02-08 RX ORDER — PREDNISONE 50 MG/1
50 TABLET ORAL EVERY 6 HOURS
Qty: 3 TABLET | Refills: 0 | Status: SHIPPED | OUTPATIENT
Start: 2023-02-08 | End: 2023-05-24

## 2023-02-08 NOTE — TELEPHONE ENCOUNTER
----- Message from Cece Parker P.A.-C. sent at 2/8/2023  1:12 PM PST -----  Regarding: RE: Clearance to hold Eliquis x 48 hrs  Yes, he can hold  ----- Message -----  From: Ana Rosa Macedo  Sent: 2/8/2023  12:59 PM PST  To: VIKAS Napier, Cece Parker P.A.-C.  Subject: Clearance to hold Eliquis x 48 hrs               Cece Parker P.A.-C.,     This patient is scheduled for a Blood Patch on 2/16/23. This will require that he hold his Eliquis x 48 hrs prior. Please confirm he is able to hold this medication.    Ana Rosa

## 2023-02-08 NOTE — PROGRESS NOTES
Pt saurabh for spinal blood patch 2/16/23 at 10 in IR. Noted to have iodine allergy, IR  confirmed w/ pt the reaction is anaphylaxis. RX sent to pharmacy.

## 2023-02-16 ENCOUNTER — HOSPITAL ENCOUNTER (OUTPATIENT)
Dept: RADIOLOGY | Facility: MEDICAL CENTER | Age: 58
End: 2023-02-16
Attending: PSYCHIATRY & NEUROLOGY
Payer: MEDICARE

## 2023-02-16 DIAGNOSIS — G97.1 SPINAL PUNCTURE HEADACHE: ICD-10-CM

## 2023-02-16 PROCEDURE — 62273 INJECT EPIDURAL PATCH: CPT

## 2023-02-16 PROCEDURE — 77003 FLUOROGUIDE FOR SPINE INJECT: CPT

## 2023-02-16 PROCEDURE — 700117 HCHG RX CONTRAST REV CODE 255: Performed by: RADIOLOGY

## 2023-02-16 RX ADMIN — IOHEXOL 3 ML: 300 INJECTION, SOLUTION INTRAVENOUS at 11:45

## 2023-02-16 ASSESSMENT — PAIN DESCRIPTION - PAIN TYPE: TYPE: ACUTE PAIN

## 2023-02-16 NOTE — OR SURGEON
Immediate Post- Operative Note        Findings: NEEDLE CONFIRMED AT L4-5 FOR SPINAL BLOOD PATCH      Procedure(s): LUMBAR SPINAL BLOOD PATCH    20G TUOHY NEEDLE    EPIDUROGRAM CONFIRMS    11 ML AUTOLOGOUS BLOOD INJECTED EPIDURAL      Estimated Blood Loss: 0 ml  (11 ML FOR BLOOD PATCH)        Complications: None            2/16/2023     11:21 AM     Bharat Purdy M.D.

## 2023-02-16 NOTE — PROGRESS NOTES
IR NOTE:  SPINAL BLOOD PATCH CONSENT OBTAINED AFTER SPEAKING WITH DR. ARMAS, PT PREMEDICATED FOR IODINE ALLERGY, PIV STARTED IN RIGHT FA 18G, PT TOLERATED WELL AND WALKED OUT OF HOSPITAL BY SELF, WIFE ANA TO  IN [facility]

## 2023-04-10 ENCOUNTER — NON-PROVIDER VISIT (OUTPATIENT)
Dept: CARDIOLOGY | Facility: MEDICAL CENTER | Age: 58
End: 2023-04-10
Payer: MEDICARE

## 2023-04-10 PROCEDURE — 93295 DEV INTERROG REMOTE 1/2/MLT: CPT | Performed by: INTERNAL MEDICINE

## 2023-04-10 NOTE — CARDIAC REMOTE MONITOR - SCAN
Device transmission reviewed. Device demonstrated appropriate function.       Electronically Signed by: Luis Enrique France M.D.    4/10/2023  2:57 PM

## 2023-04-26 ENCOUNTER — PATIENT MESSAGE (OUTPATIENT)
Dept: MEDICAL GROUP | Facility: CLINIC | Age: 58
End: 2023-04-26
Payer: MEDICARE

## 2023-04-26 DIAGNOSIS — E11.8 TYPE 2 DIABETES MELLITUS WITH COMPLICATION, WITH LONG-TERM CURRENT USE OF INSULIN (HCC): ICD-10-CM

## 2023-04-26 DIAGNOSIS — G89.29 NECK PAIN, CHRONIC: ICD-10-CM

## 2023-04-26 DIAGNOSIS — M54.50 CHRONIC MIDLINE LOW BACK PAIN WITHOUT SCIATICA: ICD-10-CM

## 2023-04-26 DIAGNOSIS — G89.29 CHRONIC MIDLINE LOW BACK PAIN WITHOUT SCIATICA: ICD-10-CM

## 2023-04-26 DIAGNOSIS — I10 ESSENTIAL HYPERTENSION: ICD-10-CM

## 2023-04-26 DIAGNOSIS — M54.9 MID BACK PAIN: ICD-10-CM

## 2023-04-26 DIAGNOSIS — M54.2 NECK PAIN, CHRONIC: ICD-10-CM

## 2023-04-26 DIAGNOSIS — Z79.4 TYPE 2 DIABETES MELLITUS WITH COMPLICATION, WITH LONG-TERM CURRENT USE OF INSULIN (HCC): ICD-10-CM

## 2023-04-26 PROCEDURE — 1126F AMNT PAIN NOTED NONE PRSNT: CPT | Performed by: PHYSICIAN ASSISTANT

## 2023-04-26 RX ORDER — SEMAGLUTIDE 1.34 MG/ML
1 INJECTION, SOLUTION SUBCUTANEOUS
Qty: 9 ML | Refills: 1 | Status: SHIPPED | OUTPATIENT
Start: 2023-04-26 | End: 2023-08-23 | Stop reason: SDUPTHER

## 2023-04-26 RX ORDER — EMPAGLIFLOZIN, METFORMIN HYDROCHLORIDE 12.5; 1 MG/1; MG/1
1 TABLET, EXTENDED RELEASE ORAL
Qty: 180 TABLET | Refills: 1 | Status: SHIPPED | OUTPATIENT
Start: 2023-04-26 | End: 2023-11-20

## 2023-04-26 NOTE — PATIENT COMMUNICATION
Received request via: Patient    Was the patient seen in the last year in this department? Yes    Does the patient have an active prescription (recently filled or refills available) for medication(s) requested? No    Does the patient have half-way Plus and need 100 day supply (blood pressure, diabetes and cholesterol meds only)? Patient does not have SCP      Last Ov 2/2/2023  Last Labs 11/26/2022

## 2023-05-04 ENCOUNTER — OFFICE VISIT (OUTPATIENT)
Dept: MEDICAL GROUP | Facility: CLINIC | Age: 58
End: 2023-05-04
Payer: MEDICARE

## 2023-05-04 ENCOUNTER — HOSPITAL ENCOUNTER (OUTPATIENT)
Dept: RADIOLOGY | Facility: MEDICAL CENTER | Age: 58
End: 2023-05-04
Attending: PHYSICIAN ASSISTANT

## 2023-05-04 VITALS
SYSTOLIC BLOOD PRESSURE: 96 MMHG | TEMPERATURE: 97.5 F | RESPIRATION RATE: 14 BRPM | DIASTOLIC BLOOD PRESSURE: 60 MMHG | BODY MASS INDEX: 25.23 KG/M2 | HEART RATE: 60 BPM | WEIGHT: 196.6 LBS | HEIGHT: 74 IN | OXYGEN SATURATION: 100 %

## 2023-05-04 DIAGNOSIS — Z12.11 COLON CANCER SCREENING: ICD-10-CM

## 2023-05-04 DIAGNOSIS — F32.9 MAJOR DEPRESSION, CHRONIC: ICD-10-CM

## 2023-05-04 DIAGNOSIS — I50.20 ACC/AHA STAGE C SYSTOLIC HEART FAILURE (HCC): ICD-10-CM

## 2023-05-04 DIAGNOSIS — E08.40: ICD-10-CM

## 2023-05-04 DIAGNOSIS — R05.3 CHRONIC COUGH: ICD-10-CM

## 2023-05-04 DIAGNOSIS — I25.5 ISCHEMIC CARDIOMYOPATHY: ICD-10-CM

## 2023-05-04 DIAGNOSIS — G89.29 NECK PAIN, CHRONIC: ICD-10-CM

## 2023-05-04 DIAGNOSIS — I48.0 PAROXYSMAL ATRIAL FIBRILLATION (HCC): ICD-10-CM

## 2023-05-04 DIAGNOSIS — R56.9 FOCAL SEIZURES (HCC): ICD-10-CM

## 2023-05-04 DIAGNOSIS — M54.2 NECK PAIN, CHRONIC: ICD-10-CM

## 2023-05-04 DIAGNOSIS — Z12.5 PROSTATE CANCER SCREENING: ICD-10-CM

## 2023-05-04 DIAGNOSIS — E11.8 TYPE 2 DIABETES MELLITUS WITH COMPLICATION, WITH LONG-TERM CURRENT USE OF INSULIN (HCC): ICD-10-CM

## 2023-05-04 DIAGNOSIS — Z79.4 TYPE 2 DIABETES MELLITUS WITH COMPLICATION, WITH LONG-TERM CURRENT USE OF INSULIN (HCC): ICD-10-CM

## 2023-05-04 DIAGNOSIS — I10 ESSENTIAL HYPERTENSION: ICD-10-CM

## 2023-05-04 DIAGNOSIS — M54.9 MID BACK PAIN: ICD-10-CM

## 2023-05-04 DIAGNOSIS — Z13.21 ENCOUNTER FOR VITAMIN DEFICIENCY SCREENING: ICD-10-CM

## 2023-05-04 DIAGNOSIS — G97.1 SPINAL PUNCTURE HEADACHE: ICD-10-CM

## 2023-05-04 DIAGNOSIS — I47.20 VENTRICULAR TACHYCARDIA (HCC): ICD-10-CM

## 2023-05-04 DIAGNOSIS — Z23 NEED FOR VACCINATION: ICD-10-CM

## 2023-05-04 PROCEDURE — G0009 ADMIN PNEUMOCOCCAL VACCINE: HCPCS | Performed by: PHYSICIAN ASSISTANT

## 2023-05-04 PROCEDURE — 99214 OFFICE O/P EST MOD 30 MIN: CPT | Mod: 25 | Performed by: PHYSICIAN ASSISTANT

## 2023-05-04 PROCEDURE — G0010 ADMIN HEPATITIS B VACCINE: HCPCS | Performed by: PHYSICIAN ASSISTANT

## 2023-05-04 PROCEDURE — 90746 HEPB VACCINE 3 DOSE ADULT IM: CPT | Performed by: PHYSICIAN ASSISTANT

## 2023-05-04 PROCEDURE — 90677 PCV20 VACCINE IM: CPT | Performed by: PHYSICIAN ASSISTANT

## 2023-05-04 RX ORDER — ZONISAMIDE 100 MG/1
300 CAPSULE ORAL DAILY
Qty: 270 CAPSULE | Refills: 2 | Status: SHIPPED | OUTPATIENT
Start: 2023-05-04 | End: 2024-02-09 | Stop reason: SDUPTHER

## 2023-05-04 RX ORDER — SOTALOL HYDROCHLORIDE 80 MG/1
80 TABLET ORAL 2 TIMES DAILY
Qty: 180 TABLET | Refills: 2 | Status: SHIPPED | OUTPATIENT
Start: 2023-05-04 | End: 2023-11-22 | Stop reason: SDUPTHER

## 2023-05-04 RX ORDER — PREDNISONE 10 MG/1
TABLET ORAL
COMMUNITY
Start: 2023-02-08 | End: 2023-05-24

## 2023-05-04 RX ORDER — METOPROLOL SUCCINATE 50 MG/1
50 TABLET, EXTENDED RELEASE ORAL DAILY
Qty: 90 TABLET | Refills: 2 | Status: SHIPPED | OUTPATIENT
Start: 2023-05-04 | End: 2023-11-22 | Stop reason: SDUPTHER

## 2023-05-04 RX ORDER — ATORVASTATIN CALCIUM 40 MG/1
40 TABLET, FILM COATED ORAL DAILY
Qty: 90 TABLET | Refills: 2 | Status: SHIPPED | OUTPATIENT
Start: 2023-05-04 | End: 2023-11-22 | Stop reason: SDUPTHER

## 2023-05-04 RX ORDER — DULOXETIN HYDROCHLORIDE 60 MG/1
60 CAPSULE, DELAYED RELEASE ORAL DAILY
Qty: 90 CAPSULE | Refills: 2 | Status: SHIPPED | OUTPATIENT
Start: 2023-05-04 | End: 2024-02-09 | Stop reason: SDUPTHER

## 2023-05-04 RX ORDER — SPIRONOLACTONE 25 MG/1
25 TABLET ORAL DAILY
Qty: 90 TABLET | Refills: 2 | Status: SHIPPED | OUTPATIENT
Start: 2023-05-04 | End: 2023-11-22 | Stop reason: SDUPTHER

## 2023-05-04 RX ORDER — BACLOFEN 5 MG/1
5 TABLET ORAL DAILY
Qty: 90 TABLET | Refills: 2 | Status: SHIPPED | OUTPATIENT
Start: 2023-05-04 | End: 2023-05-12

## 2023-05-04 RX ORDER — AMITRIPTYLINE HYDROCHLORIDE 75 MG/1
75 TABLET ORAL NIGHTLY
Qty: 90 TABLET | Refills: 2 | Status: SHIPPED | OUTPATIENT
Start: 2023-05-04 | End: 2023-11-28 | Stop reason: SDUPTHER

## 2023-05-04 ASSESSMENT — PATIENT HEALTH QUESTIONNAIRE - PHQ9
8. MOVING OR SPEAKING SO SLOWLY THAT OTHER PEOPLE COULD HAVE NOTICED. OR THE OPPOSITE, BEING SO FIGETY OR RESTLESS THAT YOU HAVE BEEN MOVING AROUND A LOT MORE THAN USUAL: SEVERAL DAYS
2. FEELING DOWN, DEPRESSED, IRRITABLE, OR HOPELESS: MORE THAN HALF THE DAYS
5. POOR APPETITE OR OVEREATING: MORE THAN HALF THE DAYS
SUM OF ALL RESPONSES TO PHQ9 QUESTIONS 1 AND 2: 5
4. FEELING TIRED OR HAVING LITTLE ENERGY: NEARLY EVERY DAY
3. TROUBLE FALLING OR STAYING ASLEEP OR SLEEPING TOO MUCH: NEARLY EVERY DAY
7. TROUBLE CONCENTRATING ON THINGS, SUCH AS READING THE NEWSPAPER OR WATCHING TELEVISION: MORE THAN HALF THE DAYS
9. THOUGHTS THAT YOU WOULD BE BETTER OFF DEAD, OR OF HURTING YOURSELF: NOT AT ALL
1. LITTLE INTEREST OR PLEASURE IN DOING THINGS: NEARLY EVERY DAY
SUM OF ALL RESPONSES TO PHQ QUESTIONS 1-9: 17
6. FEELING BAD ABOUT YOURSELF - OR THAT YOU ARE A FAILURE OR HAVE LET YOURSELF OR YOUR FAMILY DOWN: SEVERAL DAYS

## 2023-05-04 ASSESSMENT — FIBROSIS 4 INDEX: FIB4 SCORE: 1.11

## 2023-05-04 NOTE — PROGRESS NOTES
cc:  medication refills    Subjective:     Juaquin Solorzano is a 57 y.o. male presenting for medication refills      Patient presents to the office for medication refills.  [facility] all at [facility].  Therefore he is needing new refills.  Patient is needing refills of his medications for his type 2 diabetes, heart failure, atrial fibrillation, cardiomyopathy with tachycardia, hypertension, headaches, seizures, and depression.  He is stable on medications.    Patient states that he had to  items from a .  2 hours later he felt a lump and is concerned about a hernia.  He states that it has been coming and going.      Patient is still needing CTs for his back pain including mid back and neck pain.    Patient has had some weight loss.  He states that he has had increased exercise in the last week.  He has not changed his diet.  He does eat a mixture of protein.  He is on Ozempic which does promote weight loss.    Patient is due for routine screenings including a vitamin D deficiency screen, prostate cancer screening and colon cancer screening.  He is also due for hepatitis B and pneumonia vaccine today    Review of systems:  See above.   Denies any symptoms unless previously indicated.        Current Outpatient Medications:     atorvastatin (LIPITOR) 40 MG Tab, Take 1 Tablet by mouth every day., Disp: 90 Tablet, Rfl: 2    amitriptyline (ELAVIL) 75 MG Tab, Take 1 Tablet by mouth every evening., Disp: 90 Tablet, Rfl: 2    metoprolol SR (TOPROL XL) 50 MG TABLET SR 24 HR, Take 1 Tablet by mouth every day., Disp: 90 Tablet, Rfl: 2    apixaban (ELIQUIS) 5mg Tab, Take 1 Tablet by mouth 2 times a day., Disp: 180 Tablet, Rfl: 2    spironolactone (ALDACTONE) 25 MG Tab, Take 1 Tablet by mouth every day., Disp: 90 Tablet, Rfl: 2    zonisamide (ZONEGRAN) 100 MG Cap, Take 3 Capsules by mouth every day., Disp: 270 Capsule, Rfl: 2    baclofen  (LIORESAL) 5 MG Tab, Take 1 Tablet by mouth every day., Disp: 90 Tablet, Rfl: 2    DULoxetine (CYMBALTA) 60 MG Cap DR Particles delayed-release capsule, Take 1 Capsule by mouth every day., Disp: 90 Capsule, Rfl: 2    sotalol (BETAPACE) 80 MG Tab, Take 1 Tablet by mouth 2 times a day., Disp: 180 Tablet, Rfl: 2    Semaglutide, 1 MG/DOSE, (OZEMPIC, 1 MG/DOSE,) 4 MG/3ML Solution Pen-injector, Inject 1 mg under the skin every 7 days., Disp: 9 mL, Rfl: 1    Empagliflozin-metFORMIN HCl ER (SYNJARDY XR) 12.5-1000 MG TABLET SR 24 HR, Take 1 Tablet by mouth 2 times a day., Disp: 180 Tablet, Rfl: 1    benzonatate (TESSALON) 100 MG Cap, Take 1 Capsule by mouth 3 times a day as needed for Cough., Disp: 90 Capsule, Rfl: 1    glucose blood (FREESTYLE LITE) strip, 1 Strip by Other route 4 times a day., Disp: 300 Strip, Rfl: 4    predniSONE (DELTASONE) 10 MG Tab, , Disp: , Rfl:     sacubitril-valsartan (ENTRESTO) 24-26 MG Tab, Take 1 Tablet by mouth 2 times a day. (Patient not taking: Reported on 5/4/2023), Disp: 180 Tablet, Rfl: 1    predniSONE (DELTASONE) 50 MG Tab, Take 1 Tablet by mouth every 6 hours. For contrast allergy. Take Dose #1: 13 hours prior to scheduled procedure with contrast. Take Dose #2: 7 hours prior to procedure. Take Dose #3 1 hour prior to procedure. (Patient not taking: Reported on 5/4/2023), Disp: 3 Tablet, Rfl: 0    acetaminophen (TYLENOL) 500 MG Tab, Take 1,000 mg by mouth. (Patient not taking: Reported on 5/4/2023), Disp: , Rfl:     mirtazapine (REMERON) 15 MG Tab, TAKE 1 TABLET EVERY EVENING (Patient not taking: Reported on 5/4/2023), Disp: 90 Tablet, Rfl: 2    allopurinol (ZYLOPRIM) 300 MG Tab, TAKE 1 TABLET DAILY (Patient not taking: Reported on 5/4/2023), Disp: 90 Tablet, Rfl: 2    sildenafil citrate (VIAGRA) 50 MG tablet, Take 1 Tablet by mouth as needed for Erectile Dysfunction. (Patient not taking: Reported on 5/4/2023), Disp: 10 Tablet, Rfl: 10    nitroglycerin (NITROSTAT) 0.4 MG SL Tab, Place 1  "Tablet under the tongue as needed for Chest Pain. DISSOLVE 1 TABLET UNDER THE TONGUE AS NEEDED FOR CHEST PAIN (Patient not taking: Reported on 5/4/2023), Disp: 25 Tablet, Rfl: 3    Insulin Pen Needle, by Does not apply route., Disp: , Rfl:     Allergies, past medical history, past surgical history, family history, social history reviewed and updated    Objective:     Vitals: BP 96/60 (BP Location: Left arm, Patient Position: Sitting, BP Cuff Size: Adult)   Pulse 60   Temp 36.4 °C (97.5 °F) (Temporal)   Resp 14   Ht 1.88 m (6' 2\")   Wt 89.2 kg (196 lb 9.6 oz)   SpO2 100%   BMI 25.24 kg/m²   General: Alert, pleasant, NAD  EYES:   PERRL, EOMI, no icterus or pallor.  Conjunctivae and lids normal.   HENT:  Normocephalic.  External ears normal.  Neck supple.     Respiratory: Normal respiratory effort.    Abdomen: Not obese  Skin: Warm, dry, no rashes.  Musculoskeletal: Gait is normal.  Moves all extremities well.    Extremities: normal range of motion all extremities.   Neurological: No tremors, sensation grossly intact,  CN2-12 intact.  Psych:  Affect/mood is normal, judgement is good, memory is intact, grooming is appropriate.    Assessment/Plan:     Juaquin was seen today for medication refill.    Diagnoses and all orders for this visit:    Diabetic neuropathy associated with diabetes mellitus due to underlying condition (HCC)  -     Lipid Profile; Future  -     HEMOGLOBIN A1C; Future  -     Comp Metabolic Panel; Future  Type 2 diabetes mellitus with complication, with long-term current use of insulin (HCC)  -     atorvastatin (LIPITOR) 40 MG Tab; Take 1 Tablet by mouth every day.  -     Lipid Profile; Future  -     HEMOGLOBIN A1C; Future  -     Comp Metabolic Panel; Future  ACC/AHA stage C systolic heart failure (HCC)  -     atorvastatin (LIPITOR) 40 MG Tab; Take 1 Tablet by mouth every day.  -     spironolactone (ALDACTONE) 25 MG Tab; Take 1 Tablet by mouth every day.  -     Lipid Profile; Future  -     " HEMOGLOBIN A1C; Future  -     Comp Metabolic Panel; Future  Paroxysmal atrial fibrillation (HCC)  -     apixaban (ELIQUIS) 5mg Tab; Take 1 Tablet by mouth 2 times a day.  -     sotalol (BETAPACE) 80 MG Tab; Take 1 Tablet by mouth 2 times a day.  Ischemic cardiomyopathy  -     Lipid Profile; Future  -     Comp Metabolic Panel; Future  Ventricular tachycardia (HCC)  -     sotalol (BETAPACE) 80 MG Tab; Take 1 Tablet by mouth 2 times a day.  Essential hypertension  -     metoprolol SR (TOPROL XL) 50 MG TABLET SR 24 HR; Take 1 Tablet by mouth every day.  -     Lipid Profile; Future  -     Comp Metabolic Panel; Future  Spinal puncture headache  -     amitriptyline (ELAVIL) 75 MG Tab; Take 1 Tablet by mouth every evening.  Chronic cough  Focal seizures (HCC)  -     zonisamide (ZONEGRAN) 100 MG Cap; Take 3 Capsules by mouth every day.  Major depression, chronic  -     DULoxetine (CYMBALTA) 60 MG Cap DR Particles delayed-release capsule; Take 1 Capsule by mouth every day.    Type 2 diabetes with neuropathy and long-term use of insulin, systolic heart failure and paroxysmal small atrial fibrillation are all stable.  Patient does see cardiology.  We are ordering repeat labs for his diabetes.  We will follow-up in 3 months and he will contact us sooner with any issues.  All other listed health concerns are also stable.  Medications refilled.  Labs ordered as indicated.    Mid back pain  -     baclofen (LIORESAL) 5 MG Tab; Take 1 Tablet by mouth every day.  Neck pain, chronic  -     OUTSIDE IMAGES-CT CERVICAL SPINE; Future    Medication refilled.  CT orders printed.  CT cervical spine submitted.  Patient to have these done at [facility].    Encounter for vitamin deficiency screening  -     VITAMIN D,25 HYDROXY (DEFICIENCY); Future  Prostate cancer screening  -     PROSTATE SPECIFIC AG SCREENING; Future  Colon cancer screening  -     Referral to GI for Colonoscopy    Routine screening tests ordered.    Need for vaccination  -      Hepatitis B Vaccine Adult 20+  -     Pneumococcal Conjugate Vaccine 20-Valent (19 yrs+)      Vaccines given today.      Return in about 3 months (around 8/4/2023), or if symptoms worsen or fail to improve.    Please note that this dictation was created using voice recognition software. I have made every reasonable attempt to correct obvious errors, but expect that there are errors of grammar and possible content that I did not discover before finalizing note.

## 2023-05-12 RX ORDER — BACLOFEN 10 MG/1
10 TABLET ORAL 2 TIMES DAILY PRN
Qty: 180 TABLET | Refills: 0 | Status: SHIPPED | OUTPATIENT
Start: 2023-05-12 | End: 2023-08-23 | Stop reason: SDUPTHER

## 2023-05-24 ENCOUNTER — NON-PROVIDER VISIT (OUTPATIENT)
Dept: CARDIOLOGY | Facility: PHYSICIAN GROUP | Age: 58
End: 2023-05-24
Payer: MEDICARE

## 2023-05-24 ENCOUNTER — OFFICE VISIT (OUTPATIENT)
Dept: CARDIOLOGY | Facility: PHYSICIAN GROUP | Age: 58
End: 2023-05-24
Payer: MEDICARE

## 2023-05-24 VITALS
OXYGEN SATURATION: 98 % | HEART RATE: 88 BPM | SYSTOLIC BLOOD PRESSURE: 90 MMHG | BODY MASS INDEX: 25.41 KG/M2 | RESPIRATION RATE: 18 BRPM | WEIGHT: 198 LBS | HEIGHT: 74 IN | DIASTOLIC BLOOD PRESSURE: 58 MMHG

## 2023-05-24 VITALS
SYSTOLIC BLOOD PRESSURE: 90 MMHG | RESPIRATION RATE: 18 BRPM | BODY MASS INDEX: 25.44 KG/M2 | HEART RATE: 88 BPM | WEIGHT: 198.19 LBS | HEIGHT: 74 IN | OXYGEN SATURATION: 98 % | DIASTOLIC BLOOD PRESSURE: 58 MMHG

## 2023-05-24 DIAGNOSIS — E11.8 TYPE 2 DIABETES MELLITUS WITH COMPLICATION, WITH LONG-TERM CURRENT USE OF INSULIN (HCC): ICD-10-CM

## 2023-05-24 DIAGNOSIS — I50.20 ACC/AHA STAGE C SYSTOLIC HEART FAILURE (HCC): ICD-10-CM

## 2023-05-24 DIAGNOSIS — I47.20 VENTRICULAR TACHYCARDIA (HCC): ICD-10-CM

## 2023-05-24 DIAGNOSIS — I25.10 CORONARY ARTERY DISEASE INVOLVING NATIVE CORONARY ARTERY OF NATIVE HEART WITHOUT ANGINA PECTORIS: ICD-10-CM

## 2023-05-24 DIAGNOSIS — I25.5 ISCHEMIC CARDIOMYOPATHY: ICD-10-CM

## 2023-05-24 DIAGNOSIS — I10 ESSENTIAL HYPERTENSION: ICD-10-CM

## 2023-05-24 DIAGNOSIS — R56.9 FOCAL SEIZURES (HCC): ICD-10-CM

## 2023-05-24 DIAGNOSIS — Z79.01 CHRONIC ANTICOAGULATION: ICD-10-CM

## 2023-05-24 DIAGNOSIS — Z95.810 PRESENCE OF BIVENTRICULAR AICD: ICD-10-CM

## 2023-05-24 DIAGNOSIS — E08.42 DIABETIC POLYNEUROPATHY ASSOCIATED WITH DIABETES MELLITUS DUE TO UNDERLYING CONDITION (HCC): ICD-10-CM

## 2023-05-24 DIAGNOSIS — E78.2 MIXED HYPERLIPIDEMIA: ICD-10-CM

## 2023-05-24 DIAGNOSIS — I48.0 PAROXYSMAL ATRIAL FIBRILLATION (HCC): ICD-10-CM

## 2023-05-24 DIAGNOSIS — Z79.4 TYPE 2 DIABETES MELLITUS WITH COMPLICATION, WITH LONG-TERM CURRENT USE OF INSULIN (HCC): ICD-10-CM

## 2023-05-24 DIAGNOSIS — Z95.1 S/P CABG X 3: ICD-10-CM

## 2023-05-24 DIAGNOSIS — G47.33 OBSTRUCTIVE SLEEP APNEA SYNDROME: ICD-10-CM

## 2023-05-24 PROBLEM — R20.0 NUMBNESS OF ARM: Status: RESOLVED | Noted: 2022-05-12 | Resolved: 2023-05-24

## 2023-05-24 PROBLEM — E56.9 VITAMIN DEFICIENCY: Status: RESOLVED | Noted: 2018-03-20 | Resolved: 2023-05-24

## 2023-05-24 PROBLEM — R07.89 OTHER CHEST PAIN: Status: RESOLVED | Noted: 2022-04-13 | Resolved: 2023-05-24

## 2023-05-24 PROBLEM — G93.40 ACUTE ENCEPHALOPATHY: Status: RESOLVED | Noted: 2022-11-24 | Resolved: 2023-05-24

## 2023-05-24 PROBLEM — R74.8 ELEVATED ALKALINE PHOSPHATASE LEVEL: Status: RESOLVED | Noted: 2021-05-10 | Resolved: 2023-05-24

## 2023-05-24 PROBLEM — N17.9 AKI (ACUTE KIDNEY INJURY) (HCC): Status: RESOLVED | Noted: 2022-11-26 | Resolved: 2023-05-24

## 2023-05-24 PROCEDURE — 93284 PRGRMG EVAL IMPLANTABLE DFB: CPT | Performed by: NURSE PRACTITIONER

## 2023-05-24 PROCEDURE — 3078F DIAST BP <80 MM HG: CPT | Performed by: NURSE PRACTITIONER

## 2023-05-24 PROCEDURE — 3074F SYST BP LT 130 MM HG: CPT | Performed by: NURSE PRACTITIONER

## 2023-05-24 PROCEDURE — 99214 OFFICE O/P EST MOD 30 MIN: CPT | Performed by: NURSE PRACTITIONER

## 2023-05-24 ASSESSMENT — ENCOUNTER SYMPTOMS
SHORTNESS OF BREATH: 0
SEIZURES: 1
DIZZINESS: 0
LOSS OF CONSCIOUSNESS: 0
COUGH: 0
ABDOMINAL PAIN: 0
ORTHOPNEA: 0
CHILLS: 0
PALPITATIONS: 0
BRUISES/BLEEDS EASILY: 0
SENSORY CHANGE: 1
PND: 0
HEADACHES: 0
FEVER: 0
INSOMNIA: 0
BACK PAIN: 1
MYALGIAS: 0
NAUSEA: 0

## 2023-05-24 ASSESSMENT — FIBROSIS 4 INDEX
FIB4 SCORE: 1.11
FIB4 SCORE: 1.11

## 2023-05-24 NOTE — PROGRESS NOTES
Chief Complaint   Patient presents with    Follow-Up    Biventricular AICD    Coronary Artery Disease    Cardiomyopathy (Ischemic)    CHF (Compensated)    Ventricular Tachycardia    Atrial Fibrillation    Anticoagulation    HTN (Controlled)    Hyperlipidemia    Diabetes (Controlled)       Subjective     Juaquin Solorzano is a 57 y.o. male who presents today for six month follow-up for CRT-D interrogation, CAD/ischemic cardiomyopathy, HFrEF, VT, PAFib, anticoagulation, HTN, hyperlipidemia, and DM.    Juaquin is a 57 year old male with history of CAD/ischemic cardiomyopathy with LVEF 35-40% (on echocardiogdram in April 2022), with history of stents in 2005 and 2006, followed by CABG x 3 in 2013 (anatomical details unknown); coronary angiogram in July 2016 showed patent LIMA to LAD, but occluded SVG-RCA, nonobstructive disease of LCx, and diffuse distal disease of the distal RCA, no other SVG was identified.      He had CRT-D implanted in April 2018, and also has HFrEF, hypertension, hyperlipidemia, CLARE,  diabetes mellitus, and history of is seizures (followed by neurology). last seen by me in November 2022.    The same day, after seeing me, he presented to [facility] with acute right arm numbness and confusion. CT scan and MRI of the brain were both unremarkable (no acute infarct). It was thought that he had toxic/metabolic presentation due to medications. He did also have some dehydration. Entresto was lowered from 97/103mg to 24/26mg BID.     He did see his PCP on 5/4/2023, and that next weekend, he had a seizure. He is scheduled to see his neurologist next month.      He is here today for six month follow-up. No recurrence of seizures. No device therapy or symptomatic palpitations.  He denies any chest pain, pressure, tightness or discomfort; breathing is stable with no dyspnea, orthopnea or PND; no LE edema. No dizziness, lightheadedness or syncope; BP is stable.        Past Medical History:   Diagnosis Date     Arthritis     Hands/elbows    Block, bundle branch, left     CAD (coronary artery disease)     Cataract     Early stages, not removed    Class 2 obesity due to excess calories in adult 7/10/2019    Diabetes ([facility]) 2006    On oral medications, now well controlled    Fibromyalgia 04/26/2018 5-6/10    GERD (gastroesophageal reflux disease)     Gout     Hyperlipidemia     Hypertension     Ischemic cardiomyopathy 04/2022    Echocardiogram with normal LV size, LVEF 35-40%. Septal wall hypokinesis, apical hypokinesis. Normal RA, LA and RV. RVSP 25-30mMHg    Myocardial infarct ([facility]) 2007    Myocardial infarct ([facility]) 2012    Neuropathy ([facility])     Bilateral feet    Psychiatric problem     Depression and anxiety    S/P colonoscopy     repeat 6 years at age of 61.    Sleep apnea     Uses CPAP     Past Surgical History:   Procedure Laterality Date    AICD IMPLANT Left 04/27/2018    [employer] Inogen X4 CRT-D G148 implanted by Dr. Cullen.    RECOVERY  7/22/2016    Procedure: CATH LAB-[facility] W/POSSIBLE RADIAL APPROACH-CARIE;  Surgeon: [facility];  [place] [facility] [place]:     MULTIPLE CORONARY ARTERY BYPASS  2014    CABG x 3    LEONILA BY LAPAROSCOPY  2010    KNEE ARTHROSCOPY      Arthroscopy, Knee     Family History   Problem Relation Age of Onset    Heart Disease Mother     Diabetes Mother     Hyperlipidemia Father     Heart Disease Father     Hypertension Father     Stroke Father     Lung Disease Sister      Social History     Socioeconomic History    Marital status:      Spouse name: Not on file    Number of children: Not on file    Years of education: Not on file    Highest education level: Not on file   Occupational History    Not on file   Tobacco Use    Smoking status: Former     Packs/day: 0.50     Years: 12.00     Pack years: 6.00     Types: Cigarettes     Quit date: 7/15/2012     Years since quitting: 10.8    Smokeless tobacco: Never   Vaping Use    Vaping Use: Former     Substances: THC    Devices: Disposable   Substance and Sexual Activity    Alcohol use: Not Currently    Drug use: Not Currently     Types: Marijuana, Oral     Comment: bedtime    Sexual activity: Yes     Partners: Female   Other Topics Concern    Not on file   Social History Narrative    Not on file     Social Determinants of Health     Financial Resource Strain: Not on file   Food Insecurity: Not on file   Transportation Needs: Not on file   Physical Activity: Not on file   Stress: Not on file   Social Connections: Not on file   Intimate Partner Violence: Not on file   Housing Stability: Not on file     Allergies   Allergen Reactions    Iodine Anaphylaxis     Other reaction(s): Unknown     Outpatient Encounter Medications as of 5/24/2023   Medication Sig Dispense Refill    baclofen (LIORESAL) 10 MG Tab Take 1 Tablet by mouth 2 times a day as needed (pain). 180 Tablet 0    atorvastatin (LIPITOR) 40 MG Tab Take 1 Tablet by mouth every day. 90 Tablet 2    amitriptyline (ELAVIL) 75 MG Tab Take 1 Tablet by mouth every evening. 90 Tablet 2    metoprolol SR (TOPROL XL) 50 MG TABLET SR 24 HR Take 1 Tablet by mouth every day. 90 Tablet 2    apixaban (ELIQUIS) 5mg Tab Take 1 Tablet by mouth 2 times a day. 180 Tablet 2    spironolactone (ALDACTONE) 25 MG Tab Take 1 Tablet by mouth every day. 90 Tablet 2    zonisamide (ZONEGRAN) 100 MG Cap Take 3 Capsules by mouth every day. 270 Capsule 2    DULoxetine (CYMBALTA) 60 MG Cap DR Particles delayed-release capsule Take 1 Capsule by mouth every day. 90 Capsule 2    sotalol (BETAPACE) 80 MG Tab Take 1 Tablet by mouth 2 times a day. 180 Tablet 2    [DISCONTINUED] predniSONE (DELTASONE) 10 MG Tab  (Patient not taking: Reported on 5/4/2023)      Semaglutide, 1 MG/DOSE, (OZEMPIC, 1 MG/DOSE,) 4 MG/3ML Solution Pen-injector Inject 1 mg under the skin every 7 days. 9 mL 1    Empagliflozin-metFORMIN HCl ER (SYNJARDY XR) 12.5-1000 MG TABLET SR 24 HR Take 1 Tablet by mouth 2 times a day. 180  Tablet 1    [DISCONTINUED] sacubitril-valsartan (ENTRESTO) 24-26 MG Tab Take 1 Tablet by mouth 2 times a day. (Patient not taking: Reported on 5/4/2023) 180 Tablet 1    [DISCONTINUED] predniSONE (DELTASONE) 50 MG Tab Take 1 Tablet by mouth every 6 hours. For contrast allergy. Take Dose #1: 13 hours prior to scheduled procedure with contrast. Take Dose #2: 7 hours prior to procedure. Take Dose #3 1 hour prior to procedure. (Patient not taking: Reported on 5/4/2023) 3 Tablet 0    benzonatate (TESSALON) 100 MG Cap Take 1 Capsule by mouth 3 times a day as needed for Cough. 90 Capsule 1    [DISCONTINUED] acetaminophen (TYLENOL) 500 MG Tab Take 1,000 mg by mouth. (Patient not taking: Reported on 5/4/2023)      [DISCONTINUED] mirtazapine (REMERON) 15 MG Tab TAKE 1 TABLET EVERY EVENING (Patient not taking: Reported on 5/4/2023) 90 Tablet 2    [DISCONTINUED] allopurinol (ZYLOPRIM) 300 MG Tab TAKE 1 TABLET DAILY (Patient not taking: Reported on 5/4/2023) 90 Tablet 2    [DISCONTINUED] sildenafil citrate (VIAGRA) 50 MG tablet Take 1 Tablet by mouth as needed for Erectile Dysfunction. (Patient not taking: Reported on 5/4/2023) 10 Tablet 10    [DISCONTINUED] nitroglycerin (NITROSTAT) 0.4 MG SL Tab Place 1 Tablet under the tongue as needed for Chest Pain. DISSOLVE 1 TABLET UNDER THE TONGUE AS NEEDED FOR CHEST PAIN (Patient not taking: Reported on 5/4/2023) 25 Tablet 3    glucose blood (FREESTYLE LITE) strip 1 Strip by Other route 4 times a day. 300 Strip 4    Insulin Pen Needle by Does not apply route.       No facility-administered encounter medications on file as of 5/24/2023.     Review of Systems   Constitutional:  Negative for chills and fever.   HENT:  Negative for congestion.    Respiratory:  Negative for cough and shortness of breath.    Cardiovascular:  Negative for chest pain, palpitations, orthopnea, leg swelling and PND.   Gastrointestinal:  Negative for abdominal pain and nausea.   Musculoskeletal:  Positive for back  "pain and joint pain. Negative for myalgias.        Neuropathy in back and arms.   Skin:  Negative for rash.   Neurological:  Positive for sensory change and seizures. Negative for dizziness, loss of consciousness and headaches.        Seizure the first weekend in May 2023.   Endo/Heme/Allergies:  Does not bruise/bleed easily.   Psychiatric/Behavioral:  The patient does not have insomnia.               Objective     BP 90/58 (BP Location: Right arm, Patient Position: Sitting, BP Cuff Size: Adult)   Pulse 88   Resp 18   Ht 1.88 m (6' 2\")   Wt 89.8 kg (198 lb)   SpO2 98%   BMI 25.42 kg/m²     Physical Exam  Constitutional:       Appearance: He is well-developed.      Comments: Weight is stable.   HENT:      Head: Normocephalic.   Neck:      Vascular: No JVD.   Cardiovascular:      Rate and Rhythm: Normal rate and regular rhythm.      Heart sounds: Normal heart sounds.      Comments: AICD in left chest wall.  Sternotomy scar present.  Pulmonary:      Effort: Pulmonary effort is normal. No respiratory distress.      Breath sounds: Normal breath sounds. No wheezing or rales.   Abdominal:      General: Bowel sounds are normal. There is no distension.      Palpations: Abdomen is soft.      Tenderness: There is no abdominal tenderness.   Musculoskeletal:         General: Normal range of motion.      Cervical back: Normal range of motion and neck supple.   Skin:     General: Skin is warm and dry.      Findings: No rash.   Neurological:      Mental Status: He is alert and oriented to person, place, and time.       AICD interrogation today reveals normal function. NO mode switching and no VT episodes; no device therapy. No changes are made today.    Impression of MRI of brain of 11/24/2022:  No acute intracranial abnormalities. No acute cerebrovascular infarct.      IMPRESSION OF CT SCAN OF CHEST OF 9/6/2022:  No acute cardiopulmonary abnormalities.  No rib fractures.   Post CABG changes. Left ventricle apical wall " thickening with fat deposition possibly a sequela of prior myocardial infarction.       Interpretation Summary of Echocardiogram of 4/27/2022:  The study was technically difficult with some images being suboptimal in quality   There is apical hypokinesis   There is mild inferior wall hypokinesis.   Visually the ejection fraction appears in the 35-40% range.   There is no thrombus.   A variety of Doppler measurements indicate grade I diastolic dysfunction associated with normal left   atrial pressures.   No significant valvular abnormality noted      CONCLUSIONS OF ECHOCARDIOGRAM OF 10/16/2019:  Limited Study for LVEF.   TDS - Limited acoustical window, contrast not available.  Prior Echo - 2/12/18, prior EF probably 40%, currently 45%.  Mildly reduced left ventricular systolic function.  Left ventricular ejection fraction is visually estimated to be 45%.  Global hypokinesis.  Abnormal septal motion consistent with postoperative status.    POSTOPERATIVE DIAGNOSES OF [facility] OF 7/22/2016:  1.  Occluded vein grafts.  2.  Patent LIMA to LAD.  3.  Diffuse distal RCA disease.  4.  A 100% occluded LAD stent.  5.  Moderate nonobstructive left circumflex disease.  6.  LVEF 50%.     LABS AS OF 11/26/2022:  Cholesterol, S/P <=200 mg/dL 94    Triglycerides <=150 mg/dL 62    LDL Cholesterol <=99 mg/dL 52    HDL Cholesterol 40 - 59 mg/dL 33 Low     Cholesterol/HDL Ratio <=4.5 ratio 2.8      Magnesium, S/P 1.8 - 2.5 mg/dL 1.8      C-Reactive Protein 0.10 - 0.80 mg/dL 0.05 Low       Component      Latest Ref Rng 11/26/2022   Sodium      136 - 144 mmol/L 140 (E)   Potassium      3.6 - 5.1 mmol/L 3.9 (E)   Chloride      102 - 110 mmol/L 109 (E)   Co2      22 - 32 mmol/L 25 (E)   Alkaline Phosphatase      38 - 126 U/L 67 (E)   AST(SGOT)      15 - 41 U/L 18 (E)   ALT(SGPT)      17 - 63 U/L 25 (E)   Bun      8 - 20 mg/dL 16 (E)   Creatinine      0.80 - 1.40 mg/dL 0.86 (E)   Calcium      8.7 - 10.3 mg/dL 8.6 (L) (E)   Total Protein      6.4  - 8.2 g/dL 5.5 (L) (E)   Albumin      3.5 - 4.8 g/dL 3.4 (L) (E)   Ag Ratio      1.0 - 2.2 ratio 1.6 (E)   Anion Gap      2 - 11 mmol/L 6 (E)   Glom Filt Rate, Est      >60 mL/min/1.7 92 (E)   Globulin      2.6 - 3.1 g/dL 2.1 (L) (E)   Glucose      60 - 99 mg/dL 78 (E)   Total Bilirubin      0.4 - 2.0 mg/dL 0.5 (E)      Component      Latest Ref Rn 11/26/2022   Leukocytes, Absolute      3.7 - 10.6 x10E3/uL 10.5 (E)   RBC      4.50 - 5.70 x10e6/uL 4.41 (L) (E)   Hemoglobin      13.0 - 16.7 g/dL 14.4 (E)   Hematocrit      38.8 - 49.7 % 40.8 (E)   MCV      83.0 - 99.0 fL 92.6 (E)   MCH      28.0 - 33.8 pg 32.6 (E)   MCHC      33.1 - 36.5 g/dL 35.2 (E)   RDW      11.8 - 14.0 % 14.1 (H) (E)   Platelet Count      146 - 390 x10E3/uL 181 (E)   MPV      6.4 - 10.2 fL 8.7 (E)   Neutrophils-Polys      41.7 - 82.3 % 62.2 (E)   Lymphocytes      10.8 - 44.4 % 29.2 (E)   Monocytes      5.0 - 12.8 % 7.2 (E)   Eosinophils      0.0 - 6.6 % 0.7 (E)   Basophils      0.0 - 1.3 % 0.7 (E)   Neutrophils (Absolute)      1.40 - 8.00 x10E3/uL 6.50 (E)   Lymphs (Absolute)      1.00 - 5.20 x10E3/uL 3.10 (E)   Monos (Absolute)      0.16 - 1.00 x10E3/uL 0.80 (E)   Eos (Absolute)      0.00 - 0.80 x10E3/uL 0.10 (E)   Baso (Absolute)      0.00 - 0.30 x10E3/uL 0.10 (E)          Assessment & Plan     1. Presence of biventricular AICD        2. Coronary artery disease involving native coronary artery of native heart without angina pectoris        3. Ischemic cardiomyopathy        4. S/P CABG x 3        5. ACC/AHA stage C systolic heart failure (HCC)        6. Ventricular tachycardia (HCC)        7. Paroxysmal atrial fibrillation (HCC)        8. Chronic anticoagulation        9. Essential hypertension        10. Mixed hyperlipidemia        11. Type 2 diabetes mellitus with complication, with long-term current use of insulin (HCC)        12. Diabetic polyneuropathy associated with diabetes mellitus due to underlying condition (HCC)        13.  Obstructive sleep apnea syndrome        14. Focal seizures (HCC)            Medical Decision Making: Today's Assessment/Status/Plan:        1. CAD/ischemic cardiomyopathy status post CABG x 3 and previous intervention, with LVEF 35-40% on echo in April 2022. CRT-D is working normally with no arrhythmias (including during recent seizures). Continue:  Eliquis 5mg twice daily  Lipitor 40mg once daily  Entresto 24/26mg twice daily  Toprol XL 50mg once daily  Aldactone 25mg once daily  NTG 0.4mg PRN (rarely uses)     2. HFrEF, stage C, class II, LVEF 35-40% in 2022, currently stable and euvolemic. No HF symptoms. Continue Eliquis, statin, Entresto, BB and Aldactone. Will repeat echocardiogram.     3. History of VT on Sotalol 80mg twice daily. EKG in November 2022 QTc 536ms.     4. PAFib, on Sotalol 80mg twice daily, with no mode switching episodes.     5. Chronic anticoagulation with Eliquis, no bleeding issues.     6. Hypertension, treated. BP tends to run low, better on lower dose of Entresto.     7. Hyperlipidemia, treated with Lipitor 40mg. Last LDL was 52 (at goal). He has been given labs to do by his PCP.     8. Diabetes mellitus, treated with combination therapy. He will repeat HgbA1c.     9. CLARE, treated/stable.     10. History of seizures, with most recent episode in early May 2023. He is scheduled to see neurology next month.     From a cardiac standpoint, he remains stable at this time. BP tends to run lower. To do labs ordered by PCP.  Repeat echocardiogram. Keep follow-up with other providers.  Follow-up with us in 6 months for next device check.

## 2023-06-15 ENCOUNTER — PATIENT MESSAGE (OUTPATIENT)
Dept: HEALTH INFORMATION MANAGEMENT | Facility: OTHER | Age: 58
End: 2023-06-15

## 2023-06-15 ENCOUNTER — PATIENT OUTREACH (OUTPATIENT)
Dept: HEALTH INFORMATION MANAGEMENT | Facility: OTHER | Age: 58
End: 2023-06-15
Payer: MEDICARE

## 2023-06-15 DIAGNOSIS — I10 ESSENTIAL HYPERTENSION: ICD-10-CM

## 2023-06-15 DIAGNOSIS — I25.10 CORONARY ARTERY DISEASE INVOLVING NATIVE CORONARY ARTERY OF NATIVE HEART WITHOUT ANGINA PECTORIS: ICD-10-CM

## 2023-06-22 ENCOUNTER — TELEPHONE (OUTPATIENT)
Dept: NEUROLOGY | Facility: MEDICAL CENTER | Age: 58
End: 2023-06-22
Payer: MEDICARE

## 2023-06-22 NOTE — TELEPHONE ENCOUNTER
NEUROLOGY PATIENT PRE-VISIT PLANNING     Patient was NOT contacted to complete PVP.    Patient Appointment is scheduled as: Established Patient     Is visit type and length scheduled correctly? Yes    [facility] is checked in Patient Demographics? Yes    3.   Is referral attached to visit? Yes    4. Were records received from referring provider? Yes    4. Patient was NOT contacted to have someone accompany them to visit.     5. Is this appointment scheduled as a Hospital Follow-Up?  No    6. Does the patient require any pre procedure or post procedure follow up? No    7. If any orders were placed at last visit or intended to be done for this visit do we have Results/Consult Notes? Yes  Labs -  Labs were ordered but NOT completed.   Imaging - Imaging was NOT ordered.   Referrals - A referral was placed for a blood patch and it was completed. A referral was placed for an EEG and it was completed.     8. If patient appointment is for Botox - is order pended for provider? N/A

## 2023-06-23 PROBLEM — I48.0 HYPERCOAGULABLE STATE DUE TO PAROXYSMAL ATRIAL FIBRILLATION (HCC): Status: ACTIVE | Noted: 2023-06-23

## 2023-06-23 PROBLEM — D68.69 HYPERCOAGULABLE STATE DUE TO PAROXYSMAL ATRIAL FIBRILLATION (HCC): Status: ACTIVE | Noted: 2023-06-23

## 2023-06-27 ENCOUNTER — APPOINTMENT (OUTPATIENT)
Dept: NEUROLOGY | Facility: MEDICAL CENTER | Age: 58
End: 2023-06-27
Attending: PSYCHIATRY & NEUROLOGY
Payer: MEDICARE

## 2023-07-10 ENCOUNTER — NON-PROVIDER VISIT (OUTPATIENT)
Dept: CARDIOLOGY | Facility: MEDICAL CENTER | Age: 58
End: 2023-07-10
Payer: MEDICARE

## 2023-07-10 PROCEDURE — 93295 DEV INTERROG REMOTE 1/2/MLT: CPT | Performed by: INTERNAL MEDICINE

## 2023-07-10 NOTE — CARDIAC REMOTE MONITOR - SCAN
Device transmission reviewed. Device demonstrated appropriate function.       Electronically Signed by: Esequiel Cullen M.D.    7/20/2023  2:27 PM

## 2023-08-08 ENCOUNTER — TELEPHONE (OUTPATIENT)
Dept: NEUROLOGY | Facility: MEDICAL CENTER | Age: 58
End: 2023-08-08
Payer: MEDICARE

## 2023-08-08 ENCOUNTER — PATIENT OUTREACH (OUTPATIENT)
Dept: HEALTH INFORMATION MANAGEMENT | Facility: OTHER | Age: 58
End: 2023-08-08
Payer: MEDICARE

## 2023-08-08 ENCOUNTER — PATIENT MESSAGE (OUTPATIENT)
Dept: HEALTH INFORMATION MANAGEMENT | Facility: OTHER | Age: 58
End: 2023-08-08

## 2023-08-08 DIAGNOSIS — Z79.01 CHRONIC ANTICOAGULATION: ICD-10-CM

## 2023-08-08 DIAGNOSIS — E08.42 DIABETIC POLYNEUROPATHY ASSOCIATED WITH DIABETES MELLITUS DUE TO UNDERLYING CONDITION (HCC): ICD-10-CM

## 2023-08-08 DIAGNOSIS — I25.10 CORONARY ARTERY DISEASE INVOLVING NATIVE CORONARY ARTERY OF NATIVE HEART WITHOUT ANGINA PECTORIS: ICD-10-CM

## 2023-08-08 DIAGNOSIS — E78.2 MIXED HYPERLIPIDEMIA: ICD-10-CM

## 2023-08-08 DIAGNOSIS — I48.0 PAROXYSMAL ATRIAL FIBRILLATION (HCC): ICD-10-CM

## 2023-08-08 DIAGNOSIS — I47.20 VENTRICULAR TACHYCARDIA (HCC): ICD-10-CM

## 2023-08-08 DIAGNOSIS — G47.33 OBSTRUCTIVE SLEEP APNEA SYNDROME: ICD-10-CM

## 2023-08-08 DIAGNOSIS — I25.5 ISCHEMIC CARDIOMYOPATHY: ICD-10-CM

## 2023-08-08 NOTE — PROGRESS NOTES
8/8/23 1513 - Spoke with patient to introduce CCM  program. Patient was driving and requested more information. Brochure mailed to address in chart. Instructed to call if further questions and sent My Chart message with contact information.   8/23/23 -135 This RN spoke to patient to confirm if he received the PCM brochure. This RN introduced program and patient is interested in enrolling. Enrollment scheduled for Tuesday 8/29/23 at 1000 via telephone. My Chart message sent to remind.

## 2023-08-08 NOTE — TELEPHONE ENCOUNTER
NEUROLOGY PATIENT PRE-VISIT PLANNING     Patient was NOT contacted to complete PVP.    Patient Appointment is scheduled as: Established Patient     Is visit type and length scheduled correctly? Yes    [facility] is [facility] in Patient Demographics? Yes    3.   Is referral attached to visit? Yes    4. Were records received from referring provider? Yes    4. Patient was NOT contacted to have someone accompany them to visit.     5. Is this appointment scheduled as a Hospital Follow-Up?  No    6. Does the patient require any pre procedure or post procedure follow up? No    7. If any orders were placed at last visit or intended to be done for this visit do we have Results/Consult Notes? Yes  Labs -  Labs were ordered but NOT completed.   Imaging - Imaging was not ordered at last office visit.  Referrals - A referral was placed for a blood patch and it has been completed. A referral was placed for an EEG and it has been completed.     8. If patient appointment is for Botox - is order pended for provider? N/A

## 2023-08-09 ENCOUNTER — OFFICE VISIT (OUTPATIENT)
Dept: NEUROLOGY | Facility: MEDICAL CENTER | Age: 58
End: 2023-08-09
Attending: PSYCHIATRY & NEUROLOGY
Payer: MEDICARE

## 2023-08-09 VITALS
OXYGEN SATURATION: 97 % | TEMPERATURE: 95.9 F | HEART RATE: 52 BPM | DIASTOLIC BLOOD PRESSURE: 68 MMHG | SYSTOLIC BLOOD PRESSURE: 128 MMHG | WEIGHT: 190.7 LBS | HEIGHT: 74 IN | BODY MASS INDEX: 24.47 KG/M2

## 2023-08-09 DIAGNOSIS — R41.89 COGNITIVE IMPAIRMENT: Primary | ICD-10-CM

## 2023-08-09 DIAGNOSIS — R41.0 TRANSIENT CONFUSION: ICD-10-CM

## 2023-08-09 DIAGNOSIS — R56.9 FOCAL SEIZURES (HCC): ICD-10-CM

## 2023-08-09 PROCEDURE — 99212 OFFICE O/P EST SF 10 MIN: CPT | Performed by: PSYCHIATRY & NEUROLOGY

## 2023-08-09 PROCEDURE — 3074F SYST BP LT 130 MM HG: CPT | Performed by: PSYCHIATRY & NEUROLOGY

## 2023-08-09 PROCEDURE — 99215 OFFICE O/P EST HI 40 MIN: CPT | Performed by: PSYCHIATRY & NEUROLOGY

## 2023-08-09 PROCEDURE — 3078F DIAST BP <80 MM HG: CPT | Performed by: PSYCHIATRY & NEUROLOGY

## 2023-08-09 ASSESSMENT — ENCOUNTER SYMPTOMS
SENSORY CHANGE: 0
TREMORS: 1
MEMORY LOSS: 1
INSOMNIA: 1
LOSS OF CONSCIOUSNESS: 1
FALLS: 0
SEIZURES: 0

## 2023-08-09 ASSESSMENT — FIBROSIS 4 INDEX: FIB4 SCORE: 1.13

## 2023-08-09 NOTE — PROGRESS NOTES
Subjective     Juaquin Solorzano is a 58 y.o. male who presents for follow-up, with a history of focal seizures, and an isolated episode of transient confusion back in November, 2022 of unclear significance, but he was now complaining more of a persistent and progressive problem with memory loss and cognitive impairment.  He presents alone.     HPI    Seizures: Still on Zonegran 300 mg nightly, he has actually had no focal seizures recur.  He is compliant with medication.  He does also suffered from syncope, he has had several events of orthostatic loss of consciousness, the last this past April when he got out of bed in the middle of the night, again too quickly, there was a sudden blackout.  He came to quickly, was not confused, there were no other premonitory symptoms with the event and there was no sustained postictal state.  [place] of these episodes and he is undergoing a cardiac workup, because evidently has yet to be found.    Transient confusion: This has not recurred.  He states that he came back to baseline.  The episode was more disconcerting because of a loss of language function and confusion, less so for focal motor or sensory deficits on the right side.  When last seen, I had ordered an EEG study, this was never completed.    Memory loss: He has had this problem ongoing for more than just a year or so, it has been slowly getting worse.  The events of syncope and this event of transient confusion late last year did not seem to have any long-term effect.    He and his wife noted the short-term memory issues, he is repeating self more often, he requires much more frequent reminders about new events.  If he does not write things down they are more often than not forgotten.  Details of events are much more readily lost.  Uses a calendar as a result.  Routine events and tasks are done more slowly.  When he reads something, he finds himself  having to read and then simultaneously engaging in the activity being described.  He does get more easily distracted.    He is still driving safely.  His daily routines are being maintained.  He suffers from chronic insomnia, though he denies symptoms suggestive of RBD-sleep disorder.  No one has described changes in his personality or behaviors, he has had no problems with hallucinations.  He has not been spending uncontrollably.  He is maintaining checking account and credit lines appropriately.    He is independent with his ADLs.  He has a good appetite though he is still losing weight, he is also still on Ozempic.  Organization at home is maintained though he does put things down and it takes hours for him to find him.  He is not always recalling placing them where they were found.    A directed workup has not been done, his last imaging study with CT of the brain in November of last year and he was admitted for the episode of confusion.  This was unremarkable for acute insult.  EEG studies have not been done recently, previous study from 2018 revealed bifrontal irritability consistent with underlying susceptibility.  TSH from April, 2022 was 1.94.    Medical, surgical and family histories are reviewed, there are no new drug allergies.  His right hand, action based tremor is unchanged.  He is on Aldactone, Entresto, Lipitor, baclofen, Toprol-XL, Zonegran 300 mg nightly, Cymbalta 60 mg daily, Elavil 75 mg nightly, Betapace, Eliquis 5 mg twice daily, Synjardy and Ozempic.    Review of Systems   Constitutional:  Negative for malaise/fatigue.   Musculoskeletal:  Negative for falls.   Neurological:  Positive for tremors and loss of consciousness. Negative for sensory change and seizures.   Psychiatric/Behavioral:  Positive for memory loss. The patient has insomnia.    All other systems reviewed and are negative.    Objective     /68 (BP Location: Right arm, Patient Position: Sitting, BP Cuff Size: Adult)    "Pulse (!) 52   Temp (!) 35.5 °C (95.9 °F) (Temporal)   Ht 1.88 m (6' 2\")   Wt 86.5 kg (190 lb 11.2 oz)   SpO2 97%   BMI 24.48 kg/m²      Physical Exam    He appears in no acute distress.  His vital signs are stable, pulse is a little low at 52, his rhythm is regular.  There is no malar rash.  His neck is supple.  Cardiac evaluation is unremarkable.  There is no lower extremity edema.     Neurological Exam    He is fully oriented, there is no aphasia, apraxia, or inattention.  His mental processing speed is slowed, at times commands do need to be repeated by the examiner.  Frontal release signs are absent, there is no rostrocaudal extinction.  More in-depth cognitive assessment was not done.    PERRLA/EOMI, visual fields are grossly full, facial movements are symmetric, there is no bradykinesia or hypophonia.  The tongue and uvula are midline, there is no bulbar dysfunction.  Sensory exam is deferred.  Shoulder shrug and head rotation are normal.    Movements in general are a little slow, the action based tremulousness with the right hand and arm is unchanged.  There is no rigidity even with distraction bilaterally.  There is no drift.  Strength is intact throughout.    Reflexes are trace present at the ankles, requiring distraction, slightly brisker in the knees, though they are symmetric.  Biceps jerks are symmetric.  There are no pathologic reflexes.    He stands slowly, stride length is slightly diminished bilaterally, but he does not shuffle.  Station is normal.  Armswing is maintained and symmetric.  There is no appendicular dystaxia.  Repetitive movements are slowed and somewhat irregular bilaterally in the upper extremities, there is no asymmetry.  They are symmetric in the lower extremities.    Sensory exam reveals a stocking pattern decrement of vibration below the ankles.  Temperature is grossly intact.    Assessment & Plan     1. Cognitive impairment  Something that probably does need a more in-depth " evaluation, I will have him come back with his wife in the hand to help get a third party's review of the difficulties he is having.  There is a risk given his family history, but he is also young.  Other symptomatic conditions such as advanced cerebrovascular disease, NPH, nutritional deficiency state, endocrine abnormalities, nonconvulsive seizures, etc. also need to be entertained.  Additional blood work can be drawn at that time.  I doubt that this is related to significant depression or increased anxiety, not likely medication side effect.  For now we will simply observe.  EEG will be ordered as part of his workup for the episode of confusion and aphasia, it also can help in regards to a cause for his memory difficulties.  We will follow-up over the next couple of months.    2. Transient confusion  Still of unclear etiology, EEG will be obtained.  I doubt this was an ictal event, but again he is at risk.  The event itself was clinically dissimilar to the symptoms he has with one of his focal seizures.  The workup included MRI imaging of the brain done at the time he had been symptomatic, this evidently revealed no evidence of acute ischemia.  Unfortunately I do not have the images or report to confirm or deny this assertion.  Clinically he returned to baseline.  We will let him know the results of the EEG if significant.  He will notify the office if he has any recurrences.    - Referral to Neurodiagnostics (EEG,EP,EMG/NCS/DBS)    3. Focal seizures (HCC)  Stable, EEG will be repeated.  He stays on Zonegran 300 mg nightly.    - Referral to Neurodiagnostics (EEG,EP,EMG/NCS/DBS)    Time: 60 minutes in total spent on patient care including pre-charting, record review, discussion with healthcare staff and documentation.  This includes face-to-face time for exam, review, discussion, as well as counseling and coordinating care.

## 2023-08-21 ENCOUNTER — HOSPITAL ENCOUNTER (OUTPATIENT)
Facility: MEDICAL CENTER | Age: 58
End: 2023-08-21
Attending: PHYSICIAN ASSISTANT
Payer: MEDICARE

## 2023-08-21 ENCOUNTER — NON-PROVIDER VISIT (OUTPATIENT)
Dept: MEDICAL GROUP | Facility: CLINIC | Age: 58
End: 2023-08-21
Payer: MEDICARE

## 2023-08-21 DIAGNOSIS — I50.20 ACC/AHA STAGE C SYSTOLIC HEART FAILURE (HCC): ICD-10-CM

## 2023-08-21 DIAGNOSIS — Z12.5 PROSTATE CANCER SCREENING: ICD-10-CM

## 2023-08-21 DIAGNOSIS — I10 ESSENTIAL HYPERTENSION: ICD-10-CM

## 2023-08-21 DIAGNOSIS — I25.5 ISCHEMIC CARDIOMYOPATHY: ICD-10-CM

## 2023-08-21 DIAGNOSIS — Z79.4 TYPE 2 DIABETES MELLITUS WITH COMPLICATION, WITH LONG-TERM CURRENT USE OF INSULIN (HCC): ICD-10-CM

## 2023-08-21 DIAGNOSIS — Z13.21 ENCOUNTER FOR VITAMIN DEFICIENCY SCREENING: ICD-10-CM

## 2023-08-21 DIAGNOSIS — E08.40: ICD-10-CM

## 2023-08-21 DIAGNOSIS — E11.8 TYPE 2 DIABETES MELLITUS WITH COMPLICATION, WITH LONG-TERM CURRENT USE OF INSULIN (HCC): ICD-10-CM

## 2023-08-21 LAB
25(OH)D3 SERPL-MCNC: 31 NG/ML (ref 30–100)
ALBUMIN SERPL BCP-MCNC: 4.2 G/DL (ref 3.2–4.9)
ALBUMIN/GLOB SERPL: 1.9 G/DL
ALP SERPL-CCNC: 82 U/L (ref 30–99)
ALT SERPL-CCNC: 43 U/L (ref 2–50)
ANION GAP SERPL CALC-SCNC: 6 MMOL/L (ref 7–16)
AST SERPL-CCNC: 27 U/L (ref 12–45)
BILIRUB SERPL-MCNC: 0.6 MG/DL (ref 0.1–1.5)
BUN SERPL-MCNC: 10 MG/DL (ref 8–22)
CALCIUM ALBUM COR SERPL-MCNC: 9.2 MG/DL (ref 8.5–10.5)
CALCIUM SERPL-MCNC: 9.4 MG/DL (ref 8.5–10.5)
CHLORIDE SERPL-SCNC: 109 MMOL/L (ref 96–112)
CHOLEST SERPL-MCNC: 85 MG/DL (ref 100–199)
CO2 SERPL-SCNC: 27 MMOL/L (ref 20–33)
CREAT SERPL-MCNC: 1.12 MG/DL (ref 0.5–1.4)
EST. AVERAGE GLUCOSE BLD GHB EST-MCNC: 103 MG/DL
GFR SERPLBLD CREATININE-BSD FMLA CKD-EPI: 77 ML/MIN/1.73 M 2
GLOBULIN SER CALC-MCNC: 2.2 G/DL (ref 1.9–3.5)
GLUCOSE SERPL-MCNC: 93 MG/DL (ref 65–99)
HBA1C MFR BLD: 5.2 % (ref 4–5.6)
HDLC SERPL-MCNC: 41 MG/DL
LDLC SERPL CALC-MCNC: 25 MG/DL
POTASSIUM SERPL-SCNC: 4.9 MMOL/L (ref 3.6–5.5)
PROT SERPL-MCNC: 6.4 G/DL (ref 6–8.2)
PSA SERPL-MCNC: 0.19 NG/ML (ref 0–4)
SODIUM SERPL-SCNC: 142 MMOL/L (ref 135–145)
TRIGL SERPL-MCNC: 94 MG/DL (ref 0–149)

## 2023-08-21 PROCEDURE — 36415 COLL VENOUS BLD VENIPUNCTURE: CPT | Performed by: PHYSICIAN ASSISTANT

## 2023-08-21 PROCEDURE — 84153 ASSAY OF PSA TOTAL: CPT

## 2023-08-21 PROCEDURE — 82306 VITAMIN D 25 HYDROXY: CPT

## 2023-08-21 PROCEDURE — 80061 LIPID PANEL: CPT

## 2023-08-21 PROCEDURE — 80053 COMPREHEN METABOLIC PANEL: CPT

## 2023-08-21 PROCEDURE — 83036 HEMOGLOBIN GLYCOSYLATED A1C: CPT

## 2023-08-21 NOTE — PROGRESS NOTES
Juaquin Solorzano is a 58 y.o. male here for a non-provider visit for a lab draw on 8/21/2023 at 11:09 AM.    Procedure performed:  Venipuncture     Anatomical site:  Right Antecubital Area    Equipment used:  21 g butterfly     Labs drawn:  A1c, Comp Metabolic Panel , Lipid Profile, Vitamin D , and PSA    Ordering provider:  Cece Parker PA-C    Lab draw completed by:  Nina Govea, Med Ass't

## 2023-08-23 ENCOUNTER — OFFICE VISIT (OUTPATIENT)
Dept: MEDICAL GROUP | Facility: CLINIC | Age: 58
End: 2023-08-23
Payer: MEDICARE

## 2023-08-23 ENCOUNTER — NON-PROVIDER VISIT (OUTPATIENT)
Dept: NEUROLOGY | Facility: MEDICAL CENTER | Age: 58
End: 2023-08-23
Attending: STUDENT IN AN ORGANIZED HEALTH CARE EDUCATION/TRAINING PROGRAM
Payer: MEDICARE

## 2023-08-23 ENCOUNTER — HOSPITAL ENCOUNTER (OUTPATIENT)
Dept: RADIOLOGY | Facility: MEDICAL CENTER | Age: 58
End: 2023-08-23
Attending: PHYSICIAN ASSISTANT

## 2023-08-23 VITALS
SYSTOLIC BLOOD PRESSURE: 102 MMHG | DIASTOLIC BLOOD PRESSURE: 62 MMHG | HEART RATE: 59 BPM | BODY MASS INDEX: 24.51 KG/M2 | HEIGHT: 74 IN | OXYGEN SATURATION: 97 % | WEIGHT: 191 LBS | TEMPERATURE: 97.9 F | RESPIRATION RATE: 16 BRPM

## 2023-08-23 DIAGNOSIS — G89.29 NECK PAIN, CHRONIC: ICD-10-CM

## 2023-08-23 DIAGNOSIS — E78.2 MIXED HYPERLIPIDEMIA: ICD-10-CM

## 2023-08-23 DIAGNOSIS — E11.8 TYPE 2 DIABETES MELLITUS WITH COMPLICATION, WITH LONG-TERM CURRENT USE OF INSULIN (HCC): ICD-10-CM

## 2023-08-23 DIAGNOSIS — I10 ESSENTIAL HYPERTENSION: ICD-10-CM

## 2023-08-23 DIAGNOSIS — M54.50 CHRONIC MIDLINE LOW BACK PAIN WITHOUT SCIATICA: ICD-10-CM

## 2023-08-23 DIAGNOSIS — Z95.1 S/P CABG X 3: ICD-10-CM

## 2023-08-23 DIAGNOSIS — I25.5 ISCHEMIC CARDIOMYOPATHY: ICD-10-CM

## 2023-08-23 DIAGNOSIS — G89.29 CHRONIC PAIN OF RIGHT KNEE: ICD-10-CM

## 2023-08-23 DIAGNOSIS — Z79.4 TYPE 2 DIABETES MELLITUS WITH COMPLICATION, WITH LONG-TERM CURRENT USE OF INSULIN (HCC): ICD-10-CM

## 2023-08-23 DIAGNOSIS — M54.2 NECK PAIN, CHRONIC: ICD-10-CM

## 2023-08-23 DIAGNOSIS — R13.12 OROPHARYNGEAL DYSPHAGIA: ICD-10-CM

## 2023-08-23 DIAGNOSIS — D68.69 HYPERCOAGULABLE STATE DUE TO PAROXYSMAL ATRIAL FIBRILLATION (HCC): ICD-10-CM

## 2023-08-23 DIAGNOSIS — R41.0 TRANSIENT CONFUSION: ICD-10-CM

## 2023-08-23 DIAGNOSIS — R56.9 FOCAL SEIZURES (HCC): ICD-10-CM

## 2023-08-23 DIAGNOSIS — I48.0 PAROXYSMAL ATRIAL FIBRILLATION (HCC): ICD-10-CM

## 2023-08-23 DIAGNOSIS — M54.9 MID BACK PAIN: ICD-10-CM

## 2023-08-23 DIAGNOSIS — E08.42 DIABETIC POLYNEUROPATHY ASSOCIATED WITH DIABETES MELLITUS DUE TO UNDERLYING CONDITION (HCC): ICD-10-CM

## 2023-08-23 DIAGNOSIS — E26.1 SECONDARY HYPERALDOSTERONISM (HCC): ICD-10-CM

## 2023-08-23 DIAGNOSIS — M25.561 CHRONIC PAIN OF RIGHT KNEE: ICD-10-CM

## 2023-08-23 DIAGNOSIS — I48.0 HYPERCOAGULABLE STATE DUE TO PAROXYSMAL ATRIAL FIBRILLATION (HCC): ICD-10-CM

## 2023-08-23 DIAGNOSIS — F33.0 MAJOR DEPRESSIVE DISORDER, RECURRENT, MILD (HCC): ICD-10-CM

## 2023-08-23 DIAGNOSIS — G89.29 CHRONIC MIDLINE LOW BACK PAIN WITHOUT SCIATICA: ICD-10-CM

## 2023-08-23 PROCEDURE — 95819 EEG AWAKE AND ASLEEP: CPT | Performed by: STUDENT IN AN ORGANIZED HEALTH CARE EDUCATION/TRAINING PROGRAM

## 2023-08-23 PROCEDURE — 3078F DIAST BP <80 MM HG: CPT | Performed by: PHYSICIAN ASSISTANT

## 2023-08-23 PROCEDURE — 99214 OFFICE O/P EST MOD 30 MIN: CPT | Performed by: PHYSICIAN ASSISTANT

## 2023-08-23 PROCEDURE — 95816 EEG AWAKE AND DROWSY: CPT | Mod: 26 | Performed by: STUDENT IN AN ORGANIZED HEALTH CARE EDUCATION/TRAINING PROGRAM

## 2023-08-23 PROCEDURE — 3074F SYST BP LT 130 MM HG: CPT | Performed by: PHYSICIAN ASSISTANT

## 2023-08-23 RX ORDER — SEMAGLUTIDE 1.34 MG/ML
0.5 INJECTION, SOLUTION SUBCUTANEOUS
Qty: 9 ML | Refills: 1
Start: 2023-08-23

## 2023-08-23 RX ORDER — BACLOFEN 10 MG/1
10 TABLET ORAL 2 TIMES DAILY PRN
Qty: 180 TABLET | Refills: 0 | Status: SHIPPED | OUTPATIENT
Start: 2023-08-23 | End: 2024-02-09 | Stop reason: SDUPTHER

## 2023-08-23 RX ORDER — PANTOPRAZOLE SODIUM 40 MG/1
40 TABLET, DELAYED RELEASE ORAL DAILY
Qty: 90 TABLET | Refills: 1 | Status: SHIPPED | OUTPATIENT
Start: 2023-08-23 | End: 2024-02-09 | Stop reason: SDUPTHER

## 2023-08-23 ASSESSMENT — FIBROSIS 4 INDEX: FIB4 SCORE: 1.3

## 2023-08-23 NOTE — PROCEDURES
OUTPATIENT ROUTINE VIDEO ELECTROENCEPHALOGRAM REPORT    REFERRING PROVIDER: Rudy Hunt M.D.  [address] [address]  [place] [address]  DOS: 08/23/2023    STUDY DURATION: 0 hours and 24 minutes of total recording time.     INDICATION:  Juaquin Solorzano 58 y.o. male presenting with  episodes of transient confusion of unclear etiology    RELEVANT MEDICATIONS/TREATMENTS:   Current Outpatient Medications   Medication Instructions    amitriptyline (ELAVIL) 75 mg, Oral, NIGHTLY    apixaban (ELIQUIS) 5 mg, Oral, 2 TIMES DAILY    atorvastatin (LIPITOR) 40 mg, Oral, DAILY    baclofen (LIORESAL) 10 mg, Oral, 2 TIMES DAILY PRN    benzonatate (TESSALON) 100 mg, Oral, 3 TIMES DAILY PRN    DULoxetine (CYMBALTA) 60 mg, Oral, DAILY    Empagliflozin-metFORMIN HCl ER (SYNJARDY XR) 12.5-1000 MG TABLET SR 24 HR 1 Tablet, Oral, 2 Times Daily (BID)    ENTRESTO 24-26 MG Tab TAKE 1 TABLET TWICE A DAY    glucose blood (FREESTYLE LITE) strip 1 Strip, Other, 4 TIMES DAILY    Insulin Pen Needle Does not apply    metoprolol SR (TOPROL XL) 50 mg, Oral, DAILY    Ozempic (1 MG/DOSE) 0.5 mg, Subcutaneous, EVERY 7 DAYS    pantoprazole (PROTONIX) 40 mg, Oral, DAILY    sotalol (BETAPACE) 80 mg, Oral, 2 TIMES DAILY    spironolactone (ALDACTONE) 25 mg, Oral, DAILY    zonisamide (ZONEGRAN) 300 mg, Oral, DAILY        TECHNIQUE:   Routine VEEG was set up by a Neurodiagnostic technologist who performed education to the patient and staff. A minimum of 23 electrodes and 23 channel recording was setup and performed by Neurodiagnostic technologist, in accordance with the international 10-20 system. The study was reviewed in bipolar and referential montages. The recording examined the patient in the  awake and drowsy state(s).     DESCRIPTION OF THE RECORD:  During wakefulness, the background was continuous and showed a 10 Hz posterior dominant rhythm.  There was reactivity to eye closure/opening.  An anterior-posterior gradient was noted with  faster beta frequencies seen anteriorly.  During drowsiness, theta/delta frequencies were seen.    EEG Sleep: N2 sleep architecture was not seen.    ICTAL AND INTERICTAL FINDINGS:   No focal or generalized epileptiform activity noted.     No regional slowing or persistent focal asymmetries were seen.    No seizures.     ACTIVATION PROCEDURES:   Intermittent Photic stimulation was performed in a stepwise fashion from 1 to 30 Hz and did not elicited any abnormalities on EEG.     EKG: Sampling of the EKG recording showed an abnormal rhythm with a frequent PVCs burden    EVENTS:  None    INTERPRETATION:   Normal video EEG recording in the awake and drowsy state(s):  - No regional slowing or persistent focal asymmetries were seen.  - No epileptiform discharges were seen.  - No seizures. Clinical correlation is recommended.      Note: A normal EEG does not rule out epilepsy or the possibility of seizures.  If the clinical suspicion remains high for seizures, a prolonged recording to capture clinical or subclinical events may be helpful.      Alireza Mccarthy MD  Department of Neurology at [facility]  General Neurologist and Epileptologist  Director of [facility]'s Level III Comprehensive Epilepsy Program  Professor of Clinical Neurology, [facility].   Phone: [phone]  Fax: [phone]  E-mail: [email or web address]

## 2023-08-23 NOTE — PROGRESS NOTES
cc:  diabetes    Subjective:     Juaquin Solorzano is a 58 y.o. male presenting for diabetes      Patient presents to the office for diabetes.  Patient is still having weight loss and states that he is taking ozempic.  He states that he is drinking soda.  His a1c is 5.2.  He is wanting to know if he can decrease the dose. He states that he is also eating candy at night.    Patient is a type II diabetic with a long-term history of insulin use and polyneuropathy.  He also has hypertension, ischemic cardiomyopathy with a history of CABG x3.  He also has paroxysmal atrial fibrillation and is in a hypercoagulable state due to atrial fibrillation.  He does see cardiology.  His last appointment was approximately May of this year.  He does take medication for mixed hyperlipidemia.  His labs show that his cholesterol level is low.    Patient has been having pain in his right knee since he fell on his knee while attempting to go to the bathroom at night.  He has been limping on it for several weeks, but it is not improved.  The original injury was in April.  He would like to obtain an xray.  He also continues to have chronic neck pain.  Struggle to obtain a CT of the neck although this has been ordered frequently.  Patient also has chronic low back pain and does take medications for this as well as mid back pain.    Patient is on entresto.  He does have secondary hyperaldosteronism he does have a abnormal anion gap but he is stable on medications and we will continue at this time.    Patient states that he has been having difficulty with swallowing and states he can cough and food will come up.  He would like to have this evaluated further. He is also having a difficult time drinking fluids.  He does have frequent heart burn. He will have an upset stomach and nausea.     Patient does have major depressive disorder.  He does take medications for this.  Currently he is stable.    Review of systems:  See above.   Denies any  symptoms unless previously indicated.        Current Outpatient Medications:     baclofen (LIORESAL) 10 MG Tab, Take 1 Tablet by mouth 2 times a day as needed (pain)., Disp: 180 Tablet, Rfl: 0    pantoprazole (PROTONIX) 40 MG Tablet Delayed Response, Take 1 Tablet by mouth every day., Disp: 90 Tablet, Rfl: 1    ENTRESTO 24-26 MG Tab, TAKE 1 TABLET TWICE A DAY, Disp: 180 Tablet, Rfl: 2    atorvastatin (LIPITOR) 40 MG Tab, Take 1 Tablet by mouth every day., Disp: 90 Tablet, Rfl: 2    amitriptyline (ELAVIL) 75 MG Tab, Take 1 Tablet by mouth every evening., Disp: 90 Tablet, Rfl: 2    metoprolol SR (TOPROL XL) 50 MG TABLET SR 24 HR, Take 1 Tablet by mouth every day., Disp: 90 Tablet, Rfl: 2    apixaban (ELIQUIS) 5mg Tab, Take 1 Tablet by mouth 2 times a day., Disp: 180 Tablet, Rfl: 2    spironolactone (ALDACTONE) 25 MG Tab, Take 1 Tablet by mouth every day., Disp: 90 Tablet, Rfl: 2    zonisamide (ZONEGRAN) 100 MG Cap, Take 3 Capsules by mouth every day., Disp: 270 Capsule, Rfl: 2    DULoxetine (CYMBALTA) 60 MG Cap DR Particles delayed-release capsule, Take 1 Capsule by mouth every day., Disp: 90 Capsule, Rfl: 2    sotalol (BETAPACE) 80 MG Tab, Take 1 Tablet by mouth 2 times a day., Disp: 180 Tablet, Rfl: 2    Semaglutide, 1 MG/DOSE, (OZEMPIC, 1 MG/DOSE,) 4 MG/3ML Solution Pen-injector, Inject 1 mg under the skin every 7 days., Disp: 9 mL, Rfl: 1    Empagliflozin-metFORMIN HCl ER (SYNJARDY XR) 12.5-1000 MG TABLET SR 24 HR, Take 1 Tablet by mouth 2 times a day., Disp: 180 Tablet, Rfl: 1    benzonatate (TESSALON) 100 MG Cap, Take 1 Capsule by mouth 3 times a day as needed for Cough., Disp: 90 Capsule, Rfl: 1    glucose blood (FREESTYLE LITE) strip, 1 Strip by Other route 4 times a day., Disp: 300 Strip, Rfl: 4    Insulin Pen Needle, by Does not apply route., Disp: , Rfl:     Allergies, past medical history, past surgical history, family history, social history reviewed and updated    Objective:     Vitals: /62    "Pulse (!) 59   Temp 36.6 °C (97.9 °F) (Temporal)   Resp 16   Ht 1.88 m (6' 2\")   Wt 86.6 kg (191 lb)   SpO2 97%   BMI 24.52 kg/m²   General: Alert, pleasant, NAD  EYES:   PERRL, EOMI, no icterus or pallor.  Conjunctivae and lids normal.   HENT:  Normocephalic.  External ears normal.  Neck supple.    Respiratory: Normal respiratory effort.    Abdomen: Not obese  Skin: Warm, dry, no rashes.  Musculoskeletal: Gait is normal.  Moves all extremities well.    Extremities: normal range of motion all extremities.   Neurological: No tremors, sensation grossly intact,  CN2-12 intact.  Psych:  Affect/mood is normal, judgement is good, memory is intact, grooming is appropriate.     Latest Reference Range & Units 08/21/23 11:05   Sodium 135 - 145 mmol/L 142   Potassium 3.6 - 5.5 mmol/L 4.9   Chloride 96 - 112 mmol/L 109   Co2 20 - 33 mmol/L 27   Anion Gap 7.0 - 16.0  6.0 (L)   Glucose 65 - 99 mg/dL 93   Bun 8 - 22 mg/dL 10   Creatinine 0.50 - 1.40 mg/dL 1.12   GFR (CKD-EPI) >60 mL/min/1.73 m 2 77   Calcium 8.5 - 10.5 mg/dL 9.4   Correct Calcium 8.5 - 10.5 mg/dL 9.2   AST(SGOT) 12 - 45 U/L 27   ALT(SGPT) 2 - 50 U/L 43   Alkaline Phosphatase 30 - 99 U/L 82   Total Bilirubin 0.1 - 1.5 mg/dL 0.6   Albumin 3.2 - 4.9 g/dL 4.2   Total Protein 6.0 - 8.2 g/dL 6.4   Globulin 1.9 - 3.5 g/dL 2.2   A-G Ratio g/dL 1.9   Glycohemoglobin 4.0 - 5.6 % 5.2   Estim. Avg Glu mg/dL 103   Cholesterol,Tot 100 - 199 mg/dL 85 (L)   Triglycerides 0 - 149 mg/dL 94   HDL >=40 mg/dL 41   LDL <100 mg/dL 25   25-Hydroxy   Vitamin D 25 30 - 100 ng/mL 31   Prostatic Specific Antigen Tot 0.00 - 4.00 ng/mL 0.19   (L): Data is abnormally low    Assessment/Plan:     Juaquin was seen today for follow-up.    Diagnoses and all orders for this visit:    Diabetic polyneuropathy associated with diabetes mellitus due to underlying condition (HCC)  Type 2 diabetes mellitus with complication, with long-term current use of insulin (HCC)  -     Lipid Profile; Future  -   "   Comp Metabolic Panel; Future  -     HEMOGLOBIN A1C; Future    A1c for diabetes is actually lower than I would like.  He is at 5.2 and is drinking sodas and eating sugar frequently.  We will decrease the Ozempic to 0.5 mg and repeat labs in 3 months.    Essential hypertension  Ischemic cardiomyopathy  S/P CABG x 3  Hypercoagulable state due to paroxysmal atrial fibrillation (HCC)  Paroxysmal atrial fibrillation (HCC)  -     Lipid Profile; Future  -     Comp Metabolic Panel; Future  -     HEMOGLOBIN A1C; Future  Mixed hyperlipidemia    Stable.  We will repeat labs in 3 months.  Blood pressures controlled.    Chronic pain of right knee  -     DX-KNEE 3 VIEWS Pain/Deformity Following Trauma; Future    We will obtain x-rays of the knee to evaluate further.    Neck pain, chronic  -     OUTSIDE IMAGES-CT CERVICAL SPINE; Future  -     baclofen (LIORESAL) 10 MG Tab; Take 1 Tablet by mouth 2 times a day as needed (pain).  Chronic midline low back pain without sciatica  -     baclofen (LIORESAL) 10 MG Tab; Take 1 Tablet by mouth 2 times a day as needed (pain).  Mid back pain  -     baclofen (LIORESAL) 10 MG Tab; Take 1 Tablet by mouth 2 times a day as needed (pain).    CT of the neck has been reordered.  Baclofen has been refilled.    Secondary hyperaldosteronism (HCC)    Stable.  Continue with medications and continue to monitor.    Oropharyngeal dysphagia  -     Referral to Gastroenterology  -     pantoprazole (PROTONIX) 40 MG Tablet Delayed Response; Take 1 Tablet by mouth every day.    We will start patient on Protonix to see if this will help with symptoms.  We will submit an urgent referral to GI.    Major depressive disorder, recurrent, mild (HCC)    Continue with current medications.      HCC Gap Form    Diagnosis to address: E11.8, Z79.4 - Type 2 diabetes mellitus with complication, with long-term current use of insulin (HCC)  Assessment and plan: Chronic, stable. Continue with current defined treatment plan:  decrease ozempic to 0.5 mg repeat labs in 3 months. . Follow-up at least annually.  Diagnosis: E08.42 - Diabetic polyneuropathy associated with diabetes mellitus due to underlying condition (HCC)  Assessment and plan: Chronic, stable. Continue with current defined treatment plan: decrease ozempic to 0.5 mg.  Repeat labs in 3 months.  Follow-up at least annually.  Diagnosis: D68.69, I48.0 - Hypercoagulable state due to paroxysmal atrial fibrillation (HCC)  Assessment and plan: Chronic, stable. Continue with current defined treatment plan: continue to follow up with cardiology.   Follow-up at least annually.  Diagnosis: E26.1 - Secondary hyperaldosteronism (HCC)  Assessment and plan: Chronic, stable. Continue with current defined treatment plan: Continue with entresto and spironolactone. Follow-up at least annually.  Diagnosis: F33.0 - Major depressive disorder, recurrent, mild (HCC)  Assessment and plan: Chronic, stable. Continue with current defined treatment plan: currently taking cymbalta and amitriptyline.  Follow up with me in 3 months.   Follow-up at least annually.  Last edited 08/23/23 15:49 PDT by Cece Parker P.A.-C.          Return in about 3 months (around 11/23/2023), or if symptoms worsen or fail to improve.    Please note that this dictation was created using voice recognition software. I have made every reasonable attempt to correct obvious errors, but expect that there are errors of grammar and possible content that I did not discover before finalizing note.

## 2023-08-29 ENCOUNTER — PATIENT OUTREACH (OUTPATIENT)
Dept: HEALTH INFORMATION MANAGEMENT | Facility: OTHER | Age: 58
End: 2023-08-29
Payer: MEDICARE

## 2023-08-29 DIAGNOSIS — I47.20 VENTRICULAR TACHYCARDIA (HCC): ICD-10-CM

## 2023-08-29 DIAGNOSIS — E11.8 TYPE 2 DIABETES MELLITUS WITH COMPLICATION, WITH LONG-TERM CURRENT USE OF INSULIN (HCC): ICD-10-CM

## 2023-08-29 DIAGNOSIS — I10 ESSENTIAL HYPERTENSION: ICD-10-CM

## 2023-08-29 DIAGNOSIS — I50.20 ACC/AHA STAGE C SYSTOLIC HEART FAILURE (HCC): ICD-10-CM

## 2023-08-29 DIAGNOSIS — Z79.4 TYPE 2 DIABETES MELLITUS WITH COMPLICATION, WITH LONG-TERM CURRENT USE OF INSULIN (HCC): ICD-10-CM

## 2023-08-29 DIAGNOSIS — I25.10 CORONARY ARTERY DISEASE INVOLVING NATIVE CORONARY ARTERY OF NATIVE HEART WITHOUT ANGINA PECTORIS: ICD-10-CM

## 2023-08-29 DIAGNOSIS — I25.5 ISCHEMIC CARDIOMYOPATHY: ICD-10-CM

## 2023-08-29 DIAGNOSIS — E78.2 MIXED HYPERLIPIDEMIA: ICD-10-CM

## 2023-08-29 DIAGNOSIS — I48.0 PAROXYSMAL ATRIAL FIBRILLATION (HCC): ICD-10-CM

## 2023-08-29 PROCEDURE — 99490 CHRNC CARE MGMT STAFF 1ST 20: CPT | Performed by: PHYSICIAN ASSISTANT

## 2023-08-29 SDOH — ECONOMIC STABILITY: INCOME INSECURITY: IN THE PAST 12 MONTHS, HAS THE ELECTRIC, GAS, OIL, OR WATER COMPANY THREATENED TO SHUT OFF SERVICE IN YOUR HOME?: NO

## 2023-08-29 SDOH — SOCIAL STABILITY: SOCIAL NETWORK
DO YOU BELONG TO ANY CLUBS OR ORGANIZATIONS SUCH AS CHURCH GROUPS UNIONS, FRATERNAL OR ATHLETIC GROUPS, OR SCHOOL GROUPS?: YES

## 2023-08-29 SDOH — SOCIAL STABILITY: SOCIAL NETWORK: IN A TYPICAL WEEK, HOW MANY TIMES DO YOU TALK ON THE PHONE WITH FAMILY, FRIENDS, OR NEIGHBORS?: ONCE A WEEK

## 2023-08-29 SDOH — ECONOMIC STABILITY: FOOD INSECURITY: WITHIN THE PAST 12 MONTHS, THE FOOD YOU BOUGHT JUST DIDN'T LAST AND YOU DIDN'T HAVE MONEY TO GET MORE.: NEVER TRUE

## 2023-08-29 SDOH — HEALTH STABILITY: MENTAL HEALTH
STRESS IS WHEN SOMEONE FEELS TENSE, NERVOUS, ANXIOUS, OR CAN'T SLEEP AT NIGHT BECAUSE THEIR MIND IS TROUBLED. HOW STRESSED ARE YOU?: VERY MUCH

## 2023-08-29 SDOH — SOCIAL STABILITY: SOCIAL NETWORK: ARE YOU MARRIED, WIDOWED, DIVORCED, SEPARATED, NEVER MARRIED, OR LIVING WITH A PARTNER?: MARRIED

## 2023-08-29 SDOH — SOCIAL STABILITY: SOCIAL NETWORK: HOW OFTEN DO YOU GET TOGETHER WITH FRIENDS OR RELATIVES?: NEVER

## 2023-08-29 SDOH — ECONOMIC STABILITY: INCOME INSECURITY: HOW HARD IS IT FOR YOU TO PAY FOR THE VERY BASICS LIKE FOOD, HOUSING, MEDICAL CARE, AND HEATING?: HARD

## 2023-08-29 SDOH — ECONOMIC STABILITY: TRANSPORTATION INSECURITY
IN THE PAST 12 MONTHS, HAS LACK OF TRANSPORTATION KEPT YOU FROM MEETINGS, WORK, OR FROM GETTING THINGS NEEDED FOR DAILY LIVING?: NO

## 2023-08-29 SDOH — HEALTH STABILITY: MENTAL HEALTH: HOW MANY STANDARD DRINKS CONTAINING ALCOHOL DO YOU HAVE ON A TYPICAL DAY?: PATIENT DOES NOT DRINK

## 2023-08-29 SDOH — HEALTH STABILITY: MENTAL HEALTH: HOW OFTEN DO YOU HAVE 6 OR MORE DRINKS ON ONE OCCASION?: NEVER

## 2023-08-29 SDOH — ECONOMIC STABILITY: INCOME INSECURITY: IN THE LAST 12 MONTHS, WAS THERE A TIME WHEN YOU WERE NOT ABLE TO PAY THE MORTGAGE OR RENT ON TIME?: NO

## 2023-08-29 SDOH — HEALTH STABILITY: MENTAL HEALTH: HOW OFTEN DO YOU HAVE A DRINK CONTAINING ALCOHOL?: NEVER

## 2023-08-29 SDOH — ECONOMIC STABILITY: HOUSING INSECURITY
IN THE LAST 12 MONTHS, WAS THERE A TIME WHEN YOU DID NOT HAVE A STEADY PLACE TO SLEEP OR SLEPT IN A SHELTER (INCLUDING NOW)?: NO

## 2023-08-29 SDOH — ECONOMIC STABILITY: FOOD INSECURITY: WITHIN THE PAST 12 MONTHS, YOU WORRIED THAT YOUR FOOD WOULD RUN OUT BEFORE YOU GOT MONEY TO BUY MORE.: NEVER TRUE

## 2023-08-29 SDOH — SOCIAL STABILITY: SOCIAL NETWORK: HOW OFTEN DO YOU ATTENT MEETINGS OF THE CLUB OR ORGANIZATION YOU BELONG TO?: MORE THAN 4 TIMES PER YEAR

## 2023-08-29 SDOH — ECONOMIC STABILITY: TRANSPORTATION INSECURITY
IN THE PAST 12 MONTHS, HAS THE LACK OF TRANSPORTATION KEPT YOU FROM MEDICAL APPOINTMENTS OR FROM GETTING MEDICATIONS?: NO

## 2023-08-29 SDOH — HEALTH STABILITY: PHYSICAL HEALTH: ON AVERAGE, HOW MANY DAYS PER WEEK DO YOU ENGAGE IN MODERATE TO STRENUOUS EXERCISE (LIKE A BRISK WALK)?: 0 DAYS

## 2023-08-29 SDOH — HEALTH STABILITY: PHYSICAL HEALTH: ON AVERAGE, HOW MANY MINUTES DO YOU ENGAGE IN EXERCISE AT THIS LEVEL?: 0 MIN

## 2023-08-29 SDOH — SOCIAL STABILITY: SOCIAL NETWORK: HOW OFTEN DO YOU ATTEND CHURCH OR RELIGIOUS SERVICES?: 1 TO 4 TIMES PER YEAR

## 2023-08-29 SDOH — ECONOMIC STABILITY: HOUSING INSECURITY: IN THE LAST 12 MONTHS, HOW MANY PLACES HAVE YOU LIVED?: 1

## 2023-08-29 ASSESSMENT — LIFESTYLE VARIABLES
AUDIT-C TOTAL SCORE: 0
SKIP TO QUESTIONS 9-10: 1

## 2023-08-29 ASSESSMENT — PATIENT HEALTH QUESTIONNAIRE - PHQ9
SUM OF ALL RESPONSES TO PHQ QUESTIONS 1-9: 19
5. POOR APPETITE OR OVEREATING: 3 - NEARLY EVERY DAY
CLINICAL INTERPRETATION OF PHQ2 SCORE: 6

## 2023-08-29 NOTE — PROGRESS NOTES
INITIAL CARE MANAGEMENT CARE PLAN/ASSESSMENT     Juaquin is a 58 year old male that meets all criteria to qualify for the CCM program presently. He has verbalized his full name and  as well as given verbal consent to enroll in the CCM program. Patient is  but states that he has no support system. Patient lives in a one story house with spouse. Patient does not currently have home services (PT/OT/SN) coming to his home. Patient does not have spiritual beliefs that may affect care. Patient does not have an Advance Directive, but would like information mailed to him.  Family is not aware of his wishes. Patient does have medical equipment at home consisting of a cane which does not use. Patient does have communication barriers including hard of hearing and some memory loss. Patient does have reliable transportation as he is an active  and has a personal car. Patient is eating a regular diet at home. Patient is taking medication as prescribed. Patient has expressed goals of preventing falls, financial security and maintaining nutritional needs, poor sleep due to pain and managing depression.    Medication Self-Management Goals:     Reviewed medications listed below with patient.      Current Outpatient Medications:     baclofen (LIORESAL) 10 MG Tab, Take 1 Tablet by mouth 2 times a day as needed (pain)., Disp: 180 Tablet, Rfl: 0    pantoprazole (PROTONIX) 40 MG Tablet Delayed Response, Take 1 Tablet by mouth every day., Disp: 90 Tablet, Rfl: 1    Semaglutide, 1 MG/DOSE, (OZEMPIC, 1 MG/DOSE,) 4 MG/3ML Solution Pen-injector, Inject 0.5 mg under the skin every 7 days., Disp: 9 mL, Rfl: 1    ENTRESTO 24-26 MG Tab, TAKE 1 TABLET TWICE A DAY, Disp: 180 Tablet, Rfl: 2    atorvastatin (LIPITOR) 40 MG Tab, Take 1 Tablet by mouth every day., Disp: 90 Tablet, Rfl: 2    amitriptyline (ELAVIL) 75 MG Tab, Take 1 Tablet by mouth every evening., Disp: 90 Tablet, Rfl: 2    metoprolol SR (TOPROL XL) 50 MG TABLET SR 24 HR,  Take 1 Tablet by mouth every day., Disp: 90 Tablet, Rfl: 2    apixaban (ELIQUIS) 5mg Tab, Take 1 Tablet by mouth 2 times a day., Disp: 180 Tablet, Rfl: 2    spironolactone (ALDACTONE) 25 MG Tab, Take 1 Tablet by mouth every day., Disp: 90 Tablet, Rfl: 2    zonisamide (ZONEGRAN) 100 MG Cap, Take 3 Capsules by mouth every day., Disp: 270 Capsule, Rfl: 2    DULoxetine (CYMBALTA) 60 MG Cap DR Particles delayed-release capsule, Take 1 Capsule by mouth every day., Disp: 90 Capsule, Rfl: 2    sotalol (BETAPACE) 80 MG Tab, Take 1 Tablet by mouth 2 times a day., Disp: 180 Tablet, Rfl: 2    Empagliflozin-metFORMIN HCl ER (SYNJARDY XR) 12.5-1000 MG TABLET SR 24 HR, Take 1 Tablet by mouth 2 times a day., Disp: 180 Tablet, Rfl: 1    benzonatate (TESSALON) 100 MG Cap, Take 1 Capsule by mouth 3 times a day as needed for Cough., Disp: 90 Capsule, Rfl: 1    glucose blood (FREESTYLE LITE) strip, 1 Strip by Other route 4 times a day., Disp: 300 Strip, Rfl: 4    Insulin Pen Needle, by Does not apply route., Disp: , Rfl:          Goal:  Continue current medications       Physical/Functional/Environmental Status:     Activities of Daily Living:  Bathing: independent   Dressing: independent  Grooming: independent  Mouth Care: independent  Toileting: independent  Climbing a Flight of Stairs: independent    Independent Activities of Daily Living:  Shopping: independent  Cooking: independent  Managing Medications: independent  Using the phone and looking up numbers: independent  Driving or using public transportation: independent  Managing Finances: independent        8/29/2023     9:55 AM 8/29/2023    10:02 AM   STEADI Fall Risk   STEADI Risk for Falling Score  10   One or more falls in the last year Yes Yes   Advised to use a cane or walker to get around safely  No   Feels unsteady when walking  Yes   Steadies self on furniture while walking at home  Yes   Worried about falling  Yes   Needs to push with hands when rising from a chair  Yes    Has trouble stepping up onto a curb / using stairs  Yes   Often has to rush to the toilet  No   Has lost some feeling in feet  Yes   Takes medicine that makes him/her feel lightheaded or more tired than usual  No   Takes medicine to sleep or improve mood  Yes   Often feels sad or depressed  Yes         Goal:  Preventing Falls    Social Determinants of Health       Financial Status:      Financial Resource Strain: High Risk (8/29/2023)    Overall Financial Resource Strain (CARDIA)     Difficulty of Paying Living Expenses: Hard         Referred to CHW/SW:  Yes       Transportation Status:      Transportation Needs: No Transportation Needs (8/29/2023)    PRAPARE - Transportation     Lack of Transportation (Medical): No     Lack of Transportation (Non-Medical): No        Referred to CHW/SW:  NA      Food Insecurity:      Food Insecurity: No Food Insecurity (8/29/2023)    Hunger Vital Sign     Worried About Running Out of Food in the Last Year: Never true     Ran Out of Food in the Last Year: Never true        Referred to CHW/SW:  Yes       Housing Status:     Housing Stability: Low Risk  (8/29/2023)    Housing Stability Vital Sign     Unable to Pay for Housing in the Last Year: No     Number of Places Lived in the Last Year: 1     Unstable Housing in the Last Year: No        Referred to CHW/SW:  Yes      Social Connections:     Social Connections: Moderately Integrated (8/29/2023)    Social Connection and Isolation Panel [NHANES]     Frequency of Communication with Friends and Family: Once a week     Frequency of Social Gatherings with Friends and Family: Never     Attends Judaism Services: 1 to 4 times per year     Active Member of Clubs or Organizations: Yes     Attends Club or Organization Meetings: More than 4 times per year     Marital Status:         Referred to CHW/SW:  Yes      Mental/Behavioral/Psychosocial Status:        1/12/2023     2:00 PM 5/4/2023     1:08 PM 8/29/2023     9:32 AM   Depression  Screen (PHQ-2/PHQ-9)   PHQ-2 Total Score  5    PHQ-2 Total Score 0  6   PHQ-9 Total Score  17    PHQ-9 Total Score   19       Interpretation of PHQ-9 Total Score   Score Severity   1-4 No Depression   5-9 Mild Depression   10-14 Moderate Depression   15-19 Moderately Severe Depression   20-27 Severe Depression       Goal:  medication review/managing depression      Chronic Care Management Care Plan    Goal: Financial security  Barriers: Limited income, lack of resources  Interventions: Provide resources    Start Date: 8/29/23  End Date:    Goal: Prevent falls  Barriers: hypotensive episodes, recent falls  Interventions: Educate patient regarding sit to stand techniques related to dizziness, encourage use of cane    Start Date: 08/29/23  End Date:      Goal: Maintain Nutritional Needs  Barriers: Fixed income  Interventions: provide food resources    Start Date: 08/29/23  End Date:    Goal: Managing Depression  Barriers: Lack of resources, ineffective medications  Interventions:  Provide resources, and collaborate with provider    Start Date: 8/29/23  End Date:    Goal: Poor Sleep due to Pain  Barriers: Lack of resources, knowledge deficit, ineffective medications  Interventions: Provide resources for pain management and sleep. Collaborate with provider.     Start Date:08/29/23  End Date:    Discussion:  Financial security, nutritional needs, sleep/pain, preventing falls and depression management    Goals: See Above     Next Scheduled patient outreach: 09/29/23

## 2023-09-07 ENCOUNTER — PATIENT OUTREACH (OUTPATIENT)
Dept: HEALTH INFORMATION MANAGEMENT | Facility: OTHER | Age: 58
End: 2023-09-07
Payer: MEDICARE

## 2023-09-07 DIAGNOSIS — Z79.4 TYPE 2 DIABETES MELLITUS WITH COMPLICATION, WITH LONG-TERM CURRENT USE OF INSULIN (HCC): ICD-10-CM

## 2023-09-07 DIAGNOSIS — E11.8 TYPE 2 DIABETES MELLITUS WITH COMPLICATION, WITH LONG-TERM CURRENT USE OF INSULIN (HCC): ICD-10-CM

## 2023-09-07 NOTE — PROGRESS NOTES
Community Health Worker Follow-Up    Reason for outreach: Referral from Amber [facility]ARA to assist with resources.     CHW Interventions: Pt would like resources for food, advanced care planning, chair exercises, fall prevention, and ACP guide.     Specific Resources Provided:  Housing/Shelter: NA  Transportation: NA  Food: Mobile harvest, list of food pantries, produce on wheels  Financial: NA  Social Supports: [facility]   Other: ACP guide, Fall prevention tips, and Chair exercises     Plan: CHW mailed pt resources for the food pantry, produce on wheels, mobile harvest, ACP guide, chair exercises, and fall prevention. CHW will follow up with pt next week to ensure that he has received these resources.

## 2023-09-13 ENCOUNTER — PATIENT OUTREACH (OUTPATIENT)
Dept: HEALTH INFORMATION MANAGEMENT | Facility: OTHER | Age: 58
End: 2023-09-13
Payer: MEDICARE

## 2023-09-13 DIAGNOSIS — Z79.4 TYPE 2 DIABETES MELLITUS WITH COMPLICATION, WITH LONG-TERM CURRENT USE OF INSULIN (HCC): ICD-10-CM

## 2023-09-13 DIAGNOSIS — I25.10 CORONARY ARTERY DISEASE INVOLVING NATIVE CORONARY ARTERY OF NATIVE HEART WITHOUT ANGINA PECTORIS: ICD-10-CM

## 2023-09-13 DIAGNOSIS — I10 ESSENTIAL HYPERTENSION: ICD-10-CM

## 2023-09-13 DIAGNOSIS — E11.8 TYPE 2 DIABETES MELLITUS WITH COMPLICATION, WITH LONG-TERM CURRENT USE OF INSULIN (HCC): ICD-10-CM

## 2023-09-13 NOTE — PROGRESS NOTES
Community Health Worker Follow-Up    Reason for outreach: Follow up     CHW Interventions: Pt states that he did receive the materials that were mailed to him last week. Pt did not report any other barriers at this time.     Specific Resources Provided:  Housing/Shelter: NA  Transportation: NA  Food: NA  Financial: NA  Social Supports: NA  Other: NA    Plan: Pt has the contact information to reach out as needed.

## 2023-09-26 ENCOUNTER — PATIENT OUTREACH (OUTPATIENT)
Dept: HEALTH INFORMATION MANAGEMENT | Facility: OTHER | Age: 58
End: 2023-09-26
Payer: MEDICARE

## 2023-09-26 DIAGNOSIS — E11.9 TYPE 2 DIABETES MELLITUS WITHOUT COMPLICATION, UNSPECIFIED WHETHER LONG TERM INSULIN USE (HCC): ICD-10-CM

## 2023-09-26 DIAGNOSIS — I25.5 ISCHEMIC CARDIOMYOPATHY: ICD-10-CM

## 2023-09-26 DIAGNOSIS — E78.2 MIXED HYPERLIPIDEMIA: ICD-10-CM

## 2023-09-26 DIAGNOSIS — I25.10 CORONARY ARTERY DISEASE INVOLVING NATIVE CORONARY ARTERY OF NATIVE HEART WITHOUT ANGINA PECTORIS: ICD-10-CM

## 2023-09-26 DIAGNOSIS — I95.89 HYPOTENSION, IATROGENIC: ICD-10-CM

## 2023-09-26 DIAGNOSIS — I48.0 PAROXYSMAL ATRIAL FIBRILLATION (HCC): ICD-10-CM

## 2023-09-26 DIAGNOSIS — E08.42 DIABETIC POLYNEUROPATHY ASSOCIATED WITH DIABETES MELLITUS DUE TO UNDERLYING CONDITION (HCC): ICD-10-CM

## 2023-09-26 NOTE — PROGRESS NOTES
This RN called patient for monthly outreach. He states that he has no needs at this time, and reviewed future appointments. Juaquin states that he has no new medications and is taking them as prescribed and with no changes in allergies. He states that his blood pressure is stable, no issues with diabetes. Last Hemoglobin in August was 5.2. Will continue current plan of care. This RN confirmed CCM phone number with patient and  instructed patient to call with any further questions, needs, or concerns.     Assessment    See above    Education    No new education provided    Plan of Care and Goals    Continue current POC and goals    Barriers:    None at this time    Progress:    Progressing    Next outreach: 10/23/23-10/27/23

## 2023-10-20 ENCOUNTER — HOSPITAL ENCOUNTER (OUTPATIENT)
Dept: CARDIOLOGY | Facility: MEDICAL CENTER | Age: 58
End: 2023-10-20
Attending: NURSE PRACTITIONER
Payer: MEDICARE

## 2023-10-20 DIAGNOSIS — I25.10 CORONARY ARTERY DISEASE INVOLVING NATIVE CORONARY ARTERY OF NATIVE HEART WITHOUT ANGINA PECTORIS: ICD-10-CM

## 2023-10-20 DIAGNOSIS — I50.20 ACC/AHA STAGE C SYSTOLIC HEART FAILURE (HCC): ICD-10-CM

## 2023-10-20 LAB
LV EJECT FRACT  99904: 40
LV EJECT FRACT MOD 2C 99903: 60.66
LV EJECT FRACT MOD 4C 99902: 59.57
LV EJECT FRACT MOD BP 99901: 62.03

## 2023-10-20 PROCEDURE — 93306 TTE W/DOPPLER COMPLETE: CPT

## 2023-10-20 PROCEDURE — 93306 TTE W/DOPPLER COMPLETE: CPT | Mod: 26 | Performed by: INTERNAL MEDICINE

## 2023-10-25 ENCOUNTER — PATIENT OUTREACH (OUTPATIENT)
Dept: HEALTH INFORMATION MANAGEMENT | Facility: OTHER | Age: 58
End: 2023-10-25
Payer: MEDICARE

## 2023-10-25 DIAGNOSIS — I95.89 HYPOTENSION, IATROGENIC: ICD-10-CM

## 2023-10-25 DIAGNOSIS — E78.2 MIXED HYPERLIPIDEMIA: ICD-10-CM

## 2023-10-25 DIAGNOSIS — G47.33 OBSTRUCTIVE SLEEP APNEA SYNDROME: ICD-10-CM

## 2023-10-25 DIAGNOSIS — I25.5 ISCHEMIC CARDIOMYOPATHY: ICD-10-CM

## 2023-10-25 DIAGNOSIS — E08.42 DIABETIC POLYNEUROPATHY ASSOCIATED WITH DIABETES MELLITUS DUE TO UNDERLYING CONDITION (HCC): ICD-10-CM

## 2023-10-25 DIAGNOSIS — E11.40 TYPE 2 DIABETES MELLITUS WITH DIABETIC NEUROPATHY, UNSPECIFIED WHETHER LONG TERM INSULIN USE (HCC): ICD-10-CM

## 2023-10-25 DIAGNOSIS — I47.20 VENTRICULAR TACHYCARDIA (HCC): ICD-10-CM

## 2023-10-25 DIAGNOSIS — Z95.810 PRESENCE OF BIVENTRICULAR AICD: ICD-10-CM

## 2023-10-25 DIAGNOSIS — I48.0 PAROXYSMAL ATRIAL FIBRILLATION (HCC): ICD-10-CM

## 2023-10-25 DIAGNOSIS — I25.10 CORONARY ARTERY DISEASE INVOLVING NATIVE CORONARY ARTERY OF NATIVE HEART WITHOUT ANGINA PECTORIS: ICD-10-CM

## 2023-10-25 DIAGNOSIS — Z79.01 CHRONIC ANTICOAGULATION: ICD-10-CM

## 2023-10-25 PROCEDURE — 99999 PR NO CHARGE: CPT | Performed by: PHYSICIAN ASSISTANT

## 2023-10-25 NOTE — PROGRESS NOTES
This RN spoke with patient for monthly PCM outreach. Patient's most recent Hgb A1c was 5.2, drawn 8/2023. He reports that he checks his blood sugars daily and on average it is n the 90's. He denies edema, weight gain and shortness of breath. He reports an unintentional weight loss of 100 lbs over the course of 18 months and states that it is due to Ozempic . He reports neuropathy  in all 4 extremities with numbness, tingling and burning sensations. His most recent Echocardiogram showed EF of 40% and has an appointment on 11/22/23 with cardiology. He inquired about a referral to GI for dysphagia. The referral was placed in August and RN provided information to call office for an appointment. He states that he needs a referral for colonoscopy and informed this RN that he has a hernia. Patient is scheduled to see PCP on 11/28/23 and will speak to her about these things at appointment. Medications were reviewed with patient with no changes. He stated that he has approximately one month left of baclofen and may need a refill. This RN instructed him to call PCM phone number if he needs a refill before his appointment on 11/28. He reports no falls and allergies reviewed with no changes. All education provided with maacrio barnett. Will continue current POC.    Assessment    See above    Education    Medications, appointments, labs    Plan of Care and Goals    Colonoscopy referral, and schedule GI appointment for dysphagia  Continue current plan of care    Barriers:    Resources    Progress:    Progressing     Next outreach: week of  11/25/23

## 2023-11-19 DIAGNOSIS — Z79.4 TYPE 2 DIABETES MELLITUS WITH COMPLICATION, WITH LONG-TERM CURRENT USE OF INSULIN (HCC): ICD-10-CM

## 2023-11-19 DIAGNOSIS — E11.8 TYPE 2 DIABETES MELLITUS WITH COMPLICATION, WITH LONG-TERM CURRENT USE OF INSULIN (HCC): ICD-10-CM

## 2023-11-20 RX ORDER — EMPAGLIFLOZIN, METFORMIN HYDROCHLORIDE 12.5; 1 MG/1; MG/1
1 TABLET, EXTENDED RELEASE ORAL 2 TIMES DAILY
Qty: 180 TABLET | Refills: 2 | Status: SHIPPED | OUTPATIENT
Start: 2023-11-20 | End: 2023-11-28 | Stop reason: SDUPTHER

## 2023-11-20 NOTE — TELEPHONE ENCOUNTER
Received request via: Pharmacy    Was the patient seen in the last year in this department? Yes    Does the patient have an active prescription (recently filled or refills available) for medication(s) requested? No    Does the patient have nursing home Plus and need 100 day supply (blood pressure, diabetes and cholesterol meds only)? Patient does not have SCP    Last OV: 8/23/23  Last labs: 8/21/23

## 2023-11-22 ENCOUNTER — NON-PROVIDER VISIT (OUTPATIENT)
Dept: CARDIOLOGY | Facility: PHYSICIAN GROUP | Age: 58
End: 2023-11-22
Payer: MEDICARE

## 2023-11-22 ENCOUNTER — OFFICE VISIT (OUTPATIENT)
Dept: CARDIOLOGY | Facility: PHYSICIAN GROUP | Age: 58
End: 2023-11-22
Payer: MEDICARE

## 2023-11-22 VITALS
HEART RATE: 83 BPM | HEIGHT: 74 IN | DIASTOLIC BLOOD PRESSURE: 60 MMHG | BODY MASS INDEX: 23.49 KG/M2 | WEIGHT: 183 LBS | RESPIRATION RATE: 12 BRPM | SYSTOLIC BLOOD PRESSURE: 78 MMHG | OXYGEN SATURATION: 98 %

## 2023-11-22 VITALS
BODY MASS INDEX: 23.6 KG/M2 | HEART RATE: 83 BPM | HEIGHT: 74 IN | OXYGEN SATURATION: 98 % | RESPIRATION RATE: 12 BRPM | SYSTOLIC BLOOD PRESSURE: 78 MMHG | DIASTOLIC BLOOD PRESSURE: 60 MMHG | WEIGHT: 183.86 LBS

## 2023-11-22 DIAGNOSIS — E11.8 TYPE 2 DIABETES MELLITUS WITH COMPLICATION, WITH LONG-TERM CURRENT USE OF INSULIN (HCC): ICD-10-CM

## 2023-11-22 DIAGNOSIS — E78.2 MIXED HYPERLIPIDEMIA: ICD-10-CM

## 2023-11-22 DIAGNOSIS — Z95.810 PRESENCE OF BIVENTRICULAR AICD: ICD-10-CM

## 2023-11-22 DIAGNOSIS — I25.10 CORONARY ARTERY DISEASE INVOLVING NATIVE CORONARY ARTERY OF NATIVE HEART WITHOUT ANGINA PECTORIS: ICD-10-CM

## 2023-11-22 DIAGNOSIS — I25.5 ISCHEMIC CARDIOMYOPATHY: ICD-10-CM

## 2023-11-22 DIAGNOSIS — I47.20 VENTRICULAR TACHYCARDIA (HCC): ICD-10-CM

## 2023-11-22 DIAGNOSIS — I50.20 ACC/AHA STAGE C SYSTOLIC HEART FAILURE (HCC): ICD-10-CM

## 2023-11-22 DIAGNOSIS — Z79.4 TYPE 2 DIABETES MELLITUS WITH COMPLICATION, WITH LONG-TERM CURRENT USE OF INSULIN (HCC): ICD-10-CM

## 2023-11-22 DIAGNOSIS — R56.9 FOCAL SEIZURES (HCC): ICD-10-CM

## 2023-11-22 DIAGNOSIS — Z95.1 S/P CABG X 3: ICD-10-CM

## 2023-11-22 DIAGNOSIS — I48.0 PAROXYSMAL ATRIAL FIBRILLATION (HCC): ICD-10-CM

## 2023-11-22 DIAGNOSIS — I10 ESSENTIAL HYPERTENSION: ICD-10-CM

## 2023-11-22 DIAGNOSIS — Z79.01 CHRONIC ANTICOAGULATION: ICD-10-CM

## 2023-11-22 PROBLEM — R83.8 ELEVATED CSF PROTEIN: Status: RESOLVED | Noted: 2022-11-26 | Resolved: 2023-11-22

## 2023-11-22 PROBLEM — M25.551 RIGHT HIP PAIN: Status: RESOLVED | Noted: 2019-04-05 | Resolved: 2023-11-22

## 2023-11-22 PROBLEM — I95.89 HYPOTENSION, IATROGENIC: Status: RESOLVED | Noted: 2022-11-26 | Resolved: 2023-11-22

## 2023-11-22 PROCEDURE — 3078F DIAST BP <80 MM HG: CPT | Performed by: NURSE PRACTITIONER

## 2023-11-22 PROCEDURE — 93289 INTERROG DEVICE EVAL HEART: CPT | Mod: 26 | Performed by: NURSE PRACTITIONER

## 2023-11-22 PROCEDURE — 99214 OFFICE O/P EST MOD 30 MIN: CPT | Performed by: NURSE PRACTITIONER

## 2023-11-22 PROCEDURE — 3074F SYST BP LT 130 MM HG: CPT | Performed by: NURSE PRACTITIONER

## 2023-11-22 RX ORDER — METOPROLOL SUCCINATE 50 MG/1
50 TABLET, EXTENDED RELEASE ORAL DAILY
Qty: 100 TABLET | Refills: 3 | Status: SHIPPED | OUTPATIENT
Start: 2023-11-22 | End: 2024-02-09 | Stop reason: SDUPTHER

## 2023-11-22 RX ORDER — SACUBITRIL AND VALSARTAN 24; 26 MG/1; MG/1
1 TABLET, FILM COATED ORAL 2 TIMES DAILY
Qty: 200 TABLET | Refills: 3 | Status: SHIPPED | OUTPATIENT
Start: 2023-11-22

## 2023-11-22 RX ORDER — SPIRONOLACTONE 25 MG/1
25 TABLET ORAL DAILY
Qty: 100 TABLET | Refills: 3 | Status: SHIPPED | OUTPATIENT
Start: 2023-11-22 | End: 2024-02-09 | Stop reason: SDUPTHER

## 2023-11-22 RX ORDER — ATORVASTATIN CALCIUM 40 MG/1
40 TABLET, FILM COATED ORAL DAILY
Qty: 100 TABLET | Refills: 3 | Status: SHIPPED | OUTPATIENT
Start: 2023-11-22 | End: 2024-02-09 | Stop reason: SDUPTHER

## 2023-11-22 RX ORDER — SOTALOL HYDROCHLORIDE 80 MG/1
80 TABLET ORAL 2 TIMES DAILY
Qty: 200 TABLET | Refills: 3 | Status: SHIPPED | OUTPATIENT
Start: 2023-11-22 | End: 2024-02-09 | Stop reason: SDUPTHER

## 2023-11-22 ASSESSMENT — ENCOUNTER SYMPTOMS
BACK PAIN: 1
LOSS OF CONSCIOUSNESS: 0
SENSORY CHANGE: 0
PND: 0
COUGH: 0
PALPITATIONS: 0
ABDOMINAL PAIN: 0
DIZZINESS: 0
HEADACHES: 0
NAUSEA: 0
FEVER: 0
INSOMNIA: 0
MYALGIAS: 0
BRUISES/BLEEDS EASILY: 0
SHORTNESS OF BREATH: 0
CHILLS: 0
ORTHOPNEA: 0
SEIZURES: 1

## 2023-11-22 ASSESSMENT — FIBROSIS 4 INDEX
FIB4 SCORE: 1.3
FIB4 SCORE: 1.3

## 2023-11-22 NOTE — PROGRESS NOTES
Chief Complaint   Patient presents with    Follow-Up    Biventricular AICD    Coronary Artery Disease    Cardiomyopathy (Ischemic)    CHF (Compensated)    Ventricular Tachycardia    Atrial Fibrillation    Anticoagulation    HTN (Controlled)    Hyperlipidemia    Diabetes (Controlled)       Subjective     Juaquin Solorzano is a 58 y.o. male who presents today for six month follow-up of CAD/ischemic cardiomyopathy, CABG, VT, PAFib, anticoagulation, HTN, hyperlipidemia and DM.    Juaquin is a 58 year old male with history of CAD/ischemic cardiomyopathy with LVEF 40% (on echocardiogdram in October 2023), with history of stents in 2005 and 2006, followed by CABG x 3 in 2013 (anatomical details unknown); coronary angiogram in July 2016 showed patent LIMA to LAD, but occluded SVG-RCA, nonobstructive disease of LCx, and diffuse distal disease of the distal RCA, no other SVG was identified.      He had CRT-D implanted in April 2018, and also has HFrEF, hypertension, hyperlipidemia, CLARE,  diabetes mellitus, and history of is seizures (followed by neurology), last seen by me in May 2023.      He is here today for six month follow-up. Generally, he is doing well, and denies any cardiac symptoms. He is still losing weight, and Ozempic was recently cut in half. He denies any device therapy or symptomatic palpitations.  He denies any chest pain, pressure, tightness or discomfort; breathing is stable with no dyspnea, orthopnea or PND; no LE edema. No dizziness, lightheadedness or syncope; BP is stable. No recent seizures.    Past Medical History:   Diagnosis Date    Arthritis     Hands/elbows    Block, bundle branch, left     CAD (coronary artery disease)     Cataract     Early stages, not removed    Class 2 obesity due to excess calories in adult 07/10/2019    Diabetes (HCC) 2006    On oral medications, now well controlled    Fibromyalgia 04/26/2018    5-6/10    GERD (gastroesophageal reflux disease)     Gout     Hyperlipidemia      Hypertension     Ischemic cardiomyopathy 10/2023    Echocardiogram with moderately dilated LV, LVEF 40%. Global hypokinesis. Normal RA, LA and RV. Trace MR, trace AR.    Myocardial infarct (HCC)     Myocardial infarct (HCC)     Neuropathy (HCC)     Bilateral feet    Psychiatric problem     Depression and anxiety    S/P colonoscopy     repeat 6 years at age of 61.    Sleep apnea     Uses CPAP     Past Surgical History:   Procedure Laterality Date    AICD IMPLANT Left 2018    [employer]ogen X4 CRT-D G148 implanted by Dr. Cullen.    RECOVERY  2016    Procedure: CATH LAB-[facility] W/POSSIBLE RADIAL APPROACH-CARIE;  Surgeon: Mireya Surgery;  Location: SURGERY PRE-POST PROC UNIT [facility] [place];  Service:     MULTIPLE CORONARY ARTERY BYPASS  2014    CABG x 3    LEONILA BY LAPAROSCOPY      KNEE ARTHROSCOPY      Arthroscopy, Knee     Family History   Problem Relation Age of Onset    Heart Disease Mother     Diabetes Mother     Hyperlipidemia Father     Heart Disease Father     Hypertension Father     Stroke Father     Lung Disease Sister      Social History     Socioeconomic History    Marital status:      Spouse name: Not on file    Number of children: Not on file    Years of education: Not on file    Highest education level: Not on file   Occupational History    Not on file   Tobacco Use    Smoking status: Former     Current packs/day: 0.00     Average packs/day: 0.5 packs/day for 12.0 years (6.0 ttl pk-yrs)     Types: Cigarettes     Start date: 7/15/2000     Quit date: 7/15/2012     Years since quittin.3    Smokeless tobacco: Never   Vaping Use    Vaping Use: Former    Substances: THC    Devices: Disposable   Substance and Sexual Activity    Alcohol use: Not Currently    Drug use: Not Currently     Types: Marijuana, Oral     Comment: bedtime    Sexual activity: Yes     Partners: Female   Other Topics Concern    Not on file   Social History Narrative    Not on file     Social  Determinants of Health     Financial Resource Strain: High Risk (8/29/2023)    Overall Financial Resource Strain (CARDIA)     Difficulty of Paying Living Expenses: Hard   Food Insecurity: No Food Insecurity (8/29/2023)    Hunger Vital Sign     Worried About Running Out of Food in the Last Year: Never true     Ran Out of Food in the Last Year: Never true   Transportation Needs: No Transportation Needs (8/29/2023)    PRAPARE - Transportation     Lack of Transportation (Medical): No     Lack of Transportation (Non-Medical): No   Physical Activity: Inactive (8/29/2023)    Exercise Vital Sign     Days of Exercise per Week: 0 days     Minutes of Exercise per Session: 0 min   Stress: Stress Concern Present (8/29/2023)    [employer] - Occupational Stress Questionnaire     Feeling of Stress : Very much   Social Connections: Moderately Integrated (8/29/2023)    Social Connection and Isolation Panel [NHANES]     Frequency of Communication with Friends and Family: Once a week     Frequency of Social Gatherings with Friends and Family: Never     Attends Church Services: 1 to 4 times per year     Active Member of Clubs or Organizations: Yes     Attends Club or Organization Meetings: More than 4 times per year     Marital Status:    Intimate Partner Violence: Not on file   Housing Stability: Low Risk  (8/29/2023)    Housing Stability Vital Sign     Unable to Pay for Housing in the Last Year: No     Number of Places Lived in the Last Year: 1     Unstable Housing in the Last Year: No     Allergies   Allergen Reactions    Iodine Anaphylaxis     Other reaction(s): Unknown     Outpatient Encounter Medications as of 11/22/2023   Medication Sig Dispense Refill    sotalol (BETAPACE) 80 MG Tab Take 1 Tablet by mouth 2 times a day. 200 Tablet 3    metoprolol SR (TOPROL XL) 50 MG TABLET SR 24 HR Take 1 Tablet by mouth every day. 100 Tablet 3    apixaban (ELIQUIS) 5mg Tab Take 1 Tablet by mouth 2 times  a day. 200 Tablet 3    atorvastatin (LIPITOR) 40 MG Tab Take 1 Tablet by mouth every day. 100 Tablet 3    spironolactone (ALDACTONE) 25 MG Tab Take 1 Tablet by mouth every day. 100 Tablet 3    sacubitril-valsartan (ENTRESTO) 24-26 MG Tab Take 1 Tablet by mouth 2 times a day. 200 Tablet 3    Empagliflozin-metFORMIN HCl ER (SYNJARDY XR) 12.5-1000 MG TABLET SR 24 HR TAKE 1 TABLET TWICE A  Tablet 2    baclofen (LIORESAL) 10 MG Tab Take 1 Tablet by mouth 2 times a day as needed (pain). 180 Tablet 0    pantoprazole (PROTONIX) 40 MG Tablet Delayed Response Take 1 Tablet by mouth every day. 90 Tablet 1    Semaglutide, 1 MG/DOSE, (OZEMPIC, 1 MG/DOSE,) 4 MG/3ML Solution Pen-injector Inject 0.5 mg under the skin every 7 days. 9 mL 1    [DISCONTINUED] ENTRESTO 24-26 MG Tab TAKE 1 TABLET TWICE A  Tablet 2    amitriptyline (ELAVIL) 75 MG Tab Take 1 Tablet by mouth every evening. 90 Tablet 2    zonisamide (ZONEGRAN) 100 MG Cap Take 3 Capsules by mouth every day. 270 Capsule 2    DULoxetine (CYMBALTA) 60 MG Cap DR Particles delayed-release capsule Take 1 Capsule by mouth every day. 90 Capsule 2    [DISCONTINUED] atorvastatin (LIPITOR) 40 MG Tab Take 1 Tablet by mouth every day. 90 Tablet 2    [DISCONTINUED] metoprolol SR (TOPROL XL) 50 MG TABLET SR 24 HR Take 1 Tablet by mouth every day. 90 Tablet 2    [DISCONTINUED] apixaban (ELIQUIS) 5mg Tab Take 1 Tablet by mouth 2 times a day. 180 Tablet 2    [DISCONTINUED] spironolactone (ALDACTONE) 25 MG Tab Take 1 Tablet by mouth every day. 90 Tablet 2    [DISCONTINUED] sotalol (BETAPACE) 80 MG Tab Take 1 Tablet by mouth 2 times a day. 180 Tablet 2    benzonatate (TESSALON) 100 MG Cap Take 1 Capsule by mouth 3 times a day as needed for Cough. 90 Capsule 1    glucose blood (FREESTYLE LITE) strip 1 Strip by Other route 4 times a day. 300 Strip 4    Insulin Pen Needle by Does not apply route.       No facility-administered encounter medications on file as of 11/22/2023.     Review  "of Systems   Constitutional:  Negative for chills and fever.   HENT:  Negative for congestion.    Respiratory:  Negative for cough and shortness of breath.    Cardiovascular:  Negative for chest pain, palpitations, orthopnea, leg swelling and PND.   Gastrointestinal:  Negative for abdominal pain and nausea.   Musculoskeletal:  Positive for back pain and joint pain. Negative for myalgias.        Neuropathy in back and arms.   Skin:  Negative for rash.   Neurological:  Positive for seizures. Negative for dizziness, sensory change, loss of consciousness and headaches.        Seizures followed by neurology.   Endo/Heme/Allergies:  Does not bruise/bleed easily.   Psychiatric/Behavioral:  The patient does not have insomnia.               Objective     BP (!) 78/60 (BP Location: Left arm, Patient Position: Sitting, BP Cuff Size: Adult)   Pulse 83   Resp 12   Ht 1.88 m (6' 2\")   Wt 83 kg (183 lb)   SpO2 98%   BMI 23.50 kg/m²     Physical Exam  Constitutional:       Appearance: He is well-developed.   HENT:      Head: Normocephalic.   Neck:      Vascular: No JVD.   Cardiovascular:      Rate and Rhythm: Normal rate and regular rhythm.      Heart sounds: Normal heart sounds.      Comments: AICD in left chest wall.  Sternotomy scar present.  Pulmonary:      Effort: Pulmonary effort is normal. No respiratory distress.      Breath sounds: Normal breath sounds. No wheezing or rales.   Abdominal:      General: Bowel sounds are normal. There is no distension.      Palpations: Abdomen is soft.      Tenderness: There is no abdominal tenderness.   Musculoskeletal:         General: Normal range of motion.      Cervical back: Normal range of motion and neck supple.   Skin:     General: Skin is warm and dry.      Findings: No rash.   Neurological:      Mental Status: He is alert and oriented to person, place, and time.     CRT-D interrogation today reveals normal function. No device therapy and no mode switching episodes. No changes " are made today.    CONCLUSIONS OF TTE OF 10/20/2023:  Compared to the prior study on 10/16/2019, there has been reduction in left ventricular systolic function.  Reduced left ventricular systolic function. The left ventricular ejection fraction is visually estimated to be 40%.  Global hypokinesis.   No evidence of valvular abnormality based on Doppler evaluation.      Impression of MRI of brain of 11/24/2022:  No acute intracranial abnormalities. No acute cerebrovascular infarct.       IMPRESSION OF CT SCAN OF CHEST OF 9/6/2022:  No acute cardiopulmonary abnormalities.  No rib fractures.   Post CABG changes. Left ventricle apical wall thickening with fat deposition possibly a sequela of prior myocardial infarction.       Interpretation Summary of Echocardiogram of 4/27/2022:  The study was technically difficult with some images being suboptimal in quality   There is apical hypokinesis   There is mild inferior wall hypokinesis.   Visually the ejection fraction appears in the 35-40% range.   There is no thrombus.   A variety of Doppler measurements indicate grade I diastolic dysfunction associated with normal left   atrial pressures.   No significant valvular abnormality noted      CONCLUSIONS OF ECHOCARDIOGRAM OF 10/16/2019:  Limited Study for LVEF.   TDS - Limited acoustical window, contrast not available.  Prior Echo - 2/12/18, prior EF probably 40%, currently 45%.  Mildly reduced left ventricular systolic function.  Left ventricular ejection fraction is visually estimated to be 45%.  Global hypokinesis.  Abnormal septal motion consistent with postoperative status.     POSTOPERATIVE DIAGNOSES OF [facility] OF 7/22/2016:  1.  Occluded vein grafts.  2.  Patent LIMA to LAD.  3.  Diffuse distal RCA disease.  4.  A 100% occluded LAD stent.  5.  Moderate nonobstructive left circumflex disease.  6.  LVEF 50%.    Lab Results   Component Value Date/Time    CHOLSTRLTOT 85 (L) 08/21/2023 11:05 AM    LDL 25 08/21/2023 11:05 AM    HDL  41 08/21/2023 11:05 AM    TRIGLYCERIDE 94 08/21/2023 11:05 AM        Lab Results   Component Value Date/Time    SODIUM 142 08/21/2023 11:05 AM    POTASSIUM 4.9 08/21/2023 11:05 AM    CHLORIDE 109 08/21/2023 11:05 AM    CO2 27 08/21/2023 11:05 AM    GLUCOSE 93 08/21/2023 11:05 AM    BUN 10 08/21/2023 11:05 AM    CREATININE 1.12 08/21/2023 11:05 AM    GLOMRATE 92 11/26/2022 04:44 AM      Lab Results   Component Value Date/Time    ASTSGOT 27 08/21/2023 11:05 AM    ALTSGPT 43 08/21/2023 11:05 AM           Assessment & Plan     1. Presence of biventricular AICD        2. Coronary artery disease involving native coronary artery of native heart without angina pectoris        3. Ischemic cardiomyopathy        4. S/P CABG x 3        5. ACC/AHA stage C systolic heart failure (HCC)  atorvastatin (LIPITOR) 40 MG Tab    spironolactone (ALDACTONE) 25 MG Tab    sacubitril-valsartan (ENTRESTO) 24-26 MG Tab      6. Ventricular tachycardia (HCC)  sotalol (BETAPACE) 80 MG Tab      7. Paroxysmal atrial fibrillation (HCC)  sotalol (BETAPACE) 80 MG Tab    apixaban (ELIQUIS) 5mg Tab      8. Chronic anticoagulation        9. Essential hypertension  metoprolol SR (TOPROL XL) 50 MG TABLET SR 24 HR    sacubitril-valsartan (ENTRESTO) 24-26 MG Tab      10. Mixed hyperlipidemia        11. Type 2 diabetes mellitus with complication, with long-term current use of insulin (HCC)  atorvastatin (LIPITOR) 40 MG Tab      12. Focal seizures (HCC)            Medical Decision Making: Today's Assessment/Status/Plan:      1. CAD/ischemic cardiomyopathy status post CABG x 3 and previous intervention, with LVEF 40% on echo in October 2023; we reviewed the results together.. CRT-D is working normally with no arrhythmias. Continue:  Eliquis 5mg twice daily  Lipitor 40mg once daily  Entresto 24/26mg twice daily  Toprol XL 50mg once daily  Aldactone 25mg once daily  NTG 0.4mg PRN (rarely uses)     2. HFrEF, stage C, class II, LVEF 40% in October 2023, currently  stable and euvolemic. No HF symptoms. Continue Eliquis, statin, Entresto, BB and Aldactone.      3. History of VT on Sotalol 80mg twice daily. EKG in November 2022 QTc 536ms.Creatinine is normal.     4. PAFib, on Sotalol 80mg twice daily, with no mode switching episodes.     5. Chronic anticoagulation with Eliquis, no bleeding issues.     6. Hypertension, treated. BP tends to run low, but he is mostly asymptomatic. BP Is low today.     7. Hyperlipidemia, treated with Lipitor 40mg. Last LDL was 25 (at goal).     8. Diabetes mellitus, treated with combination therapy. Last HgbA1c was 5.2.Ozempic was recently reduced.     9. History of seizures, last episode in early May 2023, with no recent recurrence, followed by neurology.     From a cardiac standpoint, he remains stable at this time. BP tends to run lower. No recent seizures.    We reviewed echo, which was stable from previous, and labs. Same medications for now. Follow-up in 6 months for next device interrogation.

## 2023-11-27 ENCOUNTER — NON-PROVIDER VISIT (OUTPATIENT)
Dept: MEDICAL GROUP | Facility: CLINIC | Age: 58
End: 2023-11-27
Payer: MEDICARE

## 2023-11-27 ENCOUNTER — HOSPITAL ENCOUNTER (OUTPATIENT)
Facility: MEDICAL CENTER | Age: 58
End: 2023-11-27
Attending: PHYSICIAN ASSISTANT
Payer: MEDICARE

## 2023-11-27 DIAGNOSIS — Z79.4 TYPE 2 DIABETES MELLITUS WITH COMPLICATION, WITH LONG-TERM CURRENT USE OF INSULIN (HCC): ICD-10-CM

## 2023-11-27 DIAGNOSIS — E11.8 TYPE 2 DIABETES MELLITUS WITH COMPLICATION, WITH LONG-TERM CURRENT USE OF INSULIN (HCC): ICD-10-CM

## 2023-11-27 DIAGNOSIS — I48.0 PAROXYSMAL ATRIAL FIBRILLATION (HCC): ICD-10-CM

## 2023-11-27 LAB
ALBUMIN SERPL BCP-MCNC: 4.4 G/DL (ref 3.2–4.9)
ALBUMIN/GLOB SERPL: 1.8 G/DL
ALP SERPL-CCNC: 86 U/L (ref 30–99)
ALT SERPL-CCNC: 28 U/L (ref 2–50)
ANION GAP SERPL CALC-SCNC: 11 MMOL/L (ref 7–16)
AST SERPL-CCNC: 25 U/L (ref 12–45)
BILIRUB SERPL-MCNC: 0.7 MG/DL (ref 0.1–1.5)
BUN SERPL-MCNC: 14 MG/DL (ref 8–22)
CALCIUM ALBUM COR SERPL-MCNC: 8.8 MG/DL (ref 8.5–10.5)
CALCIUM SERPL-MCNC: 9.1 MG/DL (ref 8.5–10.5)
CHLORIDE SERPL-SCNC: 110 MMOL/L (ref 96–112)
CHOLEST SERPL-MCNC: 93 MG/DL (ref 100–199)
CO2 SERPL-SCNC: 25 MMOL/L (ref 20–33)
CREAT SERPL-MCNC: 1.28 MG/DL (ref 0.5–1.4)
EST. AVERAGE GLUCOSE BLD GHB EST-MCNC: 103 MG/DL
GFR SERPLBLD CREATININE-BSD FMLA CKD-EPI: 65 ML/MIN/1.73 M 2
GLOBULIN SER CALC-MCNC: 2.4 G/DL (ref 1.9–3.5)
GLUCOSE SERPL-MCNC: 93 MG/DL (ref 65–99)
HBA1C MFR BLD: 5.2 % (ref 4–5.6)
HDLC SERPL-MCNC: 51 MG/DL
LDLC SERPL CALC-MCNC: 26 MG/DL
POTASSIUM SERPL-SCNC: 5.1 MMOL/L (ref 3.6–5.5)
PROT SERPL-MCNC: 6.8 G/DL (ref 6–8.2)
SODIUM SERPL-SCNC: 146 MMOL/L (ref 135–145)
TRIGL SERPL-MCNC: 79 MG/DL (ref 0–149)

## 2023-11-27 PROCEDURE — 80053 COMPREHEN METABOLIC PANEL: CPT

## 2023-11-27 PROCEDURE — 80061 LIPID PANEL: CPT

## 2023-11-27 PROCEDURE — 36415 COLL VENOUS BLD VENIPUNCTURE: CPT | Performed by: PHYSICIAN ASSISTANT

## 2023-11-27 PROCEDURE — 83036 HEMOGLOBIN GLYCOSYLATED A1C: CPT

## 2023-11-27 NOTE — PROGRESS NOTES
Juaquin Solorzano is a 58 y.o. male here for a non-provider visit for a lab draw on 11/27/2023 at 9:11 AM.    Procedure performed:  Venipuncture     Anatomical site:  Right Antecubital Area    Equipment used:  21 g vacutainer     Labs drawn:  A1c, Comp Metabolic Panel , and Lipid Profile    Ordering provider:  Cece Parker P.A.-C.     Lab draw completed by:  Nina Govea, Alvino Ass't

## 2023-11-28 ENCOUNTER — HOSPITAL ENCOUNTER (OUTPATIENT)
Dept: RADIOLOGY | Facility: MEDICAL CENTER | Age: 58
End: 2023-11-28
Attending: PHYSICIAN ASSISTANT

## 2023-11-28 ENCOUNTER — OFFICE VISIT (OUTPATIENT)
Dept: MEDICAL GROUP | Facility: CLINIC | Age: 58
End: 2023-11-28
Payer: MEDICARE

## 2023-11-28 ENCOUNTER — PATIENT MESSAGE (OUTPATIENT)
Dept: MEDICAL GROUP | Facility: CLINIC | Age: 58
End: 2023-11-28

## 2023-11-28 VITALS
DIASTOLIC BLOOD PRESSURE: 78 MMHG | OXYGEN SATURATION: 99 % | RESPIRATION RATE: 16 BRPM | TEMPERATURE: 97.4 F | HEART RATE: 78 BPM | WEIGHT: 184 LBS | BODY MASS INDEX: 23.61 KG/M2 | SYSTOLIC BLOOD PRESSURE: 100 MMHG | HEIGHT: 74 IN

## 2023-11-28 DIAGNOSIS — H93.13 TINNITUS OF BOTH EARS: ICD-10-CM

## 2023-11-28 DIAGNOSIS — F33.0 MAJOR DEPRESSIVE DISORDER, RECURRENT, MILD (HCC): ICD-10-CM

## 2023-11-28 DIAGNOSIS — G89.29 NECK PAIN, CHRONIC: ICD-10-CM

## 2023-11-28 DIAGNOSIS — E08.40: ICD-10-CM

## 2023-11-28 DIAGNOSIS — Z13.21 ENCOUNTER FOR VITAMIN DEFICIENCY SCREENING: ICD-10-CM

## 2023-11-28 DIAGNOSIS — I73.00 RAYNAUD'S PHENOMENON WITHOUT GANGRENE: ICD-10-CM

## 2023-11-28 DIAGNOSIS — M53.3 TAIL BONE PAIN: ICD-10-CM

## 2023-11-28 DIAGNOSIS — E08.42 DIABETIC POLYNEUROPATHY ASSOCIATED WITH DIABETES MELLITUS DUE TO UNDERLYING CONDITION (HCC): ICD-10-CM

## 2023-11-28 DIAGNOSIS — D68.69 HYPERCOAGULABLE STATE DUE TO PAROXYSMAL ATRIAL FIBRILLATION (HCC): ICD-10-CM

## 2023-11-28 DIAGNOSIS — E26.1 SECONDARY HYPERALDOSTERONISM (HCC): ICD-10-CM

## 2023-11-28 DIAGNOSIS — E11.8 TYPE 2 DIABETES MELLITUS WITH COMPLICATION, WITH LONG-TERM CURRENT USE OF INSULIN (HCC): ICD-10-CM

## 2023-11-28 DIAGNOSIS — Z79.4 TYPE 2 DIABETES MELLITUS WITH COMPLICATION, WITH LONG-TERM CURRENT USE OF INSULIN (HCC): ICD-10-CM

## 2023-11-28 DIAGNOSIS — M54.2 NECK PAIN, CHRONIC: ICD-10-CM

## 2023-11-28 DIAGNOSIS — I25.5 ISCHEMIC CARDIOMYOPATHY: ICD-10-CM

## 2023-11-28 DIAGNOSIS — Z11.59 NEED FOR HEPATITIS C SCREENING TEST: ICD-10-CM

## 2023-11-28 DIAGNOSIS — L72.9 CYST OF BUTTOCKS: ICD-10-CM

## 2023-11-28 DIAGNOSIS — I48.0 HYPERCOAGULABLE STATE DUE TO PAROXYSMAL ATRIAL FIBRILLATION (HCC): ICD-10-CM

## 2023-11-28 DIAGNOSIS — I50.20 ACC/AHA STAGE C SYSTOLIC HEART FAILURE (HCC): ICD-10-CM

## 2023-11-28 PROCEDURE — 3078F DIAST BP <80 MM HG: CPT | Performed by: PHYSICIAN ASSISTANT

## 2023-11-28 PROCEDURE — 3074F SYST BP LT 130 MM HG: CPT | Performed by: PHYSICIAN ASSISTANT

## 2023-11-28 PROCEDURE — 99214 OFFICE O/P EST MOD 30 MIN: CPT | Performed by: PHYSICIAN ASSISTANT

## 2023-11-28 RX ORDER — METOPROLOL SUCCINATE 100 MG/1
TABLET, EXTENDED RELEASE ORAL
COMMUNITY

## 2023-11-28 RX ORDER — ASPIRIN 81 MG/1
TABLET, CHEWABLE ORAL
COMMUNITY

## 2023-11-28 RX ORDER — SEMAGLUTIDE 1.34 MG/ML
INJECTION, SOLUTION SUBCUTANEOUS
COMMUNITY

## 2023-11-28 RX ORDER — BUSPIRONE HYDROCHLORIDE 10 MG/1
TABLET ORAL
COMMUNITY

## 2023-11-28 RX ORDER — ALLOPURINOL 100 MG/1
TABLET ORAL
COMMUNITY

## 2023-11-28 RX ORDER — BENZALKONIUM CHLORIDE 1.3 MG/ML
CLOTH TOPICAL
COMMUNITY

## 2023-11-28 RX ORDER — METFORMIN HYDROCHLORIDE EXTENDED-RELEASE TABLETS 1000 MG/1
TABLET, FILM COATED, EXTENDED RELEASE ORAL
COMMUNITY

## 2023-11-28 RX ORDER — EMPAGLIFLOZIN, METFORMIN HYDROCHLORIDE 12.5; 1 MG/1; MG/1
1 TABLET, EXTENDED RELEASE ORAL 2 TIMES DAILY
Qty: 180 TABLET | Refills: 2 | Status: SHIPPED | OUTPATIENT
Start: 2023-11-28

## 2023-11-28 RX ORDER — AMITRIPTYLINE HYDROCHLORIDE 100 MG/1
TABLET ORAL
COMMUNITY
End: 2023-11-28

## 2023-11-28 RX ORDER — AMITRIPTYLINE HYDROCHLORIDE 75 MG/1
75 TABLET ORAL NIGHTLY
Qty: 90 TABLET | Refills: 2 | Status: SHIPPED | OUTPATIENT
Start: 2023-11-28 | End: 2024-02-09 | Stop reason: SDUPTHER

## 2023-11-28 RX ORDER — BACLOFEN 5 MG/1
TABLET ORAL
COMMUNITY
Start: 2023-11-22 | End: 2023-11-28

## 2023-11-28 ASSESSMENT — FIBROSIS 4 INDEX: FIB4 SCORE: 1.49

## 2023-11-28 NOTE — PROGRESS NOTES
cc:  circulation    Subjective:     Juaquin Solorzano is a 58 y.o. male presenting for circulation      Patient presents to the office for circulation.  He states that this has been an issue for years.  He states that he has been wearing gloves all morning and his fingers are still cold.  He has been told it is due to blood thinner use.      Patient is also here to discuss his most recent test results.  He is a diabetic with neuropathy    Patient is a type II diabetic with neuropathy and with long-term insulin use.  He also sees cardiology for ischemic cardiomyopathy and heart failure, atrial fibrillation and is in a hypercoagulable state due to his atrial fibrillation.  He does take a diuretic which can also cause a secondary hyperaldosteronism.    He is needing a refill of his amitriptyline for his recurrent major depression.    He is complaining of tailbone pain and a cyst in the left buttock region.  He would like to have this evaluated today.    Patient continues to have ongoing pain.  He is requested a CT of the neck multiple times and we have placed the order multiple times.  However the order seems to be canceled were never done and he has not had any luck scheduling with Ryan Alfonso.    He is due for routine screenings including a vitamin deficiency screen as well as a hepatitis C screen.    Review of systems:  See above.   Denies any symptoms unless previously indicated.        Current Outpatient Medications:     metoprolol SR (TOPROL XL) 100 MG TABLET SR 24 HR, orally, Disp: , Rfl:     metFORMIN ER 1000 MG TABLET SR 24 HR, orally, Disp: , Rfl:     busPIRone (BUSPAR) 10 MG Tab tablet, orally, Disp: , Rfl:     allopurinol (ZYLOPRIM) 100 MG Tab, orally, Disp: , Rfl:     Semaglutide,0.25 or 0.5MG/DOS, (OZEMPIC, 0.25 OR 0.5 MG/DOSE,) 2 MG/1.5ML Solution Pen-injector, Ozempic (0.25 mg or 0.5 mg dose), Disp: , Rfl:     aspirin (ASPIRIN 81) 81 MG Chew Tab chewable tablet, Aspir 81, Disp: , Rfl:      Respiratory Therapy Supplies (CARETOUCH CPAP & BIPAP HOSE) Misc, ** 10/7 cm H2O **  with sleep via mask - Prior sleep study in [place] 5 years ago, told severe sleep apnea. He thinks 72 AHI. Titration study 10-23-18 showed Both CPAP and BiPAP eliminated hypopneas and apneas, but BiPAP corrected the hypoxia better. for Lifetime, Disp: , Rfl:     amitriptyline (ELAVIL) 75 MG Tab, Take 1 Tablet by mouth every evening., Disp: 90 Tablet, Rfl: 2    Empagliflozin-metFORMIN HCl ER (SYNJARDY XR) 12.5-1000 MG TABLET SR 24 HR, Take 1 Tablet by mouth 2 times a day., Disp: 180 Tablet, Rfl: 2    sotalol (BETAPACE) 80 MG Tab, Take 1 Tablet by mouth 2 times a day., Disp: 200 Tablet, Rfl: 3    metoprolol SR (TOPROL XL) 50 MG TABLET SR 24 HR, Take 1 Tablet by mouth every day., Disp: 100 Tablet, Rfl: 3    apixaban (ELIQUIS) 5mg Tab, Take 1 Tablet by mouth 2 times a day., Disp: 200 Tablet, Rfl: 3    atorvastatin (LIPITOR) 40 MG Tab, Take 1 Tablet by mouth every day., Disp: 100 Tablet, Rfl: 3    spironolactone (ALDACTONE) 25 MG Tab, Take 1 Tablet by mouth every day., Disp: 100 Tablet, Rfl: 3    sacubitril-valsartan (ENTRESTO) 24-26 MG Tab, Take 1 Tablet by mouth 2 times a day., Disp: 200 Tablet, Rfl: 3    baclofen (LIORESAL) 10 MG Tab, Take 1 Tablet by mouth 2 times a day as needed (pain)., Disp: 180 Tablet, Rfl: 0    pantoprazole (PROTONIX) 40 MG Tablet Delayed Response, Take 1 Tablet by mouth every day., Disp: 90 Tablet, Rfl: 1    Semaglutide, 1 MG/DOSE, (OZEMPIC, 1 MG/DOSE,) 4 MG/3ML Solution Pen-injector, Inject 0.5 mg under the skin every 7 days., Disp: 9 mL, Rfl: 1    zonisamide (ZONEGRAN) 100 MG Cap, Take 3 Capsules by mouth every day., Disp: 270 Capsule, Rfl: 2    DULoxetine (CYMBALTA) 60 MG Cap DR Particles delayed-release capsule, Take 1 Capsule by mouth every day., Disp: 90 Capsule, Rfl: 2    benzonatate (TESSALON) 100 MG Cap, Take 1 Capsule by mouth 3 times a day as needed for Cough., Disp: 90 Capsule, Rfl: 1    glucose  "blood (FREESTYLE LITE) strip, 1 Strip by Other route 4 times a day., Disp: 300 Strip, Rfl: 4    Insulin Pen Needle, by Does not apply route., Disp: , Rfl:     Allergies, past medical history, past surgical history, family history, social history reviewed and updated    Objective:     Vitals: /78 (BP Location: Left arm, Patient Position: Sitting, BP Cuff Size: Adult)   Pulse 78   Temp 36.3 °C (97.4 °F) (Temporal)   Resp 16   Ht 1.88 m (6' 2\")   Wt 83.5 kg (184 lb)   SpO2 99%   BMI 23.62 kg/m²   General: Alert, pleasant, NAD  EYES:   PERRL, EOMI, no icterus or pallor.  Conjunctivae and lids normal.   HENT:  Normocephalic.  External ears normal.   Neck supple.     Respiratory: Normal respiratory effort.   Abdomen: Not obese.  Weight is steady  Skin: Warm, dry, no rashes.  Firm lump in the left gluteal region towards gluteal cleft.  Musculoskeletal: Gait is normal.  Moves all extremities well.  Bony prominence coccyx.  Extremities: Monofilament testing with a 10 gram force: sensation intact: intact bilaterally  Visual Inspection: Feet withremoved nails on both second toes with no sign of infection, fissures.  Pedal pulses: intact bilaterally  Neurological: No tremors, sensation grossly intact,  CN2-12 intact.  Psych:  Affect/mood is normal, judgement is good, memory is intact, grooming is appropriate.     Latest Reference Range & Units 11/27/23 09:08   Sodium 135 - 145 mmol/L 146 (H)   Potassium 3.6 - 5.5 mmol/L 5.1   Chloride 96 - 112 mmol/L 110   Co2 20 - 33 mmol/L 25   Anion Gap 7.0 - 16.0  11.0   Glucose 65 - 99 mg/dL 93   Bun 8 - 22 mg/dL 14   Creatinine 0.50 - 1.40 mg/dL 1.28   GFR (CKD-EPI) >60 mL/min/1.73 m 2 65   Calcium 8.5 - 10.5 mg/dL 9.1   Correct Calcium 8.5 - 10.5 mg/dL 8.8   AST(SGOT) 12 - 45 U/L 25   ALT(SGPT) 2 - 50 U/L 28   Alkaline Phosphatase 30 - 99 U/L 86   Total Bilirubin 0.1 - 1.5 mg/dL 0.7   Albumin 3.2 - 4.9 g/dL 4.4   Total Protein 6.0 - 8.2 g/dL 6.8   Globulin 1.9 - 3.5 g/dL 2.4 "   A-G Ratio g/dL 1.8   Glycohemoglobin 4.0 - 5.6 % 5.2   Estim. Avg Glu mg/dL 103   Cholesterol,Tot 100 - 199 mg/dL 93 (L)   Triglycerides 0 - 149 mg/dL 79   HDL >=40 mg/dL 51   LDL <100 mg/dL 26   (H): Data is abnormally high  (L): Data is abnormally low    Assessment/Plan:     Juaquin was seen today for circulatory problem and lab results.    Diagnoses and all orders for this visit:    Diabetic neuropathy associated with diabetes mellitus due to underlying condition (HCC)  -     POCT Retinal Eye Exam  -     Diabetic Monofilament LE Exam  -     Microalbumin Creat Ratio Urine (Lab Collect); Future  -     Lipid Profile; Future  -     Comp Metabolic Panel; Future  -     TSH WITH REFLEX TO FT4; Future  Type 2 diabetes mellitus with complication, with long-term current use of insulin ([facility])  -     Lipid Profile; Future  -     Comp Metabolic Panel; Future  -     TSH WITH REFLEX TO FT4; Future  -     Empagliflozin-metFORMIN HCl ER (SYNJARDY XR) 12.5-1000 MG TABLET SR 24 HR; Take 1 Tablet by mouth 2 times a day.  Diabetic polyneuropathy associated with diabetes mellitus due to underlying condition (HCC)  Ischemic cardiomyopathy  -     Lipid Profile; Future  -     Comp Metabolic Panel; Future  -     TSH WITH REFLEX TO FT4; Future  Hypercoagulable state due to paroxysmal atrial fibrillation (HCC)  ACC/AHA stage C systolic heart failure (HCC)  -     Lipid Profile; Future  -     Comp Metabolic Panel; Future  -     TSH WITH REFLEX TO FT4; Future  Secondary hyperaldosteronism (HCC)  -     Comp Metabolic Panel; Future  -     TSH WITH REFLEX TO FT4; Future    Labs reviewed with patient.  Repeat labs in 6 months.    Raynaud's phenomenon without gangrene  -     TSH WITH REFLEX TO FT4; Future    Patient is experiencing Raynaud's phenomenon.  We will obtain a thyroid level to evaluate further.  However advised patient that the best treatment option is to keep hands and feet as warm as possible.    Major depressive disorder, recurrent,  mild (HCC)  -     amitriptyline (ELAVIL) 75 MG Tab; Take 1 Tablet by mouth every evening.    Stable.  Continue with current medications.    Cyst of buttocks  -     Referral to General Surgery    Patient concerned about cancer.  Will refer to general surgery for further evaluation.    Tail bone pain  -     DX-SACRUM AND COCCYX 2+; Future    X-rays ordered to evaluate further.  Advised patient to sit with cushioning to help with symptoms.    Neck pain, chronic  -     OUTSIDE IMAGES-CT CERVICAL SPINE; Future  CT has been reordered.  Recommend patient proceed to have it at [facility].    Encounter for vitamin deficiency screening  -     VITAMIN D,25 HYDROXY (DEFICIENCY); Future  Need for hepatitis C screening test  -     HEP C VIRUS ANTIBODY; Future    Routine testing ordered to evaluate further.      HCC Gap Form    Diagnosis to address: E26.1 - Secondary hyperaldosteronism (HCC)  Assessment and plan: Chronic, stable. Continue with current defined treatment plan: repeat labs in 6 months.  Stable on medication. Follow-up at least annually.  Diagnosis: D68.69, I48.0 - Hypercoagulable state due to paroxysmal atrial fibrillation (HCC)  Assessment and plan: Chronic, stable, as based on today's assessment and impact on other conditions evaluated today. Continue with current treatment plan: sees Cardiology in April.   Follow-up with specialist as directed, but at least annually.  Diagnosis: F33.0 - Major depressive disorder, recurrent, mild (HCC)  Assessment and plan: Chronic, stable. Continue with current defined treatment plan: amitriptyline refilled today.  Follow up in 6 months. Follow-up at least annually.  Diagnosis: E08.42 - Diabetic polyneuropathy associated with diabetes mellitus due to underlying condition (HCC)  Assessment and plan: Chronic, stable. Continue with current defined treatment plan: labs reviewed with patient.  Repeat labs in 6 months. Follow-up at least annually.  Last edited 11/28/23 13:11 PST by Cece NARANJO  ROCIO Parker.          Return in about 4 months (around 3/28/2024), or if symptoms worsen or fail to improve, for 3-4 months.    Please note that this dictation was created using voice recognition software. I have made every reasonable attempt to correct obvious errors, but expect that there are errors of grammar and possible content that I did not discover before finalizing note.

## 2023-11-30 ENCOUNTER — PATIENT OUTREACH (OUTPATIENT)
Dept: HEALTH INFORMATION MANAGEMENT | Facility: OTHER | Age: 58
End: 2023-11-30
Payer: MEDICARE

## 2023-11-30 DIAGNOSIS — I50.20 ACC/AHA STAGE C SYSTOLIC HEART FAILURE (HCC): ICD-10-CM

## 2023-11-30 DIAGNOSIS — Z79.4 TYPE 2 DIABETES MELLITUS WITH COMPLICATION, WITH LONG-TERM CURRENT USE OF INSULIN (HCC): ICD-10-CM

## 2023-11-30 DIAGNOSIS — E11.8 TYPE 2 DIABETES MELLITUS WITH COMPLICATION, WITH LONG-TERM CURRENT USE OF INSULIN (HCC): ICD-10-CM

## 2023-11-30 DIAGNOSIS — I10 HYPERTENSION, UNSPECIFIED TYPE: ICD-10-CM

## 2023-11-30 DIAGNOSIS — I10 ESSENTIAL HYPERTENSION: ICD-10-CM

## 2023-11-30 PROCEDURE — 99490 CHRNC CARE MGMT STAFF 1ST 20: CPT | Performed by: PHYSICIAN ASSISTANT

## 2023-11-30 NOTE — PROGRESS NOTES
This RN spoke with patient for monthly/Quarterly PCM outreach. Patient saw PCP two days ago. He reports that he checks his blood sugars daily and on average it is between 70-80. He denies edema, weight gain and shortness of breath. He reports neuropathy  in all 4 extremities with numbness, tingling and burning sensations. He states that he needs a referral for colonoscopy and informed this RN that he has a hernia. PCP stated that he needs to schedule an appointment to discuss hernia and  referral. His appointment is scheduled for 01/09/24 Medications were reviewed with patient with no changes. He reports that all of his medications were refilled at last appointment with PCP. Patient was hypotensive at last cardiology appointment and states that it is normal on average /70's. He feels lightheaded when he stand up but he gets up slowly when rising from a chair and then ambulates after symptoms dissipate.  He denies recent falls. He states he has no further needs at this time. This RN instructed him to call if needs arise.  All education provided with verba understanding. Updated POC and will continue to follow current POC.     Assessment     See above     Education     Appointments, referrals, s/s of hypotension, fall prevention     Plan of Care and Goals     Appoinment with PCP for hernia and referral, preventing falls  Continue current plan of care     Barriers:     Hypotensive episodes, patient chronic illnesses     Progress:     Progressing      Next outreach: week of  12/26/23

## 2023-12-02 ENCOUNTER — PATIENT MESSAGE (OUTPATIENT)
Dept: CARDIOLOGY | Facility: MEDICAL CENTER | Age: 58
End: 2023-12-02
Payer: MEDICARE

## 2023-12-29 ENCOUNTER — PATIENT OUTREACH (OUTPATIENT)
Dept: HEALTH INFORMATION MANAGEMENT | Facility: OTHER | Age: 58
End: 2023-12-29
Payer: MEDICARE

## 2023-12-29 DIAGNOSIS — E11.8 TYPE 2 DIABETES MELLITUS WITH COMPLICATION, WITH LONG-TERM CURRENT USE OF INSULIN (HCC): ICD-10-CM

## 2023-12-29 DIAGNOSIS — Z79.4 TYPE 2 DIABETES MELLITUS WITH COMPLICATION, WITH LONG-TERM CURRENT USE OF INSULIN (HCC): ICD-10-CM

## 2023-12-29 DIAGNOSIS — I25.10 CORONARY ARTERY DISEASE INVOLVING NATIVE CORONARY ARTERY OF NATIVE HEART WITHOUT ANGINA PECTORIS: ICD-10-CM

## 2023-12-29 DIAGNOSIS — E78.49 OTHER HYPERLIPIDEMIA: ICD-10-CM

## 2023-12-29 DIAGNOSIS — Z95.810 PRESENCE OF BIVENTRICULAR AICD: ICD-10-CM

## 2023-12-29 PROCEDURE — 99999 PR NO CHARGE: CPT | Performed by: PHYSICIAN ASSISTANT

## 2023-12-29 NOTE — PROGRESS NOTES
This RN spoke with patient for monthly PCM outreach. This patient states he has no needs at this time. Appointments and referrals discussed and this RN provided phone numbers for patient to follow up.Juaquin denies any recent weight changes. He eats one large meal in the evening. He states that his blood sugars averages in the 80's. Last hemoglobin A1C and blood glucose levels were 5.2 and 103, drawn 11/2023.  Juaquin states that he has had no recent falls or hospitalizations or changes in medications and takes as directed. This RN instructed patient to call if any needs, questions, concerns should arise. He verbalizes understanding. Will continue current POC.    Education: Referrals, medication, appointments, diet and fall prevention    Goals: To remain fall free. Schedule appointments with specialty providers per discussion    Barriers: n/a    Interventions:Will continue monthly outreach calls.     POC: Continue current plan of care    Progression: Progressing    Next Outreach: Week of 01/29/24

## 2024-01-08 ENCOUNTER — NON-PROVIDER VISIT (OUTPATIENT)
Dept: CARDIOLOGY | Facility: MEDICAL CENTER | Age: 59
End: 2024-01-08
Payer: MEDICARE

## 2024-01-08 PROCEDURE — 93295 DEV INTERROG REMOTE 1/2/MLT: CPT | Performed by: INTERNAL MEDICINE

## 2024-01-09 ENCOUNTER — OFFICE VISIT (OUTPATIENT)
Dept: MEDICAL GROUP | Facility: CLINIC | Age: 59
End: 2024-01-09
Payer: MEDICARE

## 2024-01-09 VITALS
OXYGEN SATURATION: 98 % | DIASTOLIC BLOOD PRESSURE: 64 MMHG | RESPIRATION RATE: 20 BRPM | HEIGHT: 73 IN | BODY MASS INDEX: 24.07 KG/M2 | TEMPERATURE: 97 F | SYSTOLIC BLOOD PRESSURE: 112 MMHG | WEIGHT: 181.6 LBS | HEART RATE: 78 BPM

## 2024-01-09 DIAGNOSIS — K40.90 RIGHT INGUINAL HERNIA: ICD-10-CM

## 2024-01-09 DIAGNOSIS — M54.50 CHRONIC MIDLINE LOW BACK PAIN WITHOUT SCIATICA: ICD-10-CM

## 2024-01-09 DIAGNOSIS — Z95.810 PRESENCE OF BIVENTRICULAR AICD: ICD-10-CM

## 2024-01-09 DIAGNOSIS — M54.2 NECK PAIN, CHRONIC: ICD-10-CM

## 2024-01-09 DIAGNOSIS — G89.29 CHRONIC MIDLINE LOW BACK PAIN WITHOUT SCIATICA: ICD-10-CM

## 2024-01-09 DIAGNOSIS — E11.8 TYPE 2 DIABETES MELLITUS WITH COMPLICATION, WITH LONG-TERM CURRENT USE OF INSULIN (HCC): ICD-10-CM

## 2024-01-09 DIAGNOSIS — E08.42 DIABETIC POLYNEUROPATHY ASSOCIATED WITH DIABETES MELLITUS DUE TO UNDERLYING CONDITION (HCC): ICD-10-CM

## 2024-01-09 DIAGNOSIS — E26.1 SECONDARY HYPERALDOSTERONISM (HCC): ICD-10-CM

## 2024-01-09 DIAGNOSIS — R56.9 FOCAL SEIZURES (HCC): ICD-10-CM

## 2024-01-09 DIAGNOSIS — I48.0 HYPERCOAGULABLE STATE DUE TO PAROXYSMAL ATRIAL FIBRILLATION (HCC): ICD-10-CM

## 2024-01-09 DIAGNOSIS — D68.69 HYPERCOAGULABLE STATE DUE TO PAROXYSMAL ATRIAL FIBRILLATION (HCC): ICD-10-CM

## 2024-01-09 DIAGNOSIS — Z79.4 ENCOUNTER FOR LONG-TERM (CURRENT) USE OF INSULIN (HCC): ICD-10-CM

## 2024-01-09 DIAGNOSIS — G89.29 NECK PAIN, CHRONIC: ICD-10-CM

## 2024-01-09 DIAGNOSIS — M53.3 TAIL BONE PAIN: ICD-10-CM

## 2024-01-09 DIAGNOSIS — F33.0 MAJOR DEPRESSIVE DISORDER, RECURRENT, MILD (HCC): ICD-10-CM

## 2024-01-09 DIAGNOSIS — I48.0 PAROXYSMAL ATRIAL FIBRILLATION (HCC): ICD-10-CM

## 2024-01-09 DIAGNOSIS — Z23 NEED FOR VACCINATION: ICD-10-CM

## 2024-01-09 DIAGNOSIS — I50.20 ACC/AHA STAGE C SYSTOLIC HEART FAILURE (HCC): ICD-10-CM

## 2024-01-09 DIAGNOSIS — Z79.4 TYPE 2 DIABETES MELLITUS WITH COMPLICATION, WITH LONG-TERM CURRENT USE OF INSULIN (HCC): ICD-10-CM

## 2024-01-09 DIAGNOSIS — G89.29 CHRONIC LEFT SHOULDER PAIN: ICD-10-CM

## 2024-01-09 DIAGNOSIS — M25.561 CHRONIC PAIN OF RIGHT KNEE: ICD-10-CM

## 2024-01-09 DIAGNOSIS — M25.512 CHRONIC LEFT SHOULDER PAIN: ICD-10-CM

## 2024-01-09 DIAGNOSIS — R29.898 WEAKNESS OF RIGHT LEG: ICD-10-CM

## 2024-01-09 DIAGNOSIS — G89.29 CHRONIC PAIN OF RIGHT KNEE: ICD-10-CM

## 2024-01-09 PROCEDURE — 90686 IIV4 VACC NO PRSV 0.5 ML IM: CPT | Performed by: PHYSICIAN ASSISTANT

## 2024-01-09 PROCEDURE — 99214 OFFICE O/P EST MOD 30 MIN: CPT | Mod: 25 | Performed by: PHYSICIAN ASSISTANT

## 2024-01-09 PROCEDURE — 3074F SYST BP LT 130 MM HG: CPT | Performed by: PHYSICIAN ASSISTANT

## 2024-01-09 PROCEDURE — G0008 ADMIN INFLUENZA VIRUS VAC: HCPCS | Performed by: PHYSICIAN ASSISTANT

## 2024-01-09 PROCEDURE — 3078F DIAST BP <80 MM HG: CPT | Performed by: PHYSICIAN ASSISTANT

## 2024-01-09 ASSESSMENT — PATIENT HEALTH QUESTIONNAIRE - PHQ9
5. POOR APPETITE OR OVEREATING: 0 - NOT AT ALL
SUM OF ALL RESPONSES TO PHQ QUESTIONS 1-9: 10
CLINICAL INTERPRETATION OF PHQ2 SCORE: 4

## 2024-01-09 ASSESSMENT — FIBROSIS 4 INDEX: FIB4 SCORE: 1.49

## 2024-01-09 NOTE — PROGRESS NOTES
cc:  hernia    Subjective:     Juaquin Solorzano is a 58 y.o. male presenting for hernia      Patient presents to the office for hernia.  Patient would like to see a surgeon.  He has a right inguinal hernia that is bulging at this time.  He states that on occasion it can be larger.     Patient is currently not due for lab work but labs have been ordered for the next 2 to 4 months for his type 2 diabetes with long-term insulin use and neuropathy, secondary hyperaldosteronism, history of systolic heart failure with biventricular AICD, paroxysmal atrial fibrillation, focal seizures, and major depressive disorder.    CT cervical spine continues to be completed or cancelled.  Have been unable to obtain views.  Patient continues to have tailbone pain, chronic right knee pain, low back pain, left shoulder pain, chronic neck pain and weakness of the right leg.    Review of systems:  See above.   Denies any symptoms unless previously indicated.        Current Outpatient Medications:     metFORMIN ER 1000 MG TABLET SR 24 HR, orally, Disp: , Rfl:     busPIRone (BUSPAR) 10 MG Tab tablet, orally, Disp: , Rfl:     allopurinol (ZYLOPRIM) 100 MG Tab, orally, Disp: , Rfl:     Respiratory Therapy Supplies (CARETOUCH CPAP & BIPAP HOSE) Misc, ** 10/7 cm H2O **  with sleep via mask - Prior sleep study in [place] 5 years ago, told severe sleep apnea. He thinks 72 AHI. Titration study 10-23-18 showed Both CPAP and BiPAP eliminated hypopneas and apneas, but BiPAP corrected the hypoxia better. for Lifetime, Disp: , Rfl:     amitriptyline (ELAVIL) 75 MG Tab, Take 1 Tablet by mouth every evening., Disp: 90 Tablet, Rfl: 2    Empagliflozin-metFORMIN HCl ER (SYNJARDY XR) 12.5-1000 MG TABLET SR 24 HR, Take 1 Tablet by mouth 2 times a day., Disp: 180 Tablet, Rfl: 2    sotalol (BETAPACE) 80 MG Tab, Take 1 Tablet by mouth 2 times a day., Disp: 200 Tablet, Rfl: 3    apixaban (ELIQUIS) 5mg Tab, Take 1 Tablet by mouth 2 times a day., Disp: 200  Tablet, Rfl: 3    atorvastatin (LIPITOR) 40 MG Tab, Take 1 Tablet by mouth every day., Disp: 100 Tablet, Rfl: 3    spironolactone (ALDACTONE) 25 MG Tab, Take 1 Tablet by mouth every day., Disp: 100 Tablet, Rfl: 3    sacubitril-valsartan (ENTRESTO) 24-26 MG Tab, Take 1 Tablet by mouth 2 times a day., Disp: 200 Tablet, Rfl: 3    baclofen (LIORESAL) 10 MG Tab, Take 1 Tablet by mouth 2 times a day as needed (pain)., Disp: 180 Tablet, Rfl: 0    pantoprazole (PROTONIX) 40 MG Tablet Delayed Response, Take 1 Tablet by mouth every day., Disp: 90 Tablet, Rfl: 1    zonisamide (ZONEGRAN) 100 MG Cap, Take 3 Capsules by mouth every day., Disp: 270 Capsule, Rfl: 2    DULoxetine (CYMBALTA) 60 MG Cap DR Particles delayed-release capsule, Take 1 Capsule by mouth every day., Disp: 90 Capsule, Rfl: 2    benzonatate (TESSALON) 100 MG Cap, Take 1 Capsule by mouth 3 times a day as needed for Cough., Disp: 90 Capsule, Rfl: 1    glucose blood (FREESTYLE LITE) strip, 1 Strip by Other route 4 times a day., Disp: 300 Strip, Rfl: 4    Insulin Pen Needle, by Does not apply route., Disp: , Rfl:     metoprolol SR (TOPROL XL) 100 MG TABLET SR 24 HR, orally, Disp: , Rfl:     Semaglutide,0.25 or 0.5MG/DOS, (OZEMPIC, 0.25 OR 0.5 MG/DOSE,) 2 MG/1.5ML Solution Pen-injector, Ozempic (0.25 mg or 0.5 mg dose), Disp: , Rfl:     aspirin (ASPIRIN 81) 81 MG Chew Tab chewable tablet, Aspir 81 (Patient not taking: Reported on 1/9/2024), Disp: , Rfl:     metoprolol SR (TOPROL XL) 50 MG TABLET SR 24 HR, Take 1 Tablet by mouth every day., Disp: 100 Tablet, Rfl: 3    Semaglutide, 1 MG/DOSE, (OZEMPIC, 1 MG/DOSE,) 4 MG/3ML Solution Pen-injector, Inject 0.5 mg under the skin every 7 days., Disp: 9 mL, Rfl: 1    Allergies, past medical history, past surgical history, family history, social history reviewed and updated    Objective:     Vitals: /64 (BP Location: Left arm, Patient Position: Sitting, BP Cuff Size: Adult)   Pulse 78   Temp 36.1 °C (97 °F) (Temporal)  "  Resp 20   Ht 1.854 m (6' 1\")   Wt 82.4 kg (181 lb 9.6 oz)   SpO2 98%   BMI 23.96 kg/m²   General: Alert, pleasant, NAD  EYES:   PERRL, EOMI, no icterus or pallor.  Conjunctivae and lids normal.   HENT:  Normocephalic.  External ears normal. .  Neck supple.    Respiratory: Normal respiratory effort.   Abdomen: Not obese  : Protruding right inguinal hernia.  Skin: Warm, dry, no rashes.  Musculoskeletal: Gait is normal.  Moves all extremities well.    Extremities: normal range of motion all extremities.   Neurological: No tremors, sensation grossly intact,  CN2-12 intact.  Psych:  Affect/mood is normal, judgement is good, memory is intact, grooming is appropriate.    Assessment/Plan:     Juaquin was seen today for hernia.    Diagnoses and all orders for this visit:    Right inguinal hernia  -     Referral to General Surgery  -     US-INGUINAL HERNIA; Future    We will obtain an ultrasound in case this is needed by general surgery.  Referral to general surgery submitted.    Type 2 diabetes mellitus with complication, with long-term current use of insulin (HCC)  Diabetic polyneuropathy associated with diabetes mellitus due to underlying condition (HCC)  Encounter for long-term (current) use of insulin (HCC)  Secondary hyperaldosteronism (HCC)  Presence of biventricular AICD  ACC/AHA stage C systolic heart failure (HCC)  Paroxysmal atrial fibrillation (HCC)  Hypercoagulable state due to paroxysmal atrial fibrillation (HCC)  Major depressive disorder, recurrent, mild (HCC)  Focal seizures (HCC)    HCC Gap Form    Diagnosis to address: E11.8, Z79.4 - Type 2 diabetes mellitus with complication, with long-term current use of insulin (HCC)  Assessment and plan: Chronic, stable. Continue with current defined treatment plan: Repeat labs in 2 months.  Orders previously placed. Follow-up at least annually.  Diagnosis: E08.42 - Diabetic polyneuropathy associated with diabetes mellitus due to underlying condition " (HCC)  Assessment and plan: Chronic, stable. Continue with current defined treatment plan: Repeat labs in 2 months.  Orders previously placed.. Follow-up at least annually.  Diagnosis: Z79.4 - Encounter for long-term (current) use of insulin (HCC)  Assessment and plan: Chronic, stable. Continue with current defined treatment plan: Repeat labs in 2 months.  Orders previously placed.. Follow-up at least annually.  Diagnosis: E26.1 - Secondary hyperaldosteronism (HCC)  Assessment and plan: Chronic, stable. Continue with current defined treatment plan: Repeat labs in 2 months.  Orders previously placed.. Follow-up at least annually.  Diagnosis: D68.69, I48.0 - Hypercoagulable state due to paroxysmal atrial fibrillation (HCC)  Assessment and plan: Chronic, stable. Continue with current defined treatment plan: Repeat labs in 2 months.  Orders previously placed.. Follow-up at least annually.  Diagnosis: F33.0 - Major depressive disorder, recurrent, mild (HCC)  Assessment and plan: Chronic, stable. Continue with current defined treatment plan: continue with current medication. Follow-up at least annually.  Diagnosis: R56.9 - Focal seizures (HCC)  Assessment and plan: Chronic, stable, as based on today's assessment and impact on other conditions evaluated today. Continue with current treatment plan: patient is seeing Neurology. Follow-up with specialist as directed, but at least annually.  Diagnosis: I50.20 - ACC/AHA stage C systolic heart failure (HCC)  Assessment and plan: Chronic, stable, as based on today's assessment and impact on other conditions evaluated today. Continue with current treatment plan: Patient is seen by Neurology.  Follow-up with specialist as directed, but at least annually.  Diagnosis: I48.0 - Paroxysmal atrial fibrillation (HCC)  Assessment and plan: Chronic, stable, as based on today's assessment and impact on other conditions evaluated today. Continue with current treatment plan: next appointment  5-23-24 with cardiology.  Labs previously ordered for 3-4 month follow up.  Follow-up with specialist as directed, but at least annually.  Last edited 01/09/24 08:52 PST by Cece Parker P.A.-C.          Tail bone pain  -     Referral to Orthopedics  Neck pain, chronic  -     Referral to Orthopedics  Chronic pain of right knee  -     Referral to Orthopedics  Chronic midline low back pain without sciatica  -     Referral to Orthopedics  Chronic left shoulder pain  -     Referral to Orthopedics  Weakness of right leg  -     Referral to Orthopedics  Need for vaccination  -     INFLUENZA VACCINE QUAD INJ (PF)    Extreme difficulty trying to obtain a cervical CT.  At this point will refer to orthopedics for further evaluation.    Other orders  -     Cancel: INFLUENZA VACCINE QUAD INJ (PF)            No follow-ups on file.    Please note that this dictation was created using voice recognition software. I have made every reasonable attempt to correct obvious errors, but expect that there are errors of grammar and possible content that I did not discover before finalizing note.

## 2024-01-09 NOTE — CARDIAC REMOTE MONITOR - SCAN
Device transmission reviewed. Device demonstrated appropriate function. Low BiV pacing %      Electronically Signed by: Luis Enrique France M.D.    1/9/2024  1:49 PM

## 2024-01-17 RX ORDER — BACLOFEN 5 MG/1
5 TABLET ORAL
Qty: 90 TABLET | Refills: 0 | Status: SHIPPED
Start: 2024-01-17 | End: 2024-03-07

## 2024-01-31 ENCOUNTER — PATIENT OUTREACH (OUTPATIENT)
Dept: HEALTH INFORMATION MANAGEMENT | Facility: OTHER | Age: 59
End: 2024-01-31
Payer: MEDICARE

## 2024-01-31 ENCOUNTER — TELEPHONE (OUTPATIENT)
Dept: CARDIOLOGY | Facility: MEDICAL CENTER | Age: 59
End: 2024-01-31
Payer: MEDICARE

## 2024-01-31 DIAGNOSIS — I10 ESSENTIAL HYPERTENSION: ICD-10-CM

## 2024-01-31 DIAGNOSIS — Z79.4 TYPE 2 DIABETES MELLITUS WITH COMPLICATION, WITH LONG-TERM CURRENT USE OF INSULIN (HCC): ICD-10-CM

## 2024-01-31 DIAGNOSIS — E08.42 DIABETIC POLYNEUROPATHY ASSOCIATED WITH DIABETES MELLITUS DUE TO UNDERLYING CONDITION (HCC): ICD-10-CM

## 2024-01-31 DIAGNOSIS — E11.8 TYPE 2 DIABETES MELLITUS WITH COMPLICATION, WITH LONG-TERM CURRENT USE OF INSULIN (HCC): ICD-10-CM

## 2024-01-31 DIAGNOSIS — I48.0 PAROXYSMAL ATRIAL FIBRILLATION (HCC): ICD-10-CM

## 2024-01-31 NOTE — TELEPHONE ENCOUNTER
He can proceed with EGD.  He can stop Eliquis 2 days prior and then resume after procedure.  He does tend to have low BP, so may want to hold Entresto and Aldactone dose the morning of the procedure. But take Toprol XL.    Magnet should be applied to device during procedure.    Thanks, AB

## 2024-01-31 NOTE — PROGRESS NOTES
This RN spoke with patient for monthly PCM outreach for January. Patient reports that he is having hernia repair next week. He has burning neuropathy throughout all of his body. Medications are ineffective and medical cannabis us ineffective. Patient states he is only getting about 4 hours of sleep. His blood sugars and hemoglobin A1c are managed well. Blood pressure 105/60 today. This RN offered to schedule appointment with PCP but patient will call after next week if he decides he wants a new medication for the neuropathy. Patient states that all medications are taken as prescribed with no medication changes. Patient reports no recent falls, or recent hospitalizations/ER admissions. This RN instructed patient to call PCM phone number at [phone] if any further questions, concerns or needs. Patient verbalizes understanding.         Education: Neuropathy, medications, diabetes management, blood pressures    Goals: Better control of neuropathy pain    Barriers: Ineffective medications, knowledge, disease process    Interventions: RN will continue to call patient for monthly outreach. RN will provide resources per patient request.    POC:Continue current plan of care    Progression: Progressing    Next Outreach: 02/29/24

## 2024-01-31 NOTE — LETTER
PROCEDURE/SURGERY CLEARANCE FORM      Encounter Date: 1/31/2024    Patient: Juaquin Solorzano  YOB: 1965    CARDIOLOGIST:  VIKAS Dos Santos    REFERRING DOCTOR:  No ref. provider found  The above patient is cleared to have the following procedure/surgery:  EGD with Deep Sedation                                            Additional comments:   He can proceed with EGD.  He can stop Eliquis 2 days prior and then resume after procedure.  He does tend to have low BP, so may want to hold Entresto and Aldactone dose the morning of the procedure. But take Toprol XL.     Magnet should be applied to device during procedure.     Thanks, AB        Electronically signed         MD Signature   VIKAS Dos Santos

## 2024-01-31 NOTE — TELEPHONE ENCOUNTER
Last OV: 11/22/2023  Proposed Surgery: EGD with Deep Sedation   Surgery Date: 02/12/2024  Requesting Office Name: [facility]   Fax Number: [phone]  Preference of Location (default is surgery center unless specified by Cardiologist or [facility])  Prior Clearance Addressed: No      Anticoags/Antiplatelets: Aspirin and Apixaban   Anticoags/Antiplatelet managed by Cardiology? No, not ASPIRIN Provider to advise.    Outstanding Cardiac Imaging : No  Stent, Cardiac Devices, or Catheterization: Yes  Date : 04/27/2018   Ablation, TAVR/Valve (including open heart), Cardioversion: No  Recent Cardiac Hospitalization: No            When: N/A  History (cardiac history):   Past Medical History:   Diagnosis Date    Arthritis     Hands/elbows    Block, bundle branch, left     CAD (coronary artery disease)     Cataract     Early stages, not removed    Class 2 obesity due to excess calories in adult 07/10/2019    Diabetes (HCC) 2006    On oral medications, now well controlled    Fibromyalgia 04/26/2018    5-6/10    GERD (gastroesophageal reflux disease)     Gout     Hyperlipidemia     Hypertension     Ischemic cardiomyopathy 10/2023    Echocardiogram with moderately dilated LV, LVEF 40%. Global hypokinesis. Normal RA, LA and RV. Trace MR, trace AR.    Myocardial infarct ([facility]) 2007    Myocardial infarct ([facility]) 2012    Neuropathy ([facility])     Bilateral feet    Psychiatric problem     Depression and anxiety    S/P colonoscopy     repeat 6 years at age of 61.    Sleep apnea     Uses CPAP             Surgical Clearance Letter Sent: No, Provider to advise.   **Scan clearance request letter into [facility].**

## 2024-01-31 NOTE — TELEPHONE ENCOUNTER
Per AB -   He can proceed with EGD.  He can stop Eliquis 2 days prior and then resume after procedure.  He does tend to have low BP, so may want to hold Entresto and Aldactone dose the morning of the procedure. But take Toprol XL.     Magnet should be applied to device during procedure.     Thanks, AB       Letter created and faxed.

## 2024-02-05 ENCOUNTER — OFFICE VISIT (OUTPATIENT)
Dept: MEDICAL GROUP | Facility: CLINIC | Age: 59
End: 2024-02-05
Payer: MEDICARE

## 2024-02-05 VITALS
DIASTOLIC BLOOD PRESSURE: 74 MMHG | BODY MASS INDEX: 23.73 KG/M2 | TEMPERATURE: 97.6 F | HEART RATE: 75 BPM | HEIGHT: 73 IN | WEIGHT: 179.01 LBS | RESPIRATION RATE: 20 BRPM | OXYGEN SATURATION: 97 % | SYSTOLIC BLOOD PRESSURE: 120 MMHG

## 2024-02-05 DIAGNOSIS — Z95.1 S/P CABG X 3: ICD-10-CM

## 2024-02-05 DIAGNOSIS — Z79.4 TYPE 2 DIABETES MELLITUS WITH COMPLICATION, WITH LONG-TERM CURRENT USE OF INSULIN (HCC): ICD-10-CM

## 2024-02-05 DIAGNOSIS — K40.90 RIGHT INGUINAL HERNIA: ICD-10-CM

## 2024-02-05 DIAGNOSIS — I10 ESSENTIAL HYPERTENSION: ICD-10-CM

## 2024-02-05 DIAGNOSIS — G47.33 OBSTRUCTIVE SLEEP APNEA SYNDROME: ICD-10-CM

## 2024-02-05 DIAGNOSIS — I48.0 PAROXYSMAL ATRIAL FIBRILLATION (HCC): ICD-10-CM

## 2024-02-05 DIAGNOSIS — I50.20 ACC/AHA STAGE C SYSTOLIC HEART FAILURE (HCC): ICD-10-CM

## 2024-02-05 DIAGNOSIS — Z11.4 SCREENING FOR HIV (HUMAN IMMUNODEFICIENCY VIRUS): ICD-10-CM

## 2024-02-05 DIAGNOSIS — N52.1 ERECTILE DYSFUNCTION DUE TO DISEASES CLASSIFIED ELSEWHERE: ICD-10-CM

## 2024-02-05 DIAGNOSIS — E11.8 TYPE 2 DIABETES MELLITUS WITH COMPLICATION, WITH LONG-TERM CURRENT USE OF INSULIN (HCC): ICD-10-CM

## 2024-02-05 PROBLEM — G97.1 SPINAL PUNCTURE HEADACHE: Status: RESOLVED | Noted: 2022-12-22 | Resolved: 2024-02-05

## 2024-02-05 PROCEDURE — 3078F DIAST BP <80 MM HG: CPT | Performed by: PHYSICIAN ASSISTANT

## 2024-02-05 PROCEDURE — 3074F SYST BP LT 130 MM HG: CPT | Performed by: PHYSICIAN ASSISTANT

## 2024-02-05 PROCEDURE — 99213 OFFICE O/P EST LOW 20 MIN: CPT | Performed by: PHYSICIAN ASSISTANT

## 2024-02-05 RX ORDER — SILDENAFIL 50 MG/1
50 TABLET, FILM COATED ORAL
Qty: 30 TABLET | Refills: 2 | Status: SHIPPED | OUTPATIENT
Start: 2024-02-05

## 2024-02-05 ASSESSMENT — FIBROSIS 4 INDEX: FIB4 SCORE: 1.21

## 2024-02-05 NOTE — PROGRESS NOTES
REASON FOR VISIT: Pre-Op Consultation  Consultation Requested by: Dr. Yon Bonilla  Procedure date and type: robotic right inguinal hernia repair with Mesh 24    History of condition for which surgery is planned:right inguinal hernia.  Cardiology has cleared patient and indicate a magnet should be placed to his device.     Patient does have type 2 diabetes and is insulin-dependent.  He is also status post CABG x 3, paroxysmal atrial fibrillation, ACC/AHA stage C systolic heart failure, has hypertension and obstructive sleep apnea.    Patient requesting viagra refill.   He denies taking nitroglycerin    Current chronic conditions: No problem-specific Assessment & Plan notes found for this encounter.    Past medical history:  has a past medical history of Arthritis, Block, bundle branch, left, CAD (coronary artery disease), Cataract, Class 2 obesity due to excess calories in adult (07/10/2019), Diabetes ([facility]) (), Fibromyalgia (2018), GERD (gastroesophageal reflux disease), Gout, Hyperlipidemia, Hypertension, Ischemic cardiomyopathy (10/2023), Myocardial infarct ([facility]) (), Myocardial infarct ([facility]) (), Neuropathy ([facility]), Psychiatric problem, S/P colonoscopy, Sleep apnea, and Spinal puncture headache (2022).. Negative for: CAD, SBE, CVA, TIA, DVT, PE, bleeding requiring transfusion, intubation.  Surgical and anesthetic history:  has a past surgical history that includes recovery (2016); claudy by laparoscopy (); knee arthroscopy; aicd implant (Left, 2018); and multiple coronary artery bypass (). Prior surgery without complication, bleeding, reaction to anesthetic, prolonged recovery  Habits:   Social History     Tobacco Use    Smoking status: Former     Current packs/day: 0.00     Average packs/day: 0.5 packs/day for 12.0 years (6.0 ttl pk-yrs)     Types: Cigarettes     Start date: 7/15/2000     Quit date: 7/15/2012     Years since quittin.5    Smokeless tobacco: Never    Vaping Use    Vaping Use: Every day    Substances: THC    Devices: Disposable   Substance Use Topics    Alcohol use: Not Currently    Drug use: Yes     Types: Marijuana, Oral     Comment: bedtime     Allergies: Iodine No known allergy to Anesthetic, or Latex.     Current medicines:   Current Outpatient Medications   Medication Sig Dispense Refill    baclofen (LIORESAL) 5 MG Tab TAKE ONE TABLET BY MOUTH EVERY DAY 90 Tablet 0    allopurinol (ZYLOPRIM) 100 MG Tab orally      Semaglutide,0.25 or 0.5MG/DOS, (OZEMPIC, 0.25 OR 0.5 MG/DOSE,) 2 MG/1.5ML Solution Pen-injector Ozempic (0.25 mg or 0.5 mg dose)      Respiratory Therapy Supplies (CARETOUCH CPAP & BIPAP HOSE) Misc ** 10/7 cm H2O **  with sleep via mask - Prior sleep study in [place] 5 years ago, told severe sleep apnea. He thinks 72 AHI. Titration study 10-23-18 showed Both CPAP and BiPAP eliminated hypopneas and apneas, but BiPAP corrected the hypoxia better. for Lifetime      amitriptyline (ELAVIL) 75 MG Tab Take 1 Tablet by mouth every evening. 90 Tablet 2    Empagliflozin-metFORMIN HCl ER (SYNJARDY XR) 12.5-1000 MG TABLET SR 24 HR Take 1 Tablet by mouth 2 times a day. 180 Tablet 2    sotalol (BETAPACE) 80 MG Tab Take 1 Tablet by mouth 2 times a day. 200 Tablet 3    metoprolol SR (TOPROL XL) 50 MG TABLET SR 24 HR Take 1 Tablet by mouth every day. 100 Tablet 3    apixaban (ELIQUIS) 5mg Tab Take 1 Tablet by mouth 2 times a day. 200 Tablet 3    atorvastatin (LIPITOR) 40 MG Tab Take 1 Tablet by mouth every day. 100 Tablet 3    spironolactone (ALDACTONE) 25 MG Tab Take 1 Tablet by mouth every day. 100 Tablet 3    sacubitril-valsartan (ENTRESTO) 24-26 MG Tab Take 1 Tablet by mouth 2 times a day. 200 Tablet 3    Semaglutide, 1 MG/DOSE, (OZEMPIC, 1 MG/DOSE,) 4 MG/3ML Solution Pen-injector Inject 0.5 mg under the skin every 7 days. 9 mL 1    zonisamide (ZONEGRAN) 100 MG Cap Take 3 Capsules by mouth every day. 270 Capsule 2    DULoxetine (CYMBALTA) 60 MG Cap DR  "Particles delayed-release capsule Take 1 Capsule by mouth every day. 90 Capsule 2    benzonatate (TESSALON) 100 MG Cap Take 1 Capsule by mouth 3 times a day as needed for Cough. 90 Capsule 1    glucose blood (FREESTYLE LITE) strip 1 Strip by Other route 4 times a day. 300 Strip 4    Insulin Pen Needle by Does not apply route.      metoprolol SR (TOPROL XL) 100 MG TABLET SR 24 HR orally      metFORMIN ER 1000 MG TABLET SR 24 HR orally (Patient not taking: Reported on 2/5/2024)      busPIRone (BUSPAR) 10 MG Tab tablet orally      aspirin (ASPIRIN 81) 81 MG Chew Tab chewable tablet Aspir 81 (Patient not taking: Reported on 1/9/2024)      baclofen (LIORESAL) 10 MG Tab Take 1 Tablet by mouth 2 times a day as needed (pain). (Patient not taking: Reported on 2/5/2024) 180 Tablet 0    pantoprazole (PROTONIX) 40 MG Tablet Delayed Response Take 1 Tablet by mouth every day. 90 Tablet 1     No current facility-administered medications for this visit.     Anticoagulant: eliquis-patient has been instructed to hold this before surgery   Herbals: none   ROS: Constitutional ROS: denies fever, chills, nausea and vomiting  Pulmonary ROS: No shortness of breath  Cardiovascular ROS: No chest pain  Admits to pain  Functional Status: ambulatory    PHYSICAL EXAMINATION:  VITAL SIGNS: /74 (BP Location: Left arm, Patient Position: Sitting, BP Cuff Size: Adult)   Pulse 75   Temp 36.4 °C (97.6 °F) (Temporal)   Resp 20   Ht 1.854 m (6' 1\")   Wt 81.2 kg (179 lb 0.2 oz)   SpO2 97%  Body mass index is 23.62 kg/m².  HEENT: EOMI, PERRL. Oropharynx pink, moist. Normal airway. Neck supple, no cervical lymphadenopathy.  Mildly erythematous right ear canal.   LUNGS: CTAB good excursion.   HEART: irregular rate but rate controlled.   no murmur.  ABDOMEN: soft, nondistended, nontender, normal BS. Thin.   LOWER EXTREMITIES: warm and well perfused with no edema, no cyanosis.      Labs: chest xray is normal, EKG from cardiology on 2-1-24 whos " paced rhythm with decreased rate,  labs normal/stable : Results reviewed and discussed with the patient, questions answered.      IMPRESSION:  Right inguinal hernia  Type 2 DM with insulin use  S/p cabg x3  Paroxysmal atrial fibrillation  ACC/AHA stage C systolic heart failure  Obstructive leep apnea  Hypertension  Erectile dysfunction  1. Screening for HIV (human immunodeficiency virus)  - HIV AG/AB COMBO ASSAY SCREENING; Future      PLAN:  Chronic medical conditions: Stable and controlled. Continue current medicines.   Avoid drugs that potentiate bleeding as advised by surgeon  Discontinue all herbal supplements 2 weeks prior to surgery.  Need for SBE prophylaxis: No  HIV screening ordered.  Not due before surgery  Sildenafil refilled.  This patient is considered: Intermediate risk for cardiopulmonary complications for this planned surgery.

## 2024-02-06 ENCOUNTER — TELEPHONE (OUTPATIENT)
Dept: CARDIOLOGY | Facility: MEDICAL CENTER | Age: 59
End: 2024-02-06
Payer: MEDICARE

## 2024-02-06 NOTE — TELEPHONE ENCOUNTER
AB    Caller: Khadijah @ [facility]     Fax Number: [phone]    Procedure Name: Hernia Repair with mesh     Procedure Scheduled Date: 02/08/2024    Callback Number: [phone]    The office received a clearance but I was for an EDG, please send Clearance for Hernia Repair     Thank You   Judy GUERIN

## 2024-02-07 NOTE — TELEPHONE ENCOUNTER
Last OV: 11/22/2023  Proposed Surgery: Hernia Repair with mesh   Surgery Date: 02/08/2024  Requesting Office Name: [facility]   Fax Number: [phone]   Preference of Location (default is surgery center unless specified by Cardiologist or [facility])  Prior Clearance Addressed: No      Anticoags/Antiplatelets: Aspirin and Apixaban   Anticoags/Antiplatelet managed by Cardiology? YES  ASA not managed by cardiology   Outstanding Cardiac Imaging : No  Stent, Cardiac Devices, or Catheterization: No  Ablation, TAVR/Valve (including open heart), Cardioversion: No  Recent Cardiac Hospitalization: No            When: N/A  History (cardiac history):   Past Medical History:   Diagnosis Date    Arthritis     Hands/elbows    Block, bundle branch, left     CAD (coronary artery disease)     Cataract     Early stages, not removed    Class 2 obesity due to excess calories in adult 07/10/2019    Diabetes ([facility]) 2006    On oral medications, now well controlled    Fibromyalgia 04/26/2018    5-6/10    GERD (gastroesophageal reflux disease)     Gout     Hyperlipidemia     Hypertension     Ischemic cardiomyopathy 10/2023    Echocardiogram with moderately dilated LV, LVEF 40%. Global hypokinesis. Normal RA, LA and RV. Trace MR, trace AR.    Myocardial infarct ([facility]) 2007    Myocardial infarct ([facility]) 2012    Neuropathy ([facility])     Bilateral feet    Psychiatric problem     Depression and anxiety    S/P colonoscopy     repeat 6 years at age of 61.    Sleep apnea     Uses CPAP    Spinal puncture headache 12/22/2022             Surgical Clearance Letter Sent: No Provider to advise.   **Scan clearance request letter into [facility].**

## 2024-02-07 NOTE — TELEPHONE ENCOUNTER
Patient can proceed with surgery.  Patient is on Eliquis, and should stop 2 days prior to procedure.  IF taking ASA, this can be stopped 7 days prior to procedure.  Both Eliquis and ASA should be resumed after procedure, as the discretion of the surgeon.  Magnet should be applied to the device during surgery.  Thanks, AB

## 2024-02-09 DIAGNOSIS — F32.9 MAJOR DEPRESSION, CHRONIC: ICD-10-CM

## 2024-02-09 DIAGNOSIS — I25.10 CORONARY ARTERY DISEASE INVOLVING NATIVE CORONARY ARTERY OF NATIVE HEART WITHOUT ANGINA PECTORIS: ICD-10-CM

## 2024-02-09 DIAGNOSIS — I10 ESSENTIAL HYPERTENSION: ICD-10-CM

## 2024-02-09 DIAGNOSIS — I48.0 PAROXYSMAL ATRIAL FIBRILLATION (HCC): ICD-10-CM

## 2024-02-09 DIAGNOSIS — G89.29 NECK PAIN, CHRONIC: ICD-10-CM

## 2024-02-09 DIAGNOSIS — I25.5 ISCHEMIC CARDIOMYOPATHY: ICD-10-CM

## 2024-02-09 DIAGNOSIS — I47.20 VENTRICULAR TACHYCARDIA (HCC): ICD-10-CM

## 2024-02-09 DIAGNOSIS — M54.50 CHRONIC MIDLINE LOW BACK PAIN WITHOUT SCIATICA: ICD-10-CM

## 2024-02-09 DIAGNOSIS — Z79.4 TYPE 2 DIABETES MELLITUS WITH COMPLICATION, WITH LONG-TERM CURRENT USE OF INSULIN (HCC): ICD-10-CM

## 2024-02-09 DIAGNOSIS — R56.9 FOCAL SEIZURES (HCC): ICD-10-CM

## 2024-02-09 DIAGNOSIS — F33.0 MAJOR DEPRESSIVE DISORDER, RECURRENT, MILD (HCC): ICD-10-CM

## 2024-02-09 DIAGNOSIS — G89.29 CHRONIC MIDLINE LOW BACK PAIN WITHOUT SCIATICA: ICD-10-CM

## 2024-02-09 DIAGNOSIS — M54.9 MID BACK PAIN: ICD-10-CM

## 2024-02-09 DIAGNOSIS — E11.8 TYPE 2 DIABETES MELLITUS WITH COMPLICATION, WITH LONG-TERM CURRENT USE OF INSULIN (HCC): ICD-10-CM

## 2024-02-09 DIAGNOSIS — R05.3 CHRONIC COUGH: ICD-10-CM

## 2024-02-09 DIAGNOSIS — M54.2 NECK PAIN, CHRONIC: ICD-10-CM

## 2024-02-09 DIAGNOSIS — R13.12 OROPHARYNGEAL DYSPHAGIA: ICD-10-CM

## 2024-02-09 DIAGNOSIS — I50.20 ACC/AHA STAGE C SYSTOLIC HEART FAILURE (HCC): ICD-10-CM

## 2024-02-10 NOTE — TELEPHONE ENCOUNTER
Requested Prescriptions     Pending Prescriptions Disp Refills    benzonatate (TESSALON) 100 MG Cap 90 Capsule 1     Sig: Take 1 Capsule by mouth 3 times a day as needed for Cough.    zonisamide (ZONEGRAN) 100 MG Cap 270 Capsule 2     Sig: Take 3 Capsules by mouth every day.    DULoxetine (CYMBALTA) 60 MG Cap DR Particles delayed-release capsule 90 Capsule 2     Sig: Take 1 Capsule by mouth every day.    baclofen (LIORESAL) 10 MG Tab 180 Tablet 0     Sig: Take 1 Tablet by mouth 2 times a day as needed (pain).    pantoprazole (PROTONIX) 40 MG Tablet Delayed Response 90 Tablet 1     Sig: Take 1 Tablet by mouth every day.    amitriptyline (ELAVIL) 75 MG Tab 90 Tablet 2     Sig: Take 1 Tablet by mouth every evening.      Last office visit: 2/5/24  Last lab: 11/27/23

## 2024-02-12 RX ORDER — DULOXETIN HYDROCHLORIDE 60 MG/1
60 CAPSULE, DELAYED RELEASE ORAL DAILY
Qty: 90 CAPSULE | Refills: 2 | Status: SHIPPED | OUTPATIENT
Start: 2024-02-12

## 2024-02-12 RX ORDER — BENZONATATE 100 MG/1
100 CAPSULE ORAL 3 TIMES DAILY PRN
Qty: 90 CAPSULE | Refills: 2 | Status: SHIPPED | OUTPATIENT
Start: 2024-02-12

## 2024-02-12 RX ORDER — PANTOPRAZOLE SODIUM 40 MG/1
40 TABLET, DELAYED RELEASE ORAL DAILY
Qty: 90 TABLET | Refills: 2 | Status: SHIPPED | OUTPATIENT
Start: 2024-02-12

## 2024-02-12 RX ORDER — BACLOFEN 10 MG/1
10 TABLET ORAL 2 TIMES DAILY PRN
Qty: 180 TABLET | Refills: 2 | Status: SHIPPED | OUTPATIENT
Start: 2024-02-12

## 2024-02-12 RX ORDER — AMITRIPTYLINE HYDROCHLORIDE 75 MG/1
75 TABLET ORAL NIGHTLY
Qty: 90 TABLET | Refills: 2 | Status: SHIPPED | OUTPATIENT
Start: 2024-02-12

## 2024-02-12 RX ORDER — ZONISAMIDE 100 MG/1
300 CAPSULE ORAL DAILY
Qty: 270 CAPSULE | Refills: 2 | Status: SHIPPED | OUTPATIENT
Start: 2024-02-12

## 2024-02-13 RX ORDER — SOTALOL HYDROCHLORIDE 80 MG/1
80 TABLET ORAL 2 TIMES DAILY
Qty: 200 TABLET | Refills: 2 | Status: SHIPPED | OUTPATIENT
Start: 2024-02-13

## 2024-02-13 RX ORDER — ATORVASTATIN CALCIUM 40 MG/1
40 TABLET, FILM COATED ORAL DAILY
Qty: 100 TABLET | Refills: 2 | Status: SHIPPED | OUTPATIENT
Start: 2024-02-13

## 2024-02-13 RX ORDER — METOPROLOL SUCCINATE 50 MG/1
50 TABLET, EXTENDED RELEASE ORAL DAILY
Qty: 100 TABLET | Refills: 2 | Status: SHIPPED | OUTPATIENT
Start: 2024-02-13

## 2024-02-13 RX ORDER — SPIRONOLACTONE 25 MG/1
25 TABLET ORAL DAILY
Qty: 100 TABLET | Refills: 2 | Status: SHIPPED | OUTPATIENT
Start: 2024-02-13

## 2024-02-26 ENCOUNTER — PATIENT OUTREACH (OUTPATIENT)
Dept: HEALTH INFORMATION MANAGEMENT | Facility: OTHER | Age: 59
End: 2024-02-26
Payer: MEDICARE

## 2024-02-26 DIAGNOSIS — E11.8 TYPE 2 DIABETES MELLITUS WITH COMPLICATION, WITH LONG-TERM CURRENT USE OF INSULIN (HCC): ICD-10-CM

## 2024-02-26 DIAGNOSIS — E78.49 OTHER HYPERLIPIDEMIA: ICD-10-CM

## 2024-02-26 DIAGNOSIS — Z79.4 TYPE 2 DIABETES MELLITUS WITH COMPLICATION, WITH LONG-TERM CURRENT USE OF INSULIN (HCC): ICD-10-CM

## 2024-02-26 DIAGNOSIS — Z95.1 S/P CABG X 3: ICD-10-CM

## 2024-02-26 DIAGNOSIS — I50.20 ACC/AHA STAGE C SYSTOLIC HEART FAILURE (HCC): ICD-10-CM

## 2024-02-26 NOTE — PROGRESS NOTES
2/26 This RN left message on patient's voicemail with contact information requesting a return call for PCM monthly outreach.   2/27 This RN left message on patient's voicemail with contact information requesting a return call for PCM monthly outreach.   2/28 This RN left message on patient's voicemail with contact information requesting a return call for PCM monthly outreach.     Unable to reach patient. Will sign and close encounter.

## 2024-03-07 ENCOUNTER — OFFICE VISIT (OUTPATIENT)
Dept: MEDICAL GROUP | Facility: CLINIC | Age: 59
End: 2024-03-07
Payer: MEDICARE

## 2024-03-07 VITALS
TEMPERATURE: 97.2 F | DIASTOLIC BLOOD PRESSURE: 60 MMHG | OXYGEN SATURATION: 98 % | HEART RATE: 98 BPM | BODY MASS INDEX: 21.88 KG/M2 | HEIGHT: 73 IN | WEIGHT: 165.12 LBS | SYSTOLIC BLOOD PRESSURE: 116 MMHG | RESPIRATION RATE: 20 BRPM

## 2024-03-07 DIAGNOSIS — R05.1 ACUTE COUGH: ICD-10-CM

## 2024-03-07 DIAGNOSIS — Z87.19 S/P HERNIA SURGERY: ICD-10-CM

## 2024-03-07 DIAGNOSIS — Z79.4 TYPE 2 DIABETES MELLITUS WITH COMPLICATION, WITH LONG-TERM CURRENT USE OF INSULIN (HCC): ICD-10-CM

## 2024-03-07 DIAGNOSIS — Z98.890 S/P HERNIA SURGERY: ICD-10-CM

## 2024-03-07 DIAGNOSIS — E11.8 TYPE 2 DIABETES MELLITUS WITH COMPLICATION, WITH LONG-TERM CURRENT USE OF INSULIN (HCC): ICD-10-CM

## 2024-03-07 DIAGNOSIS — R07.9 CHEST PAIN, UNSPECIFIED TYPE: ICD-10-CM

## 2024-03-07 DIAGNOSIS — R06.02 SHORTNESS OF BREATH: ICD-10-CM

## 2024-03-07 DIAGNOSIS — R11.2 NAUSEA AND VOMITING, UNSPECIFIED VOMITING TYPE: ICD-10-CM

## 2024-03-07 PROBLEM — K40.20 BILATERAL INGUINAL HERNIA: Status: ACTIVE | Noted: 2024-03-07

## 2024-03-07 LAB
FLUAV RNA SPEC QL NAA+PROBE: NEGATIVE
FLUBV RNA SPEC QL NAA+PROBE: NEGATIVE
GLUCOSE BLD-MCNC: 118 MG/DL (ref 65–99)
RSV RNA SPEC QL NAA+PROBE: NEGATIVE
SARS-COV-2 RNA RESP QL NAA+PROBE: NEGATIVE

## 2024-03-07 PROCEDURE — 99214 OFFICE O/P EST MOD 30 MIN: CPT | Performed by: PHYSICIAN ASSISTANT

## 2024-03-07 PROCEDURE — 93000 ELECTROCARDIOGRAM COMPLETE: CPT | Performed by: PHYSICIAN ASSISTANT

## 2024-03-07 PROCEDURE — 3074F SYST BP LT 130 MM HG: CPT | Performed by: PHYSICIAN ASSISTANT

## 2024-03-07 PROCEDURE — 0241U POCT CEPHEID COV-2, FLU A/B, RSV - PCR: CPT | Performed by: PHYSICIAN ASSISTANT

## 2024-03-07 PROCEDURE — 82962 GLUCOSE BLOOD TEST: CPT | Performed by: PHYSICIAN ASSISTANT

## 2024-03-07 PROCEDURE — 3078F DIAST BP <80 MM HG: CPT | Performed by: PHYSICIAN ASSISTANT

## 2024-03-07 ASSESSMENT — FIBROSIS 4 INDEX: FIB4 SCORE: 1.21

## 2024-03-07 NOTE — PROGRESS NOTES
cc:  vomiting    Subjective:     Juaquin Solorzano is a 58 y.o. male presenting for vomiting      Patient presents to the office for vomiting.  Patient states that he has not been out of bed for 3 days.  He states that he has not had a fever but along with a fever, he has been short of breath and had chest pain.  He states that his chest is tight.  He states that he has not been able to keep fluids or food down.   Patient denies pain with urination.  He has not checked his sugars for the last several days.  He had a hernia repair on February 7.  He states that about 10 days ago, his dog kicked him in the groin.  He states that he was seen by the surgeon after the dog kicked him and was told he was okay.      Review of systems:  See above.   Denies any symptoms unless previously indicated.        Current Outpatient Medications:     sotalol (BETAPACE) 80 MG Tab, Take 1 Tablet by mouth 2 times a day., Disp: 200 Tablet, Rfl: 2    metoprolol SR (TOPROL XL) 50 MG TABLET SR 24 HR, Take 1 Tablet by mouth every day., Disp: 100 Tablet, Rfl: 2    apixaban (ELIQUIS) 5mg Tab, Take 1 Tablet by mouth 2 times a day., Disp: 200 Tablet, Rfl: 2    atorvastatin (LIPITOR) 40 MG Tab, Take 1 Tablet by mouth every day., Disp: 100 Tablet, Rfl: 2    spironolactone (ALDACTONE) 25 MG Tab, Take 1 Tablet by mouth every day., Disp: 100 Tablet, Rfl: 2    benzonatate (TESSALON) 100 MG Cap, Take 1 Capsule by mouth 3 times a day as needed for Cough., Disp: 90 Capsule, Rfl: 2    zonisamide (ZONEGRAN) 100 MG Cap, Take 3 Capsules by mouth every day., Disp: 270 Capsule, Rfl: 2    DULoxetine (CYMBALTA) 60 MG Cap DR Particles delayed-release capsule, Take 1 Capsule by mouth every day., Disp: 90 Capsule, Rfl: 2    baclofen (LIORESAL) 10 MG Tab, Take 1 Tablet by mouth 2 times a day as needed (pain)., Disp: 180 Tablet, Rfl: 2    pantoprazole (PROTONIX) 40 MG Tablet Delayed Response, Take 1 Tablet by mouth every day., Disp: 90 Tablet, Rfl: 2     amitriptyline (ELAVIL) 75 MG Tab, Take 1 Tablet by mouth every evening., Disp: 90 Tablet, Rfl: 2    sildenafil citrate (VIAGRA) 50 MG tablet, Take 1 Tablet by mouth 1 time a day as needed for Erectile Dysfunction., Disp: 30 Tablet, Rfl: 2    metFORMIN ER 1000 MG TABLET SR 24 HR, , Disp: , Rfl:     busPIRone (BUSPAR) 10 MG Tab tablet, orally, Disp: , Rfl:     allopurinol (ZYLOPRIM) 100 MG Tab, orally, Disp: , Rfl:     Semaglutide,0.25 or 0.5MG/DOS, (OZEMPIC, 0.25 OR 0.5 MG/DOSE,) 2 MG/1.5ML Solution Pen-injector, Ozempic (0.25 mg or 0.5 mg dose), Disp: , Rfl:     Respiratory Therapy Supplies (CARETOUCH CPAP & BIPAP HOSE) Misc, ** 10/7 cm H2O **  with sleep via mask - Prior sleep study in [place] 5 years ago, told severe sleep apnea. He thinks 73 AHI. Titration study 10-23-18 showed Both CPAP and BiPAP eliminated hypopneas and apneas, but BiPAP corrected the hypoxia better. for Lifetime, Disp: , Rfl:     Empagliflozin-metFORMIN HCl ER (SYNJARDY XR) 12.5-1000 MG TABLET SR 24 HR, Take 1 Tablet by mouth 2 times a day., Disp: 180 Tablet, Rfl: 2    sacubitril-valsartan (ENTRESTO) 24-26 MG Tab, Take 1 Tablet by mouth 2 times a day., Disp: 200 Tablet, Rfl: 3    glucose blood (FREESTYLE LITE) strip, 1 Strip by Other route 4 times a day., Disp: 300 Strip, Rfl: 4    Insulin Pen Needle, by Does not apply route., Disp: , Rfl:     metoprolol SR (TOPROL XL) 100 MG TABLET SR 24 HR, orally (Patient not taking: Reported on 3/7/2024), Disp: , Rfl:     aspirin (ASPIRIN 81) 81 MG Chew Tab chewable tablet, Aspir 81 (Patient not taking: Reported on 1/9/2024), Disp: , Rfl:     Semaglutide, 1 MG/DOSE, (OZEMPIC, 1 MG/DOSE,) 4 MG/3ML Solution Pen-injector, Inject 0.5 mg under the skin every 7 days. (Patient not taking: Reported on 3/7/2024), Disp: 9 mL, Rfl: 1    Allergies, past medical history, past surgical history, family history, social history reviewed and updated    Objective:     Vitals: /60 (BP Location: Left arm, Patient  "Position: Sitting, BP Cuff Size: Adult)   Pulse 98   Temp 36.2 °C (97.2 °F) (Temporal)   Resp 20   Ht 1.854 m (6' 1\")   Wt 74.9 kg (165 lb 2 oz)   SpO2 98%   BMI 21.79 kg/m²   General: Alert, pleasant, appears ill.  Coughing and nauseated in office.  EYES:   PERRL, EOMI, no icterus or pallor.  Conjunctivae and lids normal.   HENT:  Normocephalic.  External ears normal.  Neck supple.     Heart: Regular rate and rhythm.  S1 and S2 normal.  No murmurs appreciated.  Respiratory: Normal respiratory effort.  Decreased breath sounds all lung fields.    Abdomen: Not obese  Skin: Warm, dry, no rashes.  Musculoskeletal: Gait is normal.  Moves all extremities well.    Extremities: Normal range of motion all extremities.   Neurological: No tremors, sensation grossly intact, CN2-12 intact.  Psych:  Affect/mood is normal, judgement is good, memory is intact, grooming is appropriate.  Glucose 118  EKG: Paced rhythm.  Further analysis not attempted  Rapid COVID: Rapid flu: Rapid RSV: All negative    Assessment/Plan:     Juaquin was seen today for follow-up.    Diagnoses and all orders for this visit:    Nausea and vomiting, unspecified vomiting type  -     POCT CoV-2, Flu A/B, RSV by PCR  Chest pain, unspecified type  -     POCT CoV-2, Flu A/B, RSV by PCR  -     EKG - Clinic Performed  Shortness of breath  -     POCT CoV-2, Flu A/B, RSV by PCR  -     EKG - Clinic Performed  Type 2 diabetes mellitus with complication, with long-term current use of insulin ([facility])  -     POCT Glucose  Acute cough  -     POCT CoV-2, Flu A/B, RSV by PCR  S/P hernia surgery    I am concerned that patient is unable to keep even fluids down.  He has had a recent hernia surgery although it has been a month, consideration can be given to an obstructive process.  Glucose levels are stable.  We did attempt an EKG but patient has AICD and so further analysis is unobtainable.  Rapid flu, COVID and RSV are all negative.  At this point as patient is unable " to keep fluids and food down, he is feeling ill and has had a recent surgery, I would recommend a higher level of care including the ER.  Did recommend patient proceed by ambulance which she declined.  He indicates having an appointment with orthopedics that he has been waiting months for at 2 PM today and would like to be seen by the specialist and then indicates he will proceed to the ER.  Advised patient that if symptoms worsen he must go to the ER immediately and he has verbalized understanding.        No follow-ups on file.    Please note that this dictation was created using voice recognition software. I have made every reasonable attempt to correct obvious errors, but expect that there are errors of grammar and possible content that I did not discover before finalizing note.

## 2024-03-13 ENCOUNTER — PATIENT MESSAGE (OUTPATIENT)
Dept: MEDICAL GROUP | Facility: CLINIC | Age: 59
End: 2024-03-13
Payer: MEDICARE

## 2024-03-13 DIAGNOSIS — R11.0 NAUSEA: ICD-10-CM

## 2024-03-14 ENCOUNTER — PATIENT MESSAGE (OUTPATIENT)
Dept: MEDICAL GROUP | Facility: CLINIC | Age: 59
End: 2024-03-14
Payer: MEDICARE

## 2024-03-14 DIAGNOSIS — R11.0 NAUSEA: ICD-10-CM

## 2024-03-14 RX ORDER — ONDANSETRON 4 MG/1
4 TABLET, FILM COATED ORAL EVERY 4 HOURS PRN
Qty: 20 TABLET | Refills: 0 | Status: SHIPPED | OUTPATIENT
Start: 2024-03-14 | End: 2024-03-18 | Stop reason: SDUPTHER

## 2024-03-18 RX ORDER — ONDANSETRON 4 MG/1
4 TABLET, FILM COATED ORAL EVERY 4 HOURS PRN
Qty: 20 TABLET | Refills: 0 | Status: SHIPPED | OUTPATIENT
Start: 2024-03-18

## 2024-03-26 ENCOUNTER — PATIENT OUTREACH (OUTPATIENT)
Dept: HEALTH INFORMATION MANAGEMENT | Facility: OTHER | Age: 59
End: 2024-03-26
Payer: MEDICARE

## 2024-03-26 DIAGNOSIS — I10 ESSENTIAL HYPERTENSION: ICD-10-CM

## 2024-03-26 DIAGNOSIS — E11.8 TYPE 2 DIABETES MELLITUS WITH COMPLICATION, WITH LONG-TERM CURRENT USE OF INSULIN (HCC): ICD-10-CM

## 2024-03-26 DIAGNOSIS — Z79.4 TYPE 2 DIABETES MELLITUS WITH COMPLICATION, WITH LONG-TERM CURRENT USE OF INSULIN (HCC): ICD-10-CM

## 2024-03-26 DIAGNOSIS — E78.49 OTHER HYPERLIPIDEMIA: ICD-10-CM

## 2024-03-26 NOTE — PROGRESS NOTES
3/26 /24  0925 This RN left message on patient's voicemail with contact information requesting a return call for PCM monthly outreach.   3/27/24 1515 This RN left message on patient's voicemail with contact information requesting a return call for PCM monthly outreach.   3/29/24 1440 This RN left message on patient's voicemail with contact information requesting a return call for PCM monthly outreach.     4/1/24 - This RN unable to reach patient and will reach out at the end of April.

## 2024-04-03 ENCOUNTER — OFFICE VISIT (OUTPATIENT)
Dept: MEDICAL GROUP | Facility: CLINIC | Age: 59
End: 2024-04-03
Payer: MEDICARE

## 2024-04-03 VITALS
RESPIRATION RATE: 18 BRPM | WEIGHT: 171.3 LBS | HEART RATE: 75 BPM | TEMPERATURE: 97.1 F | SYSTOLIC BLOOD PRESSURE: 122 MMHG | HEIGHT: 74 IN | BODY MASS INDEX: 21.98 KG/M2 | OXYGEN SATURATION: 98 % | DIASTOLIC BLOOD PRESSURE: 60 MMHG

## 2024-04-03 DIAGNOSIS — M79.602 PAIN OF LEFT UPPER EXTREMITY: ICD-10-CM

## 2024-04-03 DIAGNOSIS — R11.0 NAUSEA: ICD-10-CM

## 2024-04-03 PROCEDURE — 3074F SYST BP LT 130 MM HG: CPT | Performed by: PHYSICIAN ASSISTANT

## 2024-04-03 PROCEDURE — 99213 OFFICE O/P EST LOW 20 MIN: CPT | Performed by: PHYSICIAN ASSISTANT

## 2024-04-03 PROCEDURE — 3078F DIAST BP <80 MM HG: CPT | Performed by: PHYSICIAN ASSISTANT

## 2024-04-03 RX ORDER — ONDANSETRON 4 MG/1
4 TABLET, FILM COATED ORAL EVERY 4 HOURS PRN
Qty: 40 TABLET | Refills: 2 | Status: SHIPPED | OUTPATIENT
Start: 2024-04-03

## 2024-04-03 ASSESSMENT — FIBROSIS 4 INDEX: FIB4 SCORE: 1.17

## 2024-04-03 NOTE — PROGRESS NOTES
cc: Nausea vomiting    Subjective:     Juaquin Solorzano is a 58 y.o. male presenting for nausea vomiting    Patient presents to the office for nausea vomiting.  Patient states that he did see GI and will have a colonoscopy and endoscopy scheduled for July.  He does still need to take zofran to help the symptoms but finds that the medication does. He is taking 0.25 of ozempic.  He states that he takes the zofran as needed. Sometimes tums helps.   He leaves for [place] for May 8.      Patient has been having left arm pain.  It has been a problem for 3 weeks.  It is a constant pain that has not improved.  CBD oil will help some but not completely.  He states that his pain is a 5/10    Patient states that he does not feel he can cough as hard or strain with bowel movements but does not admit to chest pain.  He is not able to hold his urine as long.      Review of systems:  See above.   Denies any symptoms unless previously indicated.        Current Outpatient Medications:     ondansetron (ZOFRAN) 4 MG Tab tablet, Take 1 Tablet by mouth every four hours as needed for Nausea/Vomiting., Disp: 40 Tablet, Rfl: 2    sotalol (BETAPACE) 80 MG Tab, Take 1 Tablet by mouth 2 times a day., Disp: 200 Tablet, Rfl: 2    metoprolol SR (TOPROL XL) 50 MG TABLET SR 24 HR, Take 1 Tablet by mouth every day., Disp: 100 Tablet, Rfl: 2    apixaban (ELIQUIS) 5mg Tab, Take 1 Tablet by mouth 2 times a day., Disp: 200 Tablet, Rfl: 2    atorvastatin (LIPITOR) 40 MG Tab, Take 1 Tablet by mouth every day., Disp: 100 Tablet, Rfl: 2    spironolactone (ALDACTONE) 25 MG Tab, Take 1 Tablet by mouth every day., Disp: 100 Tablet, Rfl: 2    benzonatate (TESSALON) 100 MG Cap, Take 1 Capsule by mouth 3 times a day as needed for Cough., Disp: 90 Capsule, Rfl: 2    zonisamide (ZONEGRAN) 100 MG Cap, Take 3 Capsules by mouth every day., Disp: 270 Capsule, Rfl: 2    DULoxetine (CYMBALTA) 60 MG Cap DR Particles delayed-release capsule, Take 1 Capsule by  mouth every day., Disp: 90 Capsule, Rfl: 2    baclofen (LIORESAL) 10 MG Tab, Take 1 Tablet by mouth 2 times a day as needed (pain)., Disp: 180 Tablet, Rfl: 2    pantoprazole (PROTONIX) 40 MG Tablet Delayed Response, Take 1 Tablet by mouth every day., Disp: 90 Tablet, Rfl: 2    amitriptyline (ELAVIL) 75 MG Tab, Take 1 Tablet by mouth every evening., Disp: 90 Tablet, Rfl: 2    sildenafil citrate (VIAGRA) 50 MG tablet, Take 1 Tablet by mouth 1 time a day as needed for Erectile Dysfunction., Disp: 30 Tablet, Rfl: 2    metFORMIN ER 1000 MG TABLET SR 24 HR, , Disp: , Rfl:     busPIRone (BUSPAR) 10 MG Tab tablet, orally, Disp: , Rfl:     allopurinol (ZYLOPRIM) 100 MG Tab, orally, Disp: , Rfl:     Semaglutide,0.25 or 0.5MG/DOS, (OZEMPIC, 0.25 OR 0.5 MG/DOSE,) 2 MG/1.5ML Solution Pen-injector, Ozempic (0.25 mg or 0.5 mg dose), Disp: , Rfl:     Respiratory Therapy Supplies (CARETOUCH CPAP & BIPAP HOSE) Misc, ** 10/7 cm H2O **  with sleep via mask - Prior sleep study in [place] 5 years ago, told severe sleep apnea. He thinks 72 AHI. Titration study 10-23-18 showed Both CPAP and BiPAP eliminated hypopneas and apneas, but BiPAP corrected the hypoxia better. for Lifetime, Disp: , Rfl:     Empagliflozin-metFORMIN HCl ER (SYNJARDY XR) 12.5-1000 MG TABLET SR 24 HR, Take 1 Tablet by mouth 2 times a day., Disp: 180 Tablet, Rfl: 2    sacubitril-valsartan (ENTRESTO) 24-26 MG Tab, Take 1 Tablet by mouth 2 times a day., Disp: 200 Tablet, Rfl: 3    glucose blood (FREESTYLE LITE) strip, 1 Strip by Other route 4 times a day., Disp: 300 Strip, Rfl: 4    Insulin Pen Needle, by Does not apply route., Disp: , Rfl:     metoprolol SR (TOPROL XL) 100 MG TABLET SR 24 HR, orally (Patient not taking: Reported on 3/7/2024), Disp: , Rfl:     aspirin (ASPIRIN 81) 81 MG Chew Tab chewable tablet, Aspir 81 (Patient not taking: Reported on 1/9/2024), Disp: , Rfl:     Allergies, past medical history, past surgical history, family history, social history  "reviewed and updated    Objective:     Vitals: /60 (BP Location: Left arm, Patient Position: Sitting, BP Cuff Size: Adult)   Pulse 75   Temp 36.2 °C (97.1 °F) (Temporal)   Resp 18   Ht 1.88 m (6' 2\")   Wt 77.7 kg (171 lb 4.8 oz)   SpO2 98%   BMI 21.99 kg/m²   General: Alert, pleasant, NAD  EYES:   PERRL, EOMI, no icterus or pallor.  Conjunctivae and lids normal.   HENT:  Normocephalic.  External ears normal.   Neck supple.     Respiratory: Normal respiratory effort.   Abdomen: Not obese  Skin: Warm, dry, no rashes.  Musculoskeletal: Gait is normal.  Moves all extremities well.    Extremities: Pain upon palpation left upper arm proximal to the elbow and left lower arm distal to the elbow..   Neurological: No tremors, sensation grossly intact, CN2-12 intact.  Psych:  Affect/mood is normal, judgement is good, memory is intact, grooming is appropriate.    Assessment/Plan:     Juaquin was seen today for follow-up.    Diagnoses and all orders for this visit:    Nausea  -     ondansetron (ZOFRAN) 4 MG Tab tablet; Take 1 Tablet by mouth every four hours as needed for Nausea/Vomiting.    Patient is seeing GI.  He has a colonoscopy and endoscopy planned.  Will fill medication at this time.  Patient is planning to go to [place] in May.    Pain of left upper extremity  -     DX-HUMERUS 2+ LEFT; Future  -     DX-FOREARM LEFT; Future      Etiology of this is unknown.  He denies chest pain.  He had mitts to feeling somewhat short of breath since his hernia surgery.  We did discuss treatment of his incision and how to proceed at this time.  We will continue to monitor and we will obtain x-rays to evaluate further.      No follow-ups on file.    Please note that this dictation was created using voice recognition software. I have made every reasonable attempt to correct obvious errors, but expect that there are errors of grammar and possible content that I did not discover before finalizing note.   "

## 2024-04-08 ENCOUNTER — NON-PROVIDER VISIT (OUTPATIENT)
Dept: CARDIOLOGY | Facility: MEDICAL CENTER | Age: 59
End: 2024-04-08
Payer: MEDICARE

## 2024-04-08 PROCEDURE — 93295 DEV INTERROG REMOTE 1/2/MLT: CPT | Performed by: INTERNAL MEDICINE

## 2024-04-29 ENCOUNTER — PATIENT OUTREACH (OUTPATIENT)
Dept: HEALTH INFORMATION MANAGEMENT | Facility: OTHER | Age: 59
End: 2024-04-29
Payer: MEDICARE

## 2024-04-29 DIAGNOSIS — Z79.4 TYPE 2 DIABETES MELLITUS WITH COMPLICATION, WITH LONG-TERM CURRENT USE OF INSULIN (HCC): ICD-10-CM

## 2024-04-29 DIAGNOSIS — I10 ESSENTIAL HYPERTENSION: ICD-10-CM

## 2024-04-29 DIAGNOSIS — E11.8 TYPE 2 DIABETES MELLITUS WITH COMPLICATION, WITH LONG-TERM CURRENT USE OF INSULIN (HCC): ICD-10-CM

## 2024-04-29 DIAGNOSIS — I50.20 ACC/AHA STAGE C SYSTOLIC HEART FAILURE (HCC): ICD-10-CM

## 2024-04-29 NOTE — PROGRESS NOTES
This RN spoke with patient for monthly/quarterly outreach.Patient reports he is doing well. He still has some nausea but is taking Zofran as needed. He will be leaving the first week of May 2024 for 2 months to see family in [place] and is hoping that it will relieve some stress that he has been experiencing.  This RN reviewed all medications with patient to ensure medications are correct so he can get them refilled before he leaves out of state. Medication reconciled and he reports no new allergies. Patient has an appointment with cardiology and this RN reminded him to reschedule appointment for when he gets back from [place]. Patient reports that colonscopy is also scheduled after he gets back as well. Patient reports no recent falls, recent hospital admissions or ER visits. This RN instructed patient to call PCM phone number at [phone] if any further questions, concerns or needs. Patient verbalizes understanding.  Will continue current plan of care.        Education: medications and appointments    Goals: Reschedule future appointments before going out of town for 2 months    Barriers: knowledge deficit, or disease process.    Interventions: RN will continue to call patient for monthly outreach. RN will provide resources per patient request.    POC:Continue current plan of care    Progression: Progressing    Next Outreach: July 2024

## 2024-05-30 ENCOUNTER — PATIENT OUTREACH (OUTPATIENT)
Dept: HEALTH INFORMATION MANAGEMENT | Facility: OTHER | Age: 59
End: 2024-05-30
Payer: MEDICARE

## 2024-05-30 DIAGNOSIS — E11.8 TYPE 2 DIABETES MELLITUS WITH COMPLICATION, WITH LONG-TERM CURRENT USE OF INSULIN (HCC): ICD-10-CM

## 2024-05-30 DIAGNOSIS — Z79.4 TYPE 2 DIABETES MELLITUS WITH COMPLICATION, WITH LONG-TERM CURRENT USE OF INSULIN (HCC): ICD-10-CM

## 2024-05-30 NOTE — PROGRESS NOTES
Patient is out of state for vacation and will not be back until July. This RN will reach out then.

## 2024-06-25 NOTE — TELEPHONE ENCOUNTER
Requested Prescriptions     Pending Prescriptions Disp Refills    baclofen (LIORESAL) 5 MG Tab [Pharmacy Med Name: BACLOFEN 5 MG TABS 5 Tablet] 90 Tablet 1     Sig: TAKE ONE TABLET BY MOUTH EVERY DAY      Last office visit: 1/9/24  Last lab: 11/27/23   She reports approximately 2-3 migraine days per week which seem to be linked to the days that she is working on her computer.  She has a longstanding history of migraines which started when she was a teenager and seemed to coincide with her menstrual cycle.  Recently, they have become more frequent and can occur all throughout the month.  She was able to titrate up to 100mg of Topamax daily (she takes 50mg BID).  Zomig is effective at eliminating the bad headaches if she catches them early enough but she feels like she is using it quite frequently.   Workup:  Patient is concerned with her increased frequency of migraines. When she was younger, they would only occur during menses and now they are throughout the month.  Will obtain MRI brain to rule out any structural abnormalities that could be causing her symptoms  Preventative:  We discussed headache hygiene and lifestyle factors that may improve headaches  Continue Topamax 50mg BID (she may wish to wean from this in the near future. Advised her to contact me for weaning schedule)  Start propranolol 20mg BID. Reach out to Cardiologist at [facility] (patient being treated for SVT and may only need one medication). We discussed CGRP inhibitor as next line option if this fails or if cardiology recommends that she doesn't use this medication  Past/ failed/contraindicated: Cymbalta (prescribed for pain but didn't help with headaches), Gabapentin (made her swell up)  Future options: Propranolol, amitriptyline, CGRP med, botox  Acute:  Discussed not taking over-the-counter or prescription pain medications more than 3 days per week to prevent medication overuse/rebound headache  Continue Zomig 2.5mg at the onset of a migraine.  May be repeated 2 hours later if not completely headache free. No more than 2 tabs in 24 hours  Start robaxin 500mg QHS prn neck/head pain  Past/ failed/contraindicated: Imitrex (ineffective)  Future options:  ubrelvy, reyvow, nurtec

## 2024-06-28 ENCOUNTER — PATIENT OUTREACH (OUTPATIENT)
Dept: HEALTH INFORMATION MANAGEMENT | Facility: OTHER | Age: 59
End: 2024-06-28
Payer: MEDICARE

## 2024-06-28 DIAGNOSIS — I25.10 CORONARY ARTERY DISEASE INVOLVING NATIVE CORONARY ARTERY WITHOUT ANGINA PECTORIS, UNSPECIFIED WHETHER NATIVE OR TRANSPLANTED HEART: ICD-10-CM

## 2024-06-28 DIAGNOSIS — Z79.4 TYPE 2 DIABETES MELLITUS WITH COMPLICATION, WITH LONG-TERM CURRENT USE OF INSULIN (HCC): ICD-10-CM

## 2024-06-28 DIAGNOSIS — E11.8 TYPE 2 DIABETES MELLITUS WITH COMPLICATION, WITH LONG-TERM CURRENT USE OF INSULIN (HCC): ICD-10-CM

## 2024-06-28 DIAGNOSIS — I10 ESSENTIAL HYPERTENSION: ICD-10-CM

## 2024-06-28 DIAGNOSIS — I50.20 ACC/AHA STAGE C SYSTOLIC HEART FAILURE (HCC): ICD-10-CM

## 2024-06-28 NOTE — PROGRESS NOTES
This RN spoke with patient for monthly PCM outreach. Patient reports he just got back from visiting family out of state. He saw NANDA in [place] in April and they ordered MRI of spine and physical therapy. He is scheduled for the MRI's at [facility] next week. He reports that blood sugars and blood pressures are stable. Patient states no other needs at this time.  Patient states that all medications are taken as prescribed with no medication changes and no new allergies. Appointments and lab reviewed with patient. Patient reports no recent falls, recent hospital admissions or ER visits. This RN instructed patient to call PCM phone number at [phone] if any further questions, concerns or needs. Patient verbalizes understanding. Will continue current plan of care.    Education: appointments, medication  and fall safety    Goals/POC: continue taking all medications as prescribed, remain free from falls    Barriers: Advanced age, knowledge deficit, disease process.    Interventions: RN will continue to call patient for monthly outreach. RN will provide resources per patient request and provide education.    Progression: Progressing    Next Outreach: One month

## 2024-07-08 ENCOUNTER — NON-PROVIDER VISIT (OUTPATIENT)
Dept: CARDIOLOGY | Facility: MEDICAL CENTER | Age: 59
End: 2024-07-08
Payer: MEDICARE

## 2024-07-08 PROCEDURE — 93295 DEV INTERROG REMOTE 1/2/MLT: CPT | Performed by: INTERNAL MEDICINE

## 2024-07-23 DIAGNOSIS — N52.1 ERECTILE DYSFUNCTION DUE TO DISEASES CLASSIFIED ELSEWHERE: ICD-10-CM

## 2024-07-23 RX ORDER — SILDENAFIL 50 MG/1
50 TABLET, FILM COATED ORAL
Qty: 30 TABLET | Refills: 2 | Status: SHIPPED | OUTPATIENT
Start: 2024-07-23

## 2024-07-30 ENCOUNTER — PATIENT OUTREACH (OUTPATIENT)
Dept: HEALTH INFORMATION MANAGEMENT | Facility: OTHER | Age: 59
End: 2024-07-30
Payer: MEDICARE

## 2024-07-30 DIAGNOSIS — I25.10 CORONARY ARTERY DISEASE INVOLVING NATIVE CORONARY ARTERY OF NATIVE HEART WITHOUT ANGINA PECTORIS: ICD-10-CM

## 2024-07-30 DIAGNOSIS — Z79.4 TYPE 2 DIABETES MELLITUS WITH COMPLICATION, WITH LONG-TERM CURRENT USE OF INSULIN (HCC): ICD-10-CM

## 2024-07-30 DIAGNOSIS — E11.8 TYPE 2 DIABETES MELLITUS WITH COMPLICATION, WITH LONG-TERM CURRENT USE OF INSULIN (HCC): ICD-10-CM

## 2024-07-30 DIAGNOSIS — I10 ESSENTIAL HYPERTENSION: ICD-10-CM

## 2024-07-30 DIAGNOSIS — E08.42 DIABETIC POLYNEUROPATHY ASSOCIATED WITH DIABETES MELLITUS DUE TO UNDERLYING CONDITION (HCC): ICD-10-CM

## 2024-07-30 DIAGNOSIS — I50.20 ACC/AHA STAGE C SYSTOLIC HEART FAILURE (HCC): ICD-10-CM

## 2024-08-01 ENCOUNTER — NON-PROVIDER VISIT (OUTPATIENT)
Dept: MEDICAL GROUP | Facility: CLINIC | Age: 59
End: 2024-08-01
Payer: MEDICARE

## 2024-08-01 ENCOUNTER — HOSPITAL ENCOUNTER (OUTPATIENT)
Facility: MEDICAL CENTER | Age: 59
End: 2024-08-01
Attending: PHYSICIAN ASSISTANT
Payer: MEDICARE

## 2024-08-01 DIAGNOSIS — Z79.4 TYPE 2 DIABETES MELLITUS WITH COMPLICATION, WITH LONG-TERM CURRENT USE OF INSULIN (HCC): ICD-10-CM

## 2024-08-01 DIAGNOSIS — I73.00 RAYNAUD'S PHENOMENON WITHOUT GANGRENE: ICD-10-CM

## 2024-08-01 DIAGNOSIS — E11.8 TYPE 2 DIABETES MELLITUS WITH COMPLICATION, WITH LONG-TERM CURRENT USE OF INSULIN (HCC): ICD-10-CM

## 2024-08-01 DIAGNOSIS — Z11.4 SCREENING FOR HIV (HUMAN IMMUNODEFICIENCY VIRUS): ICD-10-CM

## 2024-08-01 DIAGNOSIS — Z13.21 ENCOUNTER FOR VITAMIN DEFICIENCY SCREENING: ICD-10-CM

## 2024-08-01 DIAGNOSIS — Z11.59 NEED FOR HEPATITIS C SCREENING TEST: ICD-10-CM

## 2024-08-01 DIAGNOSIS — I50.20 ACC/AHA STAGE C SYSTOLIC HEART FAILURE (HCC): ICD-10-CM

## 2024-08-01 DIAGNOSIS — E26.1 SECONDARY HYPERALDOSTERONISM (HCC): ICD-10-CM

## 2024-08-01 DIAGNOSIS — E08.40: ICD-10-CM

## 2024-08-01 DIAGNOSIS — I25.5 ISCHEMIC CARDIOMYOPATHY: ICD-10-CM

## 2024-08-01 PROCEDURE — 82306 VITAMIN D 25 HYDROXY: CPT

## 2024-08-01 PROCEDURE — 84443 ASSAY THYROID STIM HORMONE: CPT

## 2024-08-01 PROCEDURE — 86803 HEPATITIS C AB TEST: CPT

## 2024-08-01 PROCEDURE — 80061 LIPID PANEL: CPT

## 2024-08-01 PROCEDURE — 80053 COMPREHEN METABOLIC PANEL: CPT

## 2024-08-01 PROCEDURE — 36415 COLL VENOUS BLD VENIPUNCTURE: CPT | Performed by: PHYSICIAN ASSISTANT

## 2024-08-01 NOTE — PROGRESS NOTES
Juaquin Solorzano is a 59 y.o. male here for a non-provider visit for a lab draw on 8/1/2024 at 8:34 AM.    Procedure performed:  Venipuncture     Anatomical site:  Right Antecubital Area    Equipment used:  21 g butterfly     Labs drawn:  A1c, Comp Metabolic Panel , Lipid Profile, Vitamin D , TSH, and Hep C, and HIV    Ordering provider:  Cece Parker    Lab draw completed by:  Corry Ramirez, Med Ass't

## 2024-08-02 LAB
25(OH)D3 SERPL-MCNC: 34 NG/ML (ref 30–100)
ALBUMIN SERPL BCP-MCNC: 4.5 G/DL (ref 3.2–4.9)
ALBUMIN/GLOB SERPL: 1.5 G/DL
ALP SERPL-CCNC: 103 U/L (ref 30–99)
ALT SERPL-CCNC: 17 U/L (ref 2–50)
ANION GAP SERPL CALC-SCNC: 16 MMOL/L (ref 7–16)
AST SERPL-CCNC: 20 U/L (ref 12–45)
BILIRUB SERPL-MCNC: 1 MG/DL (ref 0.1–1.5)
BUN SERPL-MCNC: 19 MG/DL (ref 8–22)
CALCIUM ALBUM COR SERPL-MCNC: 9.3 MG/DL (ref 8.5–10.5)
CALCIUM SERPL-MCNC: 9.7 MG/DL (ref 8.5–10.5)
CHLORIDE SERPL-SCNC: 105 MMOL/L (ref 96–112)
CHOLEST SERPL-MCNC: 106 MG/DL (ref 100–199)
CO2 SERPL-SCNC: 19 MMOL/L (ref 20–33)
CREAT SERPL-MCNC: 1.23 MG/DL (ref 0.5–1.4)
GFR SERPLBLD CREATININE-BSD FMLA CKD-EPI: 68 ML/MIN/1.73 M 2
GLOBULIN SER CALC-MCNC: 3 G/DL (ref 1.9–3.5)
GLUCOSE SERPL-MCNC: 103 MG/DL (ref 65–99)
HCV AB SER QL: NORMAL
HDLC SERPL-MCNC: 48 MG/DL
LDLC SERPL CALC-MCNC: 42 MG/DL
POTASSIUM SERPL-SCNC: 4.6 MMOL/L (ref 3.6–5.5)
PROT SERPL-MCNC: 7.5 G/DL (ref 6–8.2)
SODIUM SERPL-SCNC: 140 MMOL/L (ref 135–145)
TRIGL SERPL-MCNC: 82 MG/DL (ref 0–149)
TSH SERPL DL<=0.005 MIU/L-ACNC: 2.47 UIU/ML (ref 0.38–5.33)

## 2024-08-05 ENCOUNTER — HOSPITAL ENCOUNTER (OUTPATIENT)
Facility: MEDICAL CENTER | Age: 59
End: 2024-08-05
Attending: PHYSICIAN ASSISTANT
Payer: MEDICARE

## 2024-08-05 DIAGNOSIS — Z79.4 TYPE 2 DIABETES MELLITUS WITH COMPLICATION, WITH LONG-TERM CURRENT USE OF INSULIN (HCC): ICD-10-CM

## 2024-08-05 DIAGNOSIS — E11.8 TYPE 2 DIABETES MELLITUS WITH COMPLICATION, WITH LONG-TERM CURRENT USE OF INSULIN (HCC): ICD-10-CM

## 2024-08-05 DIAGNOSIS — R41.89 BRAIN FOG: ICD-10-CM

## 2024-08-05 DIAGNOSIS — R68.83 CHILLS: ICD-10-CM

## 2024-08-05 PROCEDURE — 82570 ASSAY OF URINE CREATININE: CPT

## 2024-08-05 PROCEDURE — 81001 URINALYSIS AUTO W/SCOPE: CPT

## 2024-08-05 PROCEDURE — 82043 UR ALBUMIN QUANTITATIVE: CPT

## 2024-08-06 LAB
APPEARANCE UR: CLEAR
BACTERIA #/AREA URNS HPF: NEGATIVE /HPF
BILIRUB UR QL STRIP.AUTO: NEGATIVE
COLOR UR: ABNORMAL
CREAT UR-MCNC: 236.29 MG/DL
EPI CELLS #/AREA URNS HPF: ABNORMAL /HPF
GLUCOSE UR STRIP.AUTO-MCNC: >=1000 MG/DL
HYALINE CASTS #/AREA URNS LPF: ABNORMAL /LPF
KETONES UR STRIP.AUTO-MCNC: ABNORMAL MG/DL
LEUKOCYTE ESTERASE UR QL STRIP.AUTO: ABNORMAL
MICRO URNS: ABNORMAL
MICROALBUMIN UR-MCNC: 9.6 MG/DL
MICROALBUMIN/CREAT UR: 41 MG/G (ref 0–30)
NITRITE UR QL STRIP.AUTO: NEGATIVE
PH UR STRIP.AUTO: 5.5 [PH] (ref 5–8)
PROT UR QL STRIP: 30 MG/DL
RBC # URNS HPF: ABNORMAL /HPF
RBC UR QL AUTO: NEGATIVE
SP GR UR STRIP.AUTO: 1.02
UROBILINOGEN UR STRIP.AUTO-MCNC: 1 MG/DL
WBC #/AREA URNS HPF: ABNORMAL /HPF

## 2024-08-10 DIAGNOSIS — E08.42 DIABETIC POLYNEUROPATHY ASSOCIATED WITH DIABETES MELLITUS DUE TO UNDERLYING CONDITION (HCC): ICD-10-CM

## 2024-08-10 DIAGNOSIS — Z79.4 TYPE 2 DIABETES MELLITUS WITH COMPLICATION, WITH LONG-TERM CURRENT USE OF INSULIN (HCC): ICD-10-CM

## 2024-08-10 DIAGNOSIS — E11.8 TYPE 2 DIABETES MELLITUS WITH COMPLICATION, WITH LONG-TERM CURRENT USE OF INSULIN (HCC): ICD-10-CM

## 2024-08-12 NOTE — TELEPHONE ENCOUNTER
Received request via: Patient    Was the patient seen in the last year in this department? Yes    Does the patient have an active prescription (recently filled or refills available) for medication(s) requested?  Yes    Pharmacy Name: [facility]    Does the patient have [facility] and need 100-day supply? (This applies to ALL medications) Patient does not have SCP

## 2024-08-13 ENCOUNTER — OFFICE VISIT (OUTPATIENT)
Dept: MEDICAL GROUP | Facility: CLINIC | Age: 59
End: 2024-08-13
Payer: MEDICARE

## 2024-08-13 ENCOUNTER — HOSPITAL ENCOUNTER (OUTPATIENT)
Facility: MEDICAL CENTER | Age: 59
End: 2024-08-13
Attending: PHYSICIAN ASSISTANT
Payer: MEDICARE

## 2024-08-13 VITALS
HEART RATE: 69 BPM | RESPIRATION RATE: 16 BRPM | OXYGEN SATURATION: 100 % | BODY MASS INDEX: 21.91 KG/M2 | DIASTOLIC BLOOD PRESSURE: 74 MMHG | HEIGHT: 73 IN | TEMPERATURE: 97.2 F | WEIGHT: 165.34 LBS | SYSTOLIC BLOOD PRESSURE: 116 MMHG

## 2024-08-13 DIAGNOSIS — R31.29 OTHER MICROSCOPIC HEMATURIA: ICD-10-CM

## 2024-08-13 DIAGNOSIS — Z79.4 TYPE 2 DIABETES MELLITUS WITH COMPLICATION, WITH LONG-TERM CURRENT USE OF INSULIN (HCC): ICD-10-CM

## 2024-08-13 DIAGNOSIS — F41.9 ANXIETY DISORDER, UNSPECIFIED TYPE: ICD-10-CM

## 2024-08-13 DIAGNOSIS — F33.0 MAJOR DEPRESSIVE DISORDER, RECURRENT, MILD (HCC): ICD-10-CM

## 2024-08-13 DIAGNOSIS — G89.29 CHRONIC MIDLINE LOW BACK PAIN WITHOUT SCIATICA: ICD-10-CM

## 2024-08-13 DIAGNOSIS — M54.50 CHRONIC MIDLINE LOW BACK PAIN WITHOUT SCIATICA: ICD-10-CM

## 2024-08-13 DIAGNOSIS — M25.50 MULTIPLE JOINT PAIN: ICD-10-CM

## 2024-08-13 DIAGNOSIS — M54.2 NECK PAIN, CHRONIC: ICD-10-CM

## 2024-08-13 DIAGNOSIS — Z95.1 S/P CABG X 3: ICD-10-CM

## 2024-08-13 DIAGNOSIS — I48.0 PAROXYSMAL ATRIAL FIBRILLATION (HCC): ICD-10-CM

## 2024-08-13 DIAGNOSIS — E08.40: ICD-10-CM

## 2024-08-13 DIAGNOSIS — G89.29 NECK PAIN, CHRONIC: ICD-10-CM

## 2024-08-13 DIAGNOSIS — E11.8 TYPE 2 DIABETES MELLITUS WITH COMPLICATION, WITH LONG-TERM CURRENT USE OF INSULIN (HCC): ICD-10-CM

## 2024-08-13 LAB
APPEARANCE UR: CLEAR
BACTERIA #/AREA URNS HPF: NEGATIVE /HPF
BILIRUB UR QL STRIP.AUTO: NEGATIVE
COLOR UR: YELLOW
CYTOLOGY REG CYTOL: NORMAL
EPI CELLS #/AREA URNS HPF: NEGATIVE /HPF
GLUCOSE UR STRIP.AUTO-MCNC: >=1000 MG/DL
HBA1C MFR BLD: 5 % (ref ?–5.8)
HYALINE CASTS #/AREA URNS LPF: ABNORMAL /LPF
KETONES UR STRIP.AUTO-MCNC: ABNORMAL MG/DL
LEUKOCYTE ESTERASE UR QL STRIP.AUTO: NEGATIVE
MICRO URNS: ABNORMAL
NITRITE UR QL STRIP.AUTO: NEGATIVE
PH UR STRIP.AUTO: 5.5 [PH] (ref 5–8)
POCT INT CON NEG: NEGATIVE
POCT INT CON POS: POSITIVE
PROT UR QL STRIP: 30 MG/DL
RBC # URNS HPF: ABNORMAL /HPF
RBC UR QL AUTO: NEGATIVE
SP GR UR STRIP.AUTO: 1.03
UROBILINOGEN UR STRIP.AUTO-MCNC: 1 MG/DL
WBC #/AREA URNS HPF: ABNORMAL /HPF

## 2024-08-13 PROCEDURE — 83036 HEMOGLOBIN GLYCOSYLATED A1C: CPT | Performed by: PHYSICIAN ASSISTANT

## 2024-08-13 PROCEDURE — 99214 OFFICE O/P EST MOD 30 MIN: CPT | Performed by: PHYSICIAN ASSISTANT

## 2024-08-13 PROCEDURE — 3078F DIAST BP <80 MM HG: CPT | Performed by: PHYSICIAN ASSISTANT

## 2024-08-13 PROCEDURE — 81001 URINALYSIS AUTO W/SCOPE: CPT

## 2024-08-13 PROCEDURE — 88112 CYTOPATH CELL ENHANCE TECH: CPT

## 2024-08-13 PROCEDURE — 3074F SYST BP LT 130 MM HG: CPT | Performed by: PHYSICIAN ASSISTANT

## 2024-08-13 RX ORDER — DULOXETIN HYDROCHLORIDE 60 MG/1
120 CAPSULE, DELAYED RELEASE ORAL DAILY
Qty: 180 CAPSULE | Refills: 0 | Status: SHIPPED | OUTPATIENT
Start: 2024-08-13

## 2024-08-13 ASSESSMENT — FIBROSIS 4 INDEX: FIB4 SCORE: 1.22

## 2024-08-13 NOTE — LETTER
[facility]  Cece Parker P.A.-C.  [address] [address] [address]  [facility] [address]  Fax: [phone]   Authorization for Release/Disclosure of   Protected Health Information   Name: JUAQUIN ATKINS : 1965 SSN: [id number]   Address: [address] [place] [address] Phone:    [phone] (home)    I authorize the entity listed below to release/disclose the PHI below to:   [facility]/Cece Parker P.A.-C. and Cece Parker P.A.-C.   Provider or Entity Name: [facility]      Address   City, State, Zip   Phone:      Fax:     Reason for request: continuity of care   Information to be released:    [  ] LAST COLONOSCOPY,  including any PATH REPORT and follow-up  [  ] LAST FIT/COLOGUARD RESULT [  ] LAST DEXA  [  ] LAST MAMMOGRAM  [  ] LAST PAP  [  ] LAST LABS [xx  ] RETINA EXAM REPORT  [  ] IMMUNIZATION RECORDS  [  ] Release all info      [  ] Check here and initial the line next to each item to release ALL health information INCLUDING  _____ Care and treatment for drug and / or alcohol abuse  _____ HIV testing, infection status, or AIDS  _____ Genetic Testing    DATES OF SERVICE OR TIME PERIOD TO BE DISCLOSED: _____________  I understand and acknowledge that:  * This Authorization may be revoked at any time by you in writing, except if your health information has already been used or disclosed.  * Your health information that will be used or disclosed as a result of you signing this authorization could be re-disclosed by the recipient. If this occurs, your re-disclosed health information may no longer be protected by State or Federal laws.  * You may refuse to sign this Authorization. Your refusal will not affect your ability to obtain treatment.  * This Authorization becomes effective upon signing and will  on (date) __________.      If no date is indicated, this Authorization will  one (1) year from the signature date.    Name: Juaquin Vann  Jeanine  Signature: Date:   8/13/2024     PLEASE FAX REQUESTED RECORDS BACK TO: [phone]

## 2024-08-13 NOTE — PROGRESS NOTES
cc:  depression    Subjective:     Juaquin Solorzano is a 59 y.o. male presenting for depression        History of Present Illness  The patient is a 59-year-old male who is a type 2 diabetic with long-term use of insulin, complaining of depression. He is currently on duloxetine 60 mg daily. Blood pressure is controlled at 116/74. Weight is stable with this visit. Labs showing mildly elevated glucose level at 103 and his hemoglobin is 5.0.    He is experiencing significant depression, including anxiety, depression, and pain. He continues to take duloxetine 60 mg daily without any adverse effects. He is considering seeing a therapist.    He is experiencing widespread pain, which he describes as pins and needles. Despite never having seen a pain management specialist, he was referred to [facility] [place] in 2022, but he did not attend. He has stopped using cannabis edibles due to persistent pins and needles sensation throughout his body. Last week, he was bedridden twice due to this discomfort. He was previously on gabapentin, but discontinued it due to side effects.    He has a history of kidney stones.    He stopped taking Ozempic 0.25 due to stomach discomfort. His diet consists of red meat, potatoes, and potatoes, and he consumes about 8 chicken tequilas at a time.  Along with having type 2 diabetes with neurologic manifestations patient also has a history of CABG x 3 and paroxysmal atrial fibrillation.    His arthritis has been worsening, and he believes fibromyalgia is a contributing factor. Upon waking, he notices changes in his hands and fingers.    Supplemental Information  He lost a lot of memory when he had an episode before Thanksgiving when he went to the hospital and was told that he did not have a mini stroke. He still has MRIs on his back to get it done. He went to [place] for 2 months to get away, but he could not breathe there. He went to [place] where he  slept like a baby, but he was sweating, uncomfortable, could not breathe, and miserable. His colonoscopy and endoscopy were messed up. He went to his appointment, but they told him that his pacemaker was deviated, so they scheduled him for the day that they did not do them.    FAMILY HISTORY  He has 5 family members  of cancer including colon, bone, and breast cancer. His sister smokes cigarettes.    ALLERGIES  He is allergic to IODINE.       Review of systems:  See above.   Denies any symptoms unless previously indicated.        Current Outpatient Medications:     DULoxetine (CYMBALTA) 60 MG Cap DR Particles delayed-release capsule, Take 2 Capsules by mouth every day., Disp: 180 Capsule, Rfl: 0    Blood Glucose Meter Kit, Test blood sugar as recommended by provider. Abbott Freestyle Lite blood glucose monitoring kit., Disp: 1 Kit, Rfl: 0    sildenafil citrate (VIAGRA) 50 MG tablet, Take 1 Tablet by mouth 1 time a day as needed for Erectile Dysfunction., Disp: 30 Tablet, Rfl: 2    tizanidine (ZANAFLEX) 4 MG Tab, Take 1 Tablet by mouth every 6 hours as needed (PAIN)., Disp: 30 Tablet, Rfl: 3    ondansetron (ZOFRAN) 4 MG Tab tablet, Take 1 Tablet by mouth every four hours as needed for Nausea/Vomiting., Disp: 40 Tablet, Rfl: 2    sotalol (BETAPACE) 80 MG Tab, Take 1 Tablet by mouth 2 times a day., Disp: 200 Tablet, Rfl: 2    metoprolol SR (TOPROL XL) 50 MG TABLET SR 24 HR, Take 1 Tablet by mouth every day., Disp: 100 Tablet, Rfl: 2    apixaban (ELIQUIS) 5mg Tab, Take 1 Tablet by mouth 2 times a day., Disp: 200 Tablet, Rfl: 2    atorvastatin (LIPITOR) 40 MG Tab, Take 1 Tablet by mouth every day., Disp: 100 Tablet, Rfl: 2    spironolactone (ALDACTONE) 25 MG Tab, Take 1 Tablet by mouth every day., Disp: 100 Tablet, Rfl: 2    benzonatate (TESSALON) 100 MG Cap, Take 1 Capsule by mouth 3 times a day as needed for Cough., Disp: 90 Capsule, Rfl: 2    zonisamide (ZONEGRAN) 100 MG Cap, Take 3 Capsules by mouth every day.,  "Disp: 270 Capsule, Rfl: 2    DULoxetine (CYMBALTA) 60 MG Cap DR Particles delayed-release capsule, Take 1 Capsule by mouth every day., Disp: 90 Capsule, Rfl: 2    baclofen (LIORESAL) 10 MG Tab, Take 1 Tablet by mouth 2 times a day as needed (pain)., Disp: 180 Tablet, Rfl: 2    pantoprazole (PROTONIX) 40 MG Tablet Delayed Response, Take 1 Tablet by mouth every day., Disp: 90 Tablet, Rfl: 2    Semaglutide,0.25 or 0.5MG/DOS, (OZEMPIC, 0.25 OR 0.5 MG/DOSE,) 2 MG/1.5ML Solution Pen-injector, Ozempic (0.25 mg or 0.5 mg dose), Disp: , Rfl:     aspirin (ASPIRIN 81) 81 MG Chew Tab chewable tablet, , Disp: , Rfl:     Respiratory Therapy Supplies (CARETOUCH CPAP & BIPAP HOSE) Misc, ** 10/7 cm H2O **  with sleep via mask - Prior sleep study in [place] 5 years ago, told severe sleep apnea. He thinks 72 AHI. Titration study 10-23-18 showed Both CPAP and BiPAP eliminated hypopneas and apneas, but BiPAP corrected the hypoxia better. for Lifetime, Disp: , Rfl:     Empagliflozin-metFORMIN HCl ER (SYNJARDY XR) 12.5-1000 MG TABLET SR 24 HR, Take 1 Tablet by mouth 2 times a day., Disp: 180 Tablet, Rfl: 2    sacubitril-valsartan (ENTRESTO) 24-26 MG Tab, Take 1 Tablet by mouth 2 times a day., Disp: 200 Tablet, Rfl: 3    glucose blood (FREESTYLE LITE) strip, 1 Strip by Other route 4 times a day., Disp: 300 Strip, Rfl: 4    Insulin Pen Needle, by Does not apply route., Disp: , Rfl:     Allergies, past medical history, past surgical history, family history, social history reviewed and updated    Objective:     Vitals: /74 (BP Location: Left arm, Patient Position: Sitting, BP Cuff Size: Small adult)   Pulse 69   Temp 36.2 °C (97.2 °F) (Temporal)   Resp 16   Ht 1.854 m (6' 1\")   Wt 75 kg (165 lb 5.5 oz)   SpO2 100%   BMI 21.81 kg/m²   General: Alert, pleasant, NAD  EYES:   PERRL, EOMI, no icterus or pallor.  Conjunctivae and lids normal.   HENT:  Normocephalic.  External ears normal.   Neck supple.     Respiratory: Normal " respiratory effort.   Abdomen: Not obese  Skin: Warm, dry, no rashes.  Musculoskeletal: Gait is normal.  Moves all extremities well.    Extremities: normal range of motion all extremities.   Neurological: No tremors, sensation grossly intact,  CN2-12 intact.  Psych:  Affect/mood is normal, judgement is good, memory is intact, grooming is appropriate.    Physical Exam  Vital Signs  Blood pressure is controlled at 116/74.       Results  Laboratory Studies  Glucose level is 103. Hemoglobin is 5.0. A1c is 5.0. Protein in urine and red blood cells in urine.      Latest Reference Range & Units 08/01/24 07:40 08/05/24 14:15 08/13/24 08:21   Sodium 135 - 145 mmol/L 140     Potassium 3.6 - 5.5 mmol/L 4.6     Chloride 96 - 112 mmol/L 105     Co2 20 - 33 mmol/L 19 (L)     Anion Gap 7.0 - 16.0  16.0     Glucose 65 - 99 mg/dL 103 (H)     Bun 8 - 22 mg/dL 19     Creatinine 0.50 - 1.40 mg/dL 1.23     GFR (CKD-EPI) >60 mL/min/1.73 m 2 68     Calcium 8.5 - 10.5 mg/dL 9.7     Correct Calcium 8.5 - 10.5 mg/dL 9.3     AST(SGOT) 12 - 45 U/L 20     ALT(SGPT) 2 - 50 U/L 17     Alkaline Phosphatase 30 - 99 U/L 103 (H)     Total Bilirubin 0.1 - 1.5 mg/dL 1.0     Albumin 3.2 - 4.9 g/dL 4.5     Total Protein 6.0 - 8.2 g/dL 7.5     Globulin 1.9 - 3.5 g/dL 3.0     A-G Ratio g/dL 1.5     Glycohemoglobin 5.8 %   5.0   Cholesterol,Tot 100 - 199 mg/dL 106     Triglycerides 0 - 149 mg/dL 82     HDL >=40 mg/dL 48     LDL <100 mg/dL 42     Micro Alb Creat Ratio 0 - 30 mg/g  41 (H)    Creatinine, Urine mg/dL  236.29    Microalbumin, Urine Random mg/dL  9.6    Urobilinogen, Urine Negative   1.0    Color   DK Yellow    Character   Clear    Specific Gravity <1.035   1.022    Ph 5.0 - 8.0   5.5    Glucose Negative mg/dL  >=1000 !    Ketones Negative mg/dL  Trace !    Bilirubin Negative   Negative    Occult Blood Negative   Negative    Protein Negative mg/dL  30 !    Nitrite Negative   Negative    Leukocyte Esterase Negative   Trace !    Micro Urine Req    Microscopic    WBC /hpf  0-2 !    RBC /hpf  0-2 !    Epithelial Cells /hpf  Few    Bacteria None /hpf  Negative    Hyaline Cast /lpf  3-5 !    25-Hydroxy   Vitamin D 25 30 - 100 ng/mL 34     TSH 0.380 - 5.330 uIU/mL 2.470     Hepatitis C Antibody Non-Reactive  Non-Reactive     (L): Data is abnormally low  (H): Data is abnormally high  !: Data is abnormal    Assessment/Plan:     Juaquin was seen today for lab results and depression.    Diagnoses and all orders for this visit:    Diabetic neuropathy associated with diabetes mellitus due to underlying condition (HCC)  -     POCT A1C  -     Referral to Pain Management    Other microscopic hematuria  -     URINALYSIS,CULTURE IF INDICATED; Future  -     URINE CULTURE(NEW)  -     PATHOLOGY MEDICAL CYTOLOGY; Future  -     CT-ABDOMEN-PELVIS W/O; Future  -     Referral to Urology    Major depressive disorder, recurrent, mild (HCC)  -     Referral to Behavioral Health  -     DULoxetine (CYMBALTA) 60 MG Cap DR Particles delayed-release capsule; Take 2 Capsules by mouth every day.    Anxiety disorder, unspecified type  -     Referral to Behavioral Health  -     DULoxetine (CYMBALTA) 60 MG Cap DR Particles delayed-release capsule; Take 2 Capsules by mouth every day.    Chronic midline low back pain without sciatica  -     DULoxetine (CYMBALTA) 60 MG Cap DR Particles delayed-release capsule; Take 2 Capsules by mouth every day.  -     Referral to Pain Management    Neck pain, chronic  -     DULoxetine (CYMBALTA) 60 MG Cap DR Particles delayed-release capsule; Take 2 Capsules by mouth every day.  -     Referral to Pain Management    Multiple joint pain  -     THYROID PEROXIDASE  (TPO) AB; Future  -     SMITH AB IGG; Future  -     Sed Rate; Future  -     RHEUMATOID ARTHRITIS FACTOR; Future  -     MPO/MA-3 (ANCA) ABS; Future  -     COMPLEMENT TOTAL (CH50); Future  -     CCP; Future  -     C3+C4+COMPT  -     ANTITHYROGLOBULIN AB; Future  -     ANTI-DNA (DS); Future  -     RAZ REFLEXIVE  PROFILE; Future    Type 2 diabetes mellitus with complication, with long-term current use of insulin (HCC)    S/P CABG x 3    Paroxysmal atrial fibrillation (HCC)        Assessment & Plan  1. Recurrent major depressive disorder/anxiety.  This is not controlled. The dosage of duloxetine will be increased from 60 mg to 120 mg. A referral will be made to behavioral health for further evaluation.    2.  Hematuria seen on test results will order a urine cytology urine culture urology referral and CT.  Patient unable to have iodine and so CT will be without contrast    3. Type 2 diabetes with neurologic complications and insulin use/status post CABG x 3/paroxysmal atrial fibrillation.  The patient is experiencing neuropathy-type symptoms, likely a fibromyalgia flare. He declines the initiation of gabapentin at this time. A referral to pain management will be made, and autoimmune testing will also be conducted.    4. Neck pain/chronic mid low back pain/multiple joint pain.  Autoimmune testing will be ordered. Gabapentin has declined. The hope is that the increase in duloxetine will also alleviate symptoms.    Follow-up  A follow-up appointment is scheduled for 2 weeks from now.    Return in about 2 weeks (around 8/27/2024) for please 40 minute appointment. .    Please note that this dictation was created using voice recognition software. I have made every reasonable attempt to correct obvious errors, but expect that there are errors of grammar and possible content that I did not discover before finalizing note.

## 2024-08-19 ENCOUNTER — APPOINTMENT (OUTPATIENT)
Dept: RADIOLOGY | Facility: IMAGING CENTER | Age: 59
End: 2024-08-19
Attending: PHYSICIAN ASSISTANT
Payer: MEDICARE

## 2024-08-19 ENCOUNTER — NON-PROVIDER VISIT (OUTPATIENT)
Dept: URGENT CARE | Facility: PHYSICIAN GROUP | Age: 59
End: 2024-08-19
Payer: MEDICARE

## 2024-08-19 ENCOUNTER — HOSPITAL ENCOUNTER (OUTPATIENT)
Dept: LAB | Facility: MEDICAL CENTER | Age: 59
End: 2024-08-19
Attending: PHYSICIAN ASSISTANT
Payer: MEDICARE

## 2024-08-19 DIAGNOSIS — M25.50 MULTIPLE JOINT PAIN: ICD-10-CM

## 2024-08-19 DIAGNOSIS — M53.3 TAIL BONE PAIN: ICD-10-CM

## 2024-08-19 DIAGNOSIS — M79.602 PAIN OF LEFT UPPER EXTREMITY: ICD-10-CM

## 2024-08-19 DIAGNOSIS — R31.29 OTHER MICROSCOPIC HEMATURIA: ICD-10-CM

## 2024-08-19 LAB
C3 SERPL-MCNC: 110.2 MG/DL (ref 87–200)
C4 SERPL-MCNC: 15.2 MG/DL (ref 19–52)
ERYTHROCYTE [SEDIMENTATION RATE] IN BLOOD BY WESTERGREN METHOD: 4 MM/HOUR (ref 0–20)
RHEUMATOID FACT SER IA-ACNC: <10 IU/ML (ref 0–14)
THYROPEROXIDASE AB SERPL-ACNC: <9 IU/ML (ref 0–9)

## 2024-08-19 PROCEDURE — 86800 THYROGLOBULIN ANTIBODY: CPT

## 2024-08-19 PROCEDURE — 73090 X-RAY EXAM OF FOREARM: CPT | Mod: TC,FY,LT | Performed by: RADIOLOGY

## 2024-08-19 PROCEDURE — 86162 COMPLEMENT TOTAL (CH50): CPT

## 2024-08-19 PROCEDURE — 86235 NUCLEAR ANTIGEN ANTIBODY: CPT

## 2024-08-19 PROCEDURE — 86225 DNA ANTIBODY NATIVE: CPT

## 2024-08-19 PROCEDURE — 86431 RHEUMATOID FACTOR QUANT: CPT

## 2024-08-19 PROCEDURE — 83516 IMMUNOASSAY NONANTIBODY: CPT | Mod: 91

## 2024-08-19 PROCEDURE — 85652 RBC SED RATE AUTOMATED: CPT

## 2024-08-19 PROCEDURE — 86376 MICROSOMAL ANTIBODY EACH: CPT

## 2024-08-19 PROCEDURE — 86200 CCP ANTIBODY: CPT

## 2024-08-19 PROCEDURE — 86038 ANTINUCLEAR ANTIBODIES: CPT

## 2024-08-19 PROCEDURE — 86160 COMPLEMENT ANTIGEN: CPT

## 2024-08-19 PROCEDURE — 72220 X-RAY EXAM SACRUM TAILBONE: CPT | Mod: TC,FY | Performed by: RADIOLOGY

## 2024-08-19 PROCEDURE — 36415 COLL VENOUS BLD VENIPUNCTURE: CPT

## 2024-08-19 PROCEDURE — 73060 X-RAY EXAM OF HUMERUS: CPT | Mod: TC,FY,LT | Performed by: RADIOLOGY

## 2024-08-21 ENCOUNTER — APPOINTMENT (OUTPATIENT)
Dept: CARDIOLOGY | Facility: PHYSICIAN GROUP | Age: 59
End: 2024-08-21
Payer: MEDICARE

## 2024-08-21 VITALS
HEART RATE: 82 BPM | WEIGHT: 167.99 LBS | DIASTOLIC BLOOD PRESSURE: 60 MMHG | SYSTOLIC BLOOD PRESSURE: 92 MMHG | RESPIRATION RATE: 14 BRPM | BODY MASS INDEX: 22.26 KG/M2 | OXYGEN SATURATION: 97 % | HEIGHT: 73 IN

## 2024-08-21 VITALS
BODY MASS INDEX: 22.26 KG/M2 | HEART RATE: 82 BPM | OXYGEN SATURATION: 97 % | DIASTOLIC BLOOD PRESSURE: 60 MMHG | HEIGHT: 73 IN | RESPIRATION RATE: 14 BRPM | SYSTOLIC BLOOD PRESSURE: 92 MMHG | WEIGHT: 167.99 LBS

## 2024-08-21 DIAGNOSIS — I25.10 CORONARY ARTERY DISEASE INVOLVING NATIVE CORONARY ARTERY OF NATIVE HEART WITHOUT ANGINA PECTORIS: ICD-10-CM

## 2024-08-21 DIAGNOSIS — G47.33 OBSTRUCTIVE SLEEP APNEA SYNDROME: ICD-10-CM

## 2024-08-21 DIAGNOSIS — I48.0 HYPERCOAGULABLE STATE DUE TO PAROXYSMAL ATRIAL FIBRILLATION (HCC): ICD-10-CM

## 2024-08-21 DIAGNOSIS — E78.2 MIXED HYPERLIPIDEMIA: ICD-10-CM

## 2024-08-21 DIAGNOSIS — I25.5 ISCHEMIC CARDIOMYOPATHY: ICD-10-CM

## 2024-08-21 DIAGNOSIS — E11.8 TYPE 2 DIABETES MELLITUS WITH COMPLICATION, WITH LONG-TERM CURRENT USE OF INSULIN (HCC): ICD-10-CM

## 2024-08-21 DIAGNOSIS — I48.0 PAROXYSMAL ATRIAL FIBRILLATION (HCC): ICD-10-CM

## 2024-08-21 DIAGNOSIS — I47.20 VENTRICULAR TACHYCARDIA (HCC): ICD-10-CM

## 2024-08-21 DIAGNOSIS — I50.20 ACC/AHA STAGE C SYSTOLIC HEART FAILURE (HCC): ICD-10-CM

## 2024-08-21 DIAGNOSIS — I10 ESSENTIAL HYPERTENSION: ICD-10-CM

## 2024-08-21 DIAGNOSIS — Z79.01 CHRONIC ANTICOAGULATION: ICD-10-CM

## 2024-08-21 DIAGNOSIS — Z95.810 PRESENCE OF BIVENTRICULAR AICD: ICD-10-CM

## 2024-08-21 DIAGNOSIS — D68.69 HYPERCOAGULABLE STATE DUE TO PAROXYSMAL ATRIAL FIBRILLATION (HCC): ICD-10-CM

## 2024-08-21 DIAGNOSIS — Z79.4 TYPE 2 DIABETES MELLITUS WITH COMPLICATION, WITH LONG-TERM CURRENT USE OF INSULIN (HCC): ICD-10-CM

## 2024-08-21 DIAGNOSIS — Z95.1 S/P CABG X 3: ICD-10-CM

## 2024-08-21 LAB
CCP IGA+IGG SERPL IA-ACNC: 3 UNITS (ref 0–19)
CH50 SERPL-ACNC: 58.5 U/ML (ref 38.7–89.9)
DSDNA AB TITR SER CLIF: NORMAL {TITER}
NUCLEAR IGG SER QL IA: NORMAL
THYROGLOB AB SERPL-ACNC: <0.9 IU/ML (ref 0–4)

## 2024-08-21 PROCEDURE — 3074F SYST BP LT 130 MM HG: CPT | Performed by: NURSE PRACTITIONER

## 2024-08-21 PROCEDURE — 93289 INTERROG DEVICE EVAL HEART: CPT | Mod: 26 | Performed by: NURSE PRACTITIONER

## 2024-08-21 PROCEDURE — 3078F DIAST BP <80 MM HG: CPT | Performed by: NURSE PRACTITIONER

## 2024-08-21 PROCEDURE — 99214 OFFICE O/P EST MOD 30 MIN: CPT | Mod: 25 | Performed by: NURSE PRACTITIONER

## 2024-08-21 RX ORDER — SOTALOL HYDROCHLORIDE 80 MG/1
80 TABLET ORAL 2 TIMES DAILY
Qty: 200 TABLET | Refills: 3 | Status: SHIPPED | OUTPATIENT
Start: 2024-08-21

## 2024-08-21 RX ORDER — ATORVASTATIN CALCIUM 40 MG/1
40 TABLET, FILM COATED ORAL DAILY
Qty: 100 TABLET | Refills: 3 | Status: SHIPPED | OUTPATIENT
Start: 2024-08-21

## 2024-08-21 RX ORDER — SACUBITRIL AND VALSARTAN 24; 26 MG/1; MG/1
1 TABLET, FILM COATED ORAL 2 TIMES DAILY
Qty: 200 TABLET | Refills: 3 | Status: SHIPPED | OUTPATIENT
Start: 2024-08-21

## 2024-08-21 RX ORDER — SPIRONOLACTONE 25 MG/1
25 TABLET ORAL DAILY
Qty: 100 TABLET | Refills: 3 | Status: SHIPPED | OUTPATIENT
Start: 2024-08-21

## 2024-08-21 RX ORDER — METOPROLOL SUCCINATE 50 MG/1
50 TABLET, EXTENDED RELEASE ORAL DAILY
Qty: 100 TABLET | Refills: 3 | Status: SHIPPED | OUTPATIENT
Start: 2024-08-21

## 2024-08-21 ASSESSMENT — FIBROSIS 4 INDEX
FIB4 SCORE: 1.22
FIB4 SCORE: 1.22

## 2024-08-21 ASSESSMENT — ENCOUNTER SYMPTOMS
FEVER: 0
BRUISES/BLEEDS EASILY: 0
ORTHOPNEA: 0
DIZZINESS: 0
MYALGIAS: 0
BACK PAIN: 1
HEADACHES: 0
ABDOMINAL PAIN: 0
SEIZURES: 1
PALPITATIONS: 0
LOSS OF CONSCIOUSNESS: 0
DEPRESSION: 1
CHILLS: 0
NAUSEA: 0
PND: 0
SHORTNESS OF BREATH: 1
SENSORY CHANGE: 0
COUGH: 0
INSOMNIA: 0

## 2024-08-21 NOTE — PROGRESS NOTES
"Chief Complaint   Patient presents with    Follow-Up    Biventricular AICD    Coronary Artery Disease    Cardiomyopathy (Ischemic)    CHF (Compensated)    Ventricular Tachycardia    Atrial Fibrillation    Anticoagulation    HTN (Controlled)    Hyperlipidemia    Diabetes (Controlled)       Subjective     Juaquin Solorzano is a 59 y.o. male who presents today for overdue six month follow-up of CAD/ischemic cardiomyopathy, HFrEF, VT, PAFib, Sotalol use, HTN, hyperlipidemia, DM and CLARE.    Juaquin is a 59 year old male with history of CAD/ischemic cardiomyopathy with LVEF 40% (on echocardiogdram in October 2023), with history of stents in 2005 and 2006, followed by CABG x 3 in 2013 (anatomical details unknown); coronary angiogram in July 2016 showed patent LIMA to LAD, but occluded SVG-RCA, nonobstructive disease of LCx, and diffuse distal disease of the distal RCA, no other SVG was identified.      He had CRT-D implanted in April 2018, and also has HFrEF, hypertension, hyperlipidemia, CLARE,  diabetes mellitus, and history of seizures (followed by neurology), last seen by me in November 2023.    In February 2024, he had robotic right inguinal hernia repair; in March 2024, he had an ER visit at [facility] for nausea and vomiting.    He did have follow-up with GI; EGD and colonoscopy were recommended.      He is here today for overdue six month follow-up. He has had a lot of depression symptoms, weight loss, fatigue and generalized pain (\"pins and needles.\"). He has been referred to urology (history of kidney stones), pain management and neurology.    From a cardiac standpoint, he remains stable. He is NOT currently taking Ozempic, due to weight loss. No chest pain, pressure, tightness or discomfort; no symptomatic palpitations. He does not some mild shortness of breath, pretty much most of the time; no orthopnea or PND; no dizziness or syncpe; BP tends to run low. He is tired a lot of the time.    Past Medical History: "   Diagnosis Date    Arthritis     Hands/elbows    Block, bundle branch, left     CAD (coronary artery disease)     Cataract     Early stages, not removed    Class 2 obesity due to excess calories in adult 07/10/2019    Diabetes ([facility]) 2006    On oral medications, now well controlled    Fibromyalgia 04/26/2018 5-6/10    GERD (gastroesophageal reflux disease)     Gout     Hyperlipidemia     Hypertension     Ischemic cardiomyopathy 10/2023    Echocardiogram with moderately dilated LV, LVEF 40%. Global hypokinesis. Normal RA, LA and RV. Trace MR, trace AR.    Myocardial infarct ([facility]) 2007    Myocardial infarct ([facility]) 2012    Neuropathy ([facility])     Bilateral feet    Pacemaker     Psychiatric problem     Depression and anxiety    S/P colonoscopy     repeat 6 years at age of 61.    Sleep apnea     Uses CPAP    Spinal puncture headache 12/22/2022     Past Surgical History:   Procedure Laterality Date    AICD IMPLANT Left 04/27/2018    [employer]ogen X4 CRT-D G148 implanted by Dr. Cullen.    RECOVERY  7/22/2016    Procedure: CATH LAB-[facility] W/POSSIBLE RADIAL APPROACH-CARIE;  Surgeon: Recoveryonly Surgery;  [place] PRE[place] [facility];  Service:     MULTIPLE CORONARY ARTERY BYPASS  2014    CABG x 3    LEONILA BY LAPAROSCOPY  2010    KNEE ARTHROSCOPY      Arthroscopy, Knee     Family History   Problem Relation Age of Onset    Heart Disease Mother     Diabetes Mother     Hyperlipidemia Father     Heart Disease Father     Hypertension Father     Stroke Father     Lung Disease Sister      Social History     Socioeconomic History    Marital status:      Spouse name: Not on file    Number of children: Not on file    Years of education: Not on file    Highest education level: Not on file   Occupational History    Not on file   Tobacco Use    Smoking status: Former     Current packs/day: 0.00     Average packs/day: 0.5 packs/day for 12.0 years (6.0 ttl pk-yrs)     Types: Cigarettes     Start date:  7/15/2000     Quit date: 7/15/2012     Years since quittin.1    Smokeless tobacco: Never   Vaping Use    Vaping status: Some Days    Substances: THC    Devices: Disposable   Substance and Sexual Activity    Alcohol use: Not Currently    Drug use: Not Currently     Types: Marijuana, Oral     Comment: bedtime    Sexual activity: Yes     Partners: Female   Other Topics Concern    Not on file   Social History Narrative    Not on file     Social Determinants of Health     Financial Resource Strain: High Risk (2023)    Overall Financial Resource Strain (CARDIA)     Difficulty of Paying Living Expenses: Hard   Food Insecurity: No Food Insecurity (2023)    Hunger Vital Sign     Worried About Running Out of Food in the Last Year: Never true     Ran Out of Food in the Last Year: Never true   Transportation Needs: No Transportation Needs (2023)    PRAPARE - Transportation     Lack of Transportation (Medical): No     Lack of Transportation (Non-Medical): No   Physical Activity: Inactive (2023)    Exercise Vital Sign     Days of Exercise per Week: 0 days     Minutes of Exercise per Session: 0 min   Stress: Stress Concern Present (2023)    [employer] - Occupational Stress Questionnaire     Feeling of Stress : Very much   Social Connections: Moderately Integrated (2023)    Social Connection and Isolation Panel [NHANES]     Frequency of Communication with Friends and Family: Once a week     Frequency of Social Gatherings with Friends and Family: Never     Attends Nondenominational Services: 1 to 4 times per year     Active Member of Clubs or Organizations: Yes     Attends Club or Organization Meetings: More than 4 times per year     Marital Status:    Intimate Partner Violence: Low Risk  (3/7/2024)    [facility]    History of Abuse     Have you ever been afraid of, threatened, neglected,  or abused by someone?: No   Housing Stability: Low Risk  (8/29/2023)    Housing Stability Vital Sign     Unable to Pay for Housing in the Last Year: No     Number of Places Lived in the Last Year: 1     Unstable Housing in the Last Year: No     Allergies   Allergen Reactions    Iodine Anaphylaxis     Other reaction(s): Unknown     Outpatient Encounter Medications as of 8/21/2024   Medication Sig Dispense Refill    Blood Glucose Meter Kit Test blood sugar as recommended by provider. Abbott Freestyle Lite blood glucose monitoring kit. 1 Kit 0    DULoxetine (CYMBALTA) 60 MG Cap DR Particles delayed-release capsule Take 2 Capsules by mouth every day. 180 Capsule 0    tizanidine (ZANAFLEX) 4 MG Tab Take 1 Tablet by mouth every 6 hours as needed (PAIN). 30 Tablet 3    ondansetron (ZOFRAN) 4 MG Tab tablet Take 1 Tablet by mouth every four hours as needed for Nausea/Vomiting. 40 Tablet 2    sotalol (BETAPACE) 80 MG Tab Take 1 Tablet by mouth 2 times a day. 200 Tablet 2    metoprolol SR (TOPROL XL) 50 MG TABLET SR 24 HR Take 1 Tablet by mouth every day. 100 Tablet 2    apixaban (ELIQUIS) 5mg Tab Take 1 Tablet by mouth 2 times a day. 200 Tablet 2    atorvastatin (LIPITOR) 40 MG Tab Take 1 Tablet by mouth every day. 100 Tablet 2    spironolactone (ALDACTONE) 25 MG Tab Take 1 Tablet by mouth every day. 100 Tablet 2    benzonatate (TESSALON) 100 MG Cap Take 1 Capsule by mouth 3 times a day as needed for Cough. 90 Capsule 2    zonisamide (ZONEGRAN) 100 MG Cap Take 3 Capsules by mouth every day. 270 Capsule 2    baclofen (LIORESAL) 10 MG Tab Take 1 Tablet by mouth 2 times a day as needed (pain). 180 Tablet 2    pantoprazole (PROTONIX) 40 MG Tablet Delayed Response Take 1 Tablet by mouth every day. 90 Tablet 2    Respiratory Therapy Supplies (CARETOUCH CPAP & BIPAP HOSE) Misc ** 10/7 cm H2O **  with sleep via mask - Prior sleep study in [place] 5 years ago, told severe sleep apnea. He thinks 72 AHI. Titration study 10-23-18 showed  Both CPAP and BiPAP eliminated hypopneas and apneas, but BiPAP corrected the hypoxia better. for Lifetime      Empagliflozin-metFORMIN HCl ER (SYNJARDY XR) 12.5-1000 MG TABLET SR 24 HR Take 1 Tablet by mouth 2 times a day. 180 Tablet 2    sacubitril-valsartan (ENTRESTO) 24-26 MG Tab Take 1 Tablet by mouth 2 times a day. 200 Tablet 3    glucose blood (FREESTYLE LITE) strip 1 Strip by Other route 4 times a day. 300 Strip 4    Insulin Pen Needle by Does not apply route.      [DISCONTINUED] sildenafil citrate (VIAGRA) 50 MG tablet Take 1 Tablet by mouth 1 time a day as needed for Erectile Dysfunction. (Patient not taking: Reported on 8/21/2024) 30 Tablet 2    [DISCONTINUED] DULoxetine (CYMBALTA) 60 MG Cap DR Particles delayed-release capsule Take 1 Capsule by mouth every day. 90 Capsule 2    aspirin (ASPIRIN 81) 81 MG Chew Tab chewable tablet  (Patient not taking: Reported on 8/21/2024)      [DISCONTINUED] Semaglutide,0.25 or 0.5MG/DOS, (OZEMPIC, 0.25 OR 0.5 MG/DOSE,) 2 MG/1.5ML Solution Pen-injector Ozempic (0.25 mg or 0.5 mg dose) (Patient not taking: Reported on 8/21/2024)       No facility-administered encounter medications on file as of 8/21/2024.     Review of Systems   Constitutional:  Positive for malaise/fatigue. Negative for chills and fever.   HENT:  Negative for congestion.    Respiratory:  Positive for shortness of breath. Negative for cough.    Cardiovascular:  Negative for chest pain, palpitations, orthopnea, leg swelling and PND.   Gastrointestinal:  Negative for abdominal pain and nausea.   Musculoskeletal:  Positive for back pain and joint pain. Negative for myalgias.        Neuropathy in back and arms.   Skin:  Negative for rash.   Neurological:  Positive for seizures. Negative for dizziness, sensory change, loss of consciousness and headaches.        Seizures followed by neurology.   Endo/Heme/Allergies:  Does not bruise/bleed easily.   Psychiatric/Behavioral:  Positive for depression. The patient  "does not have insomnia.               Objective     BP 92/60 (BP Location: Left arm, Patient Position: Sitting, BP Cuff Size: Adult)   Pulse 82   Resp 14   Ht 1.854 m (6' 1\")   Wt 76.2 kg (167 lb 15.9 oz)   SpO2 97%   BMI 22.16 kg/m²     Physical Exam  Constitutional:       Appearance: He is well-developed.      Comments: BMI 22.16 (weight is down)   HENT:      Head: Normocephalic.   Neck:      Vascular: No JVD.   Cardiovascular:      Rate and Rhythm: Normal rate and regular rhythm.      Heart sounds: Normal heart sounds.      Comments: AICD in left chest wall.  Sternotomy scar present.  Pulmonary:      Effort: Pulmonary effort is normal. No respiratory distress.      Breath sounds: Normal breath sounds. No wheezing or rales.   Abdominal:      General: Bowel sounds are normal. There is no distension.      Palpations: Abdomen is soft.      Tenderness: There is no abdominal tenderness.   Musculoskeletal:         General: Normal range of motion.      Cervical back: Normal range of motion and neck supple.   Skin:     General: Skin is warm and dry.      Findings: No rash.   Neurological:      Mental Status: He is alert and oriented to person, place, and time.     AICD interrogation today reveals normal function. No device therapy or mode switching episodes. No changes are made today. Battery is good for 6 years.    EKG of 3/7/2024 reveals atrially sensed, ventricularly paced rhythm.  QTc 476ms     CONCLUSIONS OF TTE OF 10/20/2023:  Compared to the prior study on 10/16/2019, there has been reduction in left ventricular systolic function.  Reduced left ventricular systolic function. The left ventricular ejection fraction is visually estimated to be 40%.  Global hypokinesis.   No evidence of valvular abnormality based on Doppler evaluation.      Impression of MRI of brain of 11/24/2022:  No acute intracranial abnormalities. No acute cerebrovascular infarct.       IMPRESSION OF CT SCAN OF CHEST OF 9/6/2022:  No acute " cardiopulmonary abnormalities.  No rib fractures.   Post CABG changes. Left ventricle apical wall thickening with fat deposition possibly a sequela of prior myocardial infarction.       Interpretation Summary of Echocardiogram of 4/27/2022:  The study was technically difficult with some images being suboptimal in quality   There is apical hypokinesis   There is mild inferior wall hypokinesis.   Visually the ejection fraction appears in the 35-40% range.   There is no thrombus.   A variety of Doppler measurements indicate grade I diastolic dysfunction associated with normal left   atrial pressures.   No significant valvular abnormality noted      CONCLUSIONS OF ECHOCARDIOGRAM OF 10/16/2019:  Limited Study for LVEF.   TDS - Limited acoustical window, contrast not available.  Prior Echo - 2/12/18, prior EF probably 40%, currently 45%.  Mildly reduced left ventricular systolic function.  Left ventricular ejection fraction is visually estimated to be 45%.  Global hypokinesis.  Abnormal septal motion consistent with postoperative status.     POSTOPERATIVE DIAGNOSES OF [facility] OF 7/22/2016:  1.  Occluded vein grafts.  2.  Patent LIMA to LAD.  3.  Diffuse distal RCA disease.  4.  A 100% occluded LAD stent.  5.  Moderate nonobstructive left circumflex disease.  6.  LVEF 50%.    Lab Results   Component Value Date/Time    CHOLSTRLTOT 106 08/01/2024 07:40 AM    LDL 42 08/01/2024 07:40 AM    HDL 48 08/01/2024 07:40 AM    TRIGLYCERIDE 82 08/01/2024 07:40 AM        Lab Results   Component Value Date/Time    ASTSGOT 20 08/01/2024 07:40 AM    ALTSGPT 17 08/01/2024 07:40 AM       Lab Results   Component Value Date/Time    SODIUM 140 08/01/2024 07:40 AM    POTASSIUM 4.6 08/01/2024 07:40 AM    CHLORIDE 105 08/01/2024 07:40 AM    CO2 19 (L) 08/01/2024 07:40 AM    GLUCOSE 103 (H) 08/01/2024 07:40 AM    BUN 19 08/01/2024 07:40 AM    CREATININE 1.23 08/01/2024 07:40 AM    GLOMRATE 92 11/26/2022 04:44 AM        Assessment & Plan     1. Presence  of biventricular AICD        2. Coronary artery disease involving native coronary artery of native heart without angina pectoris        3. Ischemic cardiomyopathy  EC-ECHOCARDIOGRAM COMPLETE W/O CONT      4. S/P CABG x 3        5. ACC/AHA stage C systolic heart failure (HCC)        6. Ventricular tachycardia (HCC)        7. Paroxysmal atrial fibrillation (HCC)        8. Chronic anticoagulation        9. Hypercoagulable state due to paroxysmal atrial fibrillation (HCC)        10. Essential hypertension        11. Mixed hyperlipidemia        12. Type 2 diabetes mellitus with complication, with long-term current use of insulin (HCC)        13. Obstructive sleep apnea syndrome            Medical Decision Making: Today's Assessment/Status/Plan:        1. CAD/ischemic cardiomyopathy status post CABG x 3 and previous intervention, with LVEF 40% on echo in October 2023; we will repeat echocardiogram to assess LVEF. CRT-D is working normally with no arrhythmias. Continue:  Eliquis 5mg twice daily  Lipitor 40mg once daily  Entresto 24/26mg twice daily  Toprol XL 50mg once daily  Aldactone 25mg once daily  NTG 0.4mg PRN (rarely uses)     2. HFrEF, stage C, class II, LVEF 40% in October 2023, no HF symptoms. We will repeat echocardiogram. Continue Eliquis, statin, Entresto, BB and Aldactone.      3. History of VT on Sotalol 80mg twice daily. EKG in March 2024 showed QTc 476ms, and recent creatinine was normal. No VT episodes on device interrogation.     4. PAFib, on Sotalol 80mg twice daily, with no mode switching episodes.     5. Chronic anticoagulation with Eliquis, no bleeding issues. CBC in March 2024 was normal.     6. Hypertension, treated. BP tends to run low, but he is mostly asymptomatic. BP Is low today. Could consider cutting Entresto and/or Aldactone in half.     7. Hyperlipidemia, treated with Lipitor 40mg. Last LDL was 42 (at goal).     8. Diabetes mellitus, treated with combination therapy. Last HgbA1c was 5.0.  He is currently NOT taking Ozempic, due to weight loss.    9. CLARE, treated/stable.     10. History of seizures, last episode in early May 2023, with no recent recurrence, followed by neurology.    11. Generalized pain, along with fatigue. He has been referred to pain management; recent labs were done by PCP.     From a cardiac standpoint, he remains stable at this time.   BP continues to run low. Could consider cutting Entresto and/or Aldactone in half, if he gets symptoms.  Repeat echocardiogram to assess LVEF; if abnormal, consider stress test.  Same medications for now.  Keep follow-up with other providers.     Follow-up in 6 months for next device interrogation.

## 2024-08-22 ENCOUNTER — APPOINTMENT (OUTPATIENT)
Dept: RADIOLOGY | Facility: IMAGING CENTER | Age: 59
End: 2024-08-22
Attending: PHYSICIAN ASSISTANT
Payer: MEDICARE

## 2024-08-22 ENCOUNTER — NON-PROVIDER VISIT (OUTPATIENT)
Dept: URGENT CARE | Facility: PHYSICIAN GROUP | Age: 59
End: 2024-08-22
Payer: MEDICARE

## 2024-08-22 DIAGNOSIS — G89.29 CHRONIC PAIN OF RIGHT KNEE: ICD-10-CM

## 2024-08-22 DIAGNOSIS — M25.561 CHRONIC PAIN OF RIGHT KNEE: ICD-10-CM

## 2024-08-22 LAB
ENA SM IGG SER-ACNC: 1 AU/ML (ref 0–40)
MYELOPEROXIDASE AB SER-ACNC: 0 AU/ML (ref 0–19)
PROTEINASE3 AB SER-ACNC: 0 AU/ML (ref 0–19)

## 2024-08-22 PROCEDURE — 73562 X-RAY EXAM OF KNEE 3: CPT | Mod: TC,FY,RT | Performed by: RADIOLOGY

## 2024-08-23 ENCOUNTER — HOSPITAL ENCOUNTER (OUTPATIENT)
Dept: CARDIOLOGY | Facility: MEDICAL CENTER | Age: 59
End: 2024-08-23
Attending: NURSE PRACTITIONER
Payer: MEDICARE

## 2024-08-23 DIAGNOSIS — I25.5 ISCHEMIC CARDIOMYOPATHY: ICD-10-CM

## 2024-08-23 LAB
LV EJECT FRACT  99904: 55
LV EJECT FRACT MOD 2C 99903: 58.6
LV EJECT FRACT MOD 4C 99902: 63.09
LV EJECT FRACT MOD BP 99901: 59.36

## 2024-08-23 PROCEDURE — 93306 TTE W/DOPPLER COMPLETE: CPT | Mod: 26 | Performed by: INTERNAL MEDICINE

## 2024-08-23 PROCEDURE — 93306 TTE W/DOPPLER COMPLETE: CPT

## 2024-08-23 PROCEDURE — 700117 HCHG RX CONTRAST REV CODE 255: Performed by: NURSE PRACTITIONER

## 2024-08-23 RX ADMIN — HUMAN ALBUMIN MICROSPHERES AND PERFLUTREN 3 ML: 10; .22 INJECTION, SOLUTION INTRAVENOUS at 16:30

## 2024-08-26 ENCOUNTER — PATIENT OUTREACH (OUTPATIENT)
Dept: HEALTH INFORMATION MANAGEMENT | Facility: OTHER | Age: 59
End: 2024-08-26
Payer: MEDICARE

## 2024-08-26 DIAGNOSIS — I50.20 ACC/AHA STAGE C SYSTOLIC HEART FAILURE (HCC): ICD-10-CM

## 2024-08-26 DIAGNOSIS — I10 ESSENTIAL HYPERTENSION: ICD-10-CM

## 2024-08-26 DIAGNOSIS — Z79.4 TYPE 2 DIABETES MELLITUS WITH COMPLICATION, WITH LONG-TERM CURRENT USE OF INSULIN (HCC): ICD-10-CM

## 2024-08-26 DIAGNOSIS — E11.8 TYPE 2 DIABETES MELLITUS WITH COMPLICATION, WITH LONG-TERM CURRENT USE OF INSULIN (HCC): ICD-10-CM

## 2024-08-26 DIAGNOSIS — E78.49 OTHER HYPERLIPIDEMIA: ICD-10-CM

## 2024-08-26 DIAGNOSIS — F33.0 MAJOR DEPRESSIVE DISORDER, RECURRENT, MILD (HCC): ICD-10-CM

## 2024-08-26 NOTE — PROGRESS NOTES
Assessment:  This RN spoke with patient for monthly PCM outreach. Patient reports that he needs help with transportation. He has many appointments to attend in the near future and states that the price of gas has been difficult for him to manage financially. This RN provided information regarding [facility] but patient states that the transportation schedule cannot accommodate his appointment times. He is inquiring about MTM. This RN will have [facility] call patient for resources and information. Juaquin reports that his blood pressures range /60's and states that is normal for him. He does not check blood sugars. Last A1c was 5.0 on 8/13/24. He reports that he has left a message for Behavioral Health to call back to schedule an appointment and he has not received a return call. He reports that he hasn't noticed any improvement regarding depression after PCP increased duloxetine to 120 mg daily. Patient has an appointment tomorrow to follow up.  RN reviewed all medications, allergies and upcoming appointments with patient. Patient reports no recent falls, recent hospital admissions or ER visits. This RN instructed patient to call PCM phone number at [phone] if any further questions, concerns or needs. Patient verbalizes understanding. Will continue current plan of care. This RN informed patient that [facility] RN will be following him monthly starting in September. Patient verbalizes understanding.       Education and Self Management: Medication and appointment review. Fall safety, diet, depression.     Plan of Care and Goals: Continue taking all medications as prescribed, attend all appointments, remain free from falls.     Barriers: Advanced age, knowledge deficit, disease process.    Progress: Progressing    Next Outreach: One month

## 2024-08-27 ENCOUNTER — OFFICE VISIT (OUTPATIENT)
Dept: MEDICAL GROUP | Facility: CLINIC | Age: 59
End: 2024-08-27
Payer: MEDICARE

## 2024-08-27 VITALS
HEART RATE: 76 BPM | BODY MASS INDEX: 21.62 KG/M2 | DIASTOLIC BLOOD PRESSURE: 60 MMHG | HEIGHT: 74 IN | OXYGEN SATURATION: 98 % | RESPIRATION RATE: 16 BRPM | TEMPERATURE: 98.8 F | WEIGHT: 168.43 LBS | SYSTOLIC BLOOD PRESSURE: 100 MMHG

## 2024-08-27 DIAGNOSIS — Z12.12 SCREENING FOR COLORECTAL CANCER: ICD-10-CM

## 2024-08-27 DIAGNOSIS — M25.561 CHRONIC PAIN OF RIGHT KNEE: ICD-10-CM

## 2024-08-27 DIAGNOSIS — M25.50 MULTIPLE JOINT PAIN: ICD-10-CM

## 2024-08-27 DIAGNOSIS — Z95.810 PRESENCE OF BIVENTRICULAR AICD: ICD-10-CM

## 2024-08-27 DIAGNOSIS — E11.8 TYPE 2 DIABETES MELLITUS WITH COMPLICATION, WITH LONG-TERM CURRENT USE OF INSULIN (HCC): ICD-10-CM

## 2024-08-27 DIAGNOSIS — M77.9 BONE SPUR: ICD-10-CM

## 2024-08-27 DIAGNOSIS — G89.29 CHRONIC PAIN OF RIGHT KNEE: ICD-10-CM

## 2024-08-27 DIAGNOSIS — N21.0 BLADDER STONES: ICD-10-CM

## 2024-08-27 DIAGNOSIS — M53.3 TAIL BONE PAIN: ICD-10-CM

## 2024-08-27 DIAGNOSIS — Z79.4 TYPE 2 DIABETES MELLITUS WITH COMPLICATION, WITH LONG-TERM CURRENT USE OF INSULIN (HCC): ICD-10-CM

## 2024-08-27 DIAGNOSIS — R56.9 FOCAL SEIZURES (HCC): ICD-10-CM

## 2024-08-27 DIAGNOSIS — R77.8 LOW SERUM COMPLEMENT C4: ICD-10-CM

## 2024-08-27 DIAGNOSIS — I50.20 ACC/AHA STAGE C SYSTOLIC HEART FAILURE (HCC): ICD-10-CM

## 2024-08-27 DIAGNOSIS — R06.02 SHORTNESS OF BREATH: ICD-10-CM

## 2024-08-27 DIAGNOSIS — Z12.11 SCREENING FOR COLORECTAL CANCER: ICD-10-CM

## 2024-08-27 PROCEDURE — 3074F SYST BP LT 130 MM HG: CPT | Performed by: PHYSICIAN ASSISTANT

## 2024-08-27 PROCEDURE — 3078F DIAST BP <80 MM HG: CPT | Performed by: PHYSICIAN ASSISTANT

## 2024-08-27 PROCEDURE — 99214 OFFICE O/P EST MOD 30 MIN: CPT | Performed by: PHYSICIAN ASSISTANT

## 2024-08-27 RX ORDER — ALBUTEROL SULFATE 90 UG/1
2 AEROSOL, METERED RESPIRATORY (INHALATION) EVERY 4 HOURS PRN
Qty: 18 G | Refills: 2 | Status: SHIPPED | OUTPATIENT
Start: 2024-08-27

## 2024-08-27 ASSESSMENT — FIBROSIS 4 INDEX: FIB4 SCORE: 1.22

## 2024-08-27 NOTE — PROGRESS NOTES
cc:  follow up    Subjective:     Juaquin Solorzano is a 59 y.o. male presenting for follow up      Patient presents to the office for follow up.  Patient is here to follow-up with his most recent test results.  He did have multiple x-rays done as well as labs.  For the last several visits, patient has not been feeling well.  His depression is worsened and at last visit we increased his Cymbalta from 60 mg to 120 mg.  He is also seeing cardiology as of 8- and have discussed adjusting blood pressure medication.  Patient indicates having a cold sensation as well as paresthesia for a year.  He states that he has stopped his marijuana and is experiencing more pain.  He is requesting the information for pain management.      Patient indicates that he has been feeling short of breath and is surprised that his echo is read as normal.  In the past he has used an albuterol inhaler and remembers that it has been effective for him.  He would like to try this again.    Patient indicates that he has had a problem getting his colonoscopy.  He indicates having an appointment to actually have his colonoscopy but it was canceled at the last minute when it was realized that he had a pacer/defibrillator.  He indicates that he was to receive a call back regarding an appointment but never did receive this call.  He would like to proceed with the colonoscopy and is requesting a new referral.      Patient is here to discuss his test results for multiple joint pain, tailbone pain, right knee pain.  He is also a type II diabetic with stage C systolic heart failure and has a biventricular AICD.  X-rays do show bone spurs.  X-ray also shows bladder stones and patient does have a history of hematuria.    Review of systems:  See above.   Denies any symptoms unless previously indicated.        Current Outpatient Medications:     albuterol 108 (90 Base) MCG/ACT Aero Soln inhalation aerosol, Inhale 2 Puffs every four hours as needed for  Shortness of Breath., Disp: 18 g, Rfl: 2    sotalol (BETAPACE) 80 MG Tab, Take 1 Tablet by mouth 2 times a day., Disp: 200 Tablet, Rfl: 3    metoprolol SR (TOPROL XL) 50 MG TABLET SR 24 HR, Take 1 Tablet by mouth every day., Disp: 100 Tablet, Rfl: 3    apixaban (ELIQUIS) 5mg Tab, Take 1 Tablet by mouth 2 times a day., Disp: 200 Tablet, Rfl: 3    atorvastatin (LIPITOR) 40 MG Tab, Take 1 Tablet by mouth every day., Disp: 100 Tablet, Rfl: 3    spironolactone (ALDACTONE) 25 MG Tab, Take 1 Tablet by mouth every day., Disp: 100 Tablet, Rfl: 3    sacubitril-valsartan (ENTRESTO) 24-26 MG Tab, Take 1 Tablet by mouth 2 times a day., Disp: 200 Tablet, Rfl: 3    Blood Glucose Meter Kit, Test blood sugar as recommended by provider. Abbott Freestyle Lite blood glucose monitoring kit., Disp: 1 Kit, Rfl: 0    DULoxetine (CYMBALTA) 60 MG Cap DR Particles delayed-release capsule, Take 2 Capsules by mouth every day., Disp: 180 Capsule, Rfl: 0    tizanidine (ZANAFLEX) 4 MG Tab, Take 1 Tablet by mouth every 6 hours as needed (PAIN)., Disp: 30 Tablet, Rfl: 3    ondansetron (ZOFRAN) 4 MG Tab tablet, Take 1 Tablet by mouth every four hours as needed for Nausea/Vomiting., Disp: 40 Tablet, Rfl: 2    benzonatate (TESSALON) 100 MG Cap, Take 1 Capsule by mouth 3 times a day as needed for Cough., Disp: 90 Capsule, Rfl: 2    zonisamide (ZONEGRAN) 100 MG Cap, Take 3 Capsules by mouth every day., Disp: 270 Capsule, Rfl: 2    baclofen (LIORESAL) 10 MG Tab, Take 1 Tablet by mouth 2 times a day as needed (pain)., Disp: 180 Tablet, Rfl: 2    pantoprazole (PROTONIX) 40 MG Tablet Delayed Response, Take 1 Tablet by mouth every day., Disp: 90 Tablet, Rfl: 2    Respiratory Therapy Supplies (CARETOUCH CPAP & BIPAP HOSE) Misc, ** 10/7 cm H2O **  with sleep via mask - Prior sleep study in [place] 5 years ago, told severe sleep apnea. He thinks 72 AHI. Titration study 10-23-18 showed Both CPAP and BiPAP eliminated hypopneas and apneas, but BiPAP corrected the  "hypoxia better. for Lifetime, Disp: , Rfl:     Empagliflozin-metFORMIN HCl ER (SYNJARDY XR) 12.5-1000 MG TABLET SR 24 HR, Take 1 Tablet by mouth 2 times a day., Disp: 180 Tablet, Rfl: 2    glucose blood (FREESTYLE LITE) strip, 1 Strip by Other route 4 times a day., Disp: 300 Strip, Rfl: 4    Insulin Pen Needle, by Does not apply route., Disp: , Rfl:     aspirin (ASPIRIN 81) 81 MG Chew Tab chewable tablet, , Disp: , Rfl:     Allergies, past medical history, past surgical history, family history, social history reviewed and updated    Objective:     Vitals: /60   Pulse 76   Temp 37.1 °C (98.8 °F) (Temporal)   Resp 16   Ht 1.88 m (6' 2\")   Wt 76.4 kg (168 lb 6.9 oz)   SpO2 98%   BMI 21.63 kg/m²   General: Alert, pleasant, NAD  EYES:   PERRL, EOMI, no icterus or pallor.  Conjunctivae and lids normal.   HENT:  Normocephalic.  External ears normal.   Respiratory: Normal respiratory effort.    Abdomen: Not obese  Skin: Warm, dry, no rashes.  Musculoskeletal: Gait is normal.  Moves all extremities well.    Extremities: Normal range of motion all extremities.   Neurological: No tremors, sensation grossly intact,  CN2-12 intact.  Psych:  Affect/mood is normal, judgement is good, memory is intact, grooming is appropriate.   Latest Reference Range & Units 08/13/24 08:21 08/13/24 09:40 08/19/24 12:07 08/19/24 12:09   Sed Rate Westergren 0 - 20 mm/hour    4   Glycohemoglobin 5.8 % 5.0      Urobilinogen, Urine Negative   1.0     Color   Yellow     Character   Clear     Specific Gravity <1.035   1.032     Ph 5.0 - 8.0   5.5     Glucose Negative mg/dL  >=1000 !     Ketones Negative mg/dL  Trace !     Bilirubin Negative   Negative     Occult Blood Negative   Negative     Protein Negative mg/dL  30 !     Nitrite Negative   Negative     Leukocyte Esterase Negative   Negative     Micro Urine Req   Microscopic     WBC /hpf  2-5 !     RBC /hpf  2-5 !     Epithelial Cells /hpf  Negative     Bacteria None /hpf  Negative   "   Hyaline Cast /lpf  6-10 !     C3 Complement 87.0 - 200.0 mg/dL    110.2   Complement C4 19.0 - 52.0 mg/dL    15.2 (L)   Complement Total Hemolytic 38.7 - 89.9 U/mL   58.5    Rheumatoid Factor -Neph- 0 - 14 IU/mL    <10   Cyclic Citrullinated Peptide Ab, IgG/A 0 - 19 Units    3   Anti-Dna -Ds     SEE BELOW   Microsomal -Tpo- Abs 0.0 - 9.0 IU/mL    <9.0   Anti-Thyroglobulin 0.0 - 4.0 IU/mL    <0.9   Antinuclear Antibody None Detected     None Detected   Smith Antibodies 0 - 40 AU/mL    1   Myeloperoxidase Ab 0 - 19 AU/mL    0   Serine Proteinase 3, IgG 0 - 19 AU/mL    0   PATHOLOGY SPECIMEN   Rpt     Cytology Reg   See Path Report     !: Data is abnormal  (L): Data is abnormally low  Rpt: View report in Results Review for more information  DX-FOREARM LEFT  Order: [id number]   Status: Final result       Visible to patient: Yes (seen)       Next appt: 09/03/2024 at 09:00 AM in Urology (Corine Godfrey M.D.)       Dx: Pain of left upper extremity    1 Result Note       1 Patient Communication  Details    Reading Physician Reading Date Result Priority   Aleksander Bennett M.D.  [phone] 8/19/2024      Narrative & Impression     8/19/2024 11:50 AM     HISTORY/REASON FOR EXAM:  Atraumatic Pain/Swelling/Deformity.  Left arm pain     TECHNIQUE/EXAM DESCRIPTION AND NUMBER OF VIEWS:  2 views of the LEFT forearm.     COMPARISON: None     FINDINGS:  There is no fracture.     Alignment is normal.     No degenerative changes are present.     There is a spur on the olecranon consistent with triceps tendon enthesophyte     IMPRESSION:     1.  Left triceps tendon enthesophyte        DX-HUMERUS 2+ LEFT  Order: [id number]   Status: Final result       Visible to patient: Yes (seen)       Next appt: 09/03/2024 at 09:00 AM in Urology (Corine Godfrey M.D.)       Dx: Pain of left upper extremity    1 Result Note       1 Patient Communication  Details    Reading Physician Reading Date Result Priority   Aleksander Bnenett M.D.  [phone]  8/19/2024      Narrative & Impression     8/19/2024 11:51 AM     HISTORY/REASON FOR EXAM:  Atraumatic Pain/Swelling/Deformity; pain upper and lower arm..  Left arm pain     TECHNIQUE/EXAM DESCRIPTION AND NUMBER OF VIEWS:  2 views of the LEFT humerus.     COMPARISON: None     FINDINGS:  There is no fracture.     Alignment is normal.     Left acromioclavicular joint degenerative changes are present.     IMPRESSION:     1.  Left acromioclavicular joint osteoarthritis     2.  Left humerus appears normal     DX-KNEE 3 VIEWS Pain/Deformity Following Trauma  Order: [id number]   Status: Final result       Visible to patient: Yes (seen)       Next appt: 09/03/2024 at 09:00 AM in Urology (Corine Godfrey M.D.)       Dx: Chronic pain of right knee    1 Result Note       1 Patient Communication  Details    Reading Physician Reading Date Result Priority   Bharat Chavez M.D.  [phone] 8/22/2024      Narrative & Impression     8/22/2024 8:03 AM     HISTORY/REASON FOR EXAM: Chronic atraumatic pain with crepitus.     TECHNIQUE/EXAM DESCRIPTION AND NUMBER OF VIEWS: 3 views of the RIGHT knee.     COMPARISON: None     FINDINGS:  No joint effusion is seen.     No displaced fracture is visualized.     There is no subluxation.     There is moderate quadriceps enthesophyte formation. Some spurring at the tibial tubercle is also seen     Mild patellar and medial femorotibial marginal spurring     IMPRESSION:     Minimal patellofemoral and medial femorotibial osteoarthritis     DX-SACRUM AND COCCYX 2+  Order: [id number]   Status: Final result       Visible to patient: Yes (seen)       Next appt: 09/03/2024 at 09:00 AM in Urology (Corine Godfrey M.D.)       Dx: Tail bone pain    1 Result Note       1 Patient Communication  Details    Reading Physician Reading Date Result Priority   Aleksander Bennett M.D.  [phone] 8/19/2024      Narrative & Impression     8/19/2024 11:54 AM     HISTORY/REASON FOR EXAM:  Atraumatic Pain; tail bone  pain.  Coccygeal pain     TECHNIQUE/EXAM DESCRIPTION AND NUMBER OF VIEWS:  3 views of the sacrum and coccyx.     COMPARISON: None.     FINDINGS:  There is no fracture.     Alignment is normal.     Multilevel lumbar spine facet degenerative changes are present.     There are pelvic calcifications which appear to move dependently with the standing position most consistent with bladder calculi.     IMPRESSION:     1.  No abnormality identified of the sacrum or coccyx     2.  Lumbar spine facet arthropathy     3.  Multiple mobile pelvic calcifications that are most likely bladder calculi     EC-ECHOCARDIOGRAM COMPLETE W/ CONT  Order: [id number]   Status: Final result       Visible to patient: Yes (seen)       Next appt: 2024 at 09:00 AM in Urology (Corine Godfrey M.D.)       Dx: Ischemic cardiomyopathy    1 Result Note  Details    Reading Physician Reading Date Result Priority   No Reading Provider Prelim 2024    Ward Salas M.D.  [phone] 2024      Narrative & Impression  Transthoracic  Echo Report        Echocardiography Laboratory     CONCLUSIONS  The ejection fraction is measured to be  59 % by Krishna's biplane.  Normal right ventricular size and systolic function.  No significant valvular disease.      OBED ATKINS  Exam Date:         2024                      14:12  Exam Location:     Out Patient  Priority:          Routine     Ordering Physician:        GINA MONTEIRO  Referring Physician:       [id number] Madison  Sonographer:               Tatiana Price SHIV     Age:    59     Gender:    M  MRN:    [id number]  :    1965  BSA:    1.99   Ht (in):    73     Wt (lb):    167  Exam Type:     Complete     Indications:     Other chest pain  ICD Codes:       R07.89     CPT Codes:       11445     BP:   92     /   60     HR:   72  Technical Quality:       Fair     MEASUREMENTS  (Male / Female) Normal Values  2D ECHO  LV Diastolic Diameter PLAX        5.8 cm                4.2 - 5.9 / 3.9  - 5.3   cm  LV Systolic Diameter PLAX         3.9 cm                2.1 - 4.0 cm  IVS Diastolic Thickness           0.79 cm                 LVPW Diastolic Thickness          0.82 cm                 LVOT Diameter                     2.5 cm                  Estimated LV Ejection Fraction    55 %                    LV Ejection Fraction MOD BP       59.4 %                >= 55  %  LV Ejection Fraction MOD 4C       63.1 %                  LV Ejection Fraction MOD 2C       58.6 %                  IVC Diameter                      1.7 cm                     DOPPLER  AV Peak Velocity                  0.94 m/s                AV Peak Gradient                  3.6 mmHg                AV Mean Gradient                  2.1 mmHg                LVOT Peak Velocity                0.76 m/s                AV Area Cont Eq vti               4.3 cm2                 Mitral E Point Velocity           0.64 m/s                Mitral E to A Ratio               1.2                     MV Pressure Half Time             55.6 ms                 MV Area PHT                       4 cm2                   MV Deceleration Time              192 ms                  TR Peak Velocity                  264 cm/s                PV Peak Velocity                  1 m/s                   PV Peak Gradient                  4.3 mmHg                   * Indicates values subject to auto-interpretation  LV EF:  55    %     FINDINGS  Left Ventricle  3 mL of contrast was used to enhance visualization of the endocardial   border. Normal left ventricular chamber size. Normal left ventricular   wall thickness. Normal left ventricular systolic function. The ejection   fraction is measured to be  59 % by Krishna's biplane.     Right Ventricle  Normal right ventricular size and systolic function.     Right Atrium  Normal right atrial size. Normal inferior vena cava size and   inspiratory collapse.     Left Atrium  Normal left atrial size. Left atrial volume index is 25 mL/sq  m.     Mitral Valve  Structurally normal mitral valve without significant stenosis. Trace   mitral regurgitation.     Aortic Valve  Structurally normal aortic valve without significant stenosis or   regurgitation. Tricuspid aortic valve.     Tricuspid Valve  Structurally normal tricuspid valve without significant stenosis. Mild   tricuspid regurgitation. Right ventricular systolic pressure is   estimated to be 29 mmHg.     Pulmonic Valve  Structurally normal pulmonic valve. Mild pulmonic insufficiency.     Pericardium  Normal pericardium without effusion.     Aorta  The aortic root is dilated with a diameter of 3.8  cm. The ascending   aorta diameter is  3.3 cm.                         Assessment/Plan:     Juaquin was seen today for lab results and diabetes.    Diagnoses and all orders for this visit:    Multiple joint pain  Bone spur  Tail bone pain  Chronic pain of right knee    X-rays and labs reviewed.  Will continue to monitor.    Low serum complement C4  -     COMPLEMENT C4; Future    Etiology of the low reading is unknown.  Will repeat this and if test remains low, may need to consult with rheumatology possibly with an e-visit.    Shortness of breath  -     CT-CHEST (THORAX) W/O; Future  -     albuterol 108 (90 Base) MCG/ACT Aero Soln inhalation aerosol; Inhale 2 Puffs every four hours as needed for Shortness of Breath.    Possible asthma or COPD component.  CT of the chest has been ordered but will try patient on an albuterol inhaler and follow-up in 4 weeks if there is some improvement with the albuterol we may need to consider a maintenance inhaler.    Type 2 diabetes mellitus with complication, with long-term current use of insulin ([facility])    A1c is 5.0.  It may be time to consider decreasing diabetic meds for patient and we will discuss further at follow-up.    ACC/AHA stage C systolic heart failure (HCC)  Presence of biventricular AICD    Patient to continue to follow-up with cardiology.    Focal seizures  (HCC)    Patient has an appointment with neurology scheduled for October.    Bladder stones    Patient has an appointment scheduled with urology in September.    Screening for colorectal cancer  -     Referral to Gastroenterology      New referral submitted.      Return in about 4 weeks (around 9/24/2024), or if symptoms worsen or fail to improve, for 40 min  please.    Please note that this dictation was created using voice recognition software. I have made every reasonable attempt to correct obvious errors, but expect that there are errors of grammar and possible content that I did not discover before finalizing note.

## 2024-08-28 NOTE — PATIENT INSTRUCTIONS
Repeat C4 lab approximately October 15th  CT chest ordered to evaluate shortness of breath.    New referral to gastroenterology placed for colon cancer screen.

## 2024-09-03 ENCOUNTER — OFFICE VISIT (OUTPATIENT)
Dept: UROLOGY | Facility: MEDICAL CENTER | Age: 59
End: 2024-09-03
Payer: MEDICARE

## 2024-09-03 VITALS
SYSTOLIC BLOOD PRESSURE: 114 MMHG | DIASTOLIC BLOOD PRESSURE: 71 MMHG | OXYGEN SATURATION: 98 % | HEART RATE: 74 BPM | TEMPERATURE: 98.2 F | HEIGHT: 73 IN | BODY MASS INDEX: 22.26 KG/M2 | WEIGHT: 168 LBS

## 2024-09-03 DIAGNOSIS — R31.0 GROSS HEMATURIA: ICD-10-CM

## 2024-09-03 DIAGNOSIS — Z12.5 ENCOUNTER FOR SCREENING FOR MALIGNANT NEOPLASM OF PROSTATE: ICD-10-CM

## 2024-09-03 DIAGNOSIS — R31.29 MICROSCOPIC HEMATURIA: ICD-10-CM

## 2024-09-03 LAB
APPEARANCE UR: CLEAR
BILIRUB UR STRIP-MCNC: NORMAL MG/DL
COLOR UR AUTO: YELLOW
GLUCOSE UR STRIP.AUTO-MCNC: >=1000 MG/DL
KETONES UR STRIP.AUTO-MCNC: NORMAL MG/DL
LEUKOCYTE ESTERASE UR QL STRIP.AUTO: NORMAL
NITRITE UR QL STRIP.AUTO: NORMAL
PH UR STRIP.AUTO: 6 [PH] (ref 5–8)
POC POST-VOID: 30 ML
POC PRE-VOID: NORMAL
PROT UR QL STRIP: NORMAL MG/DL
RBC UR QL AUTO: NORMAL
SP GR UR STRIP.AUTO: 1.02
UROBILINOGEN UR STRIP-MCNC: 1 MG/DL

## 2024-09-03 PROCEDURE — 3078F DIAST BP <80 MM HG: CPT | Performed by: STUDENT IN AN ORGANIZED HEALTH CARE EDUCATION/TRAINING PROGRAM

## 2024-09-03 PROCEDURE — 81002 URINALYSIS NONAUTO W/O SCOPE: CPT | Performed by: STUDENT IN AN ORGANIZED HEALTH CARE EDUCATION/TRAINING PROGRAM

## 2024-09-03 PROCEDURE — 99204 OFFICE O/P NEW MOD 45 MIN: CPT | Performed by: STUDENT IN AN ORGANIZED HEALTH CARE EDUCATION/TRAINING PROGRAM

## 2024-09-03 PROCEDURE — 51798 US URINE CAPACITY MEASURE: CPT | Performed by: STUDENT IN AN ORGANIZED HEALTH CARE EDUCATION/TRAINING PROGRAM

## 2024-09-03 PROCEDURE — 3074F SYST BP LT 130 MM HG: CPT | Performed by: STUDENT IN AN ORGANIZED HEALTH CARE EDUCATION/TRAINING PROGRAM

## 2024-09-03 RX ORDER — TADALAFIL 5 MG/1
5 TABLET ORAL DAILY
Qty: 90 TABLET | Refills: 3 | Status: SHIPPED | OUTPATIENT
Start: 2024-09-03 | End: 2024-09-04

## 2024-09-03 ASSESSMENT — FIBROSIS 4 INDEX: FIB4 SCORE: 1.22

## 2024-09-03 NOTE — PROGRESS NOTES
Subjective  Juaquin Solorzano is a 59 y.o. male who presents today for evaluation of hematuria.    He was found to have microscopic hematuria on urinalysis. He has had gross hematuria a few months ago as well. No dysuria or gross hematuria currently. He has some frequency, urgency, slow stream, and nocturia. He sometimes feels like he doesn't empty his bladder well. He currently uses sildenafil for erectile dysfunction and this works well for him.    Family History   Problem Relation Age of Onset    Heart Disease Mother     Diabetes Mother     Hyperlipidemia Father     Heart Disease Father     Hypertension Father     Stroke Father     Lung Disease Sister        Social History     Socioeconomic History    Marital status:      Spouse name: Not on file    Number of children: Not on file    Years of education: Not on file    Highest education level: Not on file   Occupational History    Not on file   Tobacco Use    Smoking status: Former     Current packs/day: 0.00     Average packs/day: 0.5 packs/day for 12.0 years (6.0 ttl pk-yrs)     Types: Cigarettes     Start date: 7/15/2000     Quit date: 7/15/2012     Years since quittin.1    Smokeless tobacco: Never   Vaping Use    Vaping status: Some Days    Substances: THC    Devices: Disposable   Substance and Sexual Activity    Alcohol use: Not Currently    Drug use: Not Currently     Types: Marijuana, Oral     Comment: bedtime    Sexual activity: Yes     Partners: Female   Other Topics Concern    Not on file   Social History Narrative    Not on file     Social Determinants of Health     Financial Resource Strain: High Risk (2023)    Overall Financial Resource Strain (CARDIA)     Difficulty of Paying Living Expenses: Hard   Food Insecurity: No Food Insecurity (2023)    Hunger Vital Sign     Worried About Running Out of Food in the Last Year: Never true     Ran Out of Food in the Last Year: Never true   Transportation Needs: No Transportation Needs  (8/29/2023)    PRAPARE - Transportation     Lack of Transportation (Medical): No     Lack of Transportation (Non-Medical): No   Physical Activity: Inactive (8/29/2023)    Exercise Vital Sign     Days of Exercise per Week: 0 days     Minutes of Exercise per Session: 0 min   Stress: Stress Concern Present (8/29/2023)    [place] [employer] - Occupational Stress Questionnaire     Feeling of Stress : Very much   Social Connections: Moderately Integrated (8/29/2023)    Social Connection and Isolation Panel [NHANES]     Frequency of Communication with Friends and Family: Once a week     Frequency of Social Gatherings with Friends and Family: Never     Attends Scientologist Services: 1 to 4 times per year     Active Member of Clubs or Organizations: Yes     Attends Club or Organization Meetings: More than 4 times per year     Marital Status:    Intimate Partner Violence: Low Risk  (3/7/2024)    [facility]    History of Abuse     Have you ever been afraid of, threatened, neglected, or abused by someone?: No   Housing Stability: Low Risk  (8/29/2023)    Housing Stability Vital Sign     Unable to Pay for Housing in the Last Year: No     Number of Places Lived in the Last Year: 1     Unstable Housing in the Last Year: No       Past Surgical History:   Procedure Laterality Date    AICD IMPLANT Left 04/27/2018    [employer] Inogen X4 CRT-D G148 implanted by Dr. Cullen.    RECOVERY  7/22/2016    Procedure: CATH LAB-[facility] W/POSSIBLE RADIAL APPROACH-CARIE;  Surgeon: Len[facility] Surgery;  [place] [facility]  Service:     MULTIPLE CORONARY ARTERY BYPASS  2014    CABG x 3    LEONILA BY LAPAROSCOPY  2010    KNEE ARTHROSCOPY      Arthroscopy, Knee       Past Medical History:   Diagnosis Date    Arthritis     Hands/elbows    Block, bundle branch, left     CAD (coronary artery disease)     Cataract      Early stages, not removed    Class 2 obesity due to excess calories in adult 07/10/2019    Diabetes ([facility]) 2006    On oral medications, now well controlled    Fibromyalgia 04/26/2018 5-6/10    GERD (gastroesophageal reflux disease)     Gout     Hyperlipidemia     Hypertension     Ischemic cardiomyopathy 08/2024    Echocardiogram with normal LV size, LVEF 59%. Normal RA, LA and RV. Trace MR, mild TR. RVSP 29mmHg. Aortic root 3.8cm, ascending aorta 3.3cm.    Myocardial infarct ([facility]) 2007    Myocardial infarct ([facility]) 2012    Neuropathy ([facility])     Bilateral feet    Pacemaker     Psychiatric problem     Depression and anxiety    S/P colonoscopy     repeat 6 years at age of 61.    Sleep apnea     Uses CPAP    Spinal puncture headache 12/22/2022       Current Outpatient Medications   Medication Sig Dispense Refill    tadalafil (CIALIS) 5 MG tablet Take 1 Tablet by mouth every day for 360 days. 90 Tablet 3    albuterol 108 (90 Base) MCG/ACT Aero Soln inhalation aerosol Inhale 2 Puffs every four hours as needed for Shortness of Breath. 18 g 2    sotalol (BETAPACE) 80 MG Tab Take 1 Tablet by mouth 2 times a day. 200 Tablet 3    metoprolol SR (TOPROL XL) 50 MG TABLET SR 24 HR Take 1 Tablet by mouth every day. 100 Tablet 3    apixaban (ELIQUIS) 5mg Tab Take 1 Tablet by mouth 2 times a day. 200 Tablet 3    atorvastatin (LIPITOR) 40 MG Tab Take 1 Tablet by mouth every day. 100 Tablet 3    spironolactone (ALDACTONE) 25 MG Tab Take 1 Tablet by mouth every day. 100 Tablet 3    sacubitril-valsartan (ENTRESTO) 24-26 MG Tab Take 1 Tablet by mouth 2 times a day. 200 Tablet 3    Blood Glucose Meter Kit Test blood sugar as recommended by provider. Abbott Freestyle Lite blood glucose monitoring kit. 1 Kit 0    DULoxetine (CYMBALTA) 60 MG Cap DR Particles delayed-release capsule Take 2 Capsules by mouth every day. 180 Capsule 0    tizanidine (ZANAFLEX) 4 MG Tab Take 1 Tablet by mouth every 6 hours as needed (PAIN). 30 Tablet 3     "ondansetron (ZOFRAN) 4 MG Tab tablet Take 1 Tablet by mouth every four hours as needed for Nausea/Vomiting. 40 Tablet 2    benzonatate (TESSALON) 100 MG Cap Take 1 Capsule by mouth 3 times a day as needed for Cough. 90 Capsule 2    zonisamide (ZONEGRAN) 100 MG Cap Take 3 Capsules by mouth every day. 270 Capsule 2    baclofen (LIORESAL) 10 MG Tab Take 1 Tablet by mouth 2 times a day as needed (pain). 180 Tablet 2    pantoprazole (PROTONIX) 40 MG Tablet Delayed Response Take 1 Tablet by mouth every day. 90 Tablet 2    Respiratory Therapy Supplies (CARETOUCH CPAP & BIPAP HOSE) Misc ** 10/7 cm H2O **  with sleep via mask - Prior sleep study in [place] 5 years ago, told severe sleep apnea. He thinks 72 AHI. Titration study 10-23-18 showed Both CPAP and BiPAP eliminated hypopneas and apneas, but BiPAP corrected the hypoxia better. for Lifetime      Empagliflozin-metFORMIN HCl ER (SYNJARDY XR) 12.5-1000 MG TABLET SR 24 HR Take 1 Tablet by mouth 2 times a day. 180 Tablet 2    glucose blood (FREESTYLE LITE) strip 1 Strip by Other route 4 times a day. 300 Strip 4    Insulin Pen Needle by Does not apply route.       No current facility-administered medications for this visit.       Allergies   Allergen Reactions    Iodine Anaphylaxis     Other reaction(s): Unknown       Objective  /71 (BP Location: Left arm, Patient Position: Sitting, BP Cuff Size: Adult)   Pulse 74   Temp 36.8 °C (98.2 °F) (Temporal)   Ht 1.854 m (6' 1\")   Wt 76.2 kg (168 lb)   SpO2 98%   Physical Exam  Constitutional:       Appearance: Normal appearance.   HENT:      Head: Normocephalic and atraumatic.   Pulmonary:      Effort: Pulmonary effort is normal.   Abdominal:      General: Abdomen is flat. There is no distension.      Palpations: Abdomen is soft.      Tenderness: There is no abdominal tenderness. There is no right CVA tenderness or left CVA tenderness.   Skin:     General: Skin is warm and dry.   Neurological:      General: No focal " deficit present.      Mental Status: He is alert.   Psychiatric:         Mood and Affect: Mood normal.         Behavior: Behavior normal.         Labs:   POCT UA   Lab Results   Component Value Date/Time    POCCOLOR yellow 09/03/2024 09:05 AM    POCAPPEAR clear 09/03/2024 09:05 AM    POCLEUKEST neg 09/03/2024 09:05 AM    POCNITRITE neg 09/03/2024 09:05 AM    POCUROBILIGE 1.0 09/03/2024 09:05 AM    POCPROTEIN neg 09/03/2024 09:05 AM    POCURPH 6.0 09/03/2024 09:05 AM    POCBLOOD neg 09/03/2024 09:05 AM    POCSPGRV 1.025 09/03/2024 09:05 AM    POCKETONES neg 09/03/2024 09:05 AM    POCBILIRUBIN neg 09/03/2024 09:05 AM    POCGLUCUA >=1,000 09/03/2024 09:05 AM          Imaging: none    Assessment    Due to age and presence of gross hematuria they are in the high risk category.  We discussed that the recommendation would be for a CT urogram and cystoscopy to evaluate the upper tracts and the bladder for possible malignancy.  We reviewed the various causes of hematuria including benign or malignant conditions such as kidney stones, urinary tract infection, kidney cancer, bladder cancer, etc. We reviewed the risks and benefits of the cystoscopy procedure including hematuria, dysuria, and urinary tract infection.  The patient indicated their understanding and agreement with this plan.     We reviewed the treatment options for BPH with LUTS including observation, treatment with medication (tamsulosin, finasteride, etc.), and surgical management with TURP, PVP, Urolift, aquablation, or robotic simple prostatectomy.  We further discussed the indications for surgical management of BPH including recurrent urinary tract infections, hematuria, kidney dysfunction, bladder stones, and urinary retention. The patient wishes to proceed with Tadalafil .     Cystoscopy: Not currently indicated    Plan    Problem List Items Addressed This Visit    None  Visit Diagnoses       Microscopic hematuria        Relevant Orders    POCT Urinalysis  (Completed)    POCT Bladder Scan (Completed)    Basic Metabolic Panel    CT-ABDOMEN & PELVIS UROGRAM    PROSTATE SPECIFIC AG SCREENING    Encounter for screening for malignant neoplasm of prostate        Relevant Orders    PROSTATE SPECIFIC AG SCREENING    Gross hematuria              RTC for cystoscopy in 2-4 weeks  CT urogram and BMP ordered  PSA screening  Start Tadalafil 5 mg daily

## 2024-09-04 ENCOUNTER — TELEPHONE (OUTPATIENT)
Dept: UROLOGY | Facility: MEDICAL CENTER | Age: 59
End: 2024-09-04
Payer: MEDICARE

## 2024-09-04 DIAGNOSIS — N40.1 BPH WITH OBSTRUCTION/LOWER URINARY TRACT SYMPTOMS: ICD-10-CM

## 2024-09-04 DIAGNOSIS — N13.8 BPH WITH OBSTRUCTION/LOWER URINARY TRACT SYMPTOMS: ICD-10-CM

## 2024-09-04 RX ORDER — TADALAFIL 5 MG/1
5 TABLET ORAL DAILY
Qty: 90 TABLET | Refills: 3 | Status: SHIPPED | OUTPATIENT
Start: 2024-09-04 | End: 2025-08-30

## 2024-09-04 NOTE — TELEPHONE ENCOUNTER
VOICEMAIL  1. Caller Name: Juaquin Solorzano                      Call Back Number: [phone]    2. Message: pt called and lm requesting for his Tadalafil to be resent to [facility] in [place].    3. Patient approves office to leave a detailed voicemail/MyChart message: yes  
[facility]

## 2024-09-07 ENCOUNTER — HOSPITAL ENCOUNTER (OUTPATIENT)
Dept: RADIOLOGY | Facility: MEDICAL CENTER | Age: 59
End: 2024-09-07
Attending: STUDENT IN AN ORGANIZED HEALTH CARE EDUCATION/TRAINING PROGRAM
Payer: MEDICARE

## 2024-09-07 ENCOUNTER — HOSPITAL ENCOUNTER (OUTPATIENT)
Dept: RADIOLOGY | Facility: MEDICAL CENTER | Age: 59
End: 2024-09-07
Attending: PHYSICIAN ASSISTANT
Payer: MEDICARE

## 2024-09-07 DIAGNOSIS — R06.02 SHORTNESS OF BREATH: ICD-10-CM

## 2024-09-07 DIAGNOSIS — R31.29 MICROSCOPIC HEMATURIA: ICD-10-CM

## 2024-09-07 PROCEDURE — 700117 HCHG RX CONTRAST REV CODE 255: Performed by: STUDENT IN AN ORGANIZED HEALTH CARE EDUCATION/TRAINING PROGRAM

## 2024-09-07 PROCEDURE — 74178 CT ABD&PLV WO CNTR FLWD CNTR: CPT

## 2024-09-07 PROCEDURE — 71250 CT THORAX DX C-: CPT

## 2024-09-07 RX ADMIN — IOHEXOL 80 ML: 350 INJECTION, SOLUTION INTRAVENOUS at 14:48

## 2024-09-24 ENCOUNTER — OFFICE VISIT (OUTPATIENT)
Dept: MEDICAL GROUP | Facility: CLINIC | Age: 59
End: 2024-09-24
Payer: MEDICARE

## 2024-09-24 VITALS
TEMPERATURE: 97.2 F | HEART RATE: 71 BPM | RESPIRATION RATE: 16 BRPM | SYSTOLIC BLOOD PRESSURE: 110 MMHG | WEIGHT: 175.04 LBS | DIASTOLIC BLOOD PRESSURE: 70 MMHG | OXYGEN SATURATION: 96 % | HEIGHT: 74 IN | BODY MASS INDEX: 22.46 KG/M2

## 2024-09-24 DIAGNOSIS — R06.02 SHORTNESS OF BREATH: ICD-10-CM

## 2024-09-24 DIAGNOSIS — I25.5 ISCHEMIC CARDIOMYOPATHY: ICD-10-CM

## 2024-09-24 DIAGNOSIS — E11.8 TYPE 2 DIABETES MELLITUS WITH COMPLICATION, WITH LONG-TERM CURRENT USE OF INSULIN (HCC): ICD-10-CM

## 2024-09-24 DIAGNOSIS — E08.42 DIABETIC POLYNEUROPATHY ASSOCIATED WITH DIABETES MELLITUS DUE TO UNDERLYING CONDITION (HCC): ICD-10-CM

## 2024-09-24 DIAGNOSIS — Z79.4 TYPE 2 DIABETES MELLITUS WITH COMPLICATION, WITH LONG-TERM CURRENT USE OF INSULIN (HCC): ICD-10-CM

## 2024-09-24 DIAGNOSIS — Z95.810 PRESENCE OF BIVENTRICULAR AICD: ICD-10-CM

## 2024-09-24 DIAGNOSIS — Z95.1 PRESENCE OF AORTOCORONARY BYPASS GRAFT: ICD-10-CM

## 2024-09-24 DIAGNOSIS — Z95.1 S/P CABG X 3: ICD-10-CM

## 2024-09-24 DIAGNOSIS — N20.0 KIDNEY STONE: ICD-10-CM

## 2024-09-24 DIAGNOSIS — E78.2 MIXED HYPERLIPIDEMIA: ICD-10-CM

## 2024-09-24 DIAGNOSIS — I25.10 CORONARY ARTERY DISEASE INVOLVING NATIVE CORONARY ARTERY OF NATIVE HEART WITHOUT ANGINA PECTORIS: ICD-10-CM

## 2024-09-24 DIAGNOSIS — F33.0 MAJOR DEPRESSIVE DISORDER, RECURRENT, MILD (HCC): ICD-10-CM

## 2024-09-24 PROBLEM — M51.36 OTHER INTERVERTEBRAL DISC DEGENERATION, LUMBAR REGION: Status: ACTIVE | Noted: 2022-11-26

## 2024-09-24 PROBLEM — M51.369 OTHER INTERVERTEBRAL DISC DEGENERATION, LUMBAR REGION: Status: ACTIVE | Noted: 2022-11-26

## 2024-09-24 PROBLEM — G40.009 LOCALIZATION-RELATED (FOCAL) (PARTIAL) IDIOPATHIC EPILEPSY AND EPILEPTIC SYNDROMES WITH SEIZURES OF LOCALIZED ONSET, NOT INTRACTABLE, WITHOUT STATUS EPILEPTICUS (HCC): Status: ACTIVE | Noted: 2022-11-26

## 2024-09-24 PROBLEM — R25.1 TREMOR, UNSPECIFIED: Status: ACTIVE | Noted: 2022-11-26

## 2024-09-24 PROBLEM — I95.9 HYPOTENSION, UNSPECIFIED: Status: ACTIVE | Noted: 2022-11-26

## 2024-09-24 PROBLEM — I25.2 OLD MYOCARDIAL INFARCTION: Status: ACTIVE | Noted: 2022-11-26

## 2024-09-24 PROCEDURE — 3074F SYST BP LT 130 MM HG: CPT | Performed by: PHYSICIAN ASSISTANT

## 2024-09-24 PROCEDURE — 99214 OFFICE O/P EST MOD 30 MIN: CPT | Performed by: PHYSICIAN ASSISTANT

## 2024-09-24 PROCEDURE — 3078F DIAST BP <80 MM HG: CPT | Performed by: PHYSICIAN ASSISTANT

## 2024-09-24 RX ORDER — FLUTICASONE PROPIONATE AND SALMETEROL 100; 50 UG/1; UG/1
1 POWDER RESPIRATORY (INHALATION) EVERY 12 HOURS
Qty: 3 EACH | Refills: 1 | Status: SHIPPED | OUTPATIENT
Start: 2024-09-24

## 2024-09-24 ASSESSMENT — FIBROSIS 4 INDEX: FIB4 SCORE: 1.22

## 2024-09-24 NOTE — PROGRESS NOTES
cc:  follow up     Subjective:     Juaquin Solorzano is a 59 y.o. male presenting for follow up        History of Present Illness  The patient is a 59-year-old male who presents to the office today for follow-up of a CT scan of the chest, which shows a nonobstructing right renal stone and coronary artery calcifications.    He has been seen by urology, who ordered a CT urogram revealing a 3 mm nonobstructing stone. He has an upcoming appointment with Urology for a cystoscopy. He is unsure if he has abnormal cells in his bladder.    He has experienced weight gain since his last visit. His primary concern is his heart health, and he is curious about dietary changes he should make due to his calcification. He last saw his cardiologist on 08/21/2024.    He reports a slight improvement in his shortness of breath with the use of albuterol, but not significantly. He uses the albuterol inhaler between 3 to 7 times a day.    He visited the pain clinic for his back, but it was without contrast.    ALLERGIES  He is allergic to IODINE.       Review of systems:  See above.   Denies any symptoms unless previously indicated.        Current Outpatient Medications:     tadalafil (CIALIS) 5 MG tablet, Take 1 Tablet by mouth every day for 360 days., Disp: 90 Tablet, Rfl: 3    albuterol 108 (90 Base) MCG/ACT Aero Soln inhalation aerosol, Inhale 2 Puffs every four hours as needed for Shortness of Breath., Disp: 18 g, Rfl: 2    sotalol (BETAPACE) 80 MG Tab, Take 1 Tablet by mouth 2 times a day., Disp: 200 Tablet, Rfl: 3    metoprolol SR (TOPROL XL) 50 MG TABLET SR 24 HR, Take 1 Tablet by mouth every day., Disp: 100 Tablet, Rfl: 3    apixaban (ELIQUIS) 5mg Tab, Take 1 Tablet by mouth 2 times a day., Disp: 200 Tablet, Rfl: 3    atorvastatin (LIPITOR) 40 MG Tab, Take 1 Tablet by mouth every day., Disp: 100 Tablet, Rfl: 3    spironolactone (ALDACTONE) 25 MG Tab, Take 1 Tablet by mouth every day., Disp: 100 Tablet, Rfl: 3     sacubitril-valsartan (ENTRESTO) 24-26 MG Tab, Take 1 Tablet by mouth 2 times a day., Disp: 200 Tablet, Rfl: 3    Blood Glucose Meter Kit, Test blood sugar as recommended by provider. Abbott Freestyle Lite blood glucose monitoring kit., Disp: 1 Kit, Rfl: 0    DULoxetine (CYMBALTA) 60 MG Cap DR Particles delayed-release capsule, Take 2 Capsules by mouth every day., Disp: 180 Capsule, Rfl: 0    tizanidine (ZANAFLEX) 4 MG Tab, Take 1 Tablet by mouth every 6 hours as needed (PAIN)., Disp: 30 Tablet, Rfl: 3    ondansetron (ZOFRAN) 4 MG Tab tablet, Take 1 Tablet by mouth every four hours as needed for Nausea/Vomiting., Disp: 40 Tablet, Rfl: 2    benzonatate (TESSALON) 100 MG Cap, Take 1 Capsule by mouth 3 times a day as needed for Cough., Disp: 90 Capsule, Rfl: 2    zonisamide (ZONEGRAN) 100 MG Cap, Take 3 Capsules by mouth every day., Disp: 270 Capsule, Rfl: 2    baclofen (LIORESAL) 10 MG Tab, Take 1 Tablet by mouth 2 times a day as needed (pain)., Disp: 180 Tablet, Rfl: 2    pantoprazole (PROTONIX) 40 MG Tablet Delayed Response, Take 1 Tablet by mouth every day., Disp: 90 Tablet, Rfl: 2    Respiratory Therapy Supplies (CARETOUCH CPAP & BIPAP HOSE) Misc, ** 10/7 cm H2O **  with sleep via mask - Prior sleep study in [place] 5 years ago, told severe sleep apnea. He thinks 72 AHI. Titration study 10-23-18 showed Both CPAP and BiPAP eliminated hypopneas and apneas, but BiPAP corrected the hypoxia better. for Lifetime, Disp: , Rfl:     Empagliflozin-metFORMIN HCl ER (SYNJARDY XR) 12.5-1000 MG TABLET SR 24 HR, Take 1 Tablet by mouth 2 times a day., Disp: 180 Tablet, Rfl: 2    glucose blood (FREESTYLE LITE) strip, 1 Strip by Other route 4 times a day., Disp: 300 Strip, Rfl: 4    Insulin Pen Needle, by Does not apply route., Disp: , Rfl:     Allergies, past medical history, past surgical history, family history, social history reviewed and updated    Objective:     Vitals: /70 (BP Location: Left arm, Patient Position:  "Sitting, BP Cuff Size: Small adult)   Pulse 71   Temp 36.2 °C (97.2 °F) (Temporal)   Resp 16   Ht 1.88 m (6' 2\")   Wt 79.4 kg (175 lb 0.7 oz)   SpO2 96%   BMI 22.47 kg/m²   General: Alert, pleasant, NAD  EYES:   PERRL, EOMI, no icterus or pallor.  Conjunctivae and lids normal.   HENT:  Normocephalic.  External ears normal. .  Neck supple.    Heart: Regular rate and rhythm.  S1 and S2 normal.  No murmurs appreciated.  Respiratory: Normal respiratory effort.    Abdomen: Not obese  Skin: Warm, dry, no rashes.  Musculoskeletal: Gait is normal.  Moves all extremities well.    Extremities: normal range of motion all extremities.   Neurological: No tremors, sensation grossly intact, CN2-12 intact.  Psych:  Affect/mood is normal, judgement is good, memory is intact, grooming is appropriate.       Results  Imaging  CT of the chest shows coronary artery calcifications. CT urogram shows a 3 mm nonobstructing stone in the right kidney.  CT-ABDOMEN & PELVIS UROGRAM  Order: [id number]   Status: Final result       Visible to patient: Yes (seen)       Next appt: 10/07/2024 at 10:00 AM in Urology (Corine Godfrey M.D.)       Dx: Microscopic hematuria    1 Result Note       1 Patient Communication  Details    Reading Physician Reading Date Result Priority   Lele Wynne M.D.  [phone] 9/9/2024      Narrative & Impression     9/7/2024 2:11 PM     HISTORY/REASON FOR EXAM:  Microscopic hematuria.        TECHNIQUE/EXAM DESCRIPTION:  CT Urogram     Initial precontrast images were obtained from the diaphragmatic domes through the pubic symphysis using helical technique.  Following this, 80 mL of Omnipaque 350 nonionic contrast was administered, and postcontrast nephrographic phase thin-section helical scanning obtained from the diaphragmatic domes through the pubic symphysis. Additional 10-minute delayed imaging is   performed from the diaphragmatic domes through the pubic symphysis to evaluate the ureters. Coronal MIP " reconstructions of the delayed phase are also performed. Low dose optimization technique was utilized for this CT exam including automated exposure   control and adjustment of the mA and/or kV according to patient size.     COMPARISON: None.     FINDINGS:  Lung: Visualized lung bases are clear.  Liver: No focal liver lesion. Focal benign liver calcification noted.  Spleen: Normal in size, without focal lesion  Adrenal glands: Normal  Pancreas: Normal  Gallbladder: Surgically absent  Biliary: No biliary duct dilation visualized.  Kidneys: 3 mm nonobstructing right renal stone. No cystic or solid renal lesion. No filling defect on delayed urographic phase images.  Vasculature: Nonaneurysmal  Lymph nodes: No adenopathy  Bowel: No evidence of obstruction or acute inflammation  Peritoneum: There are a few gallstones within the peritoneal cavity, likely related to previous cholecystectomy.  Pelvis: Urinary bladder is grossly normal. No pelvic mass or adenopathy.  Musculoskeletal: No acute abnormality or concerning osseous lesion     IMPRESSION:        1. 3 mm nonobstructing right renal stone. Otherwise, the kidneys and bladder appear normal.  2. Otherwise, negative.           Bosniak classification: Not Applicable    CT-CHEST (THORAX) W/O  Order: [id number]   Status: Final result       Visible to patient: Yes (seen)       Next appt: 10/07/2024 at 10:00 AM in Urology (Corine Godfrey M.D.)       Dx: Shortness of breath    1 Result Note       1 Patient Communication  Details    Reading Physician Reading Date Result Priority   Lele Wynne M.D.  [phone] 9/9/2024      Narrative & Impression     9/7/2024 2:07 PM     HISTORY/REASON FOR EXAM:  Shortness of breath for 6 months.        TECHNIQUE/EXAM DESCRIPTION:  CT scan of the chest without contrast.  Noncontrast helical scanning of the chest was obtained from the lung apices through the adrenal glands.  Low dose optimization technique was utilized for this CT exam  including automated exposure control and adjustment of the mA and/or kV according to patient size. Lack of intravenous contrast limits solid organ and vascular evaluation.     COMPARISON: None.     FINDINGS:  Lungs: No nodule or airspace process.  Mediastinum/Elizabeth: No adenopathy.  Pleura: No pleural effusion.  Cardiac: Heart normal in size There is coronary artery calcification. Pacer noted.  Vascular: Aorta is nonaneurysmal.  Soft tissues: Unremarkable.  Upper abdomen: Nonobstructing right renal stone noted. Previous cholecystectomy. Benign liver calcification. Elevated right hemidiaphragm.  Bones: No acute process or concerning osseous lesion.     IMPRESSION:        1. No acute process.  2. Nonobstructing right renal stone.  3. Coronary artery calcifications.       Assessment/Plan:     There are no diagnoses linked to this encounter.    Assessment & Plan  1. Shortness of breath.  Minimally improved with albuterol. He will be started on Advair inhaler, which has a cumulative effect and may need time to show improvement. A pulmonary function test will be obtained, and a referral to pulmonary medicine will be made if shortness of breath does not improve. He is advised to contact sooner if there are any issues with the inhaler.    2. Type 2 diabetes.  Continue with current medications.    3. Mixed hyperlipidemia.  He is already on a large dose of cholesterol medication. Advised to continue with current medication regimen, maintain a low-cholesterol diet, and engage in regular exercise.    4. Status post CABG x3.  Continue to follow up with cardiology for ongoing monitoring.    5. Presence of biventricular AICD.  Continue to follow up with cardiology for ongoing monitoring.    6. Presence of acute coronary bypass graft.  Continue to follow up with cardiology for ongoing monitoring.    7. Ischemic cardiomyopathy.  Continue to follow up with cardiology for ongoing monitoring.    8. Coronary artery disease.  CT results  show coronary artery calcifications. Advised to continue with current medication regimen, maintain a low-cholesterol diet, and engage in regular exercise. Follow up with cardiology as needed.    9. Recurrent major depressive disorder.  Currently stable. No changes reported. Continue with duloxetine.    10. Kidney stone.  3 mm, nonobstructing at this time. No treatment needed unless indicated by urology or if there are any other changes.    Follow-up  Return in 4 weeks for follow up.    No follow-ups on file.    Please note that this dictation was created using voice recognition software. I have made every reasonable attempt to correct obvious errors, but expect that there are errors of grammar and possible content that I did not discover before finalizing note.

## 2024-09-26 ENCOUNTER — PATIENT OUTREACH (OUTPATIENT)
Dept: HEALTH INFORMATION MANAGEMENT | Facility: OTHER | Age: 59
End: 2024-09-26
Payer: MEDICARE

## 2024-09-26 ENCOUNTER — PATIENT MESSAGE (OUTPATIENT)
Dept: HEALTH INFORMATION MANAGEMENT | Facility: OTHER | Age: 59
End: 2024-09-26

## 2024-09-26 DIAGNOSIS — Z79.4 TYPE 2 DIABETES MELLITUS WITH COMPLICATION, WITH LONG-TERM CURRENT USE OF INSULIN (HCC): ICD-10-CM

## 2024-09-26 DIAGNOSIS — F33.0 MAJOR DEPRESSIVE DISORDER, RECURRENT, MILD (HCC): ICD-10-CM

## 2024-09-26 DIAGNOSIS — I10 ESSENTIAL HYPERTENSION: ICD-10-CM

## 2024-09-26 DIAGNOSIS — I25.10 CORONARY ARTERY DISEASE, UNSPECIFIED VESSEL OR LESION TYPE, UNSPECIFIED WHETHER ANGINA PRESENT, UNSPECIFIED WHETHER NATIVE OR TRANSPLANTED HEART: ICD-10-CM

## 2024-09-26 DIAGNOSIS — I50.20 ACC/AHA STAGE C SYSTOLIC HEART FAILURE (HCC): ICD-10-CM

## 2024-09-26 DIAGNOSIS — E11.8 TYPE 2 DIABETES MELLITUS WITH COMPLICATION, WITH LONG-TERM CURRENT USE OF INSULIN (HCC): ICD-10-CM

## 2024-09-26 NOTE — PROGRESS NOTES
"Assessment    Reached out to patient for monthly PCM follow up call. Per patient, he is aware of all upcoming healthcare appointment dates and times.  Patient denied having any falls or ED hospital visits in the last month.  Patient denied having any medication changes in the last month.  Patient requesting [facility] transportation to medical appointments.  Chart routed to KAREN MEDINA  in order to further assist him with this resource.  Last hemoglobin A1c noted at 5.0% on 8/13/2024.  Patient noted that he has been having a little bit of a lack of appetite that has been going on for a long time now and his PCP is aware.  Patient reported that he is in the process of filling out some paperwork in order to schedule his behavioral health appointments.  Patient reports checking his blood pressure at home whenever he feels necessary.  Patient reported ranging between 90/58 to 110/65.  Patient stated that this is his normal range and his PCP is aware of these mildly low blood pressure values being his normal.  Patient denied having any cardiac distress symptoms or hypotension symptoms, such as lightheadedness or dizziness.  Patient noted that he sticks to \"no added salt\" in his diet as well.  Patient denied having any edema.  Patient reported that he checks his weight once a week and has not noticed any abrupt weight gain.  Patient educated on taking his weight once daily in the morning.  Routine blood pressure monitoring at home and keeping a blood pressure log that he can bring to his PCP recommended.  Care gaps reviewed with patient.  Patient verbalized understanding.  All questions answered.    Education and Self Management of Care    Patient educated on the importance of: a diabetic and heart-healthy diet, routine labs/screening, diabetic and cardiac medication adherence, self-monitoring blood pressure at home twice a day (or as recommended by his PCP) and keeping a blood pressure log, routine daily weights, and " reporting abrupt weight gain and edema.    Plan of Care and Goals    Continue plan of care and goals set by Amber ZAMAN RN    Barriers:    Advanced age; Management of multiple chronic conditions/diagnoses; Hx/current Depression; Financial security needs    Progress:    Progressing    Next outreach: 1 month (~10/25/24)

## 2024-10-01 ENCOUNTER — PATIENT OUTREACH (OUTPATIENT)
Dept: HEALTH INFORMATION MANAGEMENT | Facility: OTHER | Age: 59
End: 2024-10-01
Payer: MEDICARE

## 2024-10-01 DIAGNOSIS — E11.8 TYPE 2 DIABETES MELLITUS WITH COMPLICATION, WITH LONG-TERM CURRENT USE OF INSULIN (HCC): ICD-10-CM

## 2024-10-01 DIAGNOSIS — Z79.4 TYPE 2 DIABETES MELLITUS WITH COMPLICATION, WITH LONG-TERM CURRENT USE OF INSULIN (HCC): ICD-10-CM

## 2024-10-07 ENCOUNTER — PROCEDURE VISIT (OUTPATIENT)
Dept: UROLOGY | Facility: MEDICAL CENTER | Age: 59
End: 2024-10-07
Payer: MEDICARE

## 2024-10-07 ENCOUNTER — NON-PROVIDER VISIT (OUTPATIENT)
Dept: CARDIOLOGY | Facility: MEDICAL CENTER | Age: 59
End: 2024-10-07

## 2024-10-07 DIAGNOSIS — R31.29 MICROSCOPIC HEMATURIA: ICD-10-CM

## 2024-10-07 LAB
APPEARANCE UR: CLEAR
BILIRUB UR STRIP-MCNC: NORMAL MG/DL
COLOR UR AUTO: YELLOW
GLUCOSE UR STRIP.AUTO-MCNC: 500 MG/DL
KETONES UR STRIP.AUTO-MCNC: NORMAL MG/DL
LEUKOCYTE ESTERASE UR QL STRIP.AUTO: NORMAL
NITRITE UR QL STRIP.AUTO: NORMAL
PH UR STRIP.AUTO: 5.5 [PH] (ref 5–8)
PROT UR QL STRIP: NORMAL MG/DL
RBC UR QL AUTO: NORMAL
SP GR UR STRIP.AUTO: 1.02
UROBILINOGEN UR STRIP-MCNC: NORMAL MG/DL

## 2024-10-07 PROCEDURE — 52000 CYSTOURETHROSCOPY: CPT | Performed by: STUDENT IN AN ORGANIZED HEALTH CARE EDUCATION/TRAINING PROGRAM

## 2024-10-07 PROCEDURE — 81002 URINALYSIS NONAUTO W/O SCOPE: CPT | Performed by: STUDENT IN AN ORGANIZED HEALTH CARE EDUCATION/TRAINING PROGRAM

## 2024-10-08 PROCEDURE — 93295 DEV INTERROG REMOTE 1/2/MLT: CPT | Performed by: STUDENT IN AN ORGANIZED HEALTH CARE EDUCATION/TRAINING PROGRAM

## 2024-10-24 ENCOUNTER — PATIENT OUTREACH (OUTPATIENT)
Dept: HEALTH INFORMATION MANAGEMENT | Facility: OTHER | Age: 59
End: 2024-10-24
Payer: MEDICARE

## 2024-10-24 DIAGNOSIS — I50.20 ACC/AHA STAGE C SYSTOLIC HEART FAILURE (HCC): ICD-10-CM

## 2024-10-24 DIAGNOSIS — E11.8 TYPE 2 DIABETES MELLITUS WITH COMPLICATION, WITH LONG-TERM CURRENT USE OF INSULIN (HCC): ICD-10-CM

## 2024-10-24 DIAGNOSIS — I10 ESSENTIAL HYPERTENSION: ICD-10-CM

## 2024-10-24 DIAGNOSIS — Z79.4 TYPE 2 DIABETES MELLITUS WITH COMPLICATION, WITH LONG-TERM CURRENT USE OF INSULIN (HCC): ICD-10-CM

## 2024-10-31 NOTE — CARE PLAN
Problem: Knowledge deficient regarding condition, treatment regimen, and self care- Short-term  Goal: Increase understanding of cardiac function/disease/failure  Outcome: Progressing  Intervention: Review signs and symptoms that require immediate medical attention: rapid and significant weight gain, edema, shortness of breath, increased fatigue, cough, hemoptysis, fever     Problem: Knowledge deficit of congestive heart failure disease process- Short-term  Goal: HP Increase understanding of congestive heart failure  Outcome: Progressing  Intervention: Educate on daily weight and reporting weight gain of 3 lbs in a day or 5 lbs in a week     Problem: Excess Fluid- Long-term  Goal: Establish a plan for excess fluid management  Outcome: Progressing  Intervention: Check weight daily     Problem: Knowledge deficit of hypertension- Short-term  Goal: Demonstrate Knowledge of Hypertension  Outcome: Progressing  Intervention: Define hypertension in lay terms     Problem: Knowledge deficit of factors contributing to hypertension- Short-term  Goal: Demonstrate factors that can contribute to high blood pressure  Outcome: Progressing  Note: Patient has verbalized understanding of factors contributing to high blood pressure   Intervention: Educate patient on role of sodium in diet

## 2024-10-31 NOTE — PROGRESS NOTES
Assessment    Reached out to patient for monthly PCM follow up call. Per patient, he is aware of all upcoming healthcare appointment dates and times. Patient denied having any falls, ED/Emergency hospital visits, or medication changes in the last month. Patient once again requesting assistance with [employer] transportation to medical appointments. Patient had spoken to CHW Chelsey MEDINA last month regarding this, in which she encouraged the patient to call [employer] transportation in order to see if he qualified for their services. Patient reported that he has not called them yet and I provided their contact information in order for him to call and see if he qualifies. Patient verbalized understanding and noted that he intends to call them soon. Last hemoglobin A1c in chart noted at 5.0% on 8/13/2024. Patient is still sticking to his diabetic and cardiac medication regimen, noting that he takes all of his prescribed medications as directed. Patient denied having any edema and noted that he would seek medical advice if he did notice any abnormal swelling related to his cardiac history. Patient educated on CHF exacerbation symptoms to watch out for (chest pain, SOB, excessive swelling, headache, fatigue, etc.) and seek emergency medical care for as necessary. Patient stated that he checks his weight once a week and patient education was provided on checking his weight once daily instead in order to be able to report abrupt weight gain. Patient stated that his blood pressures at home have been in the 100s/60s and stated that his PCP is aware and unconcerned with his typical home blood pressure range. Care gaps discussed with patient and patient verbalized understanding. All questions answered.     Education    Patient education provided on the importance of: a heart-healthy low-salt diet, daily weight monitoring, reporting abrupt weight gain to your PCP,  monitoring for edema and reporting it as advised by your PCP, cardiac  medication regimen adherence, checking blood pressure at home routinely (or as advised by their PCP) and keeping a blood pressure log, adequate amounts of physical activity (150 min/week), and routine labs dependent on his medical diagnoses/history (Hgb A1c, lipid panel, etc.).    Plan of Care and Goals    See Care Plan note above    Barriers:    Advanced age; Management of multiple chronic conditions/diagnoses; Transportation needs    Progress:    Progressing    Next outreach: 1 month (~11/27/24)

## 2024-11-04 ENCOUNTER — OFFICE VISIT (OUTPATIENT)
Dept: NEUROLOGY | Facility: MEDICAL CENTER | Age: 59
End: 2024-11-04
Attending: PSYCHIATRY & NEUROLOGY
Payer: MEDICARE

## 2024-11-04 VITALS
RESPIRATION RATE: 14 BRPM | DIASTOLIC BLOOD PRESSURE: 62 MMHG | HEIGHT: 73 IN | SYSTOLIC BLOOD PRESSURE: 118 MMHG | OXYGEN SATURATION: 98 % | BODY MASS INDEX: 24.66 KG/M2 | WEIGHT: 186.07 LBS | HEART RATE: 80 BPM | TEMPERATURE: 97.1 F

## 2024-11-04 DIAGNOSIS — F02.B0 MODERATE DEMENTIA ASSOCIATED WITH OTHER UNDERLYING DISEASE, WITHOUT BEHAVIORAL DISTURBANCE, PSYCHOTIC DISTURBANCE, MOOD DISTURBANCE, OR ANXIETY (HCC): ICD-10-CM

## 2024-11-04 DIAGNOSIS — R56.9 FOCAL SEIZURES (HCC): ICD-10-CM

## 2024-11-04 PROBLEM — F03.90 DEMENTIA (HCC): Status: ACTIVE | Noted: 2024-11-04

## 2024-11-04 PROCEDURE — 99215 OFFICE O/P EST HI 40 MIN: CPT | Performed by: PSYCHIATRY & NEUROLOGY

## 2024-11-04 PROCEDURE — 3074F SYST BP LT 130 MM HG: CPT | Performed by: PSYCHIATRY & NEUROLOGY

## 2024-11-04 PROCEDURE — 3078F DIAST BP <80 MM HG: CPT | Performed by: PSYCHIATRY & NEUROLOGY

## 2024-11-04 PROCEDURE — G2212 PROLONG OUTPT/OFFICE VIS: HCPCS | Performed by: PSYCHIATRY & NEUROLOGY

## 2024-11-04 PROCEDURE — 99212 OFFICE O/P EST SF 10 MIN: CPT | Performed by: PSYCHIATRY & NEUROLOGY

## 2024-11-04 RX ORDER — ZONISAMIDE 100 MG/1
300 CAPSULE ORAL DAILY
Qty: 270 CAPSULE | Refills: 2 | Status: SHIPPED | OUTPATIENT
Start: 2024-11-04

## 2024-11-04 ASSESSMENT — MONTREAL COGNITIVE ASSESSMENT (MOCA)
10. [FLUENCY] NAME WORDS STARTING WITH DESIGNATED LETTER: 0/1
WHAT IS THE TOTAL SCORE (OUT OF 30): 20
2. COPY DRAWING: 1/1
CATEGORY CUE (IF APPLICABLE): 1
1. ALTERNATING TRAIL MAKING: 0/1
DELAYED RECALL SUBSCORE: 1/5
WHAT IS THE VERSION OF MOCA ADMINISTERED: 8.1
5. MEMORY TRIALS: SECOND TRIAL
9. REPEAT EACH SENTENCE: 1/2
8. SERIAL SUBTRACTION OF 7S: 2 OR 3/5
4. NAME EACH OF THE THREE ANIMALS SHOWN: 3/3
ADD 1 POINT IF LESS THAN OR EQUAL TO 12 YR EDUCATION LEVEL: 1
11. FOR EACH PAIR OF WORDS, WHAT CATEGORY DO THEY BELONG TO (OUT OF 2): 1/2
ORIENTATION SUBSCORE: 6/6
6. READ LIST OF DIGITS [FORWARD/BACKWARD]: 1/2
7. [VIGILENCE] TAP WHEN HEARING DESIGNATED LETTER: 1/1
3. DRAW A CLOCK: CONTOUR, NUMBERS, HANDS: 2/3

## 2024-11-04 ASSESSMENT — ENCOUNTER SYMPTOMS
DIZZINESS: 0
MEMORY LOSS: 1
SEIZURES: 0
DEPRESSION: 1
INSOMNIA: 1
LOSS OF CONSCIOUSNESS: 0
HALLUCINATIONS: 0
FALLS: 0

## 2024-11-04 ASSESSMENT — FIBROSIS 4 INDEX: FIB4 SCORE: 1.22

## 2024-11-04 ASSESSMENT — PATIENT HEALTH QUESTIONNAIRE - PHQ9
CLINICAL INTERPRETATION OF PHQ2 SCORE: 3
5. POOR APPETITE OR OVEREATING: 3 - NEARLY EVERY DAY
SUM OF ALL RESPONSES TO PHQ QUESTIONS 1-9: 14

## 2024-11-05 NOTE — ASSESSMENT & PLAN NOTE
Stable, EEG was while on medication, certainly nothing of concern when it comes to seizure control, probably not predictive of any type of issue regarding his orthostatic syncopal events in the 1 with transient aphasia.  We continue Zonegran 300 mg nightly.  Orders:    zonisamide (ZONEGRAN) 100 MG Cap; Take 3 Capsules by mouth every day.

## 2024-11-05 NOTE — ASSESSMENT & PLAN NOTE
He does have symptoms strongly suggestive of actual dementia, the deficits involving more than just a single cognitive domain, the sum total leaving him significantly impaired with loss of independence and functional status.  The issue for him is that he is a little young, there is a family history of dementia but in a second-degree relative only.  Other causes of cognitive decline do need to be assessed, including NPH, CNS vascular disease, even other degenerative diagnoses such as FTD.  I doubt that this is Parkinson's disease, Lewy body disease, etc.    Imaging of the brain will need to be done, he has a pacemaker in, but it can be turned off, it is MRI compatible.  A couple of routine, screening blood tests will also be ordered.  For treatment, I do not think that symptomatic relief with one of the cholinesterase inhibitors really will prove effective for him.  Between he and Cherise, they are getting along.  He Morrissy.  While he did alisha was encouraged to remain as active as he can be, maintaining both cognitive as well as social engagement whenever possible.  His cardiac condition limits physical activity unfortunately.  Allowing him to do as much as he can around the house can be helpful.  I do not think that medications will prove effective for him symptomatically, his illness is far too significant for any of the newer monoclonal antibody treatments to be indicated.    We will contact him with results of the MRI scan of his brain when available.  He is scheduled for imaging of the spinal axis in the not-too-distant future.  We will follow-up otherwise in 4 months or so.    Orders:    MR-BRAIN-W/O; Future    Patient has been identified as having a positive depression screening. Appropriate orders and counseling have been given.    VIT B12,  FOLIC ACID    Sed Rate; Future    SPEP W/REFLEX TO EUGENIO CALLAHAN G, M; Future

## 2024-11-05 NOTE — PROGRESS NOTES
Subjective     Juaquin Solorzano is a 59 y.o. male who presents with his wife Cherise, who provides additional history, for follow-up, with complaints of several years with progressive cognitive impairment.  He has a history of seizures and had suffered from an event of transient confusion with aphasia, now status post EEG.     HPI    Memory loss: Juaquin and Cherise both recognize the memory difficulties that he has been dealing with over the last couple of years.  These have been progressing.  Short-term memory is most noticeably impaired, he requires multiple cueing, he repeats himself as a result because he cannot remember from moment to moment.  If the events are recall, with cueing or without, details are much more commonly forgotten.  He is having more difficulty with routine tasks, taking him longer to complete.  As a result he simply leaves things undone.  Multitasking is becoming more more difficult.  Even with a list in hand, he does not complete everything.    He is leaving things around the house, his sense of organization has been curtailed, things are much more readily getting lost.  When found it is more of luck than an actual recall.  He certainly does not remember leaving them where they are found.  He feels much more easily overwhelmed with simple tasks.    He does use the appliances appropriately, but he will then watch a TV show or shows, and then forget what he has watched, often read watching over and over as if never seen before.    Personal appearance has not been derailed.  He has difficulty with his medications, Cherise has to help him put them out on a scheduled basis, but he does take them consistently.  There is only an occasional urinary urge incontinence simply because he does not get himself to the bathroom in time.  He seems to be independent with his ADLs.  There have been no real changes in his personality or behavior, there has been no impulsive behaviors, delirium or  "agitation, he does not wander off the property.  He does sleep well though he has a history of CLARE, he has not used his CPAP in some time.  He did lose a lot of weight, but has never followed through.    He has a chronic history of depressive symptomatology but has never actually suffered from severe depression.  He is on Cymbalta 120 mg daily.  He denies SI or HI.    Seizures have been stable, he is on Zonegran 300 mg nightly.  He has not had another event of actual syncope, none of these except 1 was associated with language deficits.  These all seem to be more of an orthostatic process, his cardiac medication regimen has been changed.  His EEG from  was normal.  Imaging of the brain has not been done.    Medical, surgical and family histories are reviewed, there are no new drug allergies.  No one in the immediate family has suffered from dementia, a grandparent did.  His father suffered from stroke.  His mother  from complications of pneumonia.  He quit tobacco use 12 years ago, he does not drink.    He is on Zonegran 300 mg nightly, Aldactone, Entresto, Lipitor, Cymbalta 120 mg daily, Eliquis 5 mg twice daily, Tessalon, baclofen, Toprol-XL, Zofran 4 mg as needed, Protonix, Betapace, Advair Diskus, Synjardy, Zanaflex and Cialis.    Review of Systems   Musculoskeletal:  Negative for falls.   Neurological:  Negative for dizziness, seizures and loss of consciousness.   Psychiatric/Behavioral:  Positive for depression and memory loss. Negative for hallucinations. The patient has insomnia.    All other systems reviewed and are negative.    Objective     /62 (BP Location: Left arm, Patient Position: Sitting, BP Cuff Size: Adult)   Pulse 80   Temp 36.2 °C (97.1 °F) (Temporal)   Resp 14   Ht 1.854 m (6' 1\")   Wt 84.4 kg (186 lb 1.1 oz)   SpO2 98%   BMI 24.55 kg/m²      Physical Exam    He appears in no acute distress, slightly disheveled, but still appropriately so.  Vital signs are " stable.  He is quite cooperative.  There is no malar rash, dentition is poor, there is no active periodontal disease.  The neck is supple, range of motion is full.  Cardiac evaluation reveals a regular rhythm.  There is no lower extremity edema.     Neurological Exam    Mental processing speed is slowed, but he is fully oriented.  MoCA is 20/30, an additional point given for high school level education.  Deficits surround more than just delayed recall with memory encoding, nerve clear difficulties with attention as well as visual-spatial executive functions.  His mental processing speed is slowed.  He can be distracted easily, but needs redirection when completing a task.  When told about Zonegran, he did not seem to have an idea of why he is taking it.  He did repeat himself during the interview.  Frontal release signs are absent.    PERRLA/EOMI, visual fields are full, facial movements are symmetric.  There is no hypophonia or bradykinesia.  There is no bulbar dysfunction, sensory exam is intact to light touch.  Shoulder shrug and head rotation are still symmetric.    Musculoskeletal exam reveals normal tone, there is no rigidity, tremor or drift.  Strength is intact at 5/5.  Reflexes are still notably diminished at the ankles, easily elicited at the knees and in the upper extremities, there are no asymmetries from side-to-side.  The toes are downgoing.    Station is normal as he walks, armswing is symmetric.  There is no appendicular dystaxia.  Fine motor control is intact in all 4 extremities.    Sensory exam reveals the stocking pattern decameter vibration but only in the feet.    Assessment & Plan     Assessment & Plan  Focal seizures (HCC)  Stable, EEG was while on medication, certainly nothing of concern when it comes to seizure control, probably not predictive of any type of issue regarding his orthostatic syncopal events in the 1 with transient aphasia.  We continue Zonegran 300 mg nightly.  Orders:     zonisamide (ZONEGRAN) 100 MG Cap; Take 3 Capsules by mouth every day.    Moderate dementia associated with other underlying disease, without behavioral disturbance, psychotic disturbance, mood disturbance, or anxiety (HCC)  He does have symptoms strongly suggestive of actual dementia, the deficits involving more than just a single cognitive domain, the sum total leaving him significantly impaired with loss of independence and functional status.  The issue for him is that he is a little young, there is a family history of dementia but in a second-degree relative only.  Other causes of cognitive decline do need to be assessed, including NPH, CNS vascular disease, even other degenerative diagnoses such as FTD.  I doubt that this is Parkinson's disease, Lewy body disease, etc.    Imaging of the brain will need to be done, he has a pacemaker in, but it can be turned off, it is MRI compatible.  A couple of routine, screening blood tests will also be ordered.  For treatment, I do not think that symptomatic relief with one of the cholinesterase inhibitors really will prove effective for him.  Between he and Cherise, they are getting along.  He Morrissy.  While he did yeah was encouraged to remain as active as he can be, maintaining both cognitive as well as social engagement whenever possible.  His cardiac condition limits physical activity unfortunately.  Allowing him to do as much as he can around the house can be helpful.  I do not think that medications will prove effective for him symptomatically, his illness is far too significant for any of the newer monoclonal antibody treatments to be indicated.    We will contact him with results of the MRI scan of his brain when available.  He is scheduled for imaging of the spinal axis in the not-too-distant future.  We will follow-up otherwise in 4 months or so.    Orders:    MR-BRAIN-W/O; Future    Patient has been identified as having a positive depression screening.  Appropriate orders and counseling have been given.    VIT B12,  FOLIC ACID    Sed Rate; Future    SPEP W/REFLEX TO SINDY, A, G, M; Future    Time: 60 minutes in total spent on patient care including pre-charting, record review, discussion with healthcare staff and documentation.  This includes face-to-face time for exam, review, discussion, as well as counseling and coordinating care.  All

## 2024-11-13 ENCOUNTER — OFFICE VISIT (OUTPATIENT)
Dept: MEDICAL GROUP | Facility: CLINIC | Age: 59
End: 2024-11-13
Payer: MEDICARE

## 2024-11-13 VITALS
RESPIRATION RATE: 16 BRPM | HEART RATE: 63 BPM | OXYGEN SATURATION: 98 % | WEIGHT: 187.83 LBS | TEMPERATURE: 97.6 F | DIASTOLIC BLOOD PRESSURE: 70 MMHG | HEIGHT: 73 IN | BODY MASS INDEX: 24.89 KG/M2 | SYSTOLIC BLOOD PRESSURE: 114 MMHG

## 2024-11-13 DIAGNOSIS — G89.29 NECK PAIN, CHRONIC: ICD-10-CM

## 2024-11-13 DIAGNOSIS — G47.33 OBSTRUCTIVE SLEEP APNEA SYNDROME: ICD-10-CM

## 2024-11-13 DIAGNOSIS — M51.362 DEGENERATION OF INTERVERTEBRAL DISC OF LUMBAR REGION WITH DISCOGENIC BACK PAIN AND LOWER EXTREMITY PAIN: ICD-10-CM

## 2024-11-13 DIAGNOSIS — Z23 NEED FOR VACCINATION: ICD-10-CM

## 2024-11-13 DIAGNOSIS — E08.40: ICD-10-CM

## 2024-11-13 DIAGNOSIS — M54.2 NECK PAIN, CHRONIC: ICD-10-CM

## 2024-11-13 DIAGNOSIS — R06.02 SHORTNESS OF BREATH: ICD-10-CM

## 2024-11-13 DIAGNOSIS — E11.8 TYPE 2 DIABETES MELLITUS WITH COMPLICATION, WITH LONG-TERM CURRENT USE OF INSULIN (HCC): ICD-10-CM

## 2024-11-13 DIAGNOSIS — M25.50 MULTIPLE JOINT PAIN: ICD-10-CM

## 2024-11-13 DIAGNOSIS — Z79.4 TYPE 2 DIABETES MELLITUS WITH COMPLICATION, WITH LONG-TERM CURRENT USE OF INSULIN (HCC): ICD-10-CM

## 2024-11-13 DIAGNOSIS — F03.90 DEMENTIA, UNSPECIFIED DEMENTIA SEVERITY, UNSPECIFIED DEMENTIA TYPE, UNSPECIFIED WHETHER BEHAVIORAL, PSYCHOTIC, OR MOOD DISTURBANCE OR ANXIETY (HCC): ICD-10-CM

## 2024-11-13 PROCEDURE — G0008 ADMIN INFLUENZA VIRUS VAC: HCPCS

## 2024-11-13 PROCEDURE — 90656 IIV3 VACC NO PRSV 0.5 ML IM: CPT

## 2024-11-13 PROCEDURE — 3074F SYST BP LT 130 MM HG: CPT | Performed by: PHYSICIAN ASSISTANT

## 2024-11-13 PROCEDURE — 99214 OFFICE O/P EST MOD 30 MIN: CPT | Mod: 25 | Performed by: PHYSICIAN ASSISTANT

## 2024-11-13 PROCEDURE — 3078F DIAST BP <80 MM HG: CPT | Performed by: PHYSICIAN ASSISTANT

## 2024-11-13 RX ORDER — BLOOD-GLUCOSE METER
1 KIT MISCELLANEOUS 4 TIMES DAILY
Qty: 400 STRIP | Refills: 3 | Status: SHIPPED | OUTPATIENT
Start: 2024-11-13

## 2024-11-13 RX ORDER — FLUTICASONE PROPIONATE AND SALMETEROL 250; 50 UG/1; UG/1
1 POWDER RESPIRATORY (INHALATION) EVERY 12 HOURS
Qty: 3 EACH | Refills: 1 | Status: SHIPPED | OUTPATIENT
Start: 2024-11-13

## 2024-11-13 ASSESSMENT — FIBROSIS 4 INDEX: FIB4 SCORE: 1.22

## 2024-11-13 NOTE — PROGRESS NOTES
cc:  shortness of breath    Subjective:     Juaquin Solorzano is a 59 y.o. male presenting for shortness of breath        History of Present Illness  The patient is a 59-year-old male who presents to the office today for a follow-up regarding shortness of breath.    He continues to experience shortness of breath despite using Advair and albuterol, with no significant change in his condition. He has not yet scheduled an appointment with Pulmonary Medicine.    He acknowledges having memory issues and is scheduled for an MRI of the brain to rule out Alzheimer's disease. He saw Dr. Hunt with neurology on 11/04/2024, who ordered an MRI of the brain and further labs. Dr. Hunt indicated that his seizures were stable but noted symptoms strongly suggestive of dementia, which have impaired his functional status and independence.    He has been diagnosed with sleep apnea and uses a CPAP machine. His last sleep study was conducted over 6 years ago. He reports no current issues with insomnia and states that he sleeps well with the CPAP machine.    He is under the care of Dr. Lucio for pain management and has an upcoming appointment on 01/28/2025. He has not been prescribed Lyrica in the past and mentions that he has not received any pain management for his discomfort.       Review of systems:  See above.   Denies any symptoms unless previously indicated.        Current Outpatient Medications:     glucose blood (FREESTYLE LITE) strip, 1 Strip by Other route 4 times a day., Disp: 400 Strip, Rfl: 3    fluticasone-salmeterol (ADVAIR) 250-50 MCG/ACT AEROSOL POWDER, BREATH ACTIVATED, Inhale 1 Puff every 12 hours., Disp: 3 Each, Rfl: 1    zonisamide (ZONEGRAN) 100 MG Cap, Take 3 Capsules by mouth every day., Disp: 270 Capsule, Rfl: 2    tadalafil (CIALIS) 5 MG tablet, Take 1 Tablet by mouth every day for 360 days., Disp: 90 Tablet, Rfl: 3    albuterol 108 (90 Base) MCG/ACT Aero Soln inhalation aerosol, Inhale 2 Puffs every  four hours as needed for Shortness of Breath., Disp: 18 g, Rfl: 2    sotalol (BETAPACE) 80 MG Tab, Take 1 Tablet by mouth 2 times a day., Disp: 200 Tablet, Rfl: 3    metoprolol SR (TOPROL XL) 50 MG TABLET SR 24 HR, Take 1 Tablet by mouth every day., Disp: 100 Tablet, Rfl: 3    apixaban (ELIQUIS) 5mg Tab, Take 1 Tablet by mouth 2 times a day., Disp: 200 Tablet, Rfl: 3    atorvastatin (LIPITOR) 40 MG Tab, Take 1 Tablet by mouth every day., Disp: 100 Tablet, Rfl: 3    spironolactone (ALDACTONE) 25 MG Tab, Take 1 Tablet by mouth every day., Disp: 100 Tablet, Rfl: 3    sacubitril-valsartan (ENTRESTO) 24-26 MG Tab, Take 1 Tablet by mouth 2 times a day., Disp: 200 Tablet, Rfl: 3    Blood Glucose Meter Kit, Test blood sugar as recommended by provider. Abbott Freestyle Lite blood glucose monitoring kit., Disp: 1 Kit, Rfl: 0    DULoxetine (CYMBALTA) 60 MG Cap DR Particles delayed-release capsule, Take 2 Capsules by mouth every day., Disp: 180 Capsule, Rfl: 0    tizanidine (ZANAFLEX) 4 MG Tab, Take 1 Tablet by mouth every 6 hours as needed (PAIN)., Disp: 30 Tablet, Rfl: 3    ondansetron (ZOFRAN) 4 MG Tab tablet, Take 1 Tablet by mouth every four hours as needed for Nausea/Vomiting., Disp: 40 Tablet, Rfl: 2    benzonatate (TESSALON) 100 MG Cap, Take 1 Capsule by mouth 3 times a day as needed for Cough., Disp: 90 Capsule, Rfl: 2    baclofen (LIORESAL) 10 MG Tab, Take 1 Tablet by mouth 2 times a day as needed (pain)., Disp: 180 Tablet, Rfl: 2    pantoprazole (PROTONIX) 40 MG Tablet Delayed Response, Take 1 Tablet by mouth every day., Disp: 90 Tablet, Rfl: 2    Respiratory Therapy Supplies (CARETOUCH CPAP & BIPAP HOSE) Misc, ** 10/7 cm H2O **  with sleep via mask - Prior sleep study in [place] 5 years ago, told severe sleep apnea. He thinks 72 AHI. Titration study 10-23-18 showed Both CPAP and BiPAP eliminated hypopneas and apneas, but BiPAP corrected the hypoxia better. for Lifetime, Disp: , Rfl:     Empagliflozin-metFORMIN HCl  "ER (SYNJARDY XR) 12.5-1000 MG TABLET SR 24 HR, Take 1 Tablet by mouth 2 times a day., Disp: 180 Tablet, Rfl: 2    Insulin Pen Needle, by Does not apply route., Disp: , Rfl:     Allergies, past medical history, past surgical history, family history, social history reviewed and updated    Objective:     Vitals: /70 (BP Location: Left arm, Patient Position: Sitting, BP Cuff Size: Adult)   Pulse 63   Temp 36.4 °C (97.6 °F) (Temporal)   Resp 16   Ht 1.854 m (6' 1\")   Wt 85.2 kg (187 lb 13.3 oz)   SpO2 98%   BMI 24.78 kg/m²   General: Alert, pleasant, NAD  EYES:   PERRL, EOMI, no icterus or pallor.  Conjunctivae and lids normal.   HENT:  Normocephalic.  External ears normal.   Neck supple.     Heart: Regular rate and rhythm.  S1 and S2 normal.  No murmurs appreciated.  Respiratory: Normal respiratory effort.  Clear to auscultation bilaterally.  Decreased breath sounds all lung fields.  Abdomen: Not obese  Skin: Warm, dry, no rashes.  Musculoskeletal: Gait is normal.  Moves all extremities well.    Extremities: Monofilament testing with a 10 gram force: sensation intact: intact bilaterally  Visual Inspection: Feet with maceration, ulcers, fissures.  Pedal pulses: intact bilaterally.   Neurological: No tremors, sensation grossly intact,  CN2-12 intact.  Psych:  Affect/mood is normal, judgement is good, memory is intact, grooming is appropriate.    Physical Exam         Results         Assessment/Plan:     Juaquin was seen today for follow-up.    Diagnoses and all orders for this visit:    Need for vaccination  -     INFLUENZA VACCINE TRI INJ (PF)     Type 2 diabetes mellitus with complication, with long-term current use of insulin (HCC)  -     glucose blood (FREESTYLE LITE) strip; 1 Strip by Other route 4 times a day.    Dementia, unspecified dementia severity, unspecified dementia type, unspecified whether behavioral, psychotic, or mood disturbance or anxiety (HCC)    Obstructive sleep apnea " syndrome    Shortness of breath  -     fluticasone-salmeterol (ADVAIR) 250-50 MCG/ACT AEROSOL POWDER, BREATH ACTIVATED; Inhale 1 Puff every 12 hours.    Degeneration of intervertebral disc of lumbar region with discogenic back pain and lower extremity pain    Multiple joint pain    Neck pain, chronic    Diabetic neuropathy associated with diabetes mellitus due to underlying condition (HCC)  -     Diabetic Monofilament LE Exam        Assessment & Plan  1. Type 2 diabetes with neuropathy.  A monofilament exam was conducted today. He requested test strips for his Freestyle Lite glucometer, which will be sent to the [facility] pharmacy.    2. Dementia.  He was recently evaluated by Neurology on 11/04/2024, and an MRI and labs were requested. An MRI will be performed as soon as possible, considering his defibrillator pacemaker. He acknowledges memory issues.     3. Obstructive sleep apnea.  The condition may not be adequately controlled. His recent weight loss could potentially affect the settings. Referral information was provided for him to schedule a pulmonary appointment. A repeat sleep study may be necessary to ensure proper CPAP settings.    4. Shortness of breath.  The dosage of Advair will be increased from 100/50 to 250/50. He should continue using the current inhaler until the new one is available.    5. Lumbar degenerative disc disease/multiple joint pain/chronic neck pain.  At the end of the visit, he reported that his pain is not being managed. He has been under the care of Dr. Lucio, who has requested MRIs based on his symptoms. His medical records will be obtained for further evaluation. An MRI of the spine is scheduled for 01/28/2025 at [facility].    6. Health Maintenance.  An influenza vaccine was administered today.    Follow-up  Return in 4 to 6 weeks for follow-up.    Return in about 4 weeks (around 12/11/2024).    Please note that this dictation was created using voice recognition software. I have made  every reasonable attempt to correct obvious errors, but expect that there are errors of grammar and possible content that I did not discover before finalizing note.

## 2024-11-13 NOTE — LETTER
[employer]  Cece Parker P.A.-C.  [address] [address] [address]  [facility], [place] [address]  Fax: [phone]   Authorization for Release/Disclosure of   Protected Health Information   Name: JUAQUIN SOLORZANO : 1965 SSN: [id number]   Address: [address] [address] Phone:    [phone] (home)    I authorize the entity listed below to release/disclose the PHI below to:   [facility]/Cece Parker P.A.-C. and Cece Parker P.A.-C.   Provider or Entity Name:  Dr. Lucio     Address   [facility], [place], [facility], [facility]   Phone:      Fax:     Reason for request: continuity of care   Information to be released:    [  ] LAST COLONOSCOPY,  including any PATH REPORT and follow-up  [  ] LAST FIT/COLOGUARD RESULT [  ] LAST DEXA  [  ] LAST MAMMOGRAM  [  ] LAST PAP  [  ] LAST LABS [  ] RETINA EXAM REPORT  [  ] IMMUNIZATION RECORDS  [ x ] Release all info      [  ] Check here and initial the line next to each item to release ALL health information INCLUDING  _____ Care and treatment for drug and / or alcohol abuse  _____ HIV testing, infection status, or AIDS  _____ Genetic Testing    DATES OF SERVICE OR TIME PERIOD TO BE DISCLOSED: _____________  I understand and acknowledge that:  * This Authorization may be revoked at any time by you in writing, except if your health information has already been used or disclosed.  * Your health information that will be used or disclosed as a result of you signing this authorization could be re-disclosed by the recipient. If this occurs, your re-disclosed health information may no longer be protected by State or Federal laws.  * You may refuse to sign this Authorization. Your refusal will not affect your ability to obtain treatment.  * This Authorization becomes effective upon signing and will  on (date) __________.      If no date is indicated, this Authorization will  one (1) year from the signature date.    Name: Juaquin Solorzano  Signature: Date:    11/13/2024     PLEASE FAX REQUESTED RECORDS BACK TO: [phone]

## 2024-11-27 ENCOUNTER — PATIENT OUTREACH (OUTPATIENT)
Dept: HEALTH INFORMATION MANAGEMENT | Facility: OTHER | Age: 59
End: 2024-11-27
Payer: MEDICARE

## 2024-11-27 DIAGNOSIS — I10 ESSENTIAL HYPERTENSION: ICD-10-CM

## 2024-11-27 DIAGNOSIS — I50.20 ACC/AHA STAGE C SYSTOLIC HEART FAILURE (HCC): ICD-10-CM

## 2024-12-03 ENCOUNTER — HOSPITAL ENCOUNTER (OUTPATIENT)
Dept: RADIOLOGY | Facility: MEDICAL CENTER | Age: 59
End: 2024-12-03
Attending: PSYCHIATRY & NEUROLOGY
Payer: MEDICARE

## 2024-12-03 VITALS — DIASTOLIC BLOOD PRESSURE: 71 MMHG | OXYGEN SATURATION: 98 % | SYSTOLIC BLOOD PRESSURE: 97 MMHG | HEART RATE: 85 BPM

## 2024-12-03 DIAGNOSIS — I10 ESSENTIAL HYPERTENSION: ICD-10-CM

## 2024-12-03 DIAGNOSIS — Z79.4 TYPE 2 DIABETES MELLITUS WITH COMPLICATION, WITH LONG-TERM CURRENT USE OF INSULIN (HCC): ICD-10-CM

## 2024-12-03 DIAGNOSIS — I48.0 PAROXYSMAL ATRIAL FIBRILLATION (HCC): ICD-10-CM

## 2024-12-03 DIAGNOSIS — M54.50 CHRONIC MIDLINE LOW BACK PAIN WITHOUT SCIATICA: ICD-10-CM

## 2024-12-03 DIAGNOSIS — Z79.01 CHRONIC ANTICOAGULATION: ICD-10-CM

## 2024-12-03 DIAGNOSIS — R13.12 OROPHARYNGEAL DYSPHAGIA: ICD-10-CM

## 2024-12-03 DIAGNOSIS — F33.0 MAJOR DEPRESSIVE DISORDER, RECURRENT, MILD (HCC): ICD-10-CM

## 2024-12-03 DIAGNOSIS — E11.8 TYPE 2 DIABETES MELLITUS WITH COMPLICATION, WITH LONG-TERM CURRENT USE OF INSULIN (HCC): ICD-10-CM

## 2024-12-03 DIAGNOSIS — I47.20 VENTRICULAR TACHYCARDIA (HCC): ICD-10-CM

## 2024-12-03 DIAGNOSIS — I48.0 HYPERCOAGULABLE STATE DUE TO PAROXYSMAL ATRIAL FIBRILLATION (HCC): ICD-10-CM

## 2024-12-03 DIAGNOSIS — R06.02 SHORTNESS OF BREATH: ICD-10-CM

## 2024-12-03 DIAGNOSIS — F02.B0 MODERATE DEMENTIA ASSOCIATED WITH OTHER UNDERLYING DISEASE, WITHOUT BEHAVIORAL DISTURBANCE, PSYCHOTIC DISTURBANCE, MOOD DISTURBANCE, OR ANXIETY (HCC): ICD-10-CM

## 2024-12-03 DIAGNOSIS — M54.2 NECK PAIN, CHRONIC: ICD-10-CM

## 2024-12-03 DIAGNOSIS — G89.29 NECK PAIN, CHRONIC: ICD-10-CM

## 2024-12-03 DIAGNOSIS — D68.69 HYPERCOAGULABLE STATE DUE TO PAROXYSMAL ATRIAL FIBRILLATION (HCC): ICD-10-CM

## 2024-12-03 DIAGNOSIS — F41.9 ANXIETY DISORDER, UNSPECIFIED TYPE: ICD-10-CM

## 2024-12-03 DIAGNOSIS — M54.9 MID BACK PAIN: ICD-10-CM

## 2024-12-03 DIAGNOSIS — I50.20 ACC/AHA STAGE C SYSTOLIC HEART FAILURE (HCC): ICD-10-CM

## 2024-12-03 DIAGNOSIS — G89.29 CHRONIC MIDLINE LOW BACK PAIN WITHOUT SCIATICA: ICD-10-CM

## 2024-12-03 PROCEDURE — 70551 MRI BRAIN STEM W/O DYE: CPT

## 2024-12-03 NOTE — PROGRESS NOTES
MRI NURSING NOTE:  Juaquin arrives ambulatory to [facility] for imaging of his brain. Kang ([employer] rep) available by [facility] to place pts AICD in MRI mode. POC discussed with pt, including education to notify staff of any chest/abdominal discomfort or SOB. They verbalized understanding and agreed with the POC. AICD placed in MRI mode and imaging completed while monitoring VS q5 minutes. See flowsheets. Pt tolerated scan without any adverse events. AICD taken out of MRI mode and pt escorted from facility with all belongings.

## 2024-12-04 RX ORDER — PANTOPRAZOLE SODIUM 40 MG/1
40 TABLET, DELAYED RELEASE ORAL DAILY
Qty: 90 TABLET | Refills: 2 | Status: SHIPPED | OUTPATIENT
Start: 2024-12-04

## 2024-12-04 RX ORDER — BACLOFEN 10 MG/1
10 TABLET ORAL 2 TIMES DAILY PRN
Qty: 180 TABLET | Refills: 2 | Status: SHIPPED | OUTPATIENT
Start: 2024-12-04

## 2024-12-04 RX ORDER — DULOXETIN HYDROCHLORIDE 60 MG/1
120 CAPSULE, DELAYED RELEASE ORAL DAILY
Qty: 180 CAPSULE | Refills: 2 | Status: SHIPPED | OUTPATIENT
Start: 2024-12-04

## 2024-12-04 RX ORDER — BLOOD-GLUCOSE METER
1 KIT MISCELLANEOUS 4 TIMES DAILY
Qty: 400 STRIP | Refills: 3 | Status: SHIPPED | OUTPATIENT
Start: 2024-12-04

## 2024-12-04 RX ORDER — ALBUTEROL SULFATE 90 UG/1
2 INHALANT RESPIRATORY (INHALATION) EVERY 4 HOURS PRN
Qty: 18 G | Refills: 0 | Status: SHIPPED | OUTPATIENT
Start: 2024-12-04

## 2024-12-04 RX ORDER — FLUTICASONE PROPIONATE AND SALMETEROL 250; 50 UG/1; UG/1
1 POWDER RESPIRATORY (INHALATION) EVERY 12 HOURS
Qty: 3 EACH | Refills: 2 | Status: SHIPPED | OUTPATIENT
Start: 2024-12-04

## 2024-12-04 RX ORDER — TADALAFIL 5 MG/1
5 TABLET ORAL DAILY
Qty: 90 TABLET | Refills: 3 | Status: SHIPPED | OUTPATIENT
Start: 2024-12-04 | End: 2025-11-29

## 2024-12-04 NOTE — TELEPHONE ENCOUNTER
Requested Prescriptions     Pending Prescriptions Disp Refills    baclofen (LIORESAL) 10 MG Tab 180 Tablet 2     Sig: Take 1 Tablet by mouth 2 times a day as needed (pain).    pantoprazole (PROTONIX) 40 MG Tablet Delayed Response 90 Tablet 2     Sig: Take 1 Tablet by mouth every day.    DULoxetine (CYMBALTA) 60 MG Cap DR Particles delayed-release capsule 180 Capsule 0     Sig: Take 2 Capsules by mouth every day.    albuterol 108 (90 Base) MCG/ACT Aero Soln inhalation aerosol 18 g 2     Sig: Inhale 2 Puffs every four hours as needed for Shortness of Breath.    glucose blood (FREESTYLE LITE) strip 400 Strip 3     Si Strip by Other route 4 times a day.    fluticasone-salmeterol (ADVAIR) 250-50 MCG/ACT AEROSOL POWDER, BREATH ACTIVATED 3 Each 1     Sig: Inhale 1 Puff every 12 hours.     Last office visit: 24  Last lab: 24

## 2024-12-04 NOTE — TELEPHONE ENCOUNTER
Received request via: Patient    Was the patient seen in the last year in this department? Yes    Does the patient have an active prescription (recently filled or refills available) for medication(s) requested? No    Pharmacy Name: [facility] [id number]     Does the patient have jail Plus and need 100-day supply? (This applies to ALL medications) Patient does not have SCP

## 2024-12-06 DIAGNOSIS — Z79.4 TYPE 2 DIABETES MELLITUS WITH COMPLICATION, WITH LONG-TERM CURRENT USE OF INSULIN (HCC): ICD-10-CM

## 2024-12-06 DIAGNOSIS — R56.9 FOCAL SEIZURES (HCC): ICD-10-CM

## 2024-12-06 DIAGNOSIS — I50.20 ACC/AHA STAGE C SYSTOLIC HEART FAILURE (HCC): ICD-10-CM

## 2024-12-06 DIAGNOSIS — E11.8 TYPE 2 DIABETES MELLITUS WITH COMPLICATION, WITH LONG-TERM CURRENT USE OF INSULIN (HCC): ICD-10-CM

## 2024-12-06 DIAGNOSIS — E78.2 MIXED HYPERLIPIDEMIA: ICD-10-CM

## 2024-12-06 DIAGNOSIS — Z79.01 CHRONIC ANTICOAGULATION: ICD-10-CM

## 2024-12-06 DIAGNOSIS — I48.0 PAROXYSMAL ATRIAL FIBRILLATION (HCC): ICD-10-CM

## 2024-12-06 DIAGNOSIS — I10 ESSENTIAL HYPERTENSION: ICD-10-CM

## 2024-12-06 DIAGNOSIS — D68.69 HYPERCOAGULABLE STATE DUE TO PAROXYSMAL ATRIAL FIBRILLATION (HCC): ICD-10-CM

## 2024-12-06 DIAGNOSIS — I47.20 VENTRICULAR TACHYCARDIA (HCC): ICD-10-CM

## 2024-12-06 DIAGNOSIS — I48.0 HYPERCOAGULABLE STATE DUE TO PAROXYSMAL ATRIAL FIBRILLATION (HCC): ICD-10-CM

## 2024-12-06 RX ORDER — ATORVASTATIN CALCIUM 40 MG/1
40 TABLET, FILM COATED ORAL DAILY
Qty: 100 TABLET | Refills: 2 | Status: SHIPPED | OUTPATIENT
Start: 2024-12-06

## 2024-12-06 RX ORDER — SPIRONOLACTONE 25 MG/1
25 TABLET ORAL DAILY
Qty: 100 TABLET | Refills: 2 | Status: SHIPPED | OUTPATIENT
Start: 2024-12-06

## 2024-12-06 RX ORDER — SPIRONOLACTONE 25 MG/1
25 TABLET ORAL DAILY
Qty: 100 TABLET | Refills: 0 | Status: SHIPPED | OUTPATIENT
Start: 2024-12-06

## 2024-12-06 RX ORDER — METOPROLOL SUCCINATE 50 MG/1
50 TABLET, EXTENDED RELEASE ORAL DAILY
Qty: 100 TABLET | Refills: 2 | Status: SHIPPED | OUTPATIENT
Start: 2024-12-06

## 2024-12-06 RX ORDER — SOTALOL HYDROCHLORIDE 80 MG/1
80 TABLET ORAL 2 TIMES DAILY
Qty: 200 TABLET | Refills: 2 | Status: SHIPPED | OUTPATIENT
Start: 2024-12-06

## 2024-12-06 RX ORDER — ZONISAMIDE 100 MG/1
300 CAPSULE ORAL DAILY
Qty: 270 CAPSULE | Refills: 2 | Status: SHIPPED | OUTPATIENT
Start: 2024-12-06

## 2024-12-06 RX ORDER — SOTALOL HYDROCHLORIDE 80 MG/1
80 TABLET ORAL 2 TIMES DAILY
Qty: 200 TABLET | Refills: 0 | Status: SHIPPED | OUTPATIENT
Start: 2024-12-06

## 2024-12-06 RX ORDER — ATORVASTATIN CALCIUM 40 MG/1
40 TABLET, FILM COATED ORAL DAILY
Qty: 100 TABLET | Refills: 0 | Status: SHIPPED | OUTPATIENT
Start: 2024-12-06

## 2024-12-06 RX ORDER — METOPROLOL SUCCINATE 50 MG/1
50 TABLET, EXTENDED RELEASE ORAL DAILY
Qty: 100 TABLET | Refills: 0 | Status: SHIPPED | OUTPATIENT
Start: 2024-12-06

## 2024-12-06 NOTE — TELEPHONE ENCOUNTER
To: AB    Would you like repeat blood work prior to February appointment including Mag? Last blood work completed in August. Please advise. Thank you!

## 2024-12-10 NOTE — PROGRESS NOTES
Assessment    Reached out to patient for monthly PCM follow-up call. Per patient, he is aware of all upcoming healthcare appointment dates and times. Patient denied having any serious or injurious falls, ED/Emergency hospital visits, or medication changes aside from an increase in his Advair dosage in the last month. Patient did note that he had a small stumble when walking down two stairs in which he missed his step and slightly fell on his bottom, however he noted that it was a very small/slight stumble and he did not receive any serious injury. Patient denied hitting his head, losing consciousness, or any resulting confusion. Patient additionally noted that he was carrying a beverage during the stumble and managed not to spill it because it was a very low-impact stumble on his bottom. Care gaps discussed with patient and patient verbalized understanding. Quarterly review completed and patient noted that he has not necessarily been meeting his goals. The patient noted that he sleeps 2-3 hours for two to three days in a row, then will sleep the whole day on the fourth day. Patient noted how his PCP discussed potentially trying Trazodone for sleep and noted that he will discuss this medication with his PCP at their appointment next month.  Medication review completed. Patient reported that he had a last-minute trip to [place] due to a  and did not have his medication re-fills obtained in time for the trip. Due to this, the patient will run out of his medications by [facility]. The patient stated that he will have his wife  his medications soon and hopefully mail them to him tomorrow so that they will be in [place] by next week. PCP notified of gap in medication therapy. All questions answered.     CHF  Patient denied having any edema at this time.  Patient noted that he checks his weight once weekly at home. Education provided on checking a daily weight and reporting abrupt weight gain of 3  pounds within a single day and 5 pounds within a single week. Patient verbalized understanding.  Patient education provided on cardiac medication regimen adherence. Patient noted that he will continue his medications as soon as he receives them because his wife is already in the process of picking them up and mailing them to him.  Patient education provided on seeking further evaluation for symptoms such as persistent cough, SOB, fatigue, and headache because they may seem like/present as viral respiratory illnesses during this winter season but they could also potentially be heart failure exacerbation symptoms, therefore medical evaluation is recommended to determine the cause.    HTN  Patient reported that his blood pressures typically range between 100s/60s at home. Patient noted that he tends to run lower than most at that 100s/60s range and his PCP is aware as per patient.  Patient reported that he will continue his home cardiac routine and medication regimen once he receives his medications in the mail and has his home blood pressure monitor available to him.  Patient education provided as discussed below.    Education and Self Management of Care    Patient education provided on the importance of: daily weight monitoring, reporting abrupt weight gain to PCP,  monitoring for edema and reporting it as advised by PCP, cardiac medication regimen adherence, checking blood pressure at home routinely (as advised by their PCP), and checking blood glucose levels at home as advised by PCP.    Plan of Care and Goals    Continue current plan of care- See Care Plan note    Barriers:    Advanced age; Management of multiple chronic conditions/diagnoses    Progress:    Progressing- See Care Plan note    Next outreach: 1 month (~1/10/25)

## 2024-12-10 NOTE — CARE PLAN
Problem: Excess Fluid- Long-term  Goal: Establish a plan for excess fluid management  Outcome: Progressing  Note: Patient has upcoming appointments with his PCP on 1/22/25 and cardiology on 2/26/25.  Intervention: Establish regular follow-ups with PCP/Cardiology  Intervention: Refer patient to PCP/Cardiology     Problem: Med Adherence- Long-term  Goal: Consistently take Medications as Prescribed  Outcome: Progressing  Note: Patient's current medication barrer is due to unforeseen circumstances. Patient took a last-minute trip from [place] to [place] for family circumstances and ran out of his medications mid-trip. Patient is coming back to [place] on January 1st, 2025. Medications to be mailed to [place] by his wife and are expected to get there in about a week as per patient.  Intervention: Discuss barriers to medication adherence with patient     Problem: Knowledge deficit of self-monitoring blood pressure- Short-term  Goal: Demonstrate the elements of self-monitoring blood pressure  Outcome: Not Progressing  Note: Patient to continue home blood pressure monitoring once he is back home from [place].

## 2025-01-02 ENCOUNTER — TELEPHONE (OUTPATIENT)
Dept: CARDIOLOGY | Facility: MEDICAL CENTER | Age: 60
End: 2025-01-02
Payer: MEDICARE

## 2025-01-02 NOTE — TELEPHONE ENCOUNTER
Remote transmission received via home monitor, device shows 13 VT and 3 NSVT episodes on 12/18/2024, ?? Of VT vs SVT, 3 episodes w/ successful ATP therapies delivered, full report scanned into Media.

## 2025-01-06 ENCOUNTER — PATIENT MESSAGE (OUTPATIENT)
Dept: CARDIOLOGY | Facility: MEDICAL CENTER | Age: 60
End: 2025-01-06
Payer: MEDICARE

## 2025-01-06 ENCOUNTER — NON-PROVIDER VISIT (OUTPATIENT)
Dept: CARDIOLOGY | Facility: MEDICAL CENTER | Age: 60
End: 2025-01-06
Payer: MEDICARE

## 2025-01-06 PROCEDURE — 93295 DEV INTERROG REMOTE 1/2/MLT: CPT | Performed by: STUDENT IN AN ORGANIZED HEALTH CARE EDUCATION/TRAINING PROGRAM

## 2025-01-06 NOTE — PATIENT COMMUNICATION
No, there's no alert that goes out via text message, per standard protocol we send to provider/RN and then RN calls patient for notification.     Patient may be thinking about text from [employer] reminding them to transmit for scheduled transmissions.

## 2025-01-06 NOTE — CARDIAC REMOTE MONITOR - SCAN
Device transmission reviewed. Device demonstrated appropriate function.       Electronically Signed by: Efrain Hopkins MD, PhD    1/9/2025  2:06 PM     22

## 2025-01-22 ENCOUNTER — OFFICE VISIT (OUTPATIENT)
Dept: MEDICAL GROUP | Facility: CLINIC | Age: 60
End: 2025-01-22
Payer: MEDICARE

## 2025-01-22 VITALS
HEIGHT: 73 IN | WEIGHT: 192.68 LBS | BODY MASS INDEX: 25.54 KG/M2 | OXYGEN SATURATION: 96 % | SYSTOLIC BLOOD PRESSURE: 102 MMHG | RESPIRATION RATE: 18 BRPM | TEMPERATURE: 97.8 F | DIASTOLIC BLOOD PRESSURE: 62 MMHG | HEART RATE: 76 BPM

## 2025-01-22 DIAGNOSIS — E11.8 TYPE 2 DIABETES MELLITUS WITH COMPLICATION, WITH LONG-TERM CURRENT USE OF INSULIN (HCC): ICD-10-CM

## 2025-01-22 DIAGNOSIS — G47.33 OBSTRUCTIVE SLEEP APNEA SYNDROME: ICD-10-CM

## 2025-01-22 DIAGNOSIS — F03.90 DEMENTIA, UNSPECIFIED DEMENTIA SEVERITY, UNSPECIFIED DEMENTIA TYPE, UNSPECIFIED WHETHER BEHAVIORAL, PSYCHOTIC, OR MOOD DISTURBANCE OR ANXIETY (HCC): ICD-10-CM

## 2025-01-22 DIAGNOSIS — G47.09 OTHER INSOMNIA: ICD-10-CM

## 2025-01-22 DIAGNOSIS — Z79.4 TYPE 2 DIABETES MELLITUS WITH COMPLICATION, WITH LONG-TERM CURRENT USE OF INSULIN (HCC): ICD-10-CM

## 2025-01-22 DIAGNOSIS — E08.40: ICD-10-CM

## 2025-01-22 DIAGNOSIS — G89.29 OTHER CHRONIC PAIN: ICD-10-CM

## 2025-01-22 DIAGNOSIS — M79.7 FIBROMYALGIA: ICD-10-CM

## 2025-01-22 LAB
HBA1C MFR BLD: 5 % (ref ?–5.8)
POCT INT CON NEG: NEGATIVE
POCT INT CON POS: POSITIVE

## 2025-01-22 PROCEDURE — 3078F DIAST BP <80 MM HG: CPT | Performed by: PHYSICIAN ASSISTANT

## 2025-01-22 PROCEDURE — 99215 OFFICE O/P EST HI 40 MIN: CPT | Performed by: PHYSICIAN ASSISTANT

## 2025-01-22 PROCEDURE — 83036 HEMOGLOBIN GLYCOSYLATED A1C: CPT | Performed by: PHYSICIAN ASSISTANT

## 2025-01-22 PROCEDURE — 3074F SYST BP LT 130 MM HG: CPT | Performed by: PHYSICIAN ASSISTANT

## 2025-01-22 RX ORDER — EMPAGLIFLOZIN AND METFORMIN HYDROCHLORIDE 5; 500 MG/1; MG/1
1 TABLET ORAL DAILY
Qty: 400 TABLET | Refills: 3 | Status: SHIPPED | OUTPATIENT
Start: 2025-01-22 | End: 2026-02-26

## 2025-01-22 RX ORDER — BLOOD-GLUCOSE METER
1 KIT MISCELLANEOUS 4 TIMES DAILY
Qty: 400 STRIP | Refills: 3 | Status: SHIPPED | OUTPATIENT
Start: 2025-01-22

## 2025-01-22 ASSESSMENT — PATIENT HEALTH QUESTIONNAIRE - PHQ9
SUM OF ALL RESPONSES TO PHQ QUESTIONS 1-9: 22
5. POOR APPETITE OR OVEREATING: 3 - NEARLY EVERY DAY
CLINICAL INTERPRETATION OF PHQ2 SCORE: 6

## 2025-01-22 ASSESSMENT — FIBROSIS 4 INDEX: FIB4 SCORE: 1.22

## 2025-01-22 NOTE — PROGRESS NOTES
cc:  diabetes    Subjective:     Juaquin Solorzano is a 59 y.o. male presenting for diabetes        History of Present Illness  The patient is a 59-year-old male who presents to the office today to follow up on diabetes, sleep issues, and pain management.    He reports that his test strips have been declined by , but he is uncertain of the reason. He is currently on Synjardy.    He has been diagnosed with fibromyalgia, a condition that was not previously included in his problem list. He experiences persistent body pain and a sensation of pins and needles throughout his body. He also reports cold hands, arms, feet, and legs, accompanied by shooting pains. He has been experiencing memory loss. He has undergone an MRI of his head, during which his defibrillator was temporarily deactivated by his cardiologist. He has an upcoming appointment with Ryan Alfonso on 01/28/2025 for an MRI. He has not returned to pain management until the completion of his MRI. He reports no exacerbation of his pain with depression. He was previously on gabapentin but discontinued it due to seizures, fainting episodes, and dizziness. He is currently on duloxetine 120 mg daily, which he reports as ineffective in controlling his pain. He is not taking tizanidine and does not recall its efficacy. He takes baclofen twice nightly to aid sleep, but it has not been effective. He was previously on amitriptyline, which was discontinued on 04/05/2019, but he does not recall the reason for its discontinuation.    He continues to experience sleep disturbances, averaging only 3 hours of sleep per night. He uses a CPAP machine but is unsure if the settings need adjustment. He reports feeling cold and uncomfortable during sleep, often waking up drenched in sweat. He also experiences burning and tingling sensations.    MEDICATIONS  Current: Synjardy, duloxetine, baclofen  Discontinued: gabapentin, amitriptyline, tizanidine       Review of systems:   See above.   Denies any symptoms unless previously indicated.        Current Outpatient Medications:     Empagliflozin-metFORMIN HCl (SYNJARDY) 5-500 MG Tab, Take 1 Each by mouth every day., Disp: 400 Tablet, Rfl: 3    glucose blood (FREESTYLE LITE) strip, 1 Strip by Other route 4 times a day., Disp: 400 Strip, Rfl: 3    zonisamide (ZONEGRAN) 100 MG Cap, Take 3 Capsules by mouth every day., Disp: 270 Capsule, Rfl: 2    sotalol (BETAPACE) 80 MG Tab, Take 1 Tablet by mouth 2 times a day., Disp: 200 Tablet, Rfl: 2    metoprolol SR (TOPROL XL) 50 MG TABLET SR 24 HR, Take 1 Tablet by mouth every day., Disp: 100 Tablet, Rfl: 2    apixaban (ELIQUIS) 5mg Tab, Take 1 Tablet by mouth 2 times a day., Disp: 200 Tablet, Rfl: 2    atorvastatin (LIPITOR) 40 MG Tab, Take 1 Tablet by mouth every day., Disp: 100 Tablet, Rfl: 2    spironolactone (ALDACTONE) 25 MG Tab, Take 1 Tablet by mouth every day., Disp: 100 Tablet, Rfl: 2    baclofen (LIORESAL) 10 MG Tab, Take 1 Tablet by mouth 2 times a day as needed (pain)., Disp: 180 Tablet, Rfl: 2    pantoprazole (PROTONIX) 40 MG Tablet Delayed Response, Take 1 Tablet by mouth every day., Disp: 90 Tablet, Rfl: 2    DULoxetine (CYMBALTA) 60 MG Cap DR Particles delayed-release capsule, Take 2 Capsules by mouth every day., Disp: 180 Capsule, Rfl: 2    albuterol 108 (90 Base) MCG/ACT Aero Soln inhalation aerosol, Inhale 2 Puffs every four hours as needed for Shortness of Breath., Disp: 18 g, Rfl: 0    fluticasone-salmeterol (ADVAIR) 250-50 MCG/ACT AEROSOL POWDER, BREATH ACTIVATED, Inhale 1 Puff every 12 hours., Disp: 3 Each, Rfl: 2    tadalafil (CIALIS) 5 MG tablet, Take 1 Tablet by mouth every day for 360 days., Disp: 90 Tablet, Rfl: 3    sacubitril-valsartan (ENTRESTO) 24-26 MG Tab, Take 1 Tablet by mouth 2 times a day., Disp: 200 Tablet, Rfl: 3    Blood Glucose Meter Kit, Test blood sugar as recommended by provider. Abbott Freestyle Lite blood glucose monitoring kit., Disp: 1 Kit, Rfl: 0     "ondansetron (ZOFRAN) 4 MG Tab tablet, Take 1 Tablet by mouth every four hours as needed for Nausea/Vomiting., Disp: 40 Tablet, Rfl: 2    benzonatate (TESSALON) 100 MG Cap, Take 1 Capsule by mouth 3 times a day as needed for Cough., Disp: 90 Capsule, Rfl: 2    Respiratory Therapy Supplies (CARETOUCH CPAP & BIPAP HOSE) Misc, ** 10/7 cm H2O **  with sleep via mask - Prior sleep study in [place] 5 years ago, told severe sleep apnea. He thinks 72 AHI. Titration study 10-23-18 showed Both CPAP and BiPAP eliminated hypopneas and apneas, but BiPAP corrected the hypoxia better. for [employer], Disp: , Rfl:     Insulin Pen Needle, by Does not apply route., Disp: , Rfl:     Allergies, past medical history, past surgical history, family history, social history reviewed and updated    Objective:     Vitals: /62 (BP Location: Left arm, Patient Position: Sitting, BP Cuff Size: Adult)   Pulse 76   Temp 36.6 °C (97.8 °F) (Temporal)   Resp 18   Ht 1.854 m (6' 1\")   Wt 87.4 kg (192 lb 10.9 oz)   SpO2 96%   BMI 25.42 kg/m²   General: Alert, pleasant, NAD  EYES:   PERRL, EOMI, no icterus or pallor.  Conjunctivae and lids normal.   HENT:  Normocephalic.  External ears normal.  Neck supple.    Respiratory: Normal respiratory effort.   Abdomen: Not obese  Skin: Warm, dry, no rashes.  Musculoskeletal: Gait is normal.  Moves all extremities well.    Extremities: normal range of motion all extremities.   Neurological: No tremors, sensation grossly intact, CN2-12 intact.  Psych:  Affect/mood is normal, judgement is good, memory is intact, grooming is appropriate.      Results  A1c 5.0      Laboratory Studies  A1c is 5.0.    Imaging  MRI of the head was normal. CT scan in September 2024 showed stones. MRIs of the back showed degenerative disks in the mid back and degenerative changes in the low back, no significant degeneration of the neck. X-rays of the sacrum, humerus, forearm, and coccyx were normal.       Assessment/Plan: "     Juaquin was seen today for results.    Diagnoses and all orders for this visit:    Diabetic neuropathy associated with diabetes mellitus due to underlying condition ([facility])  -     POCT A1C  -     Empagliflozin-metFORMIN HCl (SYNJARDY) 5-500 MG Tab; Take 1 Each by mouth every day.  -     Lipid Profile; Future  -     Comp Metabolic Panel; Future  -     HEMOGLOBIN A1C; Future    Type 2 diabetes mellitus with complication, with long-term current use of insulin ([facility])  -     glucose blood (FREESTYLE LITE) strip; 1 Strip by Other route 4 times a day.    Fibromyalgia    Other chronic pain    Other insomnia  -     Empagliflozin-metFORMIN HCl (SYNJARDY) 5-500 MG Tab; Take 1 Each by mouth every day.    Obstructive sleep apnea syndrome  -     Referral to Pulmonary and Sleep Medicine    Dementia, unspecified dementia severity, unspecified dementia type, unspecified whether behavioral, psychotic, or mood disturbance or anxiety ([facility])        Assessment & Plan  1. Type 2 diabetes with neurologic complications.  His A1c level is currently at 5.0, indicating potential overtreatment. The dosage of Synjardy will be reduced by half to 5/500 mg. A prescription for Synjardy will be sent to [facility]. Labs will be ordered in 6 months to monitor A1c levels.    2. Fibromyalgia/chronic pain.  He has a referral to pain management, but MRIs are needed before further action. He has appointments scheduled for MRIs on 01/28/2025. The current referral will be redirected to Dr. Lucio as per his request. He is currently on duloxetine 120 mg daily and baclofen, taking 2 doses at night to aid sleep, although this regimen has not been effective. He does not believe he has a prescription for tizanidine and does not recall its efficacy. He was previously on amitriptyline, which was discontinued on 04/05/2019, but he does not remember the reason for its discontinuation.    3. Insomnia/obstructive sleep apnea.  He may require a new sleep study due  to ongoing sleep issues. A referral to sleep medicine will be made for further evaluation. The CPAP machine settings may need to be reassessed.    4. Dementia.  There has been no significant improvement in his condition. He is under the care of a neurologist. An MRI of the brain has shown normal results. His condition will continue to be monitored.    Follow-up  The patient will follow up in 8 weeks.    Return in about 8 weeks (around 3/19/2025) for 40 minutes please.    Please note that this dictation was created using voice recognition software. I have made every reasonable attempt to correct obvious errors, but expect that there are errors of grammar and possible content that I did not discover before finalizing note.     My total time spent caring for the patient on the day of the encounter was 40 minutes discussing his care including the diagnosis of fibromyalgia, his multiple referrals and medication for pain and fibromyalgia.      This does not include time spent on separately billable procedures/tests.

## 2025-01-22 NOTE — Clinical Note
Patient wanted to see dr Montiel's but was referred to [facility].  Can we find out from referrals why he was referred to [facility].

## 2025-01-30 ENCOUNTER — TELEPHONE (OUTPATIENT)
Dept: HEALTH INFORMATION MANAGEMENT | Facility: OTHER | Age: 60
End: 2025-01-30
Payer: MEDICARE

## 2025-02-06 ENCOUNTER — TELEPHONE (OUTPATIENT)
Dept: HEALTH INFORMATION MANAGEMENT | Facility: OTHER | Age: 60
End: 2025-02-06
Payer: MEDICARE

## 2025-02-26 ENCOUNTER — OFFICE VISIT (OUTPATIENT)
Dept: CARDIOLOGY | Facility: PHYSICIAN GROUP | Age: 60
End: 2025-02-26
Payer: MEDICARE

## 2025-02-26 ENCOUNTER — NON-PROVIDER VISIT (OUTPATIENT)
Dept: CARDIOLOGY | Facility: PHYSICIAN GROUP | Age: 60
End: 2025-02-26
Payer: MEDICARE

## 2025-02-26 VITALS
OXYGEN SATURATION: 96 % | SYSTOLIC BLOOD PRESSURE: 80 MMHG | HEIGHT: 73 IN | WEIGHT: 191.8 LBS | RESPIRATION RATE: 14 BRPM | DIASTOLIC BLOOD PRESSURE: 56 MMHG | HEIGHT: 73 IN | SYSTOLIC BLOOD PRESSURE: 80 MMHG | BODY MASS INDEX: 25.42 KG/M2 | WEIGHT: 191.8 LBS | HEART RATE: 71 BPM | OXYGEN SATURATION: 96 % | RESPIRATION RATE: 14 BRPM | DIASTOLIC BLOOD PRESSURE: 56 MMHG | HEART RATE: 71 BPM | BODY MASS INDEX: 25.42 KG/M2

## 2025-02-26 DIAGNOSIS — D68.69 HYPERCOAGULABLE STATE DUE TO PAROXYSMAL ATRIAL FIBRILLATION (HCC): ICD-10-CM

## 2025-02-26 DIAGNOSIS — I10 ESSENTIAL HYPERTENSION: ICD-10-CM

## 2025-02-26 DIAGNOSIS — Z95.810 PRESENCE OF BIVENTRICULAR AICD: ICD-10-CM

## 2025-02-26 DIAGNOSIS — Z95.1 S/P CABG X 3: ICD-10-CM

## 2025-02-26 DIAGNOSIS — I25.5 ISCHEMIC CARDIOMYOPATHY: ICD-10-CM

## 2025-02-26 DIAGNOSIS — G47.33 OBSTRUCTIVE SLEEP APNEA SYNDROME: ICD-10-CM

## 2025-02-26 DIAGNOSIS — I47.20 VENTRICULAR TACHYCARDIA (HCC): ICD-10-CM

## 2025-02-26 DIAGNOSIS — E78.2 MIXED HYPERLIPIDEMIA: ICD-10-CM

## 2025-02-26 DIAGNOSIS — I50.20 ACC/AHA STAGE C SYSTOLIC HEART FAILURE (HCC): ICD-10-CM

## 2025-02-26 DIAGNOSIS — I48.0 PAROXYSMAL ATRIAL FIBRILLATION (HCC): ICD-10-CM

## 2025-02-26 DIAGNOSIS — I48.0 HYPERCOAGULABLE STATE DUE TO PAROXYSMAL ATRIAL FIBRILLATION (HCC): ICD-10-CM

## 2025-02-26 DIAGNOSIS — M79.7 FIBROMYALGIA: ICD-10-CM

## 2025-02-26 DIAGNOSIS — I25.10 CORONARY ARTERY DISEASE, UNSPECIFIED VESSEL OR LESION TYPE, UNSPECIFIED WHETHER ANGINA PRESENT, UNSPECIFIED WHETHER NATIVE OR TRANSPLANTED HEART: ICD-10-CM

## 2025-02-26 DIAGNOSIS — I95.2 HYPOTENSION DUE TO DRUGS: ICD-10-CM

## 2025-02-26 DIAGNOSIS — Z79.01 CHRONIC ANTICOAGULATION: ICD-10-CM

## 2025-02-26 RX ORDER — SACUBITRIL AND VALSARTAN 24; 26 MG/1; MG/1
0.5 TABLET, FILM COATED ORAL 2 TIMES DAILY
Qty: 100 TABLET | Refills: 3 | Status: SHIPPED | OUTPATIENT
Start: 2025-02-26

## 2025-02-26 RX ORDER — SPIRONOLACTONE 25 MG/1
12.5 TABLET ORAL DAILY
Qty: 50 TABLET | Refills: 3 | Status: SHIPPED | OUTPATIENT
Start: 2025-02-26

## 2025-02-26 RX ORDER — PREGABALIN 100 MG/1
100 CAPSULE ORAL 2 TIMES DAILY
COMMUNITY
Start: 2025-02-21

## 2025-02-26 ASSESSMENT — ENCOUNTER SYMPTOMS
COUGH: 0
BACK PAIN: 1
NAUSEA: 0
DEPRESSION: 1
ORTHOPNEA: 0
PND: 0
SHORTNESS OF BREATH: 1
SENSORY CHANGE: 0
FEVER: 0
SEIZURES: 1
LOSS OF CONSCIOUSNESS: 0
INSOMNIA: 0
BRUISES/BLEEDS EASILY: 0
CHILLS: 0
MYALGIAS: 0
ABDOMINAL PAIN: 0
DIZZINESS: 0
PALPITATIONS: 0
HEADACHES: 0

## 2025-02-26 ASSESSMENT — FIBROSIS 4 INDEX
FIB4 SCORE: 1.22
FIB4 SCORE: 1.22

## 2025-02-26 NOTE — PROGRESS NOTES
Chief Complaint   Patient presents with    Follow-Up    Coronary Artery Disease    Cardiomyopathy (Ischemic)    CHF (Compensated)    Ventricular Tachycardia    Atrial Fibrillation    Anticoagulation    HTN (Controlled)    Hyperlipidemia       Subjective     Juaquin Solorzano is a 59 y.o. male who presents today for six month follow-up for CRT-D interrogation, CAD/ischemic cardiomyopathy, HFimprovedEF, VT, PAFib, anticoagulation, HTN, and hyperlipidemia.    Juaquin is a 59 year old male with history of CAD/ischemic cardiomyopathy with LVEF 59% (on echocardiogram in August 2024), with history of stents in 2005 and 2006, followed by CABG x 3 in 2013 (anatomical details unknown); coronary angiogram in July 2016 showed patent LIMA to LAD, but occluded SVG-RCA, nonobstructive disease of LCx, and diffuse distal disease of the distal RCA, no other SVG was identified.      He had CRT-D implanted in April 2018, and also has HFimprovedEF, VT, PAFib, anticoagulation, hypertension, hyperlipidemia, CLARE, diabetes mellitus, and history of seizures (followed by neurology), last seen by me in August 2024.     In November 2024, he saw neurology (Dr. Hunt) and had MRI of the brain, which came back normal.    More recently, he was diagnosed with fibromyalgia, followed by pain management at [facility].      He is here today for six month follow-up. He has some ongoing fatigue and muscle aches and memory issues.    From a cardiac standpoint, he remains stable. He is OFF of Ozempic, and is concerned about weight gain. His BP tends to run low, but he denies dizziness or falling or syncope. He denies any chest pain, pressure, tightness or discomfort; no symptomatic palpitations. He does not some mild shortness of breath; no orthopnea or PND.    Past Medical History:   Diagnosis Date    Arthritis     Hands/elbows    Block, bundle branch, left     CAD (coronary artery disease)     Cataract     Early stages, not removed    Class 2 obesity due  to excess calories in adult 07/10/2019    Diabetes (HCC) 2006    On oral medications, now well controlled    Fibromyalgia 2018    5-6/10    GERD (gastroesophageal reflux disease)     Gout     Hyperlipidemia     Hypertension     Ischemic cardiomyopathy 2024    Echocardiogram with normal LV size, LVEF 59%. Normal RA, LA and RV. Trace MR, mild TR. RVSP 29mmHg. Aortic root 3.8cm, ascending aorta 3.3cm.    Myocardial infarct (HCC)     Myocardial infarct (HCC)     Neuropathy (HCC)     Bilateral feet    Pacemaker     Psychiatric problem     Depression and anxiety    S/P colonoscopy     repeat 6 years at age of 61.    Sleep apnea     Uses CPAP    Spinal puncture headache 2022     Past Surgical History:   Procedure Laterality Date    AICD IMPLANT Left 2018    [employer] Inogen X4 CRT-D G148 implanted by Dr. Cullen.    RECOVERY  2016    Procedure: CATH LAB-[facility] W/POSSIBLE RADIAL APPROACH-CARIE;  Surgeon: Mireya Surgery;  [place] [facility] [place]  Service:     MULTIPLE CORONARY ARTERY BYPASS      CABG x 3    LEONILA BY LAPAROSCOPY      KNEE ARTHROSCOPY      Arthroscopy, Knee     Family History   Problem Relation Age of Onset    Heart Disease Mother     Diabetes Mother     Hyperlipidemia Father     Heart Disease Father     Hypertension Father     Stroke Father     Lung Disease Sister      Social History     Socioeconomic History    Marital status:      Spouse name: Not on file    Number of children: Not on file    Years of education: Not on file    Highest education level: Not on file   Occupational History    Not on file   Tobacco Use    Smoking status: Former     Current packs/day: 0.00     Average packs/day: 0.5 packs/day for 12.0 years (6.0 ttl pk-yrs)     Types: Cigarettes     Start date: 7/15/2000     Quit date: 7/15/2012     Years since quittin.6    Smokeless tobacco: Never   Vaping Use    Vaping status: Former    Substances: THC     Devices: Disposable   Substance and Sexual Activity    Alcohol use: Not Currently    Drug use: Not Currently     Types: Marijuana, Oral     Comment: bedtime    Sexual activity: Yes     Partners: Female   Other Topics Concern    Not on file   Social History Narrative    Not on file     Social Drivers of Health     Financial Resource Strain: High Risk (8/29/2023)    Overall Financial Resource Strain (CARDIA)     Difficulty of Paying Living Expenses: Hard   Food Insecurity: No Food Insecurity (8/29/2023)    Hunger Vital Sign     Worried About Running Out of Food in the Last Year: Never true     Ran Out of Food in the Last Year: Never true   Transportation Needs: No Transportation Needs (8/29/2023)    PRAPARE - Transportation     Lack of Transportation (Medical): No     Lack of Transportation (Non-Medical): No   Physical Activity: Inactive (8/29/2023)    Exercise Vital Sign     Days of Exercise per Week: 0 days     Minutes of Exercise per Session: 0 min   Stress: Stress Concern Present (8/29/2023)    [employer] [place] [employer] - Occupational Stress Questionnaire     Feeling of Stress : Very much   Social Connections: Moderately Integrated (8/29/2023)    Social Connection and Isolation Panel [NHANES]     Frequency of Communication with Friends and Family: Once a week     Frequency of Social Gatherings with Friends and Family: Never     Attends Tenri[employer] Services: 1 to 4 times per year     Active Member of Clubs or Organizations: Yes     Attends Club or Organization Meetings: More than 4 times per year     Marital Status:    Intimate Partner Violence: Low Risk  (3/7/2024)    Received from [facility], [facility], [facility]    History of Abuse     Have you ever been afraid of, threatened, neglected, or abused by someone?: No   Housing Stability: Low Risk  (8/29/2023)    Housing Stability Vital Sign     Unable to Pay for Housing in the Last Year: No      Number of Places Lived in the Last Year: 1     Unstable Housing in the Last Year: No     Allergies   Allergen Reactions    Iodine Anaphylaxis     Other reaction(s): Unknown    Iodine I 131 Tositumomab     Shellfish Allergy      Outpatient Encounter Medications as of 2/26/2025   Medication Sig Dispense Refill    sacubitril-valsartan (ENTRESTO) 24-26 MG Tab Take 0.5 Tablets by mouth 2 times a day. 100 Tablet 3    spironolactone (ALDACTONE) 25 MG Tab Take 0.5 Tablets by mouth every day. 50 Tablet 3    Empagliflozin-metFORMIN HCl (SYNJARDY) 5-500 MG Tab Take 1 Each by mouth every day. 400 Tablet 3    glucose blood (FREESTYLE LITE) strip 1 Strip by Other route 4 times a day. 400 Strip 3    zonisamide (ZONEGRAN) 100 MG Cap Take 3 Capsules by mouth every day. 270 Capsule 2    sotalol (BETAPACE) 80 MG Tab Take 1 Tablet by mouth 2 times a day. 200 Tablet 2    metoprolol SR (TOPROL XL) 50 MG TABLET SR 24 HR Take 1 Tablet by mouth every day. 100 Tablet 2    apixaban (ELIQUIS) 5mg Tab Take 1 Tablet by mouth 2 times a day. 200 Tablet 2    atorvastatin (LIPITOR) 40 MG Tab Take 1 Tablet by mouth every day. 100 Tablet 2    [DISCONTINUED] spironolactone (ALDACTONE) 25 MG Tab Take 1 Tablet by mouth every day. 100 Tablet 2    baclofen (LIORESAL) 10 MG Tab Take 1 Tablet by mouth 2 times a day as needed (pain). 180 Tablet 2    pantoprazole (PROTONIX) 40 MG Tablet Delayed Response Take 1 Tablet by mouth every day. 90 Tablet 2    DULoxetine (CYMBALTA) 60 MG Cap DR Particles delayed-release capsule Take 2 Capsules by mouth every day. 180 Capsule 2    albuterol 108 (90 Base) MCG/ACT Aero Soln inhalation aerosol Inhale 2 Puffs every four hours as needed for Shortness of Breath. 18 g 0    fluticasone-salmeterol (ADVAIR) 250-50 MCG/ACT AEROSOL POWDER, BREATH ACTIVATED Inhale 1 Puff every 12 hours. 3 Each 2    tadalafil (CIALIS) 5 MG tablet Take 1 Tablet by mouth every day for 360 days. 90 Tablet 3    [DISCONTINUED] sacubitril-valsartan  "(ENTRESTO) 24-26 MG Tab Take 1 Tablet by mouth 2 times a day. 200 Tablet 3    Blood Glucose Meter Kit Test blood sugar as recommended by provider. Abbott Freestyle Lite blood glucose monitoring kit. 1 Kit 0    ondansetron (ZOFRAN) 4 MG Tab tablet Take 1 Tablet by mouth every four hours as needed for Nausea/Vomiting. 40 Tablet 2    benzonatate (TESSALON) 100 MG Cap Take 1 Capsule by mouth 3 times a day as needed for Cough. 90 Capsule 2    Respiratory Therapy Supplies (CARETOUCH CPAP & BIPAP HOSE) Misc ** 10/7 cm H2O **  with sleep via mask - Prior sleep study in [place] 5 years ago, told severe sleep apnea. He thinks 72 AHI. Titration study 10-23-18 showed Both CPAP and BiPAP eliminated hypopneas and apneas, but BiPAP corrected the hypoxia better. for Lifetime      Insulin Pen Needle by Does not apply route. (Patient not taking: Reported on 2/26/2025)       No facility-administered encounter medications on file as of 2/26/2025.     Review of Systems   Constitutional:  Positive for malaise/fatigue. Negative for chills and fever.   HENT:  Negative for congestion.    Respiratory:  Positive for shortness of breath. Negative for cough.    Cardiovascular:  Negative for chest pain, palpitations, orthopnea, leg swelling and PND.   Gastrointestinal:  Negative for abdominal pain and nausea.   Musculoskeletal:  Positive for back pain and joint pain. Negative for myalgias.        Neuropathy in back and arms.   Skin:  Negative for rash.   Neurological:  Positive for seizures. Negative for dizziness, sensory change, loss of consciousness and headaches.        Seizures followed by neurology.   Endo/Heme/Allergies:  Does not bruise/bleed easily.   Psychiatric/Behavioral:  Positive for depression. The patient does not have insomnia.               Objective     BP (!) 80/56 (BP Location: Left arm, Patient Position: Sitting, BP Cuff Size: Adult)   Pulse 71   Resp 14   Ht 1.854 m (6' 1\")   Wt 87 kg (191 lb 12.8 oz)   SpO2 96%   " BMI 25.30 kg/m²     Physical Exam  Constitutional:       Appearance: He is well-developed.      Comments: BMI 25.30 (weight is up slightly).   HENT:      Head: Normocephalic.   Neck:      Vascular: No JVD.   Cardiovascular:      Rate and Rhythm: Normal rate and regular rhythm.      Heart sounds: Normal heart sounds.      Comments: AICD in left chest wall.  Sternotomy scar present.  Pulmonary:      Effort: Pulmonary effort is normal. No respiratory distress.      Breath sounds: Normal breath sounds. No wheezing or rales.   Abdominal:      General: Bowel sounds are normal. There is no distension.      Palpations: Abdomen is soft.      Tenderness: There is no abdominal tenderness.   Musculoskeletal:         General: Normal range of motion.      Cervical back: Normal range of motion and neck supple.   Skin:     General: Skin is warm and dry.      Findings: No rash.   Neurological:      Mental Status: He is alert and oriented to person, place, and time.       AICD interrogation today reveals normal function. 3 episodes of VT treated with ATP, successful; no mode switching episodes. No changes are made today. Battery is good for 5.5 years.     EKG ordered and interpreted by me today reveals atrially sensed, ventricularly paced rhythm at 68bpm.  QTc 488ms    ECHOCARDIOGRAMS:    CONCLUSIONS OF TTE OF 8/23/2024:  The ejection fraction is measured to be  59 % by Krishna's biplane.  Normal right ventricular size and systolic function.  No significant valvular disease.      CONCLUSIONS OF TTE OF 10/20/2023:  Compared to the prior study on 10/16/2019, there has been reduction in left ventricular systolic function.  Reduced left ventricular systolic function. The left ventricular ejection fraction is visually estimated to be 40%.  Global hypokinesis.   No evidence of valvular abnormality based on Doppler evaluation.     Interpretation Summary of Echocardiogram of 4/27/2022:  The study was technically difficult with some images  being suboptimal in quality   There is apical hypokinesis   There is mild inferior wall hypokinesis.   Visually the ejection fraction appears in the 35-40% range.   There is no thrombus.   A variety of Doppler measurements indicate grade I diastolic dysfunction associated with normal left   atrial pressures.   No significant valvular abnormality noted      CONCLUSIONS OF ECHOCARDIOGRAM OF 10/16/2019:  Limited Study for LVEF.   TDS - Limited acoustical window, contrast not available.  Prior Echo - 2/12/18, prior EF probably 40%, currently 45%.  Mildly reduced left ventricular systolic function.  Left ventricular ejection fraction is visually estimated to be 45%.  Global hypokinesis.  Abnormal septal motion consistent with postoperative status.      CORONARY ANGIOGRAM:     POSTOPERATIVE DIAGNOSES OF [facility] OF 7/22/2016:  1.  Occluded vein grafts.  2.  Patent LIMA to LAD.  3.  Diffuse distal RCA disease.  4.  A 100% occluded LAD stent.  5.  Moderate nonobstructive left circumflex disease.  6.  LVEF 50%.     LABS:     Lab Results   Component Value Date/Time    CHOLSTRLTOT 106 08/01/2024 07:40 AM    LDL 42 08/01/2024 07:40 AM    HDL 48 08/01/2024 07:40 AM    TRIGLYCERIDE 82 08/01/2024 07:40 AM        Lab Results   Component Value Date/Time    SODIUM 140 08/01/2024 07:40 AM    POTASSIUM 4.6 08/01/2024 07:40 AM    CHLORIDE 105 08/01/2024 07:40 AM    CO2 19 (L) 08/01/2024 07:40 AM    GLUCOSE 103 (H) 08/01/2024 07:40 AM    BUN 19 08/01/2024 07:40 AM    CREATININE 1.23 08/01/2024 07:40 AM    GLOMRATE 92 11/26/2022 04:44 AM      Lab Results   Component Value Date/Time    WBC 10.2 09/01/2021 11:13 AM    RBC 5.54 03/07/2024 06:34 PM    RBC 5.43 09/01/2021 11:13 AM    HEMOGLOBIN 17.6 (H) 03/07/2024 06:34 PM    HEMOGLOBIN 17.9 09/01/2021 11:13 AM    HEMATOCRIT 51.5 (H) 03/07/2024 06:34 PM    HEMATOCRIT 52.0 09/01/2021 11:13 AM    MCV 92.9 03/07/2024 06:34 PM    MCV 95.8 09/01/2021 11:13 AM    MCH 31.8 03/07/2024 06:34 PM    MCH 33.0  09/01/2021 11:13 AM    MCHC 34.2 03/07/2024 06:34 PM    MCHC 34.4 09/01/2021 11:13 AM    MPV 8.5 03/07/2024 06:34 PM    MPV 11.1 09/01/2021 11:13 AM           Assessment & Plan     1. Presence of biventricular AICD        2. Coronary artery disease, unspecified vessel or lesion type, unspecified whether angina present, unspecified whether native or transplanted heart        3. Ischemic cardiomyopathy        4. S/P CABG x 3        5. ACC/AHA stage C systolic heart failure (HCC)  sacubitril-valsartan (ENTRESTO) 24-26 MG Tab    spironolactone (ALDACTONE) 25 MG Tab      6. Ventricular tachycardia (HCC)        7. Paroxysmal atrial fibrillation (HCC)        8. Chronic anticoagulation        9. Hypercoagulable state due to paroxysmal atrial fibrillation (HCC)        10. Essential hypertension  EKG    sacubitril-valsartan (ENTRESTO) 24-26 MG Tab    spironolactone (ALDACTONE) 25 MG Tab      11. Hypotension due to drugs        12. Mixed hyperlipidemia        13. Obstructive sleep apnea syndrome        14. Fibromyalgia            Medical Decision Making: Today's Assessment/Status/Plan:        1. CAD/ischemic cardiomyopathy status post CABG x 3 and previous intervention, with LVEF 59% on echo in August 2024; CRT-D is working normally with 3 episodes of VT, treated with ATP. Continue:  Eliquis 5mg twice daily  Lipitor 40mg once daily  Entresto 24/26mg HALF TABLET twice daily  Toprol XL 50mg once daily  Aldactone 12.5mg once daily  NTG 0.4mg PRN (rarely uses)     2. HFrEF, stage C, class II, LVEF 59% in August 2024, no HF symptoms. Continue Eliquis, Toprol XL, Entresto, Aldactone and statin.      3. History of VT on Sotalol 80mg twice daily. EKG today is stable; QTc 488ms. Creatinine is normal. He had 3 episodes of VT, treated with ATP, all on the same date.     4. PAFib, on Sotalol 80mg twice daily, with no mode switching episodes.     5. Chronic anticoagulation with Eliquis, no bleeding issues.      6. Hypertension, treated. BP  continues to run low, and he has fatigue.  Cut Entresto 24/26mg to HALF TABLET twice daily  Cut Aldactone from 25mg to 12.5mg once daily  Monitor BP at home     7. Hyperlipidemia, treated with Lipitor 40mg. Last LDL was 42 (at goal).     8. Diabetes mellitus, treated with combination therapy. He is OFF of Ozempic; weight has slowly been going up. HgbA1c is normal.     9. CLARE, treated/stable.     10. History of seizures, last episode in early May 2023, with no recent recurrence, followed by neurology.     11. Generalized pain, with recent diagnosis of fibromyalgia, followed by pain management.     From a cardiac standpoint, he remains stable at this time.   We reviewed echocardiogram results, LVEF is improved.  BP continues to run low; cut Entresto and Aldacton in HALF.    Same medications for now.  Keep follow-up with other providers.     Follow-up in 3 months for next device interrogation, and for BP recheck.

## 2025-02-28 ENCOUNTER — PATIENT OUTREACH (OUTPATIENT)
Dept: HEALTH INFORMATION MANAGEMENT | Facility: OTHER | Age: 60
End: 2025-02-28
Payer: MEDICARE

## 2025-02-28 DIAGNOSIS — I10 ESSENTIAL HYPERTENSION: ICD-10-CM

## 2025-02-28 DIAGNOSIS — I50.20 ACC/AHA STAGE C SYSTOLIC HEART FAILURE (HCC): ICD-10-CM

## 2025-02-28 NOTE — PROGRESS NOTES
Reason for Follow-Up:    Monthly outreach.     Current Health Status:    Patient currently qualifies for the CCM/PCM program and is enrolled, they qualify based on the following chronic conditions: CHF, HTN.     Medical Updates:  None noted.    Medication Updates:  Recently started Lyrica, no concerns noted.    Symptoms:  No symptoms noted.    Plan of Care Goals and Progress:    Goal 1. CHF Management      Progress:  Patient continues to take medications as prescribe and continues following education such as monitoring weights (3 lbs in a single day, etc).      Barriers:  Chronic condition; Patient adherence to education.     Interventions:  Adherence to care team, specialists/appointments as necessary, reporting concerns or changes to PCP/RN.      Education:  Patient given education of watching/monitoring weight gain, continuing to take medication as prescribed and monitoring edema.    Goal 2. HTN.      Progress:  Patient continues monitoring BP at home and taking medication as prescribed.       Barriers:  Chronic condition.      Interventions:  Adherence to care team, specialists/appointments as necessary, reporting concerns or changes to PCP/RN.      Education:  Continue adhering to current medication adherence, monitoring for any changes or new onset symptoms and reporting changes to PCP/RN.      Patient's Concerns and Feedback (Self Management of Care):     Patient is doing well. No concerns, questions or changes. Patients recent A1c is 5.0 and he continues to check BS's at home. Patient also continues checking BP's at home and most recent reading continue to stay in 100's/60's range. No further questions or concerns and encouraged to contact with any needs.     Next Steps:     Follow-Up Plan:  Monthly outreach 4 weeks (One month - 03/2025).       Appointments:      - 03/06/25 with Pulm/Sleep.   - 03/18/25 with Medical Group.   - 05/06/25 with Neurology.   - 05/28/25 with Cardiology.      Contact Information:   Call [phone] with any questions or concerns.

## 2025-04-07 ENCOUNTER — NON-PROVIDER VISIT (OUTPATIENT)
Dept: CARDIOLOGY | Facility: MEDICAL CENTER | Age: 60
End: 2025-04-07
Payer: MEDICARE

## 2025-04-07 PROCEDURE — 93295 DEV INTERROG REMOTE 1/2/MLT: CPT | Performed by: INTERNAL MEDICINE

## 2025-04-07 NOTE — CARDIAC REMOTE MONITOR - SCAN
Device transmission reviewed. Device demonstrated appropriate function.       Electronically Signed by: Esequiel Cullen M.D.    4/10/2025  4:43 PM

## 2025-05-06 ENCOUNTER — OFFICE VISIT (OUTPATIENT)
Dept: NEUROLOGY | Facility: MEDICAL CENTER | Age: 60
End: 2025-05-06
Attending: PSYCHIATRY & NEUROLOGY
Payer: MEDICARE

## 2025-05-06 VITALS
HEART RATE: 71 BPM | DIASTOLIC BLOOD PRESSURE: 66 MMHG | BODY MASS INDEX: 27.7 KG/M2 | OXYGEN SATURATION: 95 % | TEMPERATURE: 97.8 F | RESPIRATION RATE: 16 BRPM | WEIGHT: 209 LBS | SYSTOLIC BLOOD PRESSURE: 104 MMHG | HEIGHT: 73 IN

## 2025-05-06 DIAGNOSIS — G40.009 LOCALIZATION-RELATED (FOCAL) (PARTIAL) IDIOPATHIC EPILEPSY AND EPILEPTIC SYNDROMES WITH SEIZURES OF LOCALIZED ONSET, NOT INTRACTABLE, WITHOUT STATUS EPILEPTICUS (HCC): ICD-10-CM

## 2025-05-06 DIAGNOSIS — E08.42 DIABETIC POLYNEUROPATHY ASSOCIATED WITH DIABETES MELLITUS DUE TO UNDERLYING CONDITION (HCC): ICD-10-CM

## 2025-05-06 DIAGNOSIS — F03.90 DEMENTIA, UNSPECIFIED DEMENTIA SEVERITY, UNSPECIFIED DEMENTIA TYPE, UNSPECIFIED WHETHER BEHAVIORAL, PSYCHOTIC, OR MOOD DISTURBANCE OR ANXIETY (HCC): ICD-10-CM

## 2025-05-06 PROCEDURE — 3078F DIAST BP <80 MM HG: CPT | Performed by: PSYCHIATRY & NEUROLOGY

## 2025-05-06 PROCEDURE — 99215 OFFICE O/P EST HI 40 MIN: CPT | Performed by: PSYCHIATRY & NEUROLOGY

## 2025-05-06 PROCEDURE — 99213 OFFICE O/P EST LOW 20 MIN: CPT | Performed by: PSYCHIATRY & NEUROLOGY

## 2025-05-06 PROCEDURE — 3074F SYST BP LT 130 MM HG: CPT | Performed by: PSYCHIATRY & NEUROLOGY

## 2025-05-06 ASSESSMENT — ENCOUNTER SYMPTOMS
INSOMNIA: 1
FALLS: 0
SEIZURES: 0
MEMORY LOSS: 1
LOSS OF CONSCIOUSNESS: 0
DEPRESSION: 1

## 2025-05-06 ASSESSMENT — PATIENT HEALTH QUESTIONNAIRE - PHQ9
SUM OF ALL RESPONSES TO PHQ QUESTIONS 1-9: 19
5. POOR APPETITE OR OVEREATING: 2 - MORE THAN HALF THE DAYS
CLINICAL INTERPRETATION OF PHQ2 SCORE: 5

## 2025-05-06 ASSESSMENT — FIBROSIS 4 INDEX: FIB4 SCORE: 1.22

## 2025-05-06 NOTE — PROGRESS NOTES
Subjective     Juaquin Solorzano is a 59 y.o. male who presents today alone, without his wife Cherise, for follow-up, with a history of dementia, status post MRI, seizures, and diabetic polyneuropathy.     HPI    Dementia: Juaquin states that his memory problems are still there, he does not remember that we had had a discussion about dementia when he was seen here this past November, 2024.  He states he does not require help with medications, when last seen, he and his wife insisted that he did need some help.  At the same time, when asked about his seizure medication, he has difficulty remembering the dose, but he does remember when he takes it.  He does misplace things, he does repeat himself at times, nothing more than usual.  The recent memory is the most difficult thing for him, events are recalled but details still problematic.  He will leave tasks and complete if interrupted, he will then go into his bedroom to go to sleep.  Things are left incomplete as a result.  He is still driving, he has GPS which he uses more often, but he denies recent fender benders or getting lost.    He has always suffered from depression, he does not feel it is worse, he denies SI or HI.  His appetite has been stable.  He denies hallucinations.  He has had problems with sleep, this has not progressed.  MoCA was 20/30 when he was seen back in November, 2023.    MRI of the brain was completely negative.  There is no evidence of even minimal atherosclerotic change bilaterally, no evidence of infection, inflammation, mass, ischemic insult, etc.  Unfortunately blood work was not drawn.    Seizures: Stable on Zonegran 300 mg nightly.  He insists on being compliant.  He tolerates the drug without issue.  His seizures are usually an orthostatic-related issue.  He has had none of them occur.  His last EEG from 2023 around the time of his last seizure was completely normal.    Neuropathy: Stable, there is still numbness in the feet, he  "is unsteady as he walks so this is not made worse because of uneven surfaces or reduced ambient light.  He will hold onto walls and the backs of furniture if he feels unsteady.  There is no analgesia.  He has not required medication.    He does recall that he was recently placed on Lyrica, records indicate he is now at 100 mg twice daily.  He has been on Cymbalta as well, both for Fibromyalgia.    Medical, surgical and family histories are reviewed, there are no new drug allergies.  He is on Synjardy, metoprolol XL, Zonegran 3 mg nightly, Lipitor, Protonix, Betapace, Advair and albuterol inhalers, Lyrica 100 mg twice daily, Cymbalta 120 mg daily, Aldactone, Eliquis 5 mg twice daily, Entresto, Aldactone, and Cialis.    Review of Systems   Musculoskeletal:  Negative for falls.   Neurological:  Negative for seizures and loss of consciousness.   Psychiatric/Behavioral:  Positive for depression and memory loss. Negative for suicidal ideas. The patient has insomnia.    All other systems reviewed and are negative.    Objective     /66 (BP Location: Left arm, Patient Position: Sitting, BP Cuff Size: Adult)   Pulse 71   Temp 36.6 °C (97.8 °F) (Temporal)   Resp 16   Ht 1.854 m (6' 1\")   Wt 94.8 kg (208 lb 15.9 oz)   SpO2 95%   BMI 27.57 kg/m²      Physical Exam    He appears in no acute distress.  He is cooperative, dressed appropriately, though a bit disheveled.  Vital signs are stable.  There is no malar rash.  The neck is supple.  Cardiac evaluation is unremarkable.     Neurological Exam    He is fully oriented, he names and repeats easily, follows multistep commands.  He knows his date of birth and age, his address and phone number.  He spells the word \"world\" forwards and backwards.  Similarities to done easily.  There is no apraxia or inattention.    PERRLA/EOMI, visual fields are grossly full.  There is no hypophonia or bradykinesia.  The tongue and uvula are midline, facial movements are symmetric, " sensory exam is intact to temperature.  Shoulder shrug and head rotation are symmetric.    Musculoskeletal exam reveals normal tone, there is no tremor or drift.  Strength is 5/5 bilaterally.  Reflexes are present throughout, even at the ankles, there are no asymmetries from side-to-side.  The toes are downgoing bilaterally.    He stands easily, he walks without the need for an assistive device.  Station is slightly widened.  Repetitive movements are symmetric throughout.  There is no appendicular dystaxia.    Sensory exam reveals intact JPS, a graded decrement of temperature below the midshin.  Vibration is felt at the instep, loss in the toes.  Assessment & Plan  Dementia, unspecified dementia severity, unspecified dementia type, unspecified whether behavioral, psychotic, or mood disturbance or anxiety (HCC)  He will go downstairs and have his blood work drawn.  He fits criteria for dementia, I kept the conversation simple since his wife is not here with him.  He was told to allow her to help him with medications moving forward.  He seems to be able to drive safely, unfortunately his wife is not here with him to help confirm or deny this.  Eventually he will need a free driving assessment to make sure that things are safe when he is behind the wheel.  He understands the nature of dementia and hy all the testing is being done to look for causes of cognitive impairment that are treatable.    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  He is tolerating the drug without issue.  I do not think it is confounding the issue when he comes with his cognitive status.  Diabetic polyneuropathy associated with diabetes mellitus due to underlying condition (HCC)  Stable as well, he will continue his Lyrica and Cymbalta, prescribed by others for these mild  symptoms.    We will follow-up in 1 year.    Time: 40 minutes in total spent on patient care including pre-charting, record review, discussion with healthcare staff and documentation.  This includes face-to-face time for exam, review, discussion, as well as counseling and coordinating care.

## 2025-05-06 NOTE — ASSESSMENT & PLAN NOTE
He will go downstairs and have his blood work drawn.  He fits criteria for dementia, I kept the conversation simple since his wife is not here with him.  He was told to allow her to help him with medications moving forward.  He seems to be able to drive safely, unfortunately his wife is not here with him to help confirm or deny this.  Eventually he will need a free driving assessment to make sure that things are safe when he is behind the wheel.  He understands the nature of dementia and hy all the testing is being done to look for causes of cognitive impairment that are treatable.    
Stable as well, he will continue his Lyrica and Cymbalta, prescribed by others for these mild symptoms.    We will follow-up in 1 year.    Time: 40 minutes in total spent on patient care including pre-charting, record review, discussion with healthcare staff and documentation.  This includes face-to-face time for exam, review, discussion, as well as counseling and coordinating care.  
Stable, we continue Zonegran 300 mg nightly.  He is tolerating the drug without issue.  I do not think it is confounding the issue when he comes with his cognitive status.  
I was present during the key portion of the procedure.
I was present during the key portion of the procedure.

## 2025-05-12 ENCOUNTER — NON-PROVIDER VISIT (OUTPATIENT)
Dept: MEDICAL GROUP | Facility: CLINIC | Age: 60
End: 2025-05-12
Payer: MEDICARE

## 2025-05-12 ENCOUNTER — HOSPITAL ENCOUNTER (OUTPATIENT)
Facility: MEDICAL CENTER | Age: 60
End: 2025-05-12
Attending: PSYCHIATRY & NEUROLOGY
Payer: MEDICARE

## 2025-05-12 ENCOUNTER — TELEPHONE (OUTPATIENT)
Dept: CARDIOLOGY | Facility: MEDICAL CENTER | Age: 60
End: 2025-05-12

## 2025-05-12 ENCOUNTER — HOSPITAL ENCOUNTER (OUTPATIENT)
Facility: MEDICAL CENTER | Age: 60
End: 2025-05-12
Attending: STUDENT IN AN ORGANIZED HEALTH CARE EDUCATION/TRAINING PROGRAM
Payer: MEDICARE

## 2025-05-12 ENCOUNTER — HOSPITAL ENCOUNTER (OUTPATIENT)
Facility: MEDICAL CENTER | Age: 60
End: 2025-05-12
Attending: PHYSICIAN ASSISTANT
Payer: MEDICARE

## 2025-05-12 DIAGNOSIS — R31.29 MICROSCOPIC HEMATURIA: ICD-10-CM

## 2025-05-12 DIAGNOSIS — E08.40: ICD-10-CM

## 2025-05-12 DIAGNOSIS — Z12.5 ENCOUNTER FOR SCREENING FOR MALIGNANT NEOPLASM OF PROSTATE: ICD-10-CM

## 2025-05-12 DIAGNOSIS — F02.B0 MODERATE DEMENTIA ASSOCIATED WITH OTHER UNDERLYING DISEASE, WITHOUT BEHAVIORAL DISTURBANCE, PSYCHOTIC DISTURBANCE, MOOD DISTURBANCE, OR ANXIETY (HCC): ICD-10-CM

## 2025-05-12 LAB
ALBUMIN SERPL BCP-MCNC: 3.7 G/DL (ref 3.2–4.9)
ALBUMIN/GLOB SERPL: 1.4 G/DL
ALP SERPL-CCNC: 91 U/L (ref 30–99)
ALT SERPL-CCNC: 13 U/L (ref 2–50)
AMBIGUOUS DTTM AMBI4: NORMAL
ANION GAP SERPL CALC-SCNC: 11 MMOL/L (ref 7–16)
ANION GAP SERPL CALC-SCNC: 12 MMOL/L (ref 7–16)
AST SERPL-CCNC: 21 U/L (ref 12–45)
BILIRUB SERPL-MCNC: 0.9 MG/DL (ref 0.1–1.5)
BUN SERPL-MCNC: 15 MG/DL (ref 8–22)
BUN SERPL-MCNC: 15 MG/DL (ref 8–22)
C4 SERPL-MCNC: 17.9 MG/DL (ref 19–52)
CALCIUM ALBUM COR SERPL-MCNC: 9.1 MG/DL (ref 8.5–10.5)
CALCIUM SERPL-MCNC: 8.8 MG/DL (ref 8.5–10.5)
CALCIUM SERPL-MCNC: 8.9 MG/DL (ref 8.5–10.5)
CHLORIDE SERPL-SCNC: 108 MMOL/L (ref 96–112)
CHLORIDE SERPL-SCNC: 110 MMOL/L (ref 96–112)
CHOLEST SERPL-MCNC: 120 MG/DL (ref 100–199)
CO2 SERPL-SCNC: 24 MMOL/L (ref 20–33)
CO2 SERPL-SCNC: 24 MMOL/L (ref 20–33)
CREAT SERPL-MCNC: 0.97 MG/DL (ref 0.5–1.4)
CREAT SERPL-MCNC: 1 MG/DL (ref 0.5–1.4)
ERYTHROCYTE [SEDIMENTATION RATE] IN BLOOD BY WESTERGREN METHOD: 6 MM/HOUR (ref 0–20)
GFR SERPLBLD CREATININE-BSD FMLA CKD-EPI: 87 ML/MIN/1.73 M 2
GFR SERPLBLD CREATININE-BSD FMLA CKD-EPI: 90 ML/MIN/1.73 M 2
GLOBULIN SER CALC-MCNC: 2.7 G/DL (ref 1.9–3.5)
GLUCOSE SERPL-MCNC: 96 MG/DL (ref 65–99)
GLUCOSE SERPL-MCNC: 97 MG/DL (ref 65–99)
HDLC SERPL-MCNC: 52 MG/DL
LDLC SERPL CALC-MCNC: 56 MG/DL
POTASSIUM SERPL-SCNC: 4.9 MMOL/L (ref 3.6–5.5)
POTASSIUM SERPL-SCNC: 4.9 MMOL/L (ref 3.6–5.5)
PROT SERPL-MCNC: 6.4 G/DL (ref 6–8.2)
PSA SERPL DL<=0.01 NG/ML-MCNC: 0.18 NG/ML (ref 0–4)
SODIUM SERPL-SCNC: 143 MMOL/L (ref 135–145)
SODIUM SERPL-SCNC: 146 MMOL/L (ref 135–145)
TRIGL SERPL-MCNC: 58 MG/DL (ref 0–149)

## 2025-05-12 PROCEDURE — 80053 COMPREHEN METABOLIC PANEL: CPT

## 2025-05-12 PROCEDURE — 83036 HEMOGLOBIN GLYCOSYLATED A1C: CPT

## 2025-05-12 PROCEDURE — 85652 RBC SED RATE AUTOMATED: CPT

## 2025-05-12 PROCEDURE — 84153 ASSAY OF PSA TOTAL: CPT

## 2025-05-12 PROCEDURE — 36415 COLL VENOUS BLD VENIPUNCTURE: CPT | Performed by: PHYSICIAN ASSISTANT

## 2025-05-12 PROCEDURE — 80048 BASIC METABOLIC PNL TOTAL CA: CPT

## 2025-05-12 PROCEDURE — 86160 COMPLEMENT ANTIGEN: CPT

## 2025-05-12 PROCEDURE — 99000 SPECIMEN HANDLING OFFICE-LAB: CPT | Performed by: PHYSICIAN ASSISTANT

## 2025-05-12 PROCEDURE — 84165 PROTEIN E-PHORESIS SERUM: CPT

## 2025-05-12 PROCEDURE — 84155 ASSAY OF PROTEIN SERUM: CPT

## 2025-05-12 PROCEDURE — 80061 LIPID PANEL: CPT

## 2025-05-12 NOTE — PROGRESS NOTES
Juaquin Solorzano is a 59 y.o. male here for a non-provider visit for a lab draw on 5/12/2025 at 8:23 AM.    Procedure performed:  Venipuncture     Anatomical site:  Right Antecubital Area    Equipment used:  23 g butterfly     Labs drawn:  A1c, Basic Metabolic Panel , Comp Metabolic Panel , Lipid Profile, PSA, and SPEP w Reflex to SINDY, EUGENIO,G,M.   Sed Rate, and Complement C4    Ordering provider:  Cece Parker, Rudy Hunt, and Corine Godfrey    Lab draw completed by:  Corry Ramirez, Med Ass't

## 2025-05-12 NOTE — LETTER
PROCEDURE/SURGERY CLEARANCE FORM      Encounter Date: 5/12/2025    Patient: Juaquin Solorzano  YOB: 1965    CARDIOLOGIST:  LAINA Dos Santos    REFERRING DOCTOR:  No ref. provider found    The following procedure/surgery: EDG and Colonoscopy with Deep (Propofol) Sedation     PROCEDURE/SURGERY CLEARANCE FORM    Date: 5/12/2025   Patient Name: Juaquin Solorzano    Dear Surgeon or Proceduralist,      Thank you for your request for cardiac stratification of our mutual patient Juaquin Solorzano 1965. We have reviewed their [facility] records; and to the best of our understanding this patient has not had stenting, ablation, watchman, cardiothoracic surgery or hospitalization for cardiovascular reasons in the past 6 months.  Juaquin Solorzano has been seen within the past 15 months and is considered to have non-modifiable cardiac risk for this low-risk procedure/surgery. They may proceed from a cardiovascular standpoint and may hold their antiplatelet/anticoagulation as briefly as possible. Please have patient resume this medication when hemodynamically stable to do so.     Aspirin or Prasugrel   - hold 7 days prior to procedure/surgery, resume when hemodynamically stable      Clopidrogrel or Ticagrelor  - hold 7 days for all neurological procedures, hold 5 days prior to all other procedure/surgery,  resume when hemodynamically stable     Warfarin - hold 7 days for all neurological procedures, hold 5 days prior to all other procedure/surgery and coordinate with [facility] Anticoagulation [facility] ([phone]) INR testing and dose management.      Pradaxa/Xarelto/Eliquis/Savesya - hold 1 day prior to procedure for low bleeding risk procedure, 2 days for high bleeding risk procedure, or consider holding 3 days or longer for patients with reduced kidney function (CrCl <30mL/min) or spinal/cranial surgeries/procedures.      If they have a mechanical heart valve, please coordinate with [facility]  Anticoagulation Service ([phone]) the proper management of their anticoagulant in the periprocedural or perioperative period.      Some patients have higher risk for cardiovascular complications or holding medication. If our patient has had prior complications of holding antiplatelet or anticoagulants in the past and we have seen them after these events, we have addressed these concerns with the patient. They are at an unknown degree of increased risk for recurrent complication.  You may hold anticoagulation/antiplatelets for the procedure or surgery if the benefits of the procedure or surgery outweigh this nonmodifiable risk.      If Juaquin Solorzano 1965 has new symptoms of heart failure decompensation, unstable arrythmia, or angina please reach out and we will assess the patient.      If you have other patient-specific concerns, please feel free to reach out to the patient's cardiologist directly at [phone].     Thank you,       [facility]        Electronically Signed       MD Signature   KRISTI Dos Santos.

## 2025-05-13 ENCOUNTER — RESULTS FOLLOW-UP (OUTPATIENT)
Dept: MEDICAL GROUP | Facility: CLINIC | Age: 60
End: 2025-05-13

## 2025-05-13 LAB
EST. AVERAGE GLUCOSE BLD GHB EST-MCNC: 105 MG/DL
HBA1C MFR BLD: 5.3 % (ref 4–5.6)

## 2025-05-13 NOTE — TELEPHONE ENCOUNTER
Last OV: 02.26.2025  Proposed Surgery: EDG and Colonoscopy with Deep (Propofol) Sedation   Surgery Date: 06.27.2025  Requesting Office Name: [facility]   Fax Number: [phone]  Preference of Location (default is surgery center unless specified by Cardiologist or [facility])  Prior Clearance Addressed: No    Is this a general clearance? YES   Anticoags/Antiplatelets: Apixaban   Anticoags/Antiplatelet managed by Cardiology? YES    Outstanding Cardiac Imaging : No  Ablation, Cardioversion, Stent, Cardiac Devices, Catheterization, Watchman: No  TAVR/Valve, Mitral Clip, Watchman (including open heart),: N/A   Recent Cardiac Hospitalization: No            When: N/A  History (cardiac history):   Past Medical History:   Diagnosis Date    Arthritis     Hands/elbows    Block, bundle branch, left     CAD (coronary artery disease)     Cataract     Early stages, not removed    Class 2 obesity due to excess calories in adult 07/10/2019    Diabetes (HCC) 2006    On oral medications, now well controlled    Fibromyalgia 04/26/2018    5-6/10    GERD (gastroesophageal reflux disease)     Gout     Hyperlipidemia     Hypertension     Ischemic cardiomyopathy 08/2024    Echocardiogram with normal LV size, LVEF 59%. Normal RA, LA and RV. Trace MR, mild TR. RVSP 29mmHg. Aortic root 3.8cm, ascending aorta 3.3cm.    Myocardial infarct ([facility]) 2007    Myocardial infarct ([facility]) 2012    Neuropathy ([facility])     Bilateral feet    Pacemaker     Psychiatric problem     Depression and anxiety    S/P colonoscopy     repeat 6 years at age of 61.    Sleep apnea     Uses CPAP    Spinal puncture headache 12/22/2022           Is this a dental clearance? NO  Ablation, Cardioversion, Watchman, Stents, Cath, Devices within the last 3 months? No   If yes- Send dental wait letter, do not forward to provider for review.     TAVR / Valve, Mitral clip within the last 6 months? No  If yes- Send dental wait letter, do not forward to provider for review.      If completing a general clearance, continue per protocol.           Surgical Clearance Letter Sent: YES   **Scan clearance request letter into [facility].**

## 2025-05-15 LAB
ALBUMIN SERPL ELPH-MCNC: 3.61 G/DL (ref 3.75–5.01)
ALPHA1 GLOB SERPL ELPH-MCNC: 0.33 G/DL (ref 0.19–0.46)
ALPHA2 GLOB SERPL ELPH-MCNC: 0.66 G/DL (ref 0.48–1.05)
B-GLOBULIN SERPL ELPH-MCNC: 0.77 G/DL (ref 0.48–1.1)
GAMMA GLOB SERPL ELPH-MCNC: 0.84 G/DL (ref 0.62–1.51)
INTERPRETATION SERPL IFE-IMP: ABNORMAL
MONOCLON BAND OBS SERPL: ABNORMAL
MONOCLONAL PROTEIN NL11656: ABNORMAL G/DL
PATHOLOGY STUDY: ABNORMAL
PROT SERPL-MCNC: 6.2 G/DL (ref 6.3–8.2)

## 2025-05-28 ENCOUNTER — APPOINTMENT (OUTPATIENT)
Dept: CARDIOLOGY | Facility: PHYSICIAN GROUP | Age: 60
End: 2025-05-28
Payer: MEDICARE

## 2025-05-28 ENCOUNTER — APPOINTMENT (OUTPATIENT)
Dept: MEDICAL GROUP | Facility: CLINIC | Age: 60
End: 2025-05-28
Payer: MEDICARE

## 2025-06-18 ENCOUNTER — TELEPHONE (OUTPATIENT)
Dept: CARDIOLOGY | Facility: MEDICAL CENTER | Age: 60
End: 2025-06-18
Payer: MEDICARE

## 2025-06-24 ENCOUNTER — TELEPHONE (OUTPATIENT)
Dept: CARDIOLOGY | Facility: MEDICAL CENTER | Age: 60
End: 2025-06-24
Payer: MEDICARE

## 2025-07-07 ENCOUNTER — NON-PROVIDER VISIT (OUTPATIENT)
Dept: CARDIOLOGY | Facility: MEDICAL CENTER | Age: 60
End: 2025-07-07
Payer: MEDICARE

## 2025-07-07 PROCEDURE — 93295 DEV INTERROG REMOTE 1/2/MLT: CPT | Performed by: INTERNAL MEDICINE

## 2025-07-08 NOTE — CARDIAC REMOTE MONITOR - SCAN
Device transmission reviewed. Device demonstrated appropriate function.       Electronically Signed by: Esequiel Cullen M.D.    7/10/2025  8:09 AM

## 2025-07-18 ENCOUNTER — TELEPHONE (OUTPATIENT)
Dept: HEALTH INFORMATION MANAGEMENT | Facility: OTHER | Age: 60
End: 2025-07-18
Payer: MEDICARE

## 2025-07-25 ENCOUNTER — PATIENT OUTREACH (OUTPATIENT)
Dept: HEALTH INFORMATION MANAGEMENT | Facility: OTHER | Age: 60
End: 2025-07-25
Payer: MEDICARE

## 2025-07-25 DIAGNOSIS — E11.8 TYPE 2 DIABETES MELLITUS WITH COMPLICATION, WITH LONG-TERM CURRENT USE OF INSULIN (HCC): Primary | ICD-10-CM

## 2025-07-25 DIAGNOSIS — Z79.4 TYPE 2 DIABETES MELLITUS WITH COMPLICATION, WITH LONG-TERM CURRENT USE OF INSULIN (HCC): Primary | ICD-10-CM

## 2025-07-28 NOTE — PROGRESS NOTES
07/25/25 - Monthly outreach, attempt #1.  07/28/25 - Left VM for monthly outreach, attempt #2, closing encounter.

## 2025-07-29 ENCOUNTER — TELEPHONE (OUTPATIENT)
Dept: HEALTH INFORMATION MANAGEMENT | Facility: OTHER | Age: 60
End: 2025-07-29
Payer: MEDICARE

## 2025-07-30 DIAGNOSIS — R06.02 SHORTNESS OF BREATH: Primary | ICD-10-CM

## 2025-08-22 DIAGNOSIS — I10 ESSENTIAL HYPERTENSION: ICD-10-CM

## 2025-08-22 DIAGNOSIS — E08.40: ICD-10-CM

## 2025-08-22 DIAGNOSIS — Z79.01 CHRONIC ANTICOAGULATION: ICD-10-CM

## 2025-08-22 DIAGNOSIS — R05.3 CHRONIC COUGH: ICD-10-CM

## 2025-08-22 DIAGNOSIS — G89.29 CHRONIC MIDLINE LOW BACK PAIN WITHOUT SCIATICA: ICD-10-CM

## 2025-08-22 DIAGNOSIS — R56.9 FOCAL SEIZURES (HCC): ICD-10-CM

## 2025-08-22 DIAGNOSIS — M54.9 MID BACK PAIN: ICD-10-CM

## 2025-08-22 DIAGNOSIS — E78.2 MIXED HYPERLIPIDEMIA: ICD-10-CM

## 2025-08-22 DIAGNOSIS — F41.9 ANXIETY DISORDER, UNSPECIFIED TYPE: ICD-10-CM

## 2025-08-22 DIAGNOSIS — G47.09 OTHER INSOMNIA: ICD-10-CM

## 2025-08-22 DIAGNOSIS — I48.0 PAROXYSMAL ATRIAL FIBRILLATION (HCC): ICD-10-CM

## 2025-08-22 DIAGNOSIS — M54.50 CHRONIC MIDLINE LOW BACK PAIN WITHOUT SCIATICA: ICD-10-CM

## 2025-08-22 DIAGNOSIS — M54.2 NECK PAIN, CHRONIC: ICD-10-CM

## 2025-08-22 DIAGNOSIS — R13.12 OROPHARYNGEAL DYSPHAGIA: ICD-10-CM

## 2025-08-22 DIAGNOSIS — G89.29 NECK PAIN, CHRONIC: ICD-10-CM

## 2025-08-22 DIAGNOSIS — I47.20 VENTRICULAR TACHYCARDIA (HCC): ICD-10-CM

## 2025-08-22 DIAGNOSIS — F33.0 MAJOR DEPRESSIVE DISORDER, RECURRENT, MILD (HCC): ICD-10-CM

## 2025-08-22 DIAGNOSIS — I50.20 ACC/AHA STAGE C SYSTOLIC HEART FAILURE (HCC): ICD-10-CM

## 2025-08-22 DIAGNOSIS — R06.02 SHORTNESS OF BREATH: ICD-10-CM

## 2025-08-25 RX ORDER — TADALAFIL 5 MG/1
5 TABLET ORAL DAILY
Qty: 90 TABLET | Refills: 3 | Status: SHIPPED | OUTPATIENT
Start: 2025-08-25 | End: 2026-08-20

## 2025-08-26 RX ORDER — METOPROLOL SUCCINATE 50 MG/1
50 TABLET, EXTENDED RELEASE ORAL DAILY
Qty: 90 TABLET | Refills: 0 | Status: SHIPPED | OUTPATIENT
Start: 2025-08-26 | End: 2025-08-27 | Stop reason: SDUPTHER

## 2025-08-26 RX ORDER — SOTALOL HYDROCHLORIDE 80 MG/1
80 TABLET ORAL 2 TIMES DAILY
Qty: 180 TABLET | Refills: 0 | Status: SHIPPED | OUTPATIENT
Start: 2025-08-26 | End: 2025-08-27 | Stop reason: SDUPTHER

## 2025-08-26 RX ORDER — ATORVASTATIN CALCIUM 40 MG/1
40 TABLET, FILM COATED ORAL DAILY
Qty: 180 TABLET | Refills: 0 | Status: SHIPPED | OUTPATIENT
Start: 2025-08-26 | End: 2025-08-27 | Stop reason: SDUPTHER

## 2025-08-26 RX ORDER — SPIRONOLACTONE 25 MG/1
12.5 TABLET ORAL DAILY
Qty: 45 TABLET | Refills: 0 | Status: SHIPPED | OUTPATIENT
Start: 2025-08-26 | End: 2025-08-27 | Stop reason: SDUPTHER

## 2025-08-26 RX ORDER — ZONISAMIDE 100 MG/1
300 CAPSULE ORAL DAILY
Qty: 270 CAPSULE | Refills: 2 | Status: SHIPPED | OUTPATIENT
Start: 2025-08-26

## 2025-08-27 ENCOUNTER — HOSPITAL ENCOUNTER (OUTPATIENT)
Dept: LAB | Facility: MEDICAL CENTER | Age: 60
End: 2025-08-27
Attending: NURSE PRACTITIONER
Payer: MEDICARE

## 2025-08-27 ENCOUNTER — HOSPITAL ENCOUNTER (OUTPATIENT)
Dept: LAB | Facility: MEDICAL CENTER | Age: 60
End: 2025-08-27
Attending: PHYSICIAN ASSISTANT
Payer: MEDICARE

## 2025-08-27 ENCOUNTER — NON-PROVIDER VISIT (OUTPATIENT)
Dept: CARDIOLOGY | Facility: PHYSICIAN GROUP | Age: 60
End: 2025-08-27
Payer: MEDICARE

## 2025-08-27 VITALS
HEART RATE: 61 BPM | WEIGHT: 195.33 LBS | DIASTOLIC BLOOD PRESSURE: 60 MMHG | RESPIRATION RATE: 14 BRPM | SYSTOLIC BLOOD PRESSURE: 80 MMHG | HEIGHT: 73 IN | OXYGEN SATURATION: 98 % | BODY MASS INDEX: 25.89 KG/M2

## 2025-08-27 DIAGNOSIS — I50.20 ACC/AHA STAGE C SYSTOLIC HEART FAILURE (HCC): ICD-10-CM

## 2025-08-27 DIAGNOSIS — I10 ESSENTIAL HYPERTENSION: ICD-10-CM

## 2025-08-27 DIAGNOSIS — Z95.1 S/P CABG X 3: ICD-10-CM

## 2025-08-27 DIAGNOSIS — I47.20 VENTRICULAR TACHYCARDIA (HCC): ICD-10-CM

## 2025-08-27 DIAGNOSIS — E78.2 MIXED HYPERLIPIDEMIA: ICD-10-CM

## 2025-08-27 DIAGNOSIS — R77.8 LOW SERUM COMPLEMENT C4: ICD-10-CM

## 2025-08-27 DIAGNOSIS — Z95.810 PRESENCE OF BIVENTRICULAR AICD: ICD-10-CM

## 2025-08-27 DIAGNOSIS — I48.0 PAROXYSMAL ATRIAL FIBRILLATION (HCC): ICD-10-CM

## 2025-08-27 DIAGNOSIS — Z79.01 CHRONIC ANTICOAGULATION: ICD-10-CM

## 2025-08-27 DIAGNOSIS — I25.10 CORONARY ARTERY DISEASE, UNSPECIFIED VESSEL OR LESION TYPE, UNSPECIFIED WHETHER ANGINA PRESENT, UNSPECIFIED WHETHER NATIVE OR TRANSPLANTED HEART: ICD-10-CM

## 2025-08-27 DIAGNOSIS — I25.5 ISCHEMIC CARDIOMYOPATHY: ICD-10-CM

## 2025-08-27 LAB
C4 SERPL-MCNC: 18.6 MG/DL (ref 19–52)
ERYTHROCYTE [DISTWIDTH] IN BLOOD BY AUTOMATED COUNT: 45.5 FL (ref 35.9–50)
HCT VFR BLD AUTO: 51 % (ref 42–52)
HGB BLD-MCNC: 17.7 G/DL (ref 14–18)
MAGNESIUM SERPL-MCNC: 2.1 MG/DL (ref 1.5–2.5)
MCH RBC QN AUTO: 32.5 PG (ref 27–33)
MCHC RBC AUTO-ENTMCNC: 34.7 G/DL (ref 32.3–36.5)
MCV RBC AUTO: 93.6 FL (ref 81.4–97.8)
PLATELET # BLD AUTO: 207 K/UL (ref 164–446)
PMV BLD AUTO: 11.9 FL (ref 9–12.9)
RBC # BLD AUTO: 5.45 M/UL (ref 4.7–6.1)
WBC # BLD AUTO: 8.5 K/UL (ref 4.8–10.8)

## 2025-08-27 PROCEDURE — 86160 COMPLEMENT ANTIGEN: CPT

## 2025-08-27 PROCEDURE — 36415 COLL VENOUS BLD VENIPUNCTURE: CPT

## 2025-08-27 PROCEDURE — 83735 ASSAY OF MAGNESIUM: CPT

## 2025-08-27 PROCEDURE — 85027 COMPLETE CBC AUTOMATED: CPT

## 2025-08-27 RX ORDER — ATORVASTATIN CALCIUM 40 MG/1
40 TABLET, FILM COATED ORAL DAILY
Qty: 200 TABLET | Refills: 3 | Status: SHIPPED | OUTPATIENT
Start: 2025-08-27

## 2025-08-27 RX ORDER — METOPROLOL SUCCINATE 50 MG/1
50 TABLET, EXTENDED RELEASE ORAL DAILY
Qty: 100 TABLET | Refills: 3 | Status: SHIPPED | OUTPATIENT
Start: 2025-08-27

## 2025-08-27 RX ORDER — SACUBITRIL AND VALSARTAN 24; 26 MG/1; MG/1
0.5 TABLET ORAL 2 TIMES DAILY
Qty: 100 TABLET | Refills: 3 | Status: SHIPPED | OUTPATIENT
Start: 2025-08-27

## 2025-08-27 RX ORDER — SOTALOL HYDROCHLORIDE 80 MG/1
80 TABLET ORAL 2 TIMES DAILY
Qty: 200 TABLET | Refills: 3 | Status: SHIPPED | OUTPATIENT
Start: 2025-08-27

## 2025-08-27 RX ORDER — SPIRONOLACTONE 25 MG/1
12.5 TABLET ORAL DAILY
Qty: 50 TABLET | Refills: 3 | Status: SHIPPED | OUTPATIENT
Start: 2025-08-27

## 2025-08-27 ASSESSMENT — LIFESTYLE VARIABLES
AUDIT-C TOTAL SCORE: 6
HOW OFTEN DO YOU HAVE A DRINK CONTAINING ALCOHOL: 2-3 TIMES A WEEK
HOW MANY STANDARD DRINKS CONTAINING ALCOHOL DO YOU HAVE ON A TYPICAL DAY: 1 OR 2
SKIP TO QUESTIONS 9-10: 0
HOW OFTEN DO YOU HAVE SIX OR MORE DRINKS ON ONE OCCASION: WEEKLY

## 2025-08-27 ASSESSMENT — FIBROSIS 4 INDEX: FIB4 SCORE: 1.49

## 2025-08-28 ENCOUNTER — RESULTS FOLLOW-UP (OUTPATIENT)
Dept: CARDIOLOGY | Facility: MEDICAL CENTER | Age: 60
End: 2025-08-28
Payer: MEDICARE

## 2025-08-28 RX ORDER — FLUTICASONE PROPIONATE AND SALMETEROL 250; 50 UG/1; UG/1
1 POWDER RESPIRATORY (INHALATION) EVERY 12 HOURS
Qty: 3 EACH | Refills: 0 | Status: SHIPPED | OUTPATIENT
Start: 2025-08-28

## 2025-08-28 RX ORDER — DULOXETIN HYDROCHLORIDE 60 MG/1
120 CAPSULE, DELAYED RELEASE ORAL DAILY
Qty: 180 CAPSULE | Refills: 0 | Status: SHIPPED | OUTPATIENT
Start: 2025-08-28

## 2025-08-28 RX ORDER — BACLOFEN 10 MG/1
10 TABLET ORAL 2 TIMES DAILY PRN
Qty: 180 TABLET | Refills: 0 | Status: SHIPPED | OUTPATIENT
Start: 2025-08-28

## 2025-08-28 RX ORDER — PANTOPRAZOLE SODIUM 40 MG/1
40 TABLET, DELAYED RELEASE ORAL DAILY
Qty: 90 TABLET | Refills: 0 | Status: SHIPPED | OUTPATIENT
Start: 2025-08-28

## 2025-08-28 RX ORDER — EMPAGLIFLOZIN AND METFORMIN HYDROCHLORIDE 5; 500 MG/1; MG/1
1 TABLET ORAL DAILY
Qty: 400 TABLET | Refills: 0 | Status: SHIPPED | OUTPATIENT
Start: 2025-08-28 | End: 2026-10-02

## 2025-08-28 RX ORDER — BENZONATATE 100 MG/1
100 CAPSULE ORAL 3 TIMES DAILY PRN
Qty: 90 CAPSULE | Refills: 0 | Status: SHIPPED | OUTPATIENT
Start: 2025-08-28

## 2025-08-28 RX ORDER — ALBUTEROL SULFATE 90 UG/1
2 INHALANT RESPIRATORY (INHALATION) EVERY 4 HOURS PRN
Qty: 18 G | Refills: 0 | Status: SHIPPED | OUTPATIENT
Start: 2025-08-28

## 2025-10-20 ENCOUNTER — APPOINTMENT (OUTPATIENT)
Dept: MEDICAL GROUP | Facility: CLINIC | Age: 60
End: 2025-10-20
Payer: MEDICARE